# Patient Record
Sex: MALE | Race: WHITE | NOT HISPANIC OR LATINO | Employment: OTHER | URBAN - METROPOLITAN AREA
[De-identification: names, ages, dates, MRNs, and addresses within clinical notes are randomized per-mention and may not be internally consistent; named-entity substitution may affect disease eponyms.]

---

## 2022-05-26 ENCOUNTER — TRANSCRIBE ORDERS (OUTPATIENT)
Dept: HOME HEALTH SERVICES | Facility: HOME HEALTHCARE | Age: 77
End: 2022-05-26

## 2022-05-26 ENCOUNTER — HOME HEALTH ADMISSION (OUTPATIENT)
Dept: HOME HEALTH SERVICES | Facility: HOME HEALTHCARE | Age: 77
End: 2022-05-26

## 2022-05-26 DIAGNOSIS — L03.115 CELLULITIS OF RIGHT FOOT: Primary | ICD-10-CM

## 2022-05-27 ENCOUNTER — HOME CARE VISIT (OUTPATIENT)
Dept: HOME HEALTH SERVICES | Facility: HOME HEALTHCARE | Age: 77
End: 2022-05-27

## 2022-05-27 PROCEDURE — G0299 HHS/HOSPICE OF RN EA 15 MIN: HCPCS

## 2022-05-30 NOTE — HOME HEALTH
Home Health ordered for: SN  Reason for hosp/primary dx/co-morbidities: cellulitis BLE's, afib, HTN, morbid obesity, Covid-19  Focus of care:  Wound care BLE's  Current functional status/mobility/DME:  Minimal assist w/ walker or wheelchair  HB status/living arrangements:  Patient is homebound and lives with spouse.   Skin integrity/wound status:  Several open wounds to BLE's.   Code status:  DNR  Fall risk:  Patient is a risk for falls  POC confirmed with Dr. Perez's office on 5.27.22.

## 2022-05-31 ENCOUNTER — HOME CARE VISIT (OUTPATIENT)
Dept: HOME HEALTH SERVICES | Facility: HOME HEALTHCARE | Age: 77
End: 2022-05-31

## 2022-05-31 VITALS
TEMPERATURE: 98.9 F | SYSTOLIC BLOOD PRESSURE: 134 MMHG | DIASTOLIC BLOOD PRESSURE: 78 MMHG | RESPIRATION RATE: 14 BRPM | OXYGEN SATURATION: 93 % | HEART RATE: 67 BPM

## 2022-05-31 PROCEDURE — G0300 HHS/HOSPICE OF LPN EA 15 MIN: HCPCS

## 2022-05-31 NOTE — HOME HEALTH
Routine Visit Note: LPN    Skill/education provided: *wound care/unna boots    Patient/caregiver response: pt will call [facility] if s/s of infection noted such increased pain , drainage, swelling odor.    Plan for next visit: Wound care/ unna boots    Other pertinent info: non measurable wounds

## 2022-06-03 ENCOUNTER — HOME CARE VISIT (OUTPATIENT)
Dept: HOME HEALTH SERVICES | Facility: HOME HEALTHCARE | Age: 77
End: 2022-06-03

## 2022-06-03 VITALS
RESPIRATION RATE: 18 BRPM | DIASTOLIC BLOOD PRESSURE: 72 MMHG | TEMPERATURE: 97.9 F | OXYGEN SATURATION: 95 % | HEART RATE: 76 BPM | SYSTOLIC BLOOD PRESSURE: 118 MMHG

## 2022-06-03 PROCEDURE — G0299 HHS/HOSPICE OF RN EA 15 MIN: HCPCS

## 2022-06-03 NOTE — HOME HEALTH
SNV for wound care to BLE's. Dressings removed and unaboots applied to BLE's. Wounds are improving and scabbing over in some areas. Patient states he may go see ID on 6/7. Will continue SN visits next week for wound care.

## 2022-06-06 ENCOUNTER — HOME CARE VISIT (OUTPATIENT)
Dept: HOME HEALTH SERVICES | Facility: HOME HEALTHCARE | Age: 77
End: 2022-06-06

## 2022-06-06 PROCEDURE — G0300 HHS/HOSPICE OF LPN EA 15 MIN: HCPCS

## 2022-06-07 VITALS
DIASTOLIC BLOOD PRESSURE: 74 MMHG | TEMPERATURE: 97.2 F | RESPIRATION RATE: 15 BRPM | HEART RATE: 76 BPM | OXYGEN SATURATION: 97 % | SYSTOLIC BLOOD PRESSURE: 120 MMHG

## 2022-06-10 ENCOUNTER — HOME CARE VISIT (OUTPATIENT)
Dept: HOME HEALTH SERVICES | Facility: HOME HEALTHCARE | Age: 77
End: 2022-06-10

## 2022-06-10 PROCEDURE — G0299 HHS/HOSPICE OF RN EA 15 MIN: HCPCS

## 2022-06-12 VITALS
HEART RATE: 73 BPM | OXYGEN SATURATION: 96 % | TEMPERATURE: 97.6 F | DIASTOLIC BLOOD PRESSURE: 72 MMHG | RESPIRATION RATE: 24 BRPM | SYSTOLIC BLOOD PRESSURE: 124 MMHG

## 2022-06-12 NOTE — HOME HEALTH
SNV for unna boots BLE's.  Legs are healing well.  Continued care next week for unna boot change.  Patient to see Chris on Tuesday.

## 2022-06-15 ENCOUNTER — HOME CARE VISIT (OUTPATIENT)
Dept: HOME HEALTH SERVICES | Facility: HOME HEALTHCARE | Age: 77
End: 2022-06-15

## 2022-06-15 VITALS
OXYGEN SATURATION: 99 % | SYSTOLIC BLOOD PRESSURE: 116 MMHG | TEMPERATURE: 98.6 F | HEART RATE: 66 BPM | DIASTOLIC BLOOD PRESSURE: 68 MMHG | RESPIRATION RATE: 12 BRPM

## 2022-06-15 PROCEDURE — G0300 HHS/HOSPICE OF LPN EA 15 MIN: HCPCS

## 2022-06-15 NOTE — HOME HEALTH
Routine Visit Note:  LPN    Skill/education provided: unna boot change to BLE, mulitiple small venous skin lesions not measurable. wounds are healing. Edema to BLE non pitting 3+.    Patient/caregiver response: Pt will call [facility] if s/s of infection are noted.    Plan for next visit: unna boot change to BLE wound care.    Other pertinent info: supplies ordered today

## 2022-06-18 ENCOUNTER — HOME CARE VISIT (OUTPATIENT)
Dept: HOME HEALTH SERVICES | Facility: HOME HEALTHCARE | Age: 77
End: 2022-06-18

## 2022-06-18 VITALS
HEART RATE: 64 BPM | OXYGEN SATURATION: 95 % | DIASTOLIC BLOOD PRESSURE: 61 MMHG | RESPIRATION RATE: 16 BRPM | SYSTOLIC BLOOD PRESSURE: 129 MMHG | TEMPERATURE: 98 F

## 2022-06-18 PROCEDURE — G0299 HHS/HOSPICE OF RN EA 15 MIN: HCPCS

## 2022-06-21 ENCOUNTER — HOME CARE VISIT (OUTPATIENT)
Dept: HOME HEALTH SERVICES | Facility: HOME HEALTHCARE | Age: 77
End: 2022-06-21

## 2022-06-21 NOTE — CASE COMMUNICATION
Patient missed a visit from [facility] on 6/21/2022    Reason:Vascular MD appt      For your records only.   As per home health protocol, MD must be notified of missed/cancelled visits; therefore the prescribed frequency was not met.

## 2022-06-28 ENCOUNTER — HOME CARE VISIT (OUTPATIENT)
Dept: HOME HEALTH SERVICES | Facility: HOME HEALTHCARE | Age: 77
End: 2022-06-28

## 2022-06-28 VITALS
SYSTOLIC BLOOD PRESSURE: 132 MMHG | TEMPERATURE: 97.7 F | RESPIRATION RATE: 16 BRPM | OXYGEN SATURATION: 96 % | HEART RATE: 67 BPM | DIASTOLIC BLOOD PRESSURE: 85 MMHG

## 2022-06-28 PROCEDURE — G0299 HHS/HOSPICE OF RN EA 15 MIN: HCPCS

## 2022-11-20 ENCOUNTER — LAB (OUTPATIENT)
Dept: LAB | Facility: HOSPITAL | Age: 77
End: 2022-11-20

## 2022-11-20 ENCOUNTER — TRANSCRIBE ORDERS (OUTPATIENT)
Dept: LAB | Facility: HOSPITAL | Age: 77
End: 2022-11-20

## 2022-11-20 DIAGNOSIS — L03.115 CELLULITIS OF RIGHT FOOT: ICD-10-CM

## 2022-11-20 DIAGNOSIS — S80.812A INFECTED ABRASION OF LEFT LOWER EXTREMITY, INITIAL ENCOUNTER: Primary | ICD-10-CM

## 2022-11-20 DIAGNOSIS — L08.9 INFECTED ABRASION OF LEFT LOWER EXTREMITY, INITIAL ENCOUNTER: Primary | ICD-10-CM

## 2023-06-09 ENCOUNTER — PRE-ADMISSION TESTING (OUTPATIENT)
Dept: PREADMISSION TESTING | Facility: HOSPITAL | Age: 78
End: 2023-06-09
Payer: MEDICARE

## 2023-06-09 VITALS — BODY MASS INDEX: 41.75 KG/M2 | HEIGHT: 73 IN | WEIGHT: 315 LBS

## 2023-06-09 LAB
25(OH)D3 SERPL-MCNC: 34.7 NG/ML (ref 30–100)
ALBUMIN SERPL-MCNC: 4.2 G/DL (ref 3.5–5.2)
ALBUMIN/GLOB SERPL: 1.9 G/DL
ALP SERPL-CCNC: 50 U/L (ref 39–117)
ALT SERPL W P-5'-P-CCNC: 26 U/L (ref 1–41)
ANION GAP SERPL CALCULATED.3IONS-SCNC: 10 MMOL/L (ref 5–15)
APTT PPP: 36.3 SECONDS (ref 22–39)
AST SERPL-CCNC: 21 U/L (ref 1–40)
BASOPHILS # BLD AUTO: 0.04 10*3/MM3 (ref 0–0.2)
BASOPHILS NFR BLD AUTO: 0.5 % (ref 0–1.5)
BILIRUB SERPL-MCNC: 0.5 MG/DL (ref 0–1.2)
BUN SERPL-MCNC: 14 MG/DL (ref 8–23)
BUN/CREAT SERPL: 18.7 (ref 7–25)
CALCIUM SPEC-SCNC: 9.6 MG/DL (ref 8.6–10.5)
CHLORIDE SERPL-SCNC: 102 MMOL/L (ref 98–107)
CO2 SERPL-SCNC: 28 MMOL/L (ref 22–29)
CREAT SERPL-MCNC: 0.75 MG/DL (ref 0.76–1.27)
CRP SERPL-MCNC: 1.1 MG/DL (ref 0–0.5)
DEPRECATED RDW RBC AUTO: 47.6 FL (ref 37–54)
EGFRCR SERPLBLD CKD-EPI 2021: 93.5 ML/MIN/1.73
EOSINOPHIL # BLD AUTO: 0.21 10*3/MM3 (ref 0–0.4)
EOSINOPHIL NFR BLD AUTO: 2.4 % (ref 0.3–6.2)
ERYTHROCYTE [DISTWIDTH] IN BLOOD BY AUTOMATED COUNT: 13.4 % (ref 12.3–15.4)
ERYTHROCYTE [SEDIMENTATION RATE] IN BLOOD: 26 MM/HR (ref 0–20)
GLOBULIN UR ELPH-MCNC: 2.2 GM/DL
GLUCOSE SERPL-MCNC: 100 MG/DL (ref 65–99)
HBA1C MFR BLD: 5 % (ref 4.8–5.6)
HCT VFR BLD AUTO: 39.5 % (ref 37.5–51)
HGB BLD-MCNC: 13.4 G/DL (ref 13–17.7)
IMM GRANULOCYTES # BLD AUTO: 0.02 10*3/MM3 (ref 0–0.05)
IMM GRANULOCYTES NFR BLD AUTO: 0.2 % (ref 0–0.5)
INR PPP: 1.17 (ref 0.89–1.12)
LYMPHOCYTES # BLD AUTO: 1.71 10*3/MM3 (ref 0.7–3.1)
LYMPHOCYTES NFR BLD AUTO: 19.3 % (ref 19.6–45.3)
MCH RBC QN AUTO: 32.8 PG (ref 26.6–33)
MCHC RBC AUTO-ENTMCNC: 33.9 G/DL (ref 31.5–35.7)
MCV RBC AUTO: 96.6 FL (ref 79–97)
MONOCYTES # BLD AUTO: 0.56 10*3/MM3 (ref 0.1–0.9)
MONOCYTES NFR BLD AUTO: 6.3 % (ref 5–12)
NEUTROPHILS NFR BLD AUTO: 6.32 10*3/MM3 (ref 1.7–7)
NEUTROPHILS NFR BLD AUTO: 71.3 % (ref 42.7–76)
NRBC BLD AUTO-RTO: 0 /100 WBC (ref 0–0.2)
PLATELET # BLD AUTO: 149 10*3/MM3 (ref 140–450)
PMV BLD AUTO: 9.7 FL (ref 6–12)
POTASSIUM SERPL-SCNC: 4.5 MMOL/L (ref 3.5–5.2)
PROT SERPL-MCNC: 6.4 G/DL (ref 6–8.5)
PROTHROMBIN TIME: 15 SECONDS (ref 12.2–14.5)
QT INTERVAL: 396 MS
QTC INTERVAL: 462 MS
RBC # BLD AUTO: 4.09 10*6/MM3 (ref 4.14–5.8)
SODIUM SERPL-SCNC: 140 MMOL/L (ref 136–145)
WBC NRBC COR # BLD: 8.86 10*3/MM3 (ref 3.4–10.8)

## 2023-06-09 PROCEDURE — 80053 COMPREHEN METABOLIC PANEL: CPT

## 2023-06-09 PROCEDURE — 85730 THROMBOPLASTIN TIME PARTIAL: CPT

## 2023-06-09 PROCEDURE — 87081 CULTURE SCREEN ONLY: CPT

## 2023-06-09 PROCEDURE — G0480 DRUG TEST DEF 1-7 CLASSES: HCPCS

## 2023-06-09 PROCEDURE — 82985 ASSAY OF GLYCATED PROTEIN: CPT

## 2023-06-09 PROCEDURE — 93010 ELECTROCARDIOGRAM REPORT: CPT | Performed by: INTERNAL MEDICINE

## 2023-06-09 PROCEDURE — 36415 COLL VENOUS BLD VENIPUNCTURE: CPT

## 2023-06-09 PROCEDURE — 85610 PROTHROMBIN TIME: CPT

## 2023-06-09 PROCEDURE — 82306 VITAMIN D 25 HYDROXY: CPT

## 2023-06-09 PROCEDURE — 93005 ELECTROCARDIOGRAM TRACING: CPT

## 2023-06-09 PROCEDURE — 83036 HEMOGLOBIN GLYCOSYLATED A1C: CPT

## 2023-06-09 PROCEDURE — 85652 RBC SED RATE AUTOMATED: CPT

## 2023-06-09 PROCEDURE — 85025 COMPLETE CBC W/AUTO DIFF WBC: CPT

## 2023-06-09 PROCEDURE — 86140 C-REACTIVE PROTEIN: CPT

## 2023-06-09 RX ORDER — KETOCONAZOLE 20 MG/ML
SHAMPOO TOPICAL WEEKLY
COMMUNITY

## 2023-06-09 RX ORDER — SENNOSIDES 8.6 MG
650 CAPSULE ORAL EVERY 8 HOURS PRN
COMMUNITY

## 2023-06-09 RX ORDER — DOXYCYCLINE HYCLATE 100 MG
1 TABLET ORAL EVERY 12 HOURS SCHEDULED
COMMUNITY
Start: 2023-05-31

## 2023-06-09 RX ORDER — APIXABAN 5 MG/1
1 TABLET, FILM COATED ORAL EVERY 12 HOURS SCHEDULED
COMMUNITY
Start: 2023-04-29

## 2023-06-09 RX ORDER — FEXOFENADINE HCL 180 MG/1
180 TABLET ORAL 2 TIMES DAILY
COMMUNITY

## 2023-06-09 RX ORDER — MUPIROCIN CALCIUM 20 MG/G
CREAM TOPICAL
COMMUNITY

## 2023-06-09 RX ORDER — AMOXICILLIN 250 MG
1 CAPSULE ORAL DAILY
COMMUNITY

## 2023-06-09 NOTE — PAT
An arrival time for procedure was not provided during PAT visit. If patient had any questions or concerns about their arrival time, they were instructed to contact their surgeon/physician.  Additionally, if the patient referred to an arrival time that was acquired from their my chart account, patient was encouraged to verify that time with their surgeon/physician. Arrival times are NOT provided in Pre Admission Testing Department.    Patient viewed general PAT education video as instructed in their preoperative information received from their surgeon.  Patient stated the general PAT education video was viewed in its entirety and survey completed.  Copies of PAT general education handouts (Incentive Spirometry, Meds to Beds Program, Patient Belongings, Pre-op skin preparation instructions, Blood Glucose testing, Visitor policy, Surgery FAQ, Code H) distributed to patient if not printed. Education related to the PAT pass and skin preparation for surgery (if applicable) completed in PAT as a reinforcement to PAT education video. Patient instructed to return PAT pass provided today as well as completed skin preparation sheet (if applicable) on the day of procedure.     Additionally if patient had not viewed video yet but intended to view it at home or in our waiting area, then referred them to the handout with QR code/link provided during PAT visit.  Instructed patient to complete survey after viewing the video in its entirety.  Encouraged patient/family to read PAT general education handouts thoroughly and notify PAT staff with any questions or concerns. Patient verbalized understanding of all information and priority content.    Patient denies any current skin issues.     Patient to apply Chlorhexadine wipes  to surgical area (as instructed) the night before procedure and the AM of procedure. Wipes provided.    Patient instructed to drink 20 ounces of Gatorade and it needs to be completed 1 hour (for Main OR patients)  or 2 hours ([facility]) before given arrival time for procedure (NO RED Gatorade)    Patient verbalized understanding.    Prescription for Chlorhexidine shower called into patient's pharmacy or [facility] pharmacy by patient's surgeon.  Reinforced with patient to  the prescription from applicable pharmacy if they haven't already.  Verbal and written instructions given regarding proper use of Chlorhexidine body wash to patient and/or famlily during PAT visit. Patient/family also instructed to complete checklist and return it to Pre-op on the day of surgery.  Patient and/or family verbalized understanding.    [employer] (by [employer]) pain pump patient informational handout given to patient.  Instructed patient to watch [employer] Patient Education Video regarding Peripheral Nerve Catheter that will be in place for upcoming surgery unless contraindicated. The video can be accessed using QR code noted on handout.  Patient agreed to watch video.  Stressed to patient to call [employer] Nursing Hotline  if patient has any questions or concerns after discharge.     Post-Surgery Information Instruction Sheet given to patient during Pre-Admission Testing Visit with verbal instructions to patient to return with PAT PASS on the day of surgery. Additionally, encouraged patient to review the information provided.    Discussed with patient options for receiving total joint replacement education and assessed patient's ability and preference. Joint Replacement Guide given to patient during PAT visit since not received a copy within the last year. Encouraged patient/family to read guide thoroughly and notify PAT staff with any questions or concerns. Handout provided directing patient to links to watch online videos related to joint replacement surgery on the [facility] website. The handout gives detailed instructions for joining an online joint replacement class through Zoom or phone  "conference offered on Thursdays. Patient agreed to participate by watching videos online. Patient verbalized understanding of instructions and to complete the online learning tool survey. Encouraged to share information with family and/or . An overview of the joint replacement education was provided during the visit including general perioperative instructions that are routine for all surgical patients (PAT PASS, wipes, directions to pre-op, etc.).    Verified patient previously completed cardiology visit for cardiac risk assessment in preparation for upcoming procedure, completion of 12-lead ECG within six months, and risk assessment letter reviewed. No further interventions required.   CARDIAC RISK ASSESSMENT FROM DR. RICKS ON 5/18/23 IN EPIC UNDER \"MEDIA\" TAB. PT TO HOLD ELIQUIS STARTING (3) DAYS PRIOR TO PROCEDURE. PT VERBALIZED UNDERSTANDING.      "

## 2023-06-10 LAB — MRSA SPEC QL CULT: NORMAL

## 2023-06-11 LAB — FRUCTOSAMINE SERPL-SCNC: 229 UMOL/L (ref 0–285)

## 2023-06-14 LAB
COTININE SERPL-MCNC: <1 NG/ML
NICOTINE SERPL-MCNC: <1 NG/ML

## 2023-06-22 PROBLEM — Z79.01 CHRONIC ANTICOAGULATION: Status: ACTIVE | Noted: 2023-06-22

## 2023-06-22 PROBLEM — Z96.651 S/P TKR (TOTAL KNEE REPLACEMENT), RIGHT: Status: ACTIVE | Noted: 2023-06-22

## 2023-06-22 PROBLEM — G47.30 SLEEP APNEA: Status: ACTIVE | Noted: 2023-06-22

## 2023-06-22 PROBLEM — I10 HTN (HYPERTENSION): Status: ACTIVE | Noted: 2023-06-22

## 2023-06-22 PROBLEM — M25.561 RIGHT KNEE PAIN: Status: ACTIVE | Noted: 2023-06-22

## 2023-06-22 PROBLEM — E66.01 MORBID OBESITY: Status: ACTIVE | Noted: 2023-06-22

## 2023-06-25 PROBLEM — G89.18 ACUTE POSTOPERATIVE PAIN: Status: ACTIVE | Noted: 2023-06-25

## 2024-08-30 ENCOUNTER — HOSPITAL ENCOUNTER (INPATIENT)
Facility: HOSPITAL | Age: 79
LOS: 11 days | Discharge: NURSING, REHAB OR LONG-TERM CARE | DRG: 871 | End: 2024-09-10
Attending: EMERGENCY MEDICINE | Admitting: INTERNAL MEDICINE
Payer: MEDICARE

## 2024-08-30 ENCOUNTER — APPOINTMENT (OUTPATIENT)
Dept: GENERAL RADIOLOGY | Facility: HOSPITAL | Age: 79
DRG: 871 | End: 2024-08-30
Payer: MEDICARE

## 2024-08-30 DIAGNOSIS — J96.01 ACUTE RESPIRATORY FAILURE WITH HYPOXEMIA: Primary | ICD-10-CM

## 2024-08-30 DIAGNOSIS — A41.9 ACUTE SEPSIS: ICD-10-CM

## 2024-08-30 DIAGNOSIS — J81.0 FLASH PULMONARY EDEMA: ICD-10-CM

## 2024-08-30 DIAGNOSIS — I50.20 HFREF (HEART FAILURE WITH REDUCED EJECTION FRACTION): ICD-10-CM

## 2024-08-30 DIAGNOSIS — G89.21 CHRONIC PAIN DUE TO TRAUMA: ICD-10-CM

## 2024-08-30 DIAGNOSIS — I49.3 FREQUENT PVCS: ICD-10-CM

## 2024-08-30 DIAGNOSIS — I16.1 HYPERTENSIVE EMERGENCY: ICD-10-CM

## 2024-08-30 DIAGNOSIS — I48.19 PERSISTENT ATRIAL FIBRILLATION: ICD-10-CM

## 2024-08-30 DIAGNOSIS — J18.9 PNEUMONIA OF BOTH LOWER LOBES DUE TO INFECTIOUS ORGANISM: ICD-10-CM

## 2024-08-30 DIAGNOSIS — J96.02 ACUTE RESPIRATORY FAILURE WITH HYPOXIA AND HYPERCAPNIA: ICD-10-CM

## 2024-08-30 DIAGNOSIS — J96.01 ACUTE RESPIRATORY FAILURE WITH HYPOXIA AND HYPERCAPNIA: ICD-10-CM

## 2024-08-30 PROBLEM — G47.30 SLEEP APNEA: Status: RESOLVED | Noted: 2023-06-22 | Resolved: 2024-08-30

## 2024-08-30 PROBLEM — Z72.0 CONTINUOUS TOBACCO ABUSE: Status: ACTIVE | Noted: 2024-08-30

## 2024-08-30 LAB
ABO GROUP BLD: NORMAL
ABO GROUP BLD: NORMAL
ALBUMIN SERPL-MCNC: 3 G/DL (ref 3.5–5.2)
ALBUMIN SERPL-MCNC: 3.6 G/DL (ref 3.5–5.2)
ALBUMIN/GLOB SERPL: 1.3 G/DL
ALBUMIN/GLOB SERPL: 1.3 G/DL
ALP SERPL-CCNC: 42 U/L (ref 39–117)
ALP SERPL-CCNC: 54 U/L (ref 39–117)
ALT SERPL W P-5'-P-CCNC: 14 U/L (ref 1–41)
ALT SERPL W P-5'-P-CCNC: 14 U/L (ref 1–41)
ANION GAP SERPL CALCULATED.3IONS-SCNC: 11 MMOL/L (ref 5–15)
ANION GAP SERPL CALCULATED.3IONS-SCNC: 12 MMOL/L (ref 5–15)
APTT PPP: 25 SECONDS (ref 60–90)
ARTERIAL PATENCY WRIST A: ABNORMAL
AST SERPL-CCNC: 22 U/L (ref 1–40)
AST SERPL-CCNC: 27 U/L (ref 1–40)
ATMOSPHERIC PRESS: ABNORMAL MM[HG]
B PARAPERT DNA SPEC QL NAA+PROBE: NOT DETECTED
B PERT DNA SPEC QL NAA+PROBE: NOT DETECTED
BACTERIA UR QL AUTO: ABNORMAL /HPF
BASE EXCESS BLDA CALC-SCNC: -1.2 MMOL/L (ref 0–2)
BASE EXCESS BLDA CALC-SCNC: -1.5 MMOL/L (ref 0–2)
BASE EXCESS BLDA CALC-SCNC: -7.8 MMOL/L (ref 0–2)
BASOPHILS # BLD AUTO: 0.08 10*3/MM3 (ref 0–0.2)
BASOPHILS # BLD MANUAL: 0 10*3/MM3 (ref 0–0.2)
BASOPHILS NFR BLD AUTO: 0.3 % (ref 0–1.5)
BASOPHILS NFR BLD MANUAL: 0 % (ref 0–1.5)
BDY SITE: ABNORMAL
BILIRUB SERPL-MCNC: 0.5 MG/DL (ref 0–1.2)
BILIRUB SERPL-MCNC: 0.8 MG/DL (ref 0–1.2)
BILIRUB UR QL STRIP: NEGATIVE
BLD GP AB SCN SERPL QL: NEGATIVE
BODY TEMPERATURE: 37
BUN SERPL-MCNC: 19 MG/DL (ref 8–23)
BUN SERPL-MCNC: 21 MG/DL (ref 8–23)
BUN/CREAT SERPL: 17.2 (ref 7–25)
BUN/CREAT SERPL: 19 (ref 7–25)
C PNEUM DNA NPH QL NAA+NON-PROBE: NOT DETECTED
CALCIUM SPEC-SCNC: 7.4 MG/DL (ref 8.6–10.5)
CALCIUM SPEC-SCNC: 8.3 MG/DL (ref 8.6–10.5)
CHLORIDE SERPL-SCNC: 105 MMOL/L (ref 98–107)
CHLORIDE SERPL-SCNC: 110 MMOL/L (ref 98–107)
CK SERPL-CCNC: 71 U/L (ref 20–200)
CLARITY UR: CLEAR
CO2 BLDA-SCNC: 24.3 MMOL/L (ref 22–33)
CO2 BLDA-SCNC: 26.1 MMOL/L (ref 22–33)
CO2 BLDA-SCNC: 28.7 MMOL/L (ref 22–33)
CO2 SERPL-SCNC: 21 MMOL/L (ref 22–29)
CO2 SERPL-SCNC: 24 MMOL/L (ref 22–29)
COARSE GRAN CASTS URNS QL MICRO: ABNORMAL /LPF
COHGB MFR BLD: 1.3 % (ref 0–2)
COHGB MFR BLD: 1.4 % (ref 0–2)
COHGB MFR BLD: 1.5 % (ref 0–2)
COLOR UR: YELLOW
CREAT SERPL-MCNC: 1 MG/DL (ref 0.76–1.27)
CREAT SERPL-MCNC: 1.22 MG/DL (ref 0.76–1.27)
CRP SERPL-MCNC: 0.81 MG/DL (ref 0–0.5)
D-LACTATE SERPL-SCNC: 7.3 MMOL/L (ref 0.5–2)
DEPRECATED RDW RBC AUTO: 57.8 FL (ref 37–54)
DEPRECATED RDW RBC AUTO: 59.7 FL (ref 37–54)
EGFRCR SERPLBLD CKD-EPI 2021: 60.7 ML/MIN/1.73
EGFRCR SERPLBLD CKD-EPI 2021: 77 ML/MIN/1.73
EOSINOPHIL # BLD AUTO: 0.04 10*3/MM3 (ref 0–0.4)
EOSINOPHIL # BLD MANUAL: 0.8 10*3/MM3 (ref 0–0.4)
EOSINOPHIL NFR BLD AUTO: 0.2 % (ref 0.3–6.2)
EOSINOPHIL NFR BLD MANUAL: 3 % (ref 0.3–6.2)
EPAP: 0
ERYTHROCYTE [DISTWIDTH] IN BLOOD BY AUTOMATED COUNT: 15.5 % (ref 12.3–15.4)
ERYTHROCYTE [DISTWIDTH] IN BLOOD BY AUTOMATED COUNT: 15.7 % (ref 12.3–15.4)
ERYTHROCYTE [SEDIMENTATION RATE] IN BLOOD: 18 MM/HR (ref 0–20)
FLUAV SUBTYP SPEC NAA+PROBE: NOT DETECTED
FLUBV RNA ISLT QL NAA+PROBE: NOT DETECTED
GEN 5 2HR TROPONIN T REFLEX: 519 NG/L
GLOBULIN UR ELPH-MCNC: 2.4 GM/DL
GLOBULIN UR ELPH-MCNC: 2.8 GM/DL
GLUCOSE BLDC GLUCOMTR-MCNC: 130 MG/DL (ref 70–130)
GLUCOSE BLDC GLUCOMTR-MCNC: 137 MG/DL (ref 70–130)
GLUCOSE BLDC GLUCOMTR-MCNC: 190 MG/DL (ref 70–130)
GLUCOSE SERPL-MCNC: 130 MG/DL (ref 65–99)
GLUCOSE SERPL-MCNC: 224 MG/DL (ref 65–99)
GLUCOSE UR STRIP-MCNC: NEGATIVE MG/DL
HADV DNA SPEC NAA+PROBE: NOT DETECTED
HBA1C MFR BLD: 5 % (ref 4.8–5.6)
HCO3 BLDA-SCNC: 23.2 MMOL/L (ref 20–26)
HCO3 BLDA-SCNC: 23.7 MMOL/L (ref 20–26)
HCO3 BLDA-SCNC: 26.9 MMOL/L (ref 20–26)
HCOV 229E RNA SPEC QL NAA+PROBE: NOT DETECTED
HCOV HKU1 RNA SPEC QL NAA+PROBE: NOT DETECTED
HCOV NL63 RNA SPEC QL NAA+PROBE: NOT DETECTED
HCOV OC43 RNA SPEC QL NAA+PROBE: NOT DETECTED
HCT VFR BLD AUTO: 39.1 % (ref 37.5–51)
HCT VFR BLD AUTO: 43.9 % (ref 37.5–51)
HCT VFR BLD CALC: 37.4 % (ref 38–51)
HCT VFR BLD CALC: 38.9 % (ref 38–51)
HCT VFR BLD CALC: 42.1 % (ref 38–51)
HGB BLD-MCNC: 12.6 G/DL (ref 13–17.7)
HGB BLD-MCNC: 13.6 G/DL (ref 13–17.7)
HGB BLDA-MCNC: 12.2 G/DL (ref 13.5–17.5)
HGB BLDA-MCNC: 12.7 G/DL (ref 13.5–17.5)
HGB BLDA-MCNC: 13.7 G/DL (ref 13.5–17.5)
HGB UR QL STRIP.AUTO: NEGATIVE
HMPV RNA NPH QL NAA+NON-PROBE: NOT DETECTED
HPIV1 RNA ISLT QL NAA+PROBE: NOT DETECTED
HPIV2 RNA SPEC QL NAA+PROBE: NOT DETECTED
HPIV3 RNA NPH QL NAA+PROBE: NOT DETECTED
HPIV4 P GENE NPH QL NAA+PROBE: NOT DETECTED
HYALINE CASTS UR QL AUTO: ABNORMAL /LPF
IMM GRANULOCYTES # BLD AUTO: 0.16 10*3/MM3 (ref 0–0.05)
IMM GRANULOCYTES NFR BLD AUTO: 0.6 % (ref 0–0.5)
INHALED O2 CONCENTRATION: 100 %
INHALED O2 CONCENTRATION: 100 %
INHALED O2 CONCENTRATION: 60 %
INR PPP: 1.07 (ref 0.89–1.12)
IPAP: 0
KETONES UR QL STRIP: NEGATIVE
L PNEUMO1 AG UR QL IA: NEGATIVE
LEUKOCYTE ESTERASE UR QL STRIP.AUTO: NEGATIVE
LIPASE SERPL-CCNC: 16 U/L (ref 13–60)
LYMPHOCYTES # BLD AUTO: 1.57 10*3/MM3 (ref 0.7–3.1)
LYMPHOCYTES # BLD MANUAL: 7.2 10*3/MM3 (ref 0.7–3.1)
LYMPHOCYTES NFR BLD AUTO: 6.4 % (ref 19.6–45.3)
LYMPHOCYTES NFR BLD MANUAL: 3 % (ref 5–12)
Lab: ABNORMAL
M PNEUMO IGG SER IA-ACNC: NOT DETECTED
MAGNESIUM SERPL-MCNC: 2.2 MG/DL (ref 1.6–2.4)
MCH RBC QN AUTO: 32.1 PG (ref 26.6–33)
MCH RBC QN AUTO: 32.5 PG (ref 26.6–33)
MCHC RBC AUTO-ENTMCNC: 31 G/DL (ref 31.5–35.7)
MCHC RBC AUTO-ENTMCNC: 32.2 G/DL (ref 31.5–35.7)
MCV RBC AUTO: 104.8 FL (ref 79–97)
MCV RBC AUTO: 99.5 FL (ref 79–97)
METAMYELOCYTES NFR BLD MANUAL: 1 % (ref 0–0)
METHGB BLD QL: 0 % (ref 0–1.5)
METHGB BLD QL: 0.3 % (ref 0–1.5)
METHGB BLD QL: 0.3 % (ref 0–1.5)
MODALITY: ABNORMAL
MONOCYTES # BLD AUTO: 0.94 10*3/MM3 (ref 0.1–0.9)
MONOCYTES # BLD: 0.8 10*3/MM3 (ref 0.1–0.9)
MONOCYTES NFR BLD AUTO: 3.8 % (ref 5–12)
MRSA DNA SPEC QL NAA+PROBE: NEGATIVE
NEUTROPHILS # BLD AUTO: 17.59 10*3/MM3 (ref 1.7–7)
NEUTROPHILS NFR BLD AUTO: 21.91 10*3/MM3 (ref 1.7–7)
NEUTROPHILS NFR BLD AUTO: 88.7 % (ref 42.7–76)
NEUTROPHILS NFR BLD MANUAL: 64 % (ref 42.7–76)
NEUTS BAND NFR BLD MANUAL: 2 % (ref 0–5)
NITRITE UR QL STRIP: NEGATIVE
NOTIFIED BY: ABNORMAL
NOTIFIED WHO: ABNORMAL
NRBC BLD AUTO-RTO: 0 /100 WBC (ref 0–0.2)
NT-PROBNP SERPL-MCNC: 1061 PG/ML (ref 0–1800)
OXYHGB MFR BLDV: 88.8 % (ref 94–99)
OXYHGB MFR BLDV: 94.6 % (ref 94–99)
OXYHGB MFR BLDV: 97.8 % (ref 94–99)
PAW @ PEAK INSP FLOW SETTING VENT: 0 CMH2O
PCO2 BLDA: 38 MM HG (ref 35–45)
PCO2 BLDA: 59.8 MM HG (ref 35–45)
PCO2 BLDA: 78.8 MM HG (ref 35–45)
PCO2 TEMP ADJ BLD: 38 MM HG (ref 35–48)
PCO2 TEMP ADJ BLD: 59.8 MM HG (ref 35–48)
PCO2 TEMP ADJ BLD: 78.8 MM HG (ref 35–48)
PEEP RESPIRATORY: 10 CM[H2O]
PH BLDA: 7.09 PH UNITS (ref 7.35–7.45)
PH BLDA: 7.26 PH UNITS (ref 7.35–7.45)
PH BLDA: 7.39 PH UNITS (ref 7.35–7.45)
PH UR STRIP.AUTO: 5.5 [PH] (ref 5–8)
PH, TEMP CORRECTED: 7.09 PH UNITS
PH, TEMP CORRECTED: 7.26 PH UNITS
PH, TEMP CORRECTED: 7.39 PH UNITS
PHOSPHATE SERPL-MCNC: 5.6 MG/DL (ref 2.5–4.5)
PLAT MORPH BLD: NORMAL
PLATELET # BLD AUTO: 178 10*3/MM3 (ref 140–450)
PLATELET # BLD AUTO: 242 10*3/MM3 (ref 140–450)
PMV BLD AUTO: 10 FL (ref 6–12)
PMV BLD AUTO: 9.5 FL (ref 6–12)
PO2 BLDA: 119 MM HG (ref 83–108)
PO2 BLDA: 80.1 MM HG (ref 83–108)
PO2 BLDA: 87.9 MM HG (ref 83–108)
PO2 TEMP ADJ BLD: 119 MM HG (ref 83–108)
PO2 TEMP ADJ BLD: 80.1 MM HG (ref 83–108)
PO2 TEMP ADJ BLD: 87.9 MM HG (ref 83–108)
POTASSIUM SERPL-SCNC: 3.7 MMOL/L (ref 3.5–5.2)
POTASSIUM SERPL-SCNC: 4.1 MMOL/L (ref 3.5–5.2)
PROCALCITONIN SERPL-MCNC: 0.13 NG/ML (ref 0–0.25)
PROT SERPL-MCNC: 5.4 G/DL (ref 6–8.5)
PROT SERPL-MCNC: 6.4 G/DL (ref 6–8.5)
PROT UR QL STRIP: ABNORMAL
PROTHROMBIN TIME: 14 SECONDS (ref 12.2–14.5)
QT INTERVAL: 420 MS
QTC INTERVAL: 475 MS
RBC # BLD AUTO: 3.93 10*6/MM3 (ref 4.14–5.8)
RBC # BLD AUTO: 4.19 10*6/MM3 (ref 4.14–5.8)
RBC # UR STRIP: ABNORMAL /HPF
RBC MORPH BLD: NORMAL
REF LAB TEST METHOD: ABNORMAL
RH BLD: NEGATIVE
RH BLD: NEGATIVE
RHINOVIRUS RNA SPEC NAA+PROBE: NOT DETECTED
RSV RNA NPH QL NAA+NON-PROBE: NOT DETECTED
S PNEUM AG SPEC QL LA: NEGATIVE
SARS-COV-2 RNA NPH QL NAA+NON-PROBE: NOT DETECTED
SODIUM SERPL-SCNC: 141 MMOL/L (ref 136–145)
SODIUM SERPL-SCNC: 142 MMOL/L (ref 136–145)
SP GR UR STRIP: 1.02 (ref 1–1.03)
SQUAMOUS #/AREA URNS HPF: ABNORMAL /HPF
T&S EXPIRATION DATE: NORMAL
TOTAL RATE: 0 BREATHS/MINUTE
TOTAL RATE: 16 BREATHS/MINUTE
TOTAL RATE: 16 BREATHS/MINUTE
TROPONIN T DELTA: 381 NG/L
TROPONIN T SERPL HS-MCNC: 138 NG/L
TROPONIN T SERPL HS-MCNC: 88 NG/L
TSH SERPL DL<=0.05 MIU/L-ACNC: 2.9 UIU/ML (ref 0.27–4.2)
TSH SERPL DL<=0.05 MIU/L-ACNC: 2.9 UIU/ML (ref 0.27–4.2)
UROBILINOGEN UR QL STRIP: ABNORMAL
VARIANT LYMPHS NFR BLD MANUAL: 27 % (ref 19.6–45.3)
VENTILATOR MODE: ABNORMAL
VT ON VENT VENT: 0.5 ML
WBC # UR STRIP: ABNORMAL /HPF
WBC MORPH BLD: NORMAL
WBC NRBC COR # BLD AUTO: 24.7 10*3/MM3 (ref 3.4–10.8)
WBC NRBC COR # BLD AUTO: 26.65 10*3/MM3 (ref 3.4–10.8)

## 2024-08-30 PROCEDURE — 25810000003 SODIUM CHLORIDE 0.9 % SOLUTION: Performed by: EMERGENCY MEDICINE

## 2024-08-30 PROCEDURE — 84484 ASSAY OF TROPONIN QUANT: CPT | Performed by: INTERNAL MEDICINE

## 2024-08-30 PROCEDURE — 94799 UNLISTED PULMONARY SVC/PX: CPT

## 2024-08-30 PROCEDURE — C1751 CATH, INF, PER/CENT/MIDLINE: HCPCS

## 2024-08-30 PROCEDURE — 86900 BLOOD TYPING SEROLOGIC ABO: CPT | Performed by: EMERGENCY MEDICINE

## 2024-08-30 PROCEDURE — 83050 HGB METHEMOGLOBIN QUAN: CPT

## 2024-08-30 PROCEDURE — 93010 ELECTROCARDIOGRAM REPORT: CPT | Performed by: INTERNAL MEDICINE

## 2024-08-30 PROCEDURE — 03HY32Z INSERTION OF MONITORING DEVICE INTO UPPER ARTERY, PERCUTANEOUS APPROACH: ICD-10-PCS | Performed by: INTERNAL MEDICINE

## 2024-08-30 PROCEDURE — 84484 ASSAY OF TROPONIN QUANT: CPT | Performed by: EMERGENCY MEDICINE

## 2024-08-30 PROCEDURE — 83880 ASSAY OF NATRIURETIC PEPTIDE: CPT | Performed by: EMERGENCY MEDICINE

## 2024-08-30 PROCEDURE — 99292 CRITICAL CARE ADDL 30 MIN: CPT | Performed by: INTERNAL MEDICINE

## 2024-08-30 PROCEDURE — 36620 INSERTION CATHETER ARTERY: CPT | Performed by: NURSE PRACTITIONER

## 2024-08-30 PROCEDURE — 36600 WITHDRAWAL OF ARTERIAL BLOOD: CPT

## 2024-08-30 PROCEDURE — 94002 VENT MGMT INPAT INIT DAY: CPT

## 2024-08-30 PROCEDURE — 82375 ASSAY CARBOXYHB QUANT: CPT

## 2024-08-30 PROCEDURE — 86901 BLOOD TYPING SEROLOGIC RH(D): CPT | Performed by: EMERGENCY MEDICINE

## 2024-08-30 PROCEDURE — 87205 SMEAR GRAM STAIN: CPT | Performed by: INTERNAL MEDICINE

## 2024-08-30 PROCEDURE — P9612 CATHETERIZE FOR URINE SPEC: HCPCS

## 2024-08-30 PROCEDURE — 82805 BLOOD GASES W/O2 SATURATION: CPT

## 2024-08-30 PROCEDURE — 82948 REAGENT STRIP/BLOOD GLUCOSE: CPT

## 2024-08-30 PROCEDURE — 93005 ELECTROCARDIOGRAM TRACING: CPT | Performed by: EMERGENCY MEDICINE

## 2024-08-30 PROCEDURE — 63710000001 INSULIN REGULAR HUMAN PER 5 UNITS: Performed by: INTERNAL MEDICINE

## 2024-08-30 PROCEDURE — 25810000003 SODIUM CHLORIDE 0.9 % SOLUTION 250 ML FLEX CONT: Performed by: INTERNAL MEDICINE

## 2024-08-30 PROCEDURE — 84100 ASSAY OF PHOSPHORUS: CPT | Performed by: EMERGENCY MEDICINE

## 2024-08-30 PROCEDURE — 85025 COMPLETE CBC W/AUTO DIFF WBC: CPT | Performed by: EMERGENCY MEDICINE

## 2024-08-30 PROCEDURE — 5A1945Z RESPIRATORY VENTILATION, 24-96 CONSECUTIVE HOURS: ICD-10-PCS | Performed by: EMERGENCY MEDICINE

## 2024-08-30 PROCEDURE — 85610 PROTHROMBIN TIME: CPT | Performed by: EMERGENCY MEDICINE

## 2024-08-30 PROCEDURE — 25010000002 PROPOFOL 1000 MG/100ML EMULSION: Performed by: EMERGENCY MEDICINE

## 2024-08-30 PROCEDURE — 74018 RADEX ABDOMEN 1 VIEW: CPT

## 2024-08-30 PROCEDURE — 84145 PROCALCITONIN (PCT): CPT | Performed by: EMERGENCY MEDICINE

## 2024-08-30 PROCEDURE — 83735 ASSAY OF MAGNESIUM: CPT | Performed by: EMERGENCY MEDICINE

## 2024-08-30 PROCEDURE — 84443 ASSAY THYROID STIM HORMONE: CPT | Performed by: EMERGENCY MEDICINE

## 2024-08-30 PROCEDURE — 86901 BLOOD TYPING SEROLOGIC RH(D): CPT

## 2024-08-30 PROCEDURE — C1894 INTRO/SHEATH, NON-LASER: HCPCS

## 2024-08-30 PROCEDURE — 31500 INSERT EMERGENCY AIRWAY: CPT

## 2024-08-30 PROCEDURE — 80053 COMPREHEN METABOLIC PANEL: CPT | Performed by: INTERNAL MEDICINE

## 2024-08-30 PROCEDURE — 86140 C-REACTIVE PROTEIN: CPT | Performed by: EMERGENCY MEDICINE

## 2024-08-30 PROCEDURE — 25010000002 PROPOFOL 10 MG/ML EMULSION: Performed by: EMERGENCY MEDICINE

## 2024-08-30 PROCEDURE — 25010000002 CEFEPIME PER 500 MG: Performed by: INTERNAL MEDICINE

## 2024-08-30 PROCEDURE — 99291 CRITICAL CARE FIRST HOUR: CPT | Performed by: INTERNAL MEDICINE

## 2024-08-30 PROCEDURE — 25010000002 AZITHROMYCIN PER 500 MG: Performed by: INTERNAL MEDICINE

## 2024-08-30 PROCEDURE — 81001 URINALYSIS AUTO W/SCOPE: CPT | Performed by: EMERGENCY MEDICINE

## 2024-08-30 PROCEDURE — 85730 THROMBOPLASTIN TIME PARTIAL: CPT | Performed by: EMERGENCY MEDICINE

## 2024-08-30 PROCEDURE — 25010000002 CEFEPIME PER 500 MG: Performed by: EMERGENCY MEDICINE

## 2024-08-30 PROCEDURE — 99291 CRITICAL CARE FIRST HOUR: CPT

## 2024-08-30 PROCEDURE — 99222 1ST HOSP IP/OBS MODERATE 55: CPT

## 2024-08-30 PROCEDURE — 86850 RBC ANTIBODY SCREEN: CPT | Performed by: EMERGENCY MEDICINE

## 2024-08-30 PROCEDURE — 85652 RBC SED RATE AUTOMATED: CPT | Performed by: EMERGENCY MEDICINE

## 2024-08-30 PROCEDURE — 76937 US GUIDE VASCULAR ACCESS: CPT | Performed by: NURSE PRACTITIONER

## 2024-08-30 PROCEDURE — 87449 NOS EACH ORGANISM AG IA: CPT | Performed by: INTERNAL MEDICINE

## 2024-08-30 PROCEDURE — 85025 COMPLETE CBC W/AUTO DIFF WBC: CPT | Performed by: INTERNAL MEDICINE

## 2024-08-30 PROCEDURE — 87040 BLOOD CULTURE FOR BACTERIA: CPT | Performed by: EMERGENCY MEDICINE

## 2024-08-30 PROCEDURE — 80053 COMPREHEN METABOLIC PANEL: CPT | Performed by: EMERGENCY MEDICINE

## 2024-08-30 PROCEDURE — 85007 BL SMEAR W/DIFF WBC COUNT: CPT | Performed by: EMERGENCY MEDICINE

## 2024-08-30 PROCEDURE — 0202U NFCT DS 22 TRGT SARS-COV-2: CPT | Performed by: EMERGENCY MEDICINE

## 2024-08-30 PROCEDURE — 84443 ASSAY THYROID STIM HORMONE: CPT | Performed by: INTERNAL MEDICINE

## 2024-08-30 PROCEDURE — 02HV33Z INSERTION OF INFUSION DEVICE INTO SUPERIOR VENA CAVA, PERCUTANEOUS APPROACH: ICD-10-PCS | Performed by: INTERNAL MEDICINE

## 2024-08-30 PROCEDURE — 25010000002 FUROSEMIDE PER 20 MG: Performed by: INTERNAL MEDICINE

## 2024-08-30 PROCEDURE — 82550 ASSAY OF CK (CPK): CPT | Performed by: EMERGENCY MEDICINE

## 2024-08-30 PROCEDURE — 83605 ASSAY OF LACTIC ACID: CPT | Performed by: EMERGENCY MEDICINE

## 2024-08-30 PROCEDURE — 86900 BLOOD TYPING SEROLOGIC ABO: CPT

## 2024-08-30 PROCEDURE — 25010000002 FENTANYL CITRATE (PF) 50 MCG/ML SOLUTION: Performed by: INTERNAL MEDICINE

## 2024-08-30 PROCEDURE — 87070 CULTURE OTHR SPECIMN AEROBIC: CPT | Performed by: INTERNAL MEDICINE

## 2024-08-30 PROCEDURE — 83690 ASSAY OF LIPASE: CPT | Performed by: EMERGENCY MEDICINE

## 2024-08-30 PROCEDURE — 36415 COLL VENOUS BLD VENIPUNCTURE: CPT

## 2024-08-30 PROCEDURE — 93005 ELECTROCARDIOGRAM TRACING: CPT | Performed by: INTERNAL MEDICINE

## 2024-08-30 PROCEDURE — 25810000003 LACTATED RINGERS SOLUTION: Performed by: INTERNAL MEDICINE

## 2024-08-30 PROCEDURE — 0BH17EZ INSERTION OF ENDOTRACHEAL AIRWAY INTO TRACHEA, VIA NATURAL OR ARTIFICIAL OPENING: ICD-10-PCS | Performed by: EMERGENCY MEDICINE

## 2024-08-30 PROCEDURE — 83036 HEMOGLOBIN GLYCOSYLATED A1C: CPT | Performed by: INTERNAL MEDICINE

## 2024-08-30 PROCEDURE — 94640 AIRWAY INHALATION TREATMENT: CPT

## 2024-08-30 PROCEDURE — 87641 MR-STAPH DNA AMP PROBE: CPT | Performed by: INTERNAL MEDICINE

## 2024-08-30 PROCEDURE — 71045 X-RAY EXAM CHEST 1 VIEW: CPT

## 2024-08-30 PROCEDURE — 25010000002 VANCOMYCIN 10 G RECONSTITUTED SOLUTION: Performed by: EMERGENCY MEDICINE

## 2024-08-30 RX ORDER — SODIUM CHLORIDE 0.9 % (FLUSH) 0.9 %
10 SYRINGE (ML) INJECTION AS NEEDED
Status: DISCONTINUED | OUTPATIENT
Start: 2024-08-30 | End: 2024-08-30

## 2024-08-30 RX ORDER — SODIUM CHLORIDE 9 MG/ML
40 INJECTION, SOLUTION INTRAVENOUS AS NEEDED
Status: DISCONTINUED | OUTPATIENT
Start: 2024-08-30 | End: 2024-09-10 | Stop reason: HOSPADM

## 2024-08-30 RX ORDER — ACETAMINOPHEN 160 MG/5ML
650 SOLUTION ORAL EVERY 4 HOURS PRN
Status: DISCONTINUED | OUTPATIENT
Start: 2024-08-30 | End: 2024-09-03

## 2024-08-30 RX ORDER — SODIUM CHLORIDE 0.9 % (FLUSH) 0.9 %
10 SYRINGE (ML) INJECTION EVERY 12 HOURS SCHEDULED
Status: DISCONTINUED | OUTPATIENT
Start: 2024-08-30 | End: 2024-08-30

## 2024-08-30 RX ORDER — ACETAMINOPHEN 650 MG/1
650 SUPPOSITORY RECTAL EVERY 4 HOURS PRN
Status: DISCONTINUED | OUTPATIENT
Start: 2024-08-30 | End: 2024-09-03

## 2024-08-30 RX ORDER — CHLORHEXIDINE GLUCONATE ORAL RINSE 1.2 MG/ML
15 SOLUTION DENTAL EVERY 12 HOURS SCHEDULED
Status: DISCONTINUED | OUTPATIENT
Start: 2024-08-30 | End: 2024-09-03

## 2024-08-30 RX ORDER — SODIUM CHLORIDE 0.9 % (FLUSH) 0.9 %
10 SYRINGE (ML) INJECTION AS NEEDED
Status: DISCONTINUED | OUTPATIENT
Start: 2024-08-30 | End: 2024-09-10 | Stop reason: HOSPADM

## 2024-08-30 RX ORDER — FENTANYL CITRATE 50 UG/ML
50 INJECTION, SOLUTION INTRAMUSCULAR; INTRAVENOUS EVERY 4 HOURS PRN
Status: DISCONTINUED | OUTPATIENT
Start: 2024-08-30 | End: 2024-08-31

## 2024-08-30 RX ORDER — SODIUM CHLORIDE 0.9 % (FLUSH) 0.9 %
10 SYRINGE (ML) INJECTION EVERY 12 HOURS SCHEDULED
Status: DISCONTINUED | OUTPATIENT
Start: 2024-08-30 | End: 2024-09-10 | Stop reason: HOSPADM

## 2024-08-30 RX ORDER — NOREPINEPHRINE BITARTRATE 0.03 MG/ML
INJECTION, SOLUTION INTRAVENOUS
Status: COMPLETED
Start: 2024-08-30 | End: 2024-08-30

## 2024-08-30 RX ORDER — IPRATROPIUM BROMIDE AND ALBUTEROL SULFATE 2.5; .5 MG/3ML; MG/3ML
3 SOLUTION RESPIRATORY (INHALATION)
Status: DISCONTINUED | OUTPATIENT
Start: 2024-08-30 | End: 2024-09-08

## 2024-08-30 RX ORDER — KETOROLAC TROMETHAMINE 5 MG/ML
1 SOLUTION OPHTHALMIC 4 TIMES DAILY
Status: DISCONTINUED | OUTPATIENT
Start: 2024-08-30 | End: 2024-09-04

## 2024-08-30 RX ORDER — PANTOPRAZOLE SODIUM 40 MG/10ML
40 INJECTION, POWDER, LYOPHILIZED, FOR SOLUTION INTRAVENOUS
Status: DISCONTINUED | OUTPATIENT
Start: 2024-08-30 | End: 2024-09-03

## 2024-08-30 RX ORDER — IPRATROPIUM BROMIDE AND ALBUTEROL SULFATE 2.5; .5 MG/3ML; MG/3ML
3 SOLUTION RESPIRATORY (INHALATION) EVERY 6 HOURS PRN
Status: DISCONTINUED | OUTPATIENT
Start: 2024-08-30 | End: 2024-09-10 | Stop reason: HOSPADM

## 2024-08-30 RX ORDER — NITROGLYCERIN 0.4 MG/1
0.4 TABLET SUBLINGUAL
Status: DISCONTINUED | OUTPATIENT
Start: 2024-08-30 | End: 2024-09-10 | Stop reason: HOSPADM

## 2024-08-30 RX ORDER — FUROSEMIDE 10 MG/ML
40 INJECTION INTRAMUSCULAR; INTRAVENOUS EVERY 12 HOURS
Status: DISCONTINUED | OUTPATIENT
Start: 2024-08-30 | End: 2024-08-30

## 2024-08-30 RX ORDER — MOXIFLOXACIN 5 MG/ML
1 SOLUTION/ DROPS OPHTHALMIC 4 TIMES DAILY
Status: DISCONTINUED | OUTPATIENT
Start: 2024-08-30 | End: 2024-09-04

## 2024-08-30 RX ORDER — MOXIFLOXACIN 5 MG/ML
1 SOLUTION/ DROPS OPHTHALMIC 4 TIMES DAILY
Status: DISCONTINUED | OUTPATIENT
Start: 2024-08-30 | End: 2024-08-30

## 2024-08-30 RX ORDER — KETOROLAC TROMETHAMINE 5 MG/ML
1 SOLUTION OPHTHALMIC 4 TIMES DAILY
Status: DISCONTINUED | OUTPATIENT
Start: 2024-08-30 | End: 2024-08-30

## 2024-08-30 RX ORDER — ACETAMINOPHEN 325 MG/1
650 TABLET ORAL EVERY 4 HOURS PRN
Status: DISCONTINUED | OUTPATIENT
Start: 2024-08-30 | End: 2024-08-30

## 2024-08-30 RX ORDER — DEXTROSE MONOHYDRATE 25 G/50ML
25 INJECTION, SOLUTION INTRAVENOUS
Status: DISCONTINUED | OUTPATIENT
Start: 2024-08-30 | End: 2024-09-10 | Stop reason: HOSPADM

## 2024-08-30 RX ORDER — ACETAMINOPHEN 650 MG/1
650 SUPPOSITORY RECTAL EVERY 4 HOURS PRN
Status: DISCONTINUED | OUTPATIENT
Start: 2024-08-30 | End: 2024-08-30

## 2024-08-30 RX ORDER — NICOTINE POLACRILEX 4 MG
15 LOZENGE BUCCAL
Status: DISCONTINUED | OUTPATIENT
Start: 2024-08-30 | End: 2024-08-31

## 2024-08-30 RX ORDER — PREDNISOLONE ACETATE 10 MG/ML
1 SUSPENSION/ DROPS OPHTHALMIC 4 TIMES DAILY
Status: DISCONTINUED | OUTPATIENT
Start: 2024-08-30 | End: 2024-08-30

## 2024-08-30 RX ORDER — DOFETILIDE 0.25 MG/1
250 CAPSULE ORAL EVERY 12 HOURS SCHEDULED
Status: DISCONTINUED | OUTPATIENT
Start: 2024-08-30 | End: 2024-08-31

## 2024-08-30 RX ORDER — NOREPINEPHRINE BITARTRATE 0.03 MG/ML
.02-.3 INJECTION, SOLUTION INTRAVENOUS
Status: DISCONTINUED | OUTPATIENT
Start: 2024-08-30 | End: 2024-09-05

## 2024-08-30 RX ORDER — PREDNISOLONE ACETATE 10 MG/ML
1 SUSPENSION/ DROPS OPHTHALMIC 4 TIMES DAILY
Status: DISCONTINUED | OUTPATIENT
Start: 2024-08-30 | End: 2024-09-04

## 2024-08-30 RX ORDER — IBUPROFEN 600 MG/1
1 TABLET ORAL
Status: DISCONTINUED | OUTPATIENT
Start: 2024-08-30 | End: 2024-09-10 | Stop reason: HOSPADM

## 2024-08-30 RX ORDER — ONDANSETRON 2 MG/ML
4 INJECTION INTRAMUSCULAR; INTRAVENOUS EVERY 6 HOURS PRN
Status: DISCONTINUED | OUTPATIENT
Start: 2024-08-30 | End: 2024-09-10 | Stop reason: HOSPADM

## 2024-08-30 RX ADMIN — Medication 10 ML: at 10:43

## 2024-08-30 RX ADMIN — MOXIFLOXACIN OPHTHALMIC 0.05 ML: 5 SOLUTION/ DROPS OPHTHALMIC at 20:40

## 2024-08-30 RX ADMIN — DOXYCYCLINE 100 MG: 100 INJECTION, POWDER, LYOPHILIZED, FOR SOLUTION INTRAVENOUS at 06:24

## 2024-08-30 RX ADMIN — PROPOFOL 30 MCG/KG/MIN: 10 INJECTION, EMULSION INTRAVENOUS at 18:22

## 2024-08-30 RX ADMIN — PROPOFOL 40 MCG/KG/MIN: 10 INJECTION, EMULSION INTRAVENOUS at 20:39

## 2024-08-30 RX ADMIN — NOREPINEPHRINE BITARTRATE 0.02 MCG/KG/MIN: 0.03 INJECTION, SOLUTION INTRAVENOUS at 23:00

## 2024-08-30 RX ADMIN — MOXIFLOXACIN OPHTHALMIC 0.05 ML: 5 SOLUTION/ DROPS OPHTHALMIC at 12:28

## 2024-08-30 RX ADMIN — KETOROLAC TROMETHAMINE 1 DROP: 5 SOLUTION/ DROPS OPHTHALMIC at 12:27

## 2024-08-30 RX ADMIN — KETOROLAC TROMETHAMINE 1 DROP: 5 SOLUTION/ DROPS OPHTHALMIC at 20:41

## 2024-08-30 RX ADMIN — CHLORHEXIDINE GLUCONATE ORAL RINSE 15 ML: 1.2 SOLUTION DENTAL at 20:39

## 2024-08-30 RX ADMIN — FUROSEMIDE 40 MG: 10 INJECTION, SOLUTION INTRAMUSCULAR; INTRAVENOUS at 06:40

## 2024-08-30 RX ADMIN — IPRATROPIUM BROMIDE AND ALBUTEROL SULFATE 3 ML: 2.5; .5 SOLUTION RESPIRATORY (INHALATION) at 13:04

## 2024-08-30 RX ADMIN — Medication 10 ML: at 20:42

## 2024-08-30 RX ADMIN — NOREPINEPHRINE BITARTRATE 0.06 MCG/KG/MIN: 0.03 INJECTION, SOLUTION INTRAVENOUS at 06:11

## 2024-08-30 RX ADMIN — PANTOPRAZOLE SODIUM 40 MG: 40 INJECTION, POWDER, FOR SOLUTION INTRAVENOUS at 10:43

## 2024-08-30 RX ADMIN — DOFETILIDE 250 MCG: 0.25 CAPSULE ORAL at 10:43

## 2024-08-30 RX ADMIN — VANCOMYCIN HYDROCHLORIDE 2750 MG: 10 INJECTION, POWDER, LYOPHILIZED, FOR SOLUTION INTRAVENOUS at 04:53

## 2024-08-30 RX ADMIN — PREDNISOLONE ACETATE 1 DROP: 10 SUSPENSION/ DROPS OPHTHALMIC at 20:41

## 2024-08-30 RX ADMIN — NOREPINEPHRINE BITARTRATE 0.06 MCG/KG/MIN: 0.03 INJECTION, SOLUTION INTRAVENOUS at 14:30

## 2024-08-30 RX ADMIN — IPRATROPIUM BROMIDE AND ALBUTEROL SULFATE 3 ML: 2.5; .5 SOLUTION RESPIRATORY (INHALATION) at 07:28

## 2024-08-30 RX ADMIN — INSULIN HUMAN 2 UNITS: 100 INJECTION, SOLUTION PARENTERAL at 13:01

## 2024-08-30 RX ADMIN — KETOROLAC TROMETHAMINE 1 DROP: 5 SOLUTION/ DROPS OPHTHALMIC at 18:23

## 2024-08-30 RX ADMIN — SODIUM CHLORIDE, POTASSIUM CHLORIDE, SODIUM LACTATE AND CALCIUM CHLORIDE 500 ML: 600; 310; 30; 20 INJECTION, SOLUTION INTRAVENOUS at 13:14

## 2024-08-30 RX ADMIN — PROPOFOL 30 MCG/KG/MIN: 10 INJECTION, EMULSION INTRAVENOUS at 23:42

## 2024-08-30 RX ADMIN — CEFEPIME 2000 MG: 2 INJECTION, POWDER, FOR SOLUTION INTRAVENOUS at 14:41

## 2024-08-30 RX ADMIN — AZITHROMYCIN DIHYDRATE 500 MG: 500 INJECTION, POWDER, LYOPHILIZED, FOR SOLUTION INTRAVENOUS at 12:25

## 2024-08-30 RX ADMIN — PROPOFOL 10 MCG/KG/MIN: 10 INJECTION, EMULSION INTRAVENOUS at 03:45

## 2024-08-30 RX ADMIN — FENTANYL CITRATE 50 MCG: 50 INJECTION, SOLUTION INTRAMUSCULAR; INTRAVENOUS at 17:13

## 2024-08-30 RX ADMIN — SODIUM CHLORIDE, POTASSIUM CHLORIDE, SODIUM LACTATE AND CALCIUM CHLORIDE 1000 ML: 600; 310; 30; 20 INJECTION, SOLUTION INTRAVENOUS at 11:42

## 2024-08-30 RX ADMIN — PROPOFOL 25 MCG/KG/MIN: 10 INJECTION, EMULSION INTRAVENOUS at 14:30

## 2024-08-30 RX ADMIN — PROPOFOL 20 MCG/KG/MIN: 10 INJECTION, EMULSION INTRAVENOUS at 06:38

## 2024-08-30 RX ADMIN — PREDNISOLONE ACETATE 1 DROP: 10 SUSPENSION/ DROPS OPHTHALMIC at 18:23

## 2024-08-30 RX ADMIN — PROPOFOL 25 MCG/KG/MIN: 10 INJECTION, EMULSION INTRAVENOUS at 11:47

## 2024-08-30 RX ADMIN — CEFEPIME 2000 MG: 2 INJECTION, POWDER, FOR SOLUTION INTRAVENOUS at 22:14

## 2024-08-30 RX ADMIN — CEFEPIME HYDROCHLORIDE 2000 MG: 2 INJECTION, POWDER, FOR SOLUTION INTRAMUSCULAR; INTRAVENOUS at 04:28

## 2024-08-30 RX ADMIN — CHLORHEXIDINE GLUCONATE ORAL RINSE 15 ML: 1.2 SOLUTION DENTAL at 10:43

## 2024-08-30 RX ADMIN — DOFETILIDE 250 MCG: 0.25 CAPSULE ORAL at 23:00

## 2024-08-30 RX ADMIN — IPRATROPIUM BROMIDE AND ALBUTEROL SULFATE 3 ML: 2.5; .5 SOLUTION RESPIRATORY (INHALATION) at 19:15

## 2024-08-30 RX ADMIN — SODIUM CHLORIDE 500 ML: 9 INJECTION, SOLUTION INTRAVENOUS at 04:15

## 2024-08-30 RX ADMIN — APIXABAN 5 MG: 5 TABLET, FILM COATED ORAL at 20:40

## 2024-08-30 RX ADMIN — SODIUM CHLORIDE, POTASSIUM CHLORIDE, SODIUM LACTATE AND CALCIUM CHLORIDE 500 ML: 600; 310; 30; 20 INJECTION, SOLUTION INTRAVENOUS at 12:42

## 2024-08-30 RX ADMIN — PREDNISOLONE ACETATE 1 DROP: 10 SUSPENSION/ DROPS OPHTHALMIC at 12:28

## 2024-08-30 RX ADMIN — MOXIFLOXACIN OPHTHALMIC 0.05 ML: 5 SOLUTION/ DROPS OPHTHALMIC at 18:23

## 2024-08-30 RX ADMIN — APIXABAN 5 MG: 5 TABLET, FILM COATED ORAL at 10:43

## 2024-08-30 NOTE — NURSING NOTE
Patient arrived to floor from ED.  Admitted with respiratory failure.  Patient on vent and sedated.  Patient's BP dropped while getting settled in.  New order for Levo.  Titrated to 0.1 to achieve MAP >65. New IV placed due to multiple infusiates.  Wife at bedside.  Educated on unit routines and layout.  All questions answered.  Wife in agreement with POC.

## 2024-08-30 NOTE — ED PROVIDER NOTES
"Subjective   History of Present Illness  This is a 79-year-old male with past medical history of atrial fibrillation and CHF presented to the emergency department with some acute respiratory distress.  The wife called EMS prior to arrival as the patient was found in severe respiratory distress.  She states that he was having blood coming out of his airway.  He was not responding to her.  When EMS arrived, the patient's oxygen saturation was in the 40s.  He had thick frothy sputum coming out of his mouth.  He was not able to provide any other history given his acute respiratory failure.  The wife states that he had been seen by multiple physicians this week.  He had a recent echo done which was normal.  Patient was positive for COVID about a month ago    History provided by:  EMS personnel and spouse  History limited by:  Severe respiratory distress   used: No        Review of Systems   Unable to perform ROS: Severe respiratory distress       Past Medical History:   Diagnosis Date    A-fib     Arthritis     Hard to intubate     Hypertension     Sleep apnea     NOT USING CPAP       Allergies   Allergen Reactions    Amoxicillin Other (See Comments)     Pt states \"Made me very sick\"       Past Surgical History:   Procedure Laterality Date    BLADDER SURGERY  04/14/2016    STIMULATOR IMPLANTED, NEW ELECTRODE 08/2022    CARDIAC ABLATION  2013    AFIB    CARPAL TUNNEL RELEASE Right 2002    CHOLECYSTECTOMY  2011    COLONOSCOPY      DENTAL PROCEDURE  1962    FINGER SURGERY Left 2000    FOREFINGER BONE TRIMMED    HEMORRHOIDECTOMY  1973    KNEE ARTHROSCOPY Left 2008    MOUTH SURGERY  2008    FOR SLEEP APNEA    SPINAL FUSION  2007    L3 LUMBAR SPINAL STENOSIS    TONSILLECTOMY  1954    TOTAL KNEE ARTHROPLASTY Left 2009    TOTAL KNEE ARTHROPLASTY Right 6/22/2023    Procedure: TOTAL KNEE ARTHROPLASTY - RIGHT;  Surgeon: Leland Elizalde MD;  Location: [facility];  Service: Orthopedics;  Laterality: Right; "       No family history on file.    Social History     Socioeconomic History    Marital status:    Tobacco Use    Smoking status: Former     Current packs/day: 3.00     Types: Cigarettes    Smokeless tobacco: Never    Tobacco comments:     QUIT 40 YRS AGO PER PT   Vaping Use    Vaping status: Never Used   Substance and Sexual Activity    Alcohol use: Not Currently    Drug use: Never    Sexual activity: Defer           Objective   Physical Exam  Vitals and nursing note reviewed.   Constitutional:       General: He is in acute distress.      Appearance: He is ill-appearing and toxic-appearing.   HENT:      Mouth/Throat:      Comments: Patient has pink frothy sputum coming from the mouth and airway  Eyes:      Pupils: Pupils are equal, round, and reactive to light.   Cardiovascular:      Rate and Rhythm: Tachycardia present. Rhythm irregular.   Pulmonary:      Effort: Respiratory distress present.      Breath sounds: Rhonchi and rales present.   Abdominal:      General: Abdomen is flat. There is no distension.      Palpations: There is no mass.      Tenderness: There is no abdominal tenderness.   Musculoskeletal:         General: No deformity.   Neurological:      Mental Status: He is disoriented.         ECG 12 Lead      Date/Time: 8/30/2024 4:11 AM    Performed by: Daryn Hernandez MD  Authorized by: Daryn Hernandez MD  Interpreted by ED physician  Comparison: compared with previous ECG   Similar to previous ECG  Rhythm: atrial fibrillation  Rate: tachycardic  BPM: 131  QRS axis: left  Conduction: non-specific intraventricular conduction delay  Other findings comments: Nonspecific ST changes  Clinical impression: non-specific ECG and dysrhythmia - atrial  Comments: Interpretation:  EKG was directly visualized by myself, interpretations as documented in hospital course.    Intubation    Date/Time: 8/30/2024 4:13 AM    Performed by: Daryn Hernandez MD  Authorized by: Daryn Hernandez MD     Consent:     Consent obtained:  Emergent situation    Consent given by:  Spouse    Risks, benefits, and alternatives were discussed: yes      Risks discussed:  Aspiration and brain injury    Alternatives discussed:  Delayed treatment and alternative treatment  Universal protocol:     Procedure explained and questions answered to patient or proxy's satisfaction: yes      Required blood products, implants, devices, and special equipment available: yes      Immediately prior to procedure, a time out was called: yes      Patient identity confirmed:  Arm band and provided demographic data  Pre-procedure details:     Indications: respiratory failure      Patient status:  Altered mental status    Look externally: no concerns      Mouth opening - incisor distance:  2 finger widths    Hyoid-mental distance: 2 finger widths      Hyoid-thyroid distance: 2 or more finger widths      Mallampati score:  III    Obstruction: none      Neck mobility: reduced      Pharmacologic strategy: RSI      Induction agents:  Etomidate    Paralytics:  Rocuronium  Procedure details:     Preoxygenation:  Bag valve mask    CPR in progress: no      Number of attempts:  1  Successful intubation attempt details:     Intubation method:  Oral    Intubation technique: video assisted      Laryngoscope blade:  Mac 3    Bougie used: no      Grade view: II      Tube size (mm):  7.5    Tube type:  Cuffed    Tube visualized through cords: yes    Placement assessment:     ETT at teeth/gumline (cm):  24    Tube secured with:  ETT bautista    Breath sounds:  Equal    Placement verification: chest rise, colorimetric ETCO2, CXR verification, direct visualization, equal breath sounds, esophageal detector, numeric ETCO2 and tube exhalation      CXR findings:  High    Tube repositioned: yes    Post-procedure details:     Procedure completion:  Tolerated well, no immediate complications             ED Course  ED Course as of 08/30/24 0425   Fri Aug 30, 2024   9838  BP(!): 164/109 [JK]   0413 Temp: 100.5 °F (38.1 °C) [JK]   0413 Temp src: Axillary [JK]   0413 Heart Rate(!): 130 [JK]   0413 SpO2(!): 89 % [JK]   0413 Interpretation:  Patient's repeat vitals, telemetry tracing, and pulse oximetry tracing were directly viewed and interpreted by myself.   O2 sat 89% % on CPAP, interpreted as acute hypoxia  Telemetry rhythm strip revealed a rate of 130 bpm, interpreted as atrial fibrillation with rapid ventricular rate [JK]   0413 Blood Gas, Arterial With Co-Ox(!!)  ABG was reviewed and directly interpreted by myself.  Acute respiratory acidosis [JK]   0413 Patient continued to have worsening respiratory failure.  Decision made to intubate the patient given the emergent and impending respiratory failure [JK]   0415 XR Chest 1 View  Interpretation:  Imaging was directly visualized by myself, per my interpretations, chest x-ray showed pulmonary edema with bilateral infiltrates. [JK]   0415 Patient initial findings consistent for acute respiratory failure pneumonia.  Given his presentation, the patient's findings are concerning for acute sepsis.  We did start the patient on broad-spectrum antibiotics.  However he does have findings consistent with flash pulmonary edema and fluid overload.  The risk of 30 cc/kg bolus outweigh the benefits at this time.  We will proceed with judicious fluid resuscitation [JK]   0422 CBC & Differential(!) [JK]   0423 Lactic Acid, Plasma(!!) [JK]   0423 Type & Screen [JK]   0423 aPTT(!) [JK]   0423 Sedimentation Rate  Interpretation:  Laboratory studies were reviewed and interpreted directly by myself.  CBC showed some significant leukocytosis with a white blood cell count of 26.65, lactic was elevated 7.3, coagulation studies normal, type and screen was negative [JK]   0423 Patient has been requiring increased sedation.  We did have to maximize the patient's PEEP for appropriate oxygenation. [JK]   0423 Findings consistent with acute respiratory failure  and sepsis secondary to pneumonia and flash pulmonary edema.  Patient be maintained on ventilator as well as broad-spectrum antibiotics.  Patient be admitted to the ICU in critical condition. [JK]   0423 Based on the patient's presentation, history and diffuse work-up in the emergency department, the patient is deemed appropriate for admission to the hospital for further evaluation and treatment.  This was discussed with the patient's family at bedside.  They are in agreement with the current medical management.    Admitting physician: Dr. Sanchez    Discussion was had with admitting physician regarding the laboratory and imaging findings.  We did discuss current therapeutics in the emergency department and progression of the patient.  Working diagnosis was conveyed to the admitting physician, as well as current status and prognosis for the patient.  They are in agreement with these findings and have accepted admission.    Shared decision making:   After full review of the patient's clinical presentation, review of any work-up including but not limited to laboratory studies and radiology obtained, I had a discussion with the patient's family.  Treatment options were discussed as well as the risks, benefits and consequences.  I discussed all findings with the patient's family members.  During the discussion, treatment goals were understood by all as well as any misconceptions which were addressed with the patient's family.  Ample time was given for any questions they may have had.  They are in agreement with the treatment plan as well as final disposition. [JK]      ED Course User Index  [JK] Daryn Hernandez MD                                             Medical Decision Making  This is a 79-year-old male with history of CHF and hypertension presented to the emergency department in severe respiratory distress.  On arrival, the patient appears acutely ill.  He has bloody sputum coming from the mouth.  His  initial oxygen saturation is 40% on EMSs CPAP machine.  Patient was immediately transferred resuscitation bay.  Bilateral IVs were established.  Patient placed on supplemental oxygen as well as continuous telemetry monitoring.  Given the patient's presentation and history, his overall presentation is deemed critical.  High percentage of morbility and mortality.  Diffuse workup initiated.      Differential diagnosis: Acute respiratory failure, flash pulmonary edema, hypertensive urgency, CHF, pneumonia, sepsis, viral respiratory illness, acute renal failure    Amount and/or Complexity of Data Reviewed  Independent Historian: EMS  External Data Reviewed: labs, radiology, ECG and notes.     Details: External laboratories, imaging as well as notes were reviewed personally by myself.  All relevant studies were used to guide decision making.     Date of previous record: 8/26/2024    Source of note: Cardiology    Summary:  Patient was seen and evaluated for routine visit.  I did review basic laboratory studies on file as well as a previous chest x-ray and EKG.  Records reviewed    Labs: ordered. Decision-making details documented in ED Course.  Radiology: ordered and independent interpretation performed. Decision-making details documented in ED Course.  ECG/medicine tests: ordered and independent interpretation performed. Decision-making details documented in ED Course.    Risk  Prescription drug management.    Critical Care  Total time providing critical care: 73 minutes (Authorized and performed by: Daryn Hernandez MD  I personally spent a total of 73 minutes of critical care time with the patient.  Due to the high probability of clinically significant, life-threatening deterioration, the patient required my highest level of care to intervene emergently.  These interventions, including, but not limited to, establishing IV access, continuous pulse oximeter and telemetry monitoring, frequent monitoring and reevaluations,  management the patient's airway and cardiovascular system, discussion with other consultants as needed, which bear directly on the management the patient.  This also includes obtaining history, examining the patient, frequent reevaluations and coordinating high level of care.  Failure to emergently initiate these interventions would carry a high probability of resulting in sudden, clinically significant or life threatening deterioration in the patient's condition.  This does not include time spent on separately reported billable procedures.)        Final diagnoses:   Acute respiratory failure with hypoxemia   Flash pulmonary edema   Acute sepsis   Pneumonia of both lower lobes due to infectious organism   Hypertensive emergency       ED Disposition  ED Disposition       ED Disposition   Decision to Admit    Condition   --    Comment   Level of Care: Critical Care [6]   Admitting Physician: NELSON FOSTER [[id number]]   Attending Physician: NELSON FOSTER [[id number]]                 No follow-up provider specified.       Medication List      No changes were made to your prescriptions during this visit.            Daryn Hernandez MD  08/30/24 0425

## 2024-08-30 NOTE — PLAN OF CARE
Goal Outcome Evaluation:         Unable to decrease vent support at this time.

## 2024-08-30 NOTE — PLAN OF CARE
Goal Outcome Evaluation:            Neuro: sedated but following commands, nodding yes to questions.   Respiratory:intubated, acvc, peep 10, Fio2 45%, sputum off and on, lung sounds coarse and diminished.   Heart: Sinus rhythm to sinus tach. EKG before Tikyson  Gi/: walker, 750 out. Bowel sounds active. No BM  Wounds: see LDA  Plan: Keep pt comfortable while on the vent. Fentanyl pushes as needed.   Family at bedside.

## 2024-08-30 NOTE — NURSING NOTE
WOC consult for compression wraps.    Spoke with NURSING who states that compression wraps from admission are still in place.  Encouraged removal as soon as possible -close assessment for pressure injuries/open wounds.    Would recommend holding off on compression wraps while patient is sedated as patient cannot tell us if they hurt/if new pressure injuries are forming in the periphery.  Patient also is on vasopressors which clamps down the periphery putting the patient more at risk for pressure injuries in the lower extremities.    WOC will sign off but please let us know if there are any wounds that need treated and if compression wraps are wanted in the future just consult PT wound care but once again WOC recommends waiting till after patient is not sedated and not on pressors.

## 2024-08-30 NOTE — Clinical Note
Level of Care: Critical Care [6]   Admitting Physician: NELSON FOSTER [[id number]]   Attending Physician: NELSON FOSTER [[id number]]

## 2024-08-30 NOTE — H&P
"Intensive Care Admission Note     Acute respiratory failure with hypoxia and hypercapnia    History of Present Illness     Limited Hx available. Patient intubated and sedated. Hx from ED and patient's wife at bedside.   79 yr old male Hx atrial fibrillation, HTN, MARK. Had cataract surgery on Right eye 1 day PTA.   Hx Covid approx 1 month ago.  Presents with acute shortness of breath that started in the evening. Found in severe hypoxemia by EMS with frothy Sputum. Intubated emergently in ED with Acute Hypoxemic, Hypercapneic Respiratory Failure.    Wife denies fever, chills, chest pain. + cough since having Covid. Former Smoker.   Viral/Respiratory panel NEGATIVE. WBC Elevated.  EKG Atrial Fibrillation. Non spec ST-T changes.       Problem List, Surgical History, Family, Social History, and ROS     Past Medical History:   Diagnosis Date    A-fib     Arthritis     Hard to intubate     Hypertension     Sleep apnea     NOT USING CPAP      Past Surgical History:   Procedure Laterality Date    BLADDER SURGERY  04/14/2016    STIMULATOR IMPLANTED, NEW ELECTRODE 08/2022    CARDIAC ABLATION  2013    AFIB    CARPAL TUNNEL RELEASE Right 2002    CHOLECYSTECTOMY  2011    COLONOSCOPY      DENTAL PROCEDURE  1962    FINGER SURGERY Left 2000    FOREFINGER BONE TRIMMED    HEMORRHOIDECTOMY  1973    KNEE ARTHROSCOPY Left 2008    MOUTH SURGERY  2008    FOR SLEEP APNEA    SPINAL FUSION  2007    L3 LUMBAR SPINAL STENOSIS    TONSILLECTOMY  1954    TOTAL KNEE ARTHROPLASTY Left 2009    TOTAL KNEE ARTHROPLASTY Right 6/22/2023    Procedure: TOTAL KNEE ARTHROPLASTY - RIGHT;  Surgeon: Leland Elizalde MD;  [facility]: [facility];  Service: Orthopedics;  Laterality: Right;       Allergies   Allergen Reactions    Amoxicillin Other (See Comments)     Pt states \"Made me very sick\"     No current facility-administered medications on file prior to encounter.     Current Outpatient Medications on File Prior to Encounter   Medication Sig    acetaminophen " (TYLENOL) 650 MG 8 hr tablet Take 1 tablet by mouth Every 8 (Eight) Hours As Needed for Mild Pain.    ARIPiprazole (ABILIFY) 5 MG tablet Take 1 tablet by mouth Daily.    atenolol (TENORMIN) 25 MG tablet Take 1 tablet by mouth Daily.    clobetasol (TEMOVATE) 0.05 % cream Apply 1 application topically to the appropriate area as directed As Needed (itching).    docusate sodium (Colace) 100 MG capsule Take 1 capsule by mouth 2 (Two) Times a Day.    dofetilide (TIKOSYN) 250 MCG capsule Take 1 capsule by mouth 2 (Two) Times a Day.    doxycycline (VIBRAMYICN) 100 MG tablet Take 1 tablet by mouth Every 12 (Twelve) Hours.    Eliquis 5 MG tablet tablet Take 1 tablet by mouth Every 12 (Twelve) Hours. Take 2.5 mg twice daily through 6/29/23 evening dose then 5 mg twice daily.    escitalopram (LEXAPRO) 10 MG tablet Take 1 tablet by mouth Every Morning.    fexofenadine (ALLEGRA) 180 MG tablet Take 1 tablet by mouth Daily.    furosemide (LASIX) 40 MG tablet Take 1 tablet by mouth Daily. Patient to take 1 tablet on Sunday, Tuesday, Thursday, and Saturday.  2 tablets on Monday, Wednesday, and Friday.    gabapentin (NEURONTIN) 800 MG tablet Take 1 tablet by mouth 3 (Three) Times a Day.    ketoconazole (NIZORAL) 2 % shampoo 1 (One) Time Per Week.    Misc Natural Products (OSTEO BI-FLEX TRIPLE STRENGTH PO) Take 1 tablet by mouth 2 (Two) Times a Day.    Multiple Vitamins-Minerals (PRESERVISION AREDS 2 PO) Take 1 tablet by mouth 2 (Two) Times a Day.    multivitamin with minerals tablet tablet Take 1 tablet by mouth Daily.    ondansetron (Zofran) 4 MG tablet Take 1 tablet by mouth Every 8 (Eight) Hours As Needed for Nausea or Vomiting.    oxyCODONE-acetaminophen (PERCOCET)  MG per tablet Take 1 tablet by mouth 4 (Four) Times a Day.    potassium chloride (K-DUR,KLOR-CON) 10 MEQ CR tablet Take 1 tablet by mouth Daily.    sennosides-docusate (senna-docusate sodium) 8.6-50 MG per tablet Take 1 tablet by mouth Daily.    TiZANidine  "(ZANAFLEX) 4 MG capsule Take 1 capsule by mouth Daily As Needed for Muscle Spasms.    traMADol (ULTRAM) 50 MG tablet Take 1 tablet by mouth Every 6 (Six) Hours As Needed for Moderate Pain.    trihexyphenidyl (ARTANE) 2 MG tablet Take 1 tablet by mouth 2 (Two) Times a Day With Meals.    trospium 60 MG capsule sustained-release 24 hr capsule Take 1 capsule by mouth Daily.     MEDICATION LIST AND ALLERGIES REVIEWED.    No family history on file.  Social History     Tobacco Use    Smoking status: Former     Current packs/day: 3.00     Types: Cigarettes    Smokeless tobacco: Never    Tobacco comments:     QUIT 40 YRS AGO PER PT   Vaping Use    Vaping status: Never Used   Substance Use Topics    Alcohol use: Not Currently    Drug use: Never     Social History     Social History Narrative    Not on file     FAMILY AND SOCIAL HISTORY REVIEWED.    Review of Systems: limited. Pertinent per HPI. Hx Lymphedema LE's.  ALL OTHER SYSTEMS REVIEWED AND ARE NEGATIVE.    Physical Exam and Clinical Information   BP 92/44   Pulse 104   Temp 100.5 °F (38.1 °C) (Axillary)   Resp 16   Ht 185 cm (72.84\")   Wt (!) 174 kg (384 lb 7.7 oz)   SpO2 94%   BMI 50.96 kg/m²   Physical Exam  Gen: Intubated, sedated       HEENT: Atraumatic, RICHARD, non icteric. Mouth no lesions/thrush.Upper missing teeth.  Neck: No JVD no adenopathy. ETT  CV S1S2 IRRR. No M/R  Lungs:Bilateral Rhonchi. No wheeze.   Abd: Distended, Obese, Soft, + BS. Non tender Rectal: deferred  : No lesions  Ext: Bilateral Lymphedema with chronic changes . Wrapped.   Neuro: Sedated, on ventilator support.   Psych: Unable to assess  Results from last 7 days   Lab Units 08/30/24  0325   WBC 10*3/mm3 26.65*   HEMOGLOBIN g/dL 13.6   PLATELETS 10*3/mm3 242           Invalid input(s): \"CHLORIDE\"  CrCl cannot be calculated (Patient's most recent lab result is older than the maximum 30 days allowed.).      Results from last 7 days   Lab Units 08/30/24  0337   PH, ARTERIAL pH units 7.086* "   PCO2, ARTERIAL mm Hg 78.8*   PO2 ART mm Hg 80.1*     Lab Results   Component Value Date    LACTATE 7.3 (C) 08/30/2024        Images: CXR: ETT, cardiomegaly. Pulmonary Edema with Bilateral patchy infiltrates R> L.     I reviewed the patient's results and images.     Impression     Acute respiratory failure with hypoxia and hypercapnia  Acute Pulmonary Edema  Atrial Fibrillation  Possible Pneumonia  Hx Covid  Hx MARK  H HTN  Cataract Surgery R Eye 8/29/24  Plan/Recommendations     ICU admission  Vent Support: PRVC 16/500/100% and PEEP 5 cm  ABG  Duonebs  Sputum Culture, Blood Culture  Sedation: Propofol, Fentanyl PRN  Lasix IV now and BID  Galindo Catheter: Strict I/O's.   Cont Eliquis  Cont Tikosyn( Dofetilide)  Serial Troponin  Echocardiogram  Repeat Lactate  Cont Cefepime . Received 1x vanco . Cont Doxycycline.   NPO  OGT  Protonix  Continue Lymphedema Leg Wraps  Full Code          Time spent Critical care 50 min (exclusive of procedure time)  including high complexity decision making to assess, manipulate, and support vital organ system failure in this individual who has impairment of one or more vital organ systems such that there is a high probability of imminent or life threatening deterioration in the patient’s condition.      Bo Thomas MD  Pulmonary and Critical Care Medicine  08/30/24 04:58 EDT     CC: Octavio Haines MD

## 2024-08-30 NOTE — PROCEDURES
Insert Arterial Line    Date/Time: 8/30/2024 3:15 PM    Performed by: Lurdes Justin APRN  Authorized by: Lurdes Justin APRN    Universal Protocol:     Verbal consent obtained?: Yes      Written consent obtained?: Yes      Risks and benefits: Risks, benefits and alternatives were discussed      Consent given by:  Spouse    Required items: Required blood products, implants, devices and special equipment available      Patient identity confirmed:  Arm band and hospital-assigned identification number    Time out: Immediately prior to the procedure a time out was called    A time out verifies correct patient, procedure, equipment, support staff and site/side marked as required:   Preparation:     Preparation: Patient was prepped and draped in usual sterile fashion    Indications:     Indications: hemodynamic monitoring    Location:     Location:  Left radial  Anesthesia:     Local anesthetic:  Lidocaine 1% without epinephrine    Patient sedated: Yes      Sedation:  See MAR for details    Analgesia:  See MAR for details  Procedure Details:     Ultrasound Guidance: yes  The ultrasound was used for evaluation of possible access sites.  Vessel patency was confirmed with the ultrasound.  Needle entry into vessel was visualized in realtime with the ultrasound.       Needle gauge:  20    Seldinger technique: Seldinger technique used      Number of attempts:  2  Post-procedure:     Post-procedure:  Line sutured and dressing applied    Post-procedure CMS: unable to assess due to being intubated/sedated.     patient tolerated the procedure well with no immediate complications     Procedure performed under the direct supervision of RAKEL Woods.

## 2024-08-30 NOTE — CASE MANAGEMENT/SOCIAL WORK
Discharge Planning Assessment  [facility]     Patient Name: Bo Perez  MRN: [id number]  Today's Date: 8/30/2024    Admit Date: 8/30/2024    Plan: IDP   Discharge Needs Assessment       Row Name 08/30/24 1227       Living Environment    People in Home spouse    Name(s) of People in Home Kristen, spouse    Current Living Arrangements home    Potentially Unsafe Housing Conditions unable to assess    In the past 12 months has the electric, gas, oil, or water company threatened to shut off services in your home? No    Primary Care Provided by self    Provides Primary Care For no one    Family Caregiver if Needed spouse    Family Caregiver Names Kristen, spouse    Quality of Family Relationships helpful;involved;supportive       Resource/Environmental Concerns    Resource/Environmental Concerns none    Transportation Concerns none       Transportation Needs    In the past 12 months, has lack of transportation kept you from medical appointments or from getting medications? no    In the past 12 months, has lack of transportation kept you from meetings, work, or from getting things needed for daily living? No       Food Insecurity    Within the past 12 months, you worried that your food would run out before you got the money to buy more. Never true    Within the past 12 months, the food you bought just didn't last and you didn't have money to get more. Never true       Transition Planning    Patient/Family Anticipated Services at Transition        Discharge Needs Assessment    Equipment Currently Used at Home walker, rolling    Concerns to be Addressed discharge planning                   Discharge Plan       Row Name 08/30/24 1233       Plan    Plan IDP    Plan Comments Spoke with patient's spouse at bedside to initiate discharge plan; patient on mechanical ventilation.  Confirmed their residence in [facility]; PCP is Srinivasan Cary; insurance is [employer].  Patient is reported to be independent with  ADLs prior to this admission; patient has RW for assistance with ambulation.  Patient is not current with home health.  CM will continue to follow.                  Continued Care and Services - Admitted Since 8/30/2024    No active coordination exists for this encounter.       Expected Discharge Date and Time       Expected Discharge Date Expected Discharge Time    Sep 4, 2024            Demographic Summary       Row Name 08/30/24 1226       General Information    Referral Source admission list    Reason for Consult discharge planning    Preferred Language English       Contact Information    Permission Granted to Share Info With                    Functional Status       Row Name 08/30/24 1227       Functional Status    Usual Activity Tolerance good       Functional Status, IADL    Medications independent    Meal Preparation assistive equipment    Housekeeping assistive equipment    Laundry assistive equipment    Shopping assistive equipment                   Psychosocial    No documentation.                  Abuse/Neglect    No documentation.                  Legal    No documentation.                  Substance Abuse    No documentation.                  Patient Forms    No documentation.                     Marizol Dowling RN

## 2024-08-30 NOTE — CONSULTS
"[place] [place] [facility] at [facility]  CARDIAC ELECTROPHYSIOLOGY CONSULTATION NOTE    Bo Perez  : 1945  MRN:[id number]  Home Phone:[phone]    Date of Admission:2024  Date of Consultation: 24  Primary Provider:  Srinivasan Cary MD    Referring Provider: Octavio Haines MD  Reason for Consultation: Wide Complex Tachycardia    IDENTIFICATION: A 79 y.o. male [place] resident    CC:   Chief Complaint   Patient presents with    Respiratory Distress       PROBLEM LIST:   Paroxysmal atrial fibrillation  Followed by Dr. Bear with [facility]  General cardiologist Dr. Galvez  Echocardiogram reportedly done 1 month ago normal per family to reevaluate asc aortic aneurysm which was stable  PVCs, bradycardia  Monitor summer 2024, data deficit: 27% PVC burden, 89 pauses longest 6.5 seconds-all pauses occurred during sleeping hours  Acute Respiratory Failure  [facility] admission 2024 pH 7.09 on alex  Normal coronaries reportedly on left heart catheterization , incomplete database  BMI >45  Hypertension  Dyslipidemia  MARK  History of secondary parkinsonism, drug-induced          ALLERGIES:   Allergies   Allergen Reactions    Amoxicillin Other (See Comments)     Pt states \"Made me very sick\"       HPI:   Mr. Perez is a 79-year-old male with the above medical history we are asked to evaluate regarding ectopic atrial rhythm and wide-complex tachycardia.  The patient initially presented to [facility] ER for acute respiratory distress.  Family says that he went unresponsive with having blood coming out of his airway.  Oxygen saturation was in the 40s upon EMS arrival.  He was positive for COVID 1 month ago.  He was intubated emergently in the ER on arrival.  His initial blood gas showed pH of 7.09 with pCO2 78.8.  This improved to 7.261 and 59.8 after several hours of mechanical ventilation.  Lactate 7.3.  WBC 25.  proBNP 1000 (negative).  Troponin understandably mildly elevated " with increase from 88-1 38 likely type II NSTEMI.  Since arrival to the ICU, the patient has had no further tachycardia and has been in either normal sinus rhythm or an ectopic atrial rhythm with normal rates. Either way stable.       ROS: All systems have been reviewed and are negative with the exception of those mentioned in the HPI and problem list above.    HOME MEDICINES:   Current Outpatient Medications   Medication Instructions    acetaminophen (TYLENOL) 650 mg, Oral, Every 8 Hours PRN    ARIPiprazole (ABILIFY) 5 mg, Oral, Daily    atenolol (TENORMIN) 25 mg, Oral, Daily    clobetasol (TEMOVATE) 0.05 % cream 1 application , Topical, As Needed    docusate sodium (COLACE) 100 mg, Oral, 2 Times Daily    dofetilide (TIKOSYN) 250 mcg, Oral, 2 Times Daily    doxycycline (VIBRAMYICN) 100 MG tablet 1 tablet, Oral, Every 12 Hours Scheduled    Eliquis 5 mg, Oral, Every 12 Hours Scheduled, Take 2.5 mg twice daily through 6/29/23 evening dose then 5 mg twice daily.    escitalopram (LEXAPRO) 10 mg, Oral, Every Morning    fexofenadine (ALLEGRA) 180 mg, Oral, Daily    furosemide (LASIX) 40 mg, Oral, Daily, Patient to take 1 tablet on Sunday, Tuesday, Thursday, and Saturday.  2 tablets on Monday, Wednesday, and Friday.     gabapentin (NEURONTIN) 800 mg, Oral, 3 Times Daily    ketoconazole (NIZORAL) 2 % shampoo Weekly    Misc Natural Products (OSTEO BI-FLEX TRIPLE STRENGTH PO) 1 tablet, Oral, 2 Times Daily    Multiple Vitamins-Minerals (PRESERVISION AREDS 2 PO) 1 tablet, Oral, 2 Times Daily    multivitamin with minerals tablet tablet 1 tablet, Oral, Daily    ondansetron (ZOFRAN) 4 mg, Oral, Every 8 Hours PRN    oxyCODONE-acetaminophen (PERCOCET)  MG per tablet 1 tablet, Oral, 4 Times Daily    potassium chloride (K-DUR,KLOR-CON) 10 MEQ CR tablet 10 mEq, Oral, Daily    sennosides-docusate (senna-docusate sodium) 8.6-50 MG per tablet 1 tablet, Oral, Daily    TiZANidine (ZANAFLEX) 4 mg, Oral, Daily PRN    traMADol (ULTRAM)  "50 mg, Oral, Every 6 Hours PRN    trihexyphenidyl (ARTANE) 2 mg, Oral, 2 Times Daily With Meals    trospium 60 mg, Oral, Daily       Surgical History:   Past Surgical History:   Procedure Laterality Date    BLADDER SURGERY  04/14/2016    STIMULATOR IMPLANTED, NEW ELECTRODE 08/2022    CARDIAC ABLATION  2013    AFIB    CARPAL TUNNEL RELEASE Right 2002    CHOLECYSTECTOMY  2011    COLONOSCOPY      DENTAL PROCEDURE  1962    FINGER SURGERY Left 2000    FOREFINGER BONE TRIMMED    HEMORRHOIDECTOMY  1973    KNEE ARTHROSCOPY Left 2008    MOUTH SURGERY  2008    FOR SLEEP APNEA    SPINAL FUSION  2007    L3 LUMBAR SPINAL STENOSIS    TONSILLECTOMY  1954    TOTAL KNEE ARTHROPLASTY Left 2009    TOTAL KNEE ARTHROPLASTY Right 6/22/2023    Procedure: TOTAL KNEE ARTHROPLASTY - RIGHT;  Surgeon: Leland Elizalde MD;  Location: [facility];  Service: Orthopedics;  Laterality: Right;       Social History:   Social History     Socioeconomic History    Marital status:    Tobacco Use    Smoking status: Former     Current packs/day: 3.00     Types: Cigarettes    Smokeless tobacco: Never    Tobacco comments:     QUIT 40 YRS AGO PER PT   Vaping Use    Vaping status: Never Used   Substance and Sexual Activity    Alcohol use: Not Currently    Drug use: Never    Sexual activity: Defer       Family History: History reviewed. No pertinent family history.    Objective     /65   Pulse 75   Temp 99.5 °F (37.5 °C) (Axillary)   Resp 18   Ht 185 cm (72.84\")   Wt (!) 160 kg (352 lb 4.7 oz)   SpO2 100%   BMI 46.69 kg/m²     Intake/Output Summary (Last 24 hours) at 8/30/2024 1239  Last data filed at 8/30/2024 0900  Gross per 24 hour   Intake 960 ml   Output 150 ml   Net 810 ml       PHYSICAL EXAM:   CONSTITUTIONAL: acutely ill-appearing, sedated on [facility] ventilation  CARDIOVASCULAR:  Regular rhythm and normal rate, no murmur, gallop, rub.   RESPIRATORY: coarse breath sounds diffusely, on mechanical ventilation  EXTREMITIES: dermalthickening " from chronic venous stasis to shala legs    Labs/Diagnostic Data  Results from last 7 days   Lab Units 08/30/24  0401   SODIUM mmol/L 141   POTASSIUM mmol/L 3.7   CHLORIDE mmol/L 105   CO2 mmol/L 24.0   BUN mg/dL 19   CREATININE mg/dL 1.00   GLUCOSE mg/dL 224*   CALCIUM mg/dL 8.3*     Results from last 7 days   Lab Units 08/30/24  0634 08/30/24  0401   CK TOTAL U/L  --  71   HSTROP T ng/L 138* 88*     Results from last 7 days   Lab Units 08/30/24  1211 08/30/24  0325   WBC 10*3/mm3 24.70* 26.65*   HEMOGLOBIN g/dL 12.6* 13.6   HEMATOCRIT % 39.1 43.9   PLATELETS 10*3/mm3 178 242     Results from last 7 days   Lab Units 08/30/24  0401   MAGNESIUM mg/dL 2.2         Results from last 7 days   Lab Units 08/30/24  0401   TSH uIU/mL 2.900  2.900     Results from last 7 days   Lab Units 08/30/24  0325   HEMOGLOBIN A1C % 5.00     Results from last 7 days   Lab Units 08/30/24  0401   PROBNP pg/mL 1,061.0     Results from last 7 days   Lab Units 08/30/24  0325   PROTIME Seconds 14.0   INR  1.07   APTT seconds 25.0*       I personally reviewed the patient's EKG/Telemetry data    Radiology Data:   XR Abdomen KUB    Result Date: 8/30/2024  Impression: NG tube curves oriented retrograde terminating over the gastric fundus. No dilated loops of bowel in the upper abdomen. Electronically Signed: Daryn Toledo MD  8/30/2024 6:35 AM EDT  Workstation ID: [id number]    XR Chest 1 View    Result Date: 8/30/2024  Impression: Mild pulmonary edema pattern with patchy airspace disease within the lung bases bilaterally, right greater than left, likely related to pneumonia. Probable small right-sided pleural effusion present. Electronically Signed: Gloria Pritchett MD  8/30/2024 3:38 AM EDT  Workstation ID: [id number]       Current Medications:    apixaban, 5 mg, Nasogastric, Q12H  azithromycin, 500 mg, Intravenous, Once  cefepime, 2,000 mg, Intravenous, Q8H  chlorhexidine, 15 mL, Mouth/Throat, Q12H  dofetilide, 250 mcg, Nasogastric, Q12H  furosemide,  40 mg, Intravenous, Q12H  insulin regular, 2-7 Units, Subcutaneous, Q6H  ipratropium-albuterol, 3 mL, Nebulization, Q6H - RT  ketorolac, 1 drop, Right Eye, 4x Daily  lactated ringers, 500 mL, Intravenous, Once  lactated ringers, 500 mL, Intravenous, Once  moxifloxacin, 1 drop, Right Eye, 4x Daily  pantoprazole, 40 mg, Intravenous, Q24H  prednisoLONE acetate, 1 drop, Right Eye, 4x Daily  sodium chloride, 10 mL, Intravenous, Q12H  sodium chloride, 10 mL, Intravenous, Q12H      norepinephrine, 0.02-0.3 mcg/kg/min, Last Rate: 0.1 mcg/kg/min (08/30/24 0645)  propofol, 5-50 mcg/kg/min, Last Rate: 25 mcg/kg/min (08/30/24 1147)        Assessment  History of paroxysmal atrial fibrillation  Established with Dr. Bear with [facility] in clinic  On Tikosyn  Wide-complex tachycardia  In ER, resolved with no recurrence since being in ER  NSTEMI, likely type ii demand ischemia s/t (4)  Normal coronaries in 2020 reportedly, idb  Troponin 88 -> 138 -> 519  No ischemic changes on EKG  Acute hypoxic, hypercapnic respiratory failure  Initial pH 7.09, [facility], per ICU        Plan  QTc acceptable on EKGs. Continue Tikosyn and Eliquis.  Troponin increasing likely due to type ii demand ischemia from respiratory failure in the absence of ischemic changes on EKG. Would not pursue aggressive ischemic evaluation at this time. Will get AM troponin and put on cardiac interventionalist's list for the weekend. Patient will likely need noninvasive ischemic testing outpatient at later date. EKG in AM or with any significant rhythm changes  Wide complex tachycardia in the ER is concerning for real VT rather than aberrancy. I believe this was secondary to acute hypoxic, hypercapnic respiratory failure rather than a primary VT. No further recurrences since arriving to ICU. Continue to monitor  Current rhythm is either NSR or an ectopic atrial rhythm originating very near the sinus node. Either way, it is stable and no intervention  necessary  Echo pending with recent transthoracic echo reportedly normal per wife  Would reintroduce home diuretic regimen when hemodynamics will allow. Reassuring proBNP      Electronically signed by Sage Arora PA-C, 08/30/24, 1:47 PM EDT.

## 2024-08-30 NOTE — PROGRESS NOTES
Critical Care Note     LOS: 0 days   Patient Care Team:  Octavio Haines MD as PCP - General (Infectious Diseases)  Srinivasan Cary MD as Consulting Physician (Family Medicine)  Shekhar Galvez MD as Consulting Physician (Cardiology)    Chief Complaint/Reason for visit:    Chief Complaint   Patient presents with    Sepsis  Bilateral pneumonia  Acute respiratory failure  Atrial fibrillation/wide-complex tachycardia  Tobacco abuse       Subjective     Interval History:     Patient admitted early this morning with respiratory distress, fever, pneumonia requiring intubation for hypoxia.  He is hypotensive requiring vasopressors.  Initially there was concern that this might represent heart failure so he did not receive a full fluid bolus.  He has a wide QRS with intermittent P waves and a controlled rate of 75.  Records indicate a normal heart catheterization in 2020.  Currently he is on a rate of 16 tidal volume 500 PEEP of 10 and 70% FiO2.  He is oliguric with only 150 mL of urine output.    Patient was in his recliner called out to his wife around 2:00 in the morning saying he could not breathe and was having chills.  Brought to our emergency room where his saturations were extremely low and he was intubated.    Review of Systems:    All systems were reviewed and negative except as noted in subjective.    Medical history, surgical history, social history, family history reviewed    Objective     Intake/Output:    Intake/Output Summary (Last 24 hours) at 8/30/2024 1126  Last data filed at 8/30/2024 0900  Gross per 24 hour   Intake 960 ml   Output 150 ml   Net 810 ml       Nutrition:  NPO Diet NPO Type: Strict NPO    Infusions:  norepinephrine, 0.02-0.3 mcg/kg/min, Last Rate: 0.1 mcg/kg/min (08/30/24 0645)  propofol, 5-50 mcg/kg/min, Last Rate: 20 mcg/kg/min (08/30/24 0638)        Mechanical Ventilator Settings:            Resp Rate (Set): 16     FiO2 (%): 70 %  PEEP/CPAP (cm H2O): 10 cm H20    Minute  "Ventilation (L/min) (Obs): 11.3 L/min  Resp Rate (Observed) Vent: 22  I:E Ratio (Set): 1:2.75  I:E Ratio (Obs): 1:1.8    PIP Observed (cm H2O): 27 cm H2O  Plateau Pressure (cm H2O): (S) 24 cm H2O    Telemetry: Sinus rhythm with a wide QRS complex             Vital Signs  Blood pressure 128/65, pulse 75, temperature 99.5 °F (37.5 °C), temperature source Axillary, resp. rate 18, height 185 cm (72.84\"), weight (!) 160 kg (352 lb 4.7 oz), SpO2 100%.    Physical Exam:  General Appearance:  Morbidly obese older gentleman   Head:  Atraumatic   Eyes:          No jaundice   Ears:     Throat: Orally intubated   Neck: Trachea midline   Back:      Lungs:   Coarse rhonchi and wheezing bilaterally    Heart:  S1, S2 auscultated, regular   Abdomen:   Hypoactive bowel sounds, large panniculus, soft   Rectal:   Deferred   Extremities: Lymphedema with compression wraps in place   Pulses: Palpable radial pulse   Skin: Warm and dry   Lymph nodes:    Neurologic: Sedated on propofol      Results Review:     I reviewed the patient's new clinical results.   Results from last 7 days   Lab Units 08/30/24  0401   SODIUM mmol/L 141   POTASSIUM mmol/L 3.7   CHLORIDE mmol/L 105   CO2 mmol/L 24.0   BUN mg/dL 19   CREATININE mg/dL 1.00   CALCIUM mg/dL 8.3*   BILIRUBIN mg/dL 0.5   ALK PHOS U/L 54   ALT (SGPT) U/L 14   AST (SGOT) U/L 22   GLUCOSE mg/dL 224*     Results from last 7 days   Lab Units 08/30/24  0325   WBC 10*3/mm3 26.65*   HEMOGLOBIN g/dL 13.6   HEMATOCRIT % 43.9   PLATELETS 10*3/mm3 242   MONOCYTES % % 3.0*   EOSINOPHIL % % 3.0     Results from last 7 days   Lab Units 08/30/24  0604   PH, ARTERIAL pH units 7.261*   PO2 ART mm Hg 87.9   PCO2, ARTERIAL mm Hg 59.8*   HCO3 ART mmol/L 26.9*     No results found for: \"BLOODCX\"  No results found for: \"URINECX\"    I reviewed the patient's new imaging including images and reports.    XR CHEST 1 VW    Date of Exam: 8/30/2024 3:35 AM EDT    Indication: Cough    Comparison: None " available.    Findings:  Endotracheal tube tip in the proximal to mid trachea. The heart appears enlarged. There is indistinctness of the pulmonary vasculature. Patchy airspace disease is seen within the lung bases bilaterally, right greater than left. Probable small right-sided   pleural effusion present. No pneumothorax.   Impression:     Impression:  Mild pulmonary edema pattern with patchy airspace disease within the lung bases bilaterally, right greater than left, likely related to pneumonia. Probable small right-sided pleural effusion present.        Electronically Signed: Gloria Pritchett MD   8/30/2024 3:38 AM EDT     All medications reviewed.   apixaban, 5 mg, Nasogastric, Q12H  azithromycin, 500 mg, Intravenous, Once  cefepime, 2,000 mg, Intravenous, Q8H  chlorhexidine, 15 mL, Mouth/Throat, Q12H  dofetilide, 250 mcg, Nasogastric, Q12H  furosemide, 40 mg, Intravenous, Q12H  ipratropium-albuterol, 3 mL, Nebulization, Q6H - RT  ketorolac, 1 drop, Right Eye, 4x Daily  lactated ringers, 1,000 mL, Intravenous, Once  lactated ringers, 500 mL, Intravenous, Once  lactated ringers, 500 mL, Intravenous, Once  moxifloxacin, 1 drop, Right Eye, 4x Daily  pantoprazole, 40 mg, Intravenous, Q24H  prednisoLONE acetate, 1 drop, Right Eye, 4x Daily  sodium chloride, 10 mL, Intravenous, Q12H          Assessment & Plan       Acute respiratory failure with hypoxia and hypercapnia    Pneumonia of both lungs due to infectious organism    Continuous tobacco abuse      79-year-old gentleman with recent cataract surgery presenting with respiratory distress, hypoxia, lung infiltrates and hypotension.  He is a heavy smoker, 3 packs/day.  He was intubated in the emergency room after he was intubated.  pH was 7.08, CO2 80, O2 80.  He is currently on a rate of 16 tidal volume 500 PEEP of 10 and 70% FiO2 with a saturation of 100%.  Driving pressure is 14.3.  Repeat ABG pH 7.26, pCO2 60, pO2 88 on 100%.  Respiratory panel PCR is negative.   Respiratory Gram stain reveals no organisms.  Procalcitonin is 0.13.  White count is elevated at 26.  proBNP was 1061(normal).  He apparently takes Tikosyn for a history of atrial fibrillation.  He had a heart catheterization in 2020 that had normal coronary arteries.  These records are from the [facility].  I cannot find an old echocardiogram report.  However, in 2023 he had an interventricular conduction delay similar to his current EKG.  Troponin is mildly elevated at 138 most likely from stress.  He had a home sleep study August 20, 2024.  His apnea hypopnea index was only 1.2.    PLAN:    Recheck ABG, to evaluate hypercapnea  10 PEEP at 10  Wean FiO2 as tolerated  Give fluid bolus  Echocardiogram  [facility]  Cardiology to address Tikosyn, wide QRS complex, possible ectopic atrial focus  Will hold diuretics because of hypotension    Nebulized bronchodilators  Follow-up respiratory cultures  Continue cefepime  Change doxycycline to azithromycin  Nasal swab for MRSA  Legionella and pneumococcal urinary antigens    Continue eyedrops post cataract surgery      VTE Prophylaxis: anticoagulated    Stress Ulcer Prophylaxis: Protonix    Patient is ill with hypotension requiring vasopressors, hypoxia requiring mechanical ventilatory support and in need of ongoing ICU management and care    Gloria Noland MD  08/30/24  11:26 EDT      Time: Critical care 40 min  I personally provided care to this critically ill patient as documented above.  Critical care time does not include time spent on separately billed procedures.  None of my critical care time was concurrent with other critical care providers.

## 2024-08-31 ENCOUNTER — APPOINTMENT (OUTPATIENT)
Dept: GENERAL RADIOLOGY | Facility: HOSPITAL | Age: 79
DRG: 871 | End: 2024-08-31
Payer: MEDICARE

## 2024-08-31 ENCOUNTER — APPOINTMENT (OUTPATIENT)
Dept: CARDIOLOGY | Facility: HOSPITAL | Age: 79
DRG: 871 | End: 2024-08-31
Payer: MEDICARE

## 2024-08-31 PROBLEM — E78.2 MIXED HYPERLIPIDEMIA: Status: ACTIVE | Noted: 2024-08-31

## 2024-08-31 PROBLEM — Z96.651 S/P TKR (TOTAL KNEE REPLACEMENT), RIGHT: Status: RESOLVED | Noted: 2023-06-22 | Resolved: 2024-08-31

## 2024-08-31 PROBLEM — I49.3 PVC'S (PREMATURE VENTRICULAR CONTRACTIONS): Status: ACTIVE | Noted: 2024-08-31

## 2024-08-31 PROBLEM — I42.9 CARDIOMYOPATHY: Status: ACTIVE | Noted: 2024-08-31

## 2024-08-31 PROBLEM — I21.4 NSTEMI, INITIAL EPISODE OF CARE: Status: ACTIVE | Noted: 2024-08-31

## 2024-08-31 PROBLEM — R00.0 WIDE-COMPLEX TACHYCARDIA: Status: ACTIVE | Noted: 2024-08-31

## 2024-08-31 PROBLEM — G89.18 ACUTE POSTOPERATIVE PAIN: Status: RESOLVED | Noted: 2023-06-25 | Resolved: 2024-08-31

## 2024-08-31 PROBLEM — M25.561 RIGHT KNEE PAIN: Status: RESOLVED | Noted: 2023-06-22 | Resolved: 2024-08-31

## 2024-08-31 PROBLEM — R94.31 PROLONGED Q-T INTERVAL ON ECG: Status: ACTIVE | Noted: 2024-08-31

## 2024-08-31 PROBLEM — I50.21 ACUTE SYSTOLIC CHF (CONGESTIVE HEART FAILURE): Status: ACTIVE | Noted: 2024-08-31

## 2024-08-31 PROBLEM — I48.0 PAROXYSMAL ATRIAL FIBRILLATION: Status: ACTIVE | Noted: 2024-08-31

## 2024-08-31 LAB
ANION GAP SERPL CALCULATED.3IONS-SCNC: 9 MMOL/L (ref 5–15)
ARTERIAL PATENCY WRIST A: ABNORMAL
ASCENDING AORTA: 4.2 CM
ATMOSPHERIC PRESS: ABNORMAL MM[HG]
BASE EXCESS BLDA CALC-SCNC: 0.5 MMOL/L (ref 0–2)
BDY SITE: ABNORMAL
BH CV ECHO MEAS - AO MAX PG: 10.8 MMHG
BH CV ECHO MEAS - AO MEAN PG: 6 MMHG
BH CV ECHO MEAS - AO ROOT DIAM: 3.3 CM
BH CV ECHO MEAS - AO V2 MAX: 164.5 CM/SEC
BH CV ECHO MEAS - AO V2 VTI: 27.6 CM
BH CV ECHO MEAS - AVA(I,D): 2.23 CM2
BH CV ECHO MEAS - EDV(CUBED): 175.6 ML
BH CV ECHO MEAS - EDV(MOD-SP2): 217 ML
BH CV ECHO MEAS - EDV(MOD-SP4): 313 ML
BH CV ECHO MEAS - EF(MOD-BP): 30 %
BH CV ECHO MEAS - EF(MOD-SP2): 32.3 %
BH CV ECHO MEAS - EF(MOD-SP4): 25.2 %
BH CV ECHO MEAS - ESV(CUBED): 71.5 ML
BH CV ECHO MEAS - ESV(MOD-SP2): 147 ML
BH CV ECHO MEAS - ESV(MOD-SP4): 234 ML
BH CV ECHO MEAS - FS: 25.9 %
BH CV ECHO MEAS - IVS/LVPW: 1 CM
BH CV ECHO MEAS - IVSD: 1.2 CM
BH CV ECHO MEAS - LA DIMENSION: 4.7 CM
BH CV ECHO MEAS - LAT PEAK E' VEL: 14 CM/SEC
BH CV ECHO MEAS - LV DIASTOLIC VOL/BSA (35-75): 114.4 CM2
BH CV ECHO MEAS - LV MASS(C)D: 280.5 GRAMS
BH CV ECHO MEAS - LV MAX PG: 5 MMHG
BH CV ECHO MEAS - LV MEAN PG: 2.5 MMHG
BH CV ECHO MEAS - LV SYSTOLIC VOL/BSA (12-30): 85.5 CM2
BH CV ECHO MEAS - LV V1 MAX: 111.5 CM/SEC
BH CV ECHO MEAS - LV V1 VTI: 17.8 CM
BH CV ECHO MEAS - LVIDD: 5.6 CM
BH CV ECHO MEAS - LVIDS: 4.2 CM
BH CV ECHO MEAS - LVOT AREA: 3.5 CM2
BH CV ECHO MEAS - LVOT DIAM: 2.1 CM
BH CV ECHO MEAS - LVPWD: 1.2 CM
BH CV ECHO MEAS - MED PEAK E' VEL: 10.3 CM/SEC
BH CV ECHO MEAS - MR MAX PG: 82.4 MMHG
BH CV ECHO MEAS - MR MAX VEL: 454 CM/SEC
BH CV ECHO MEAS - MR MEAN PG: 62 MMHG
BH CV ECHO MEAS - MR MEAN VEL: 378 CM/SEC
BH CV ECHO MEAS - MR VTI: 146 CM
BH CV ECHO MEAS - MV DEC SLOPE: 730.5 CM/SEC2
BH CV ECHO MEAS - MV DEC TIME: 0.15 SEC
BH CV ECHO MEAS - MV E MAX VEL: 112 CM/SEC
BH CV ECHO MEAS - MV MAX PG: 8.5 MMHG
BH CV ECHO MEAS - MV MEAN PG: 3 MMHG
BH CV ECHO MEAS - MV P1/2T: 55.3 MSEC
BH CV ECHO MEAS - MV V2 VTI: 25.4 CM
BH CV ECHO MEAS - MVA(P1/2T): 4 CM2
BH CV ECHO MEAS - MVA(VTI): 2.43 CM2
BH CV ECHO MEAS - PA ACC TIME: 0.11 SEC
BH CV ECHO MEAS - PA V2 MAX: 107 CM/SEC
BH CV ECHO MEAS - RAP SYSTOLE: 15 MMHG
BH CV ECHO MEAS - RVSP: 32 MMHG
BH CV ECHO MEAS - SV(LVOT): 61.7 ML
BH CV ECHO MEAS - SV(MOD-SP2): 70 ML
BH CV ECHO MEAS - SV(MOD-SP4): 79 ML
BH CV ECHO MEAS - SVI(LVOT): 22.5 ML/M2
BH CV ECHO MEAS - SVI(MOD-SP2): 25.6 ML/M2
BH CV ECHO MEAS - SVI(MOD-SP4): 28.9 ML/M2
BH CV ECHO MEAS - TAPSE (>1.6): 1.76 CM
BH CV ECHO MEAS - TR MAX PG: 16.6 MMHG
BH CV ECHO MEAS - TR MAX VEL: 204 CM/SEC
BH CV ECHO MEASUREMENTS AVERAGE E/E' RATIO: 9.22
BH CV XLRA - RV BASE: 3.8 CM
BH CV XLRA - RV LENGTH: 6.2 CM
BH CV XLRA - RV MID: 3 CM
BH CV XLRA - TDI S': 14.6 CM/SEC
BODY TEMPERATURE: 37
BUN SERPL-MCNC: 23 MG/DL (ref 8–23)
BUN/CREAT SERPL: 25 (ref 7–25)
CALCIUM SPEC-SCNC: 8 MG/DL (ref 8.6–10.5)
CHLORIDE SERPL-SCNC: 105 MMOL/L (ref 98–107)
CO2 BLDA-SCNC: 26.5 MMOL/L (ref 22–33)
CO2 SERPL-SCNC: 24 MMOL/L (ref 22–29)
COHGB MFR BLD: 1.7 % (ref 0–2)
CREAT SERPL-MCNC: 0.92 MG/DL (ref 0.76–1.27)
EGFRCR SERPLBLD CKD-EPI 2021: 85.1 ML/MIN/1.73
EPAP: 0
GLUCOSE BLDC GLUCOMTR-MCNC: 118 MG/DL (ref 70–130)
GLUCOSE BLDC GLUCOMTR-MCNC: 123 MG/DL (ref 70–130)
GLUCOSE SERPL-MCNC: 133 MG/DL (ref 65–99)
HCO3 BLDA-SCNC: 25.3 MMOL/L (ref 20–26)
HCT VFR BLD CALC: 36.5 % (ref 38–51)
HGB BLDA-MCNC: 11.9 G/DL (ref 13.5–17.5)
INHALED O2 CONCENTRATION: 35 %
IPAP: 0
LEFT ATRIUM VOLUME INDEX: 35.5 ML/M2
MAGNESIUM SERPL-MCNC: 2.1 MG/DL (ref 1.6–2.4)
METHGB BLD QL: 0.2 % (ref 0–1.5)
MODALITY: ABNORMAL
OXYHGB MFR BLDV: 97.1 % (ref 94–99)
PAW @ PEAK INSP FLOW SETTING VENT: 0 CMH2O
PCO2 BLDA: 40.4 MM HG (ref 35–45)
PCO2 TEMP ADJ BLD: 40.4 MM HG (ref 35–48)
PEEP RESPIRATORY: 10 CM[H2O]
PH BLDA: 7.41 PH UNITS (ref 7.35–7.45)
PH, TEMP CORRECTED: 7.41 PH UNITS
PO2 BLDA: 103 MM HG (ref 83–108)
PO2 TEMP ADJ BLD: 103 MM HG (ref 83–108)
POTASSIUM SERPL-SCNC: 3.9 MMOL/L (ref 3.5–5.2)
PROCALCITONIN SERPL-MCNC: 2.31 NG/ML (ref 0–0.25)
SODIUM SERPL-SCNC: 138 MMOL/L (ref 136–145)
TOTAL RATE: 16 BREATHS/MINUTE
TROPONIN T SERPL HS-MCNC: 278 NG/L
VENTILATOR MODE: ABNORMAL
VT ON VENT VENT: 0.5 ML

## 2024-08-31 PROCEDURE — 93005 ELECTROCARDIOGRAM TRACING: CPT | Performed by: INTERNAL MEDICINE

## 2024-08-31 PROCEDURE — 94799 UNLISTED PULMONARY SVC/PX: CPT

## 2024-08-31 PROCEDURE — 25010000002 FENTANYL CITRATE (PF) 50 MCG/ML SOLUTION: Performed by: INTERNAL MEDICINE

## 2024-08-31 PROCEDURE — 80048 BASIC METABOLIC PNL TOTAL CA: CPT | Performed by: INTERNAL MEDICINE

## 2024-08-31 PROCEDURE — 93306 TTE W/DOPPLER COMPLETE: CPT

## 2024-08-31 PROCEDURE — 82948 REAGENT STRIP/BLOOD GLUCOSE: CPT

## 2024-08-31 PROCEDURE — 84484 ASSAY OF TROPONIN QUANT: CPT

## 2024-08-31 PROCEDURE — 99233 SBSQ HOSP IP/OBS HIGH 50: CPT | Performed by: INTERNAL MEDICINE

## 2024-08-31 PROCEDURE — 94003 VENT MGMT INPAT SUBQ DAY: CPT

## 2024-08-31 PROCEDURE — 25010000002 FENTANYL 10 MCG/1 ML NS: Performed by: INTERNAL MEDICINE

## 2024-08-31 PROCEDURE — 93306 TTE W/DOPPLER COMPLETE: CPT | Performed by: INTERNAL MEDICINE

## 2024-08-31 PROCEDURE — 99232 SBSQ HOSP IP/OBS MODERATE 35: CPT | Performed by: INTERNAL MEDICINE

## 2024-08-31 PROCEDURE — 83050 HGB METHEMOGLOBIN QUAN: CPT

## 2024-08-31 PROCEDURE — 25010000002 PROPOFOL 10 MG/ML EMULSION: Performed by: EMERGENCY MEDICINE

## 2024-08-31 PROCEDURE — 82375 ASSAY CARBOXYHB QUANT: CPT

## 2024-08-31 PROCEDURE — 93010 ELECTROCARDIOGRAM REPORT: CPT | Performed by: INTERNAL MEDICINE

## 2024-08-31 PROCEDURE — 84145 PROCALCITONIN (PCT): CPT

## 2024-08-31 PROCEDURE — 83735 ASSAY OF MAGNESIUM: CPT | Performed by: INTERNAL MEDICINE

## 2024-08-31 PROCEDURE — 82805 BLOOD GASES W/O2 SATURATION: CPT

## 2024-08-31 PROCEDURE — 71045 X-RAY EXAM CHEST 1 VIEW: CPT

## 2024-08-31 PROCEDURE — 94761 N-INVAS EAR/PLS OXIMETRY MLT: CPT

## 2024-08-31 PROCEDURE — 25010000002 CEFEPIME PER 500 MG: Performed by: INTERNAL MEDICINE

## 2024-08-31 PROCEDURE — 25010000002 SULFUR HEXAFLUORIDE MICROSPH 60.7-25 MG RECONSTITUTED SUSPENSION: Performed by: INTERNAL MEDICINE

## 2024-08-31 RX ORDER — GABAPENTIN 400 MG/1
800 CAPSULE ORAL EVERY 8 HOURS SCHEDULED
Status: DISCONTINUED | OUTPATIENT
Start: 2024-08-31 | End: 2024-09-03

## 2024-08-31 RX ORDER — OXYCODONE AND ACETAMINOPHEN 10; 325 MG/1; MG/1
1 TABLET ORAL EVERY 6 HOURS SCHEDULED
Status: DISCONTINUED | OUTPATIENT
Start: 2024-08-31 | End: 2024-09-03

## 2024-08-31 RX ORDER — GABAPENTIN 400 MG/1
800 CAPSULE ORAL EVERY 8 HOURS SCHEDULED
Status: DISCONTINUED | OUTPATIENT
Start: 2024-08-31 | End: 2024-08-31

## 2024-08-31 RX ORDER — OXYCODONE AND ACETAMINOPHEN 10; 325 MG/1; MG/1
1 TABLET ORAL 4 TIMES DAILY
Status: DISCONTINUED | OUTPATIENT
Start: 2024-08-31 | End: 2024-08-31

## 2024-08-31 RX ORDER — ATENOLOL 25 MG/1
25 TABLET ORAL
Status: DISCONTINUED | OUTPATIENT
Start: 2024-08-31 | End: 2024-09-02

## 2024-08-31 RX ORDER — ARIPIPRAZOLE 10 MG/1
5 TABLET ORAL DAILY
Status: DISCONTINUED | OUTPATIENT
Start: 2024-08-31 | End: 2024-08-31

## 2024-08-31 RX ORDER — ARIPIPRAZOLE 10 MG/1
5 TABLET ORAL DAILY
Status: DISCONTINUED | OUTPATIENT
Start: 2024-09-01 | End: 2024-09-10 | Stop reason: HOSPADM

## 2024-08-31 RX ADMIN — PROPOFOL 35 MCG/KG/MIN: 10 INJECTION, EMULSION INTRAVENOUS at 02:47

## 2024-08-31 RX ADMIN — PROPOFOL 25 MCG/KG/MIN: 10 INJECTION, EMULSION INTRAVENOUS at 16:30

## 2024-08-31 RX ADMIN — PREDNISOLONE ACETATE 1 DROP: 10 SUSPENSION/ DROPS OPHTHALMIC at 08:21

## 2024-08-31 RX ADMIN — PREDNISOLONE ACETATE 1 DROP: 10 SUSPENSION/ DROPS OPHTHALMIC at 18:47

## 2024-08-31 RX ADMIN — PREDNISOLONE ACETATE 1 DROP: 10 SUSPENSION/ DROPS OPHTHALMIC at 20:01

## 2024-08-31 RX ADMIN — PROPOFOL 25 MCG/KG/MIN: 10 INJECTION, EMULSION INTRAVENOUS at 19:47

## 2024-08-31 RX ADMIN — CEFEPIME 2000 MG: 2 INJECTION, POWDER, FOR SOLUTION INTRAVENOUS at 05:57

## 2024-08-31 RX ADMIN — PREDNISOLONE ACETATE 1 DROP: 10 SUSPENSION/ DROPS OPHTHALMIC at 12:36

## 2024-08-31 RX ADMIN — SULFUR HEXAFLUORIDE 5 ML: KIT at 10:17

## 2024-08-31 RX ADMIN — PROPOFOL 40 MCG/KG/MIN: 10 INJECTION, EMULSION INTRAVENOUS at 09:55

## 2024-08-31 RX ADMIN — MOXIFLOXACIN OPHTHALMIC 0.05 ML: 5 SOLUTION/ DROPS OPHTHALMIC at 18:47

## 2024-08-31 RX ADMIN — IPRATROPIUM BROMIDE AND ALBUTEROL SULFATE 3 ML: 2.5; .5 SOLUTION RESPIRATORY (INHALATION) at 00:40

## 2024-08-31 RX ADMIN — PROPOFOL 30 MCG/KG/MIN: 10 INJECTION, EMULSION INTRAVENOUS at 12:36

## 2024-08-31 RX ADMIN — PANTOPRAZOLE SODIUM 40 MG: 40 INJECTION, POWDER, FOR SOLUTION INTRAVENOUS at 09:49

## 2024-08-31 RX ADMIN — KETOROLAC TROMETHAMINE 1 DROP: 5 SOLUTION/ DROPS OPHTHALMIC at 18:47

## 2024-08-31 RX ADMIN — Medication 50 MCG/HR: at 10:58

## 2024-08-31 RX ADMIN — GABAPENTIN 800 MG: 400 CAPSULE ORAL at 13:54

## 2024-08-31 RX ADMIN — OXYCODONE AND ACETAMINOPHEN 1 TABLET: 10; 325 TABLET ORAL at 23:09

## 2024-08-31 RX ADMIN — KETOROLAC TROMETHAMINE 1 DROP: 5 SOLUTION/ DROPS OPHTHALMIC at 12:36

## 2024-08-31 RX ADMIN — KETOROLAC TROMETHAMINE 1 DROP: 5 SOLUTION/ DROPS OPHTHALMIC at 20:07

## 2024-08-31 RX ADMIN — IPRATROPIUM BROMIDE AND ALBUTEROL SULFATE 3 ML: 2.5; .5 SOLUTION RESPIRATORY (INHALATION) at 13:12

## 2024-08-31 RX ADMIN — CEFEPIME 2000 MG: 2 INJECTION, POWDER, FOR SOLUTION INTRAVENOUS at 13:54

## 2024-08-31 RX ADMIN — MOXIFLOXACIN OPHTHALMIC 0.05 ML: 5 SOLUTION/ DROPS OPHTHALMIC at 12:36

## 2024-08-31 RX ADMIN — PROPOFOL 35 MCG/KG/MIN: 10 INJECTION, EMULSION INTRAVENOUS at 05:11

## 2024-08-31 RX ADMIN — IPRATROPIUM BROMIDE AND ALBUTEROL SULFATE 3 ML: 2.5; .5 SOLUTION RESPIRATORY (INHALATION) at 07:11

## 2024-08-31 RX ADMIN — FENTANYL CITRATE 50 MCG: 50 INJECTION, SOLUTION INTRAMUSCULAR; INTRAVENOUS at 08:22

## 2024-08-31 RX ADMIN — KETOROLAC TROMETHAMINE 1 DROP: 5 SOLUTION/ DROPS OPHTHALMIC at 08:21

## 2024-08-31 RX ADMIN — CHLORHEXIDINE GLUCONATE ORAL RINSE 15 ML: 1.2 SOLUTION DENTAL at 20:01

## 2024-08-31 RX ADMIN — IPRATROPIUM BROMIDE AND ALBUTEROL SULFATE 3 ML: 2.5; .5 SOLUTION RESPIRATORY (INHALATION) at 19:08

## 2024-08-31 RX ADMIN — MOXIFLOXACIN OPHTHALMIC 0.05 ML: 5 SOLUTION/ DROPS OPHTHALMIC at 20:29

## 2024-08-31 RX ADMIN — ARIPIPRAZOLE 5 MG: 10 TABLET ORAL at 10:58

## 2024-08-31 RX ADMIN — OXYCODONE AND ACETAMINOPHEN 1 TABLET: 10; 325 TABLET ORAL at 18:47

## 2024-08-31 RX ADMIN — APIXABAN 5 MG: 5 TABLET, FILM COATED ORAL at 09:49

## 2024-08-31 RX ADMIN — FENTANYL CITRATE 50 MCG: 50 INJECTION, SOLUTION INTRAMUSCULAR; INTRAVENOUS at 04:25

## 2024-08-31 RX ADMIN — OXYCODONE HYDROCHLORIDE AND ACETAMINOPHEN 1 TABLET: 10; 325 TABLET ORAL at 12:36

## 2024-08-31 RX ADMIN — ATENOLOL 25 MG: 25 TABLET ORAL at 10:58

## 2024-08-31 RX ADMIN — CEFEPIME 2000 MG: 2 INJECTION, POWDER, FOR SOLUTION INTRAVENOUS at 21:42

## 2024-08-31 RX ADMIN — Medication 10 ML: at 20:01

## 2024-08-31 RX ADMIN — MOXIFLOXACIN OPHTHALMIC 0.05 ML: 5 SOLUTION/ DROPS OPHTHALMIC at 08:21

## 2024-08-31 RX ADMIN — PROPOFOL 35 MCG/KG/MIN: 10 INJECTION, EMULSION INTRAVENOUS at 07:27

## 2024-08-31 RX ADMIN — APIXABAN 5 MG: 5 TABLET, FILM COATED ORAL at 20:01

## 2024-08-31 RX ADMIN — Medication 10 ML: at 09:49

## 2024-08-31 RX ADMIN — GABAPENTIN 800 MG: 400 CAPSULE ORAL at 21:42

## 2024-08-31 RX ADMIN — CHLORHEXIDINE GLUCONATE ORAL RINSE 15 ML: 1.2 SOLUTION DENTAL at 09:49

## 2024-08-31 RX ADMIN — PROPOFOL 25 MCG/KG/MIN: 10 INJECTION, EMULSION INTRAVENOUS at 23:09

## 2024-08-31 NOTE — PROGRESS NOTES
"[facility]  IP Progress Note    PROBLEM LIST:  Paroxysmal atrial fibrillation  Followed by Dr. Bear with [facility]  General cardiologist Dr. Galvez  Echocardiogram reportedly done 1 month ago normal per family to reevaluate asc aortic aneurysm which was stable  PVCs, bradycardia  Monitor summer 2024, data deficit: 27% PVC burden, 89 pauses longest 6.5 seconds-all pauses occurred during sleeping hours  Acute Respiratory Failure  [facility] admission 8/2024 pH 7.09 on alex  Normal coronaries reportedly on left heart catheterization 2020, incomplete database  BMI >45  Hypertension  Dyslipidemia  MARK  History of secondary parkinsonism, drug-induced       HOSPITAL COURSE:  Patient presented with severe respiratory distress.  He does have a history of a COVID a month ago.  He was emergently intubated.  He was found to have prolonged QT and he was on Tikosyn for his paroxysmal atrial fibrillation.      CHIEF COMPLAINTS:  Shortness of breath could not breathe      Subjective   Patient intubated      Objective     Blood pressure 129/71, pulse 101, temperature 98.3 °F (36.8 °C), temperature source Axillary, resp. rate 21, height 185 cm (72.84\"), weight (!) 160 kg (352 lb 11.8 oz), SpO2 97%.     Intake/Output Summary (Last 24 hours) at 8/31/2024 0950  Last data filed at 8/31/2024 0557  Gross per 24 hour   Intake 1342.95 ml   Output 775 ml   Net 567.95 ml       PHYSICAL EXAM:  Constitutional:       General: Not in acute distress.     Appearance: Healthy appearance. Not in distress.     Neck:     JVP:Not elevated     Carotid artery: Normal    Pulmonary:      Effort: Pulmonary effort is normal.      Breath sounds: Normal breath sounds. No wheezing. No rhonchi. No rales.     Cardiovascular:      Normal rate. Regular rhythm. Normal S1. Normal S2.      Murmurs: There is no significant murmur.      No gallop. No click. No rub.     Abdominal:      General: Bowel sounds are normal.      " "Palpations: Abdomen is soft.      Tenderness: There is no abdominal tenderness.    Extremities:     Pulses:Normal radial and pedal pulses     Edema:no edema    MEDICATIONS:    apixaban, 5 mg, Nasogastric, Q12H  cefepime, 2,000 mg, Intravenous, Q8H  chlorhexidine, 15 mL, Mouth/Throat, Q12H  [Held by provider] dofetilide, 250 mcg, Nasogastric, Q12H  insulin regular, 2-7 Units, Subcutaneous, Q6H  ipratropium-albuterol, 3 mL, Nebulization, Q6H - RT  ketorolac, 1 drop, Right Eye, 4x Daily  moxifloxacin, 1 drop, Right Eye, 4x Daily  pantoprazole, 40 mg, Intravenous, Q24H  prednisoLONE acetate, 1 drop, Right Eye, 4x Daily  sodium chloride, 10 mL, Intravenous, Q12H          Results from last 7 days   Lab Units 08/30/24  1211   WBC 10*3/mm3 24.70*   HEMOGLOBIN g/dL 12.6*   HEMATOCRIT % 39.1   PLATELETS 10*3/mm3 178     Results from last 7 days   Lab Units 08/31/24  0430 08/30/24  1211   SODIUM mmol/L 138 142   POTASSIUM mmol/L 3.9 4.1   CHLORIDE mmol/L 105 110*   CO2 mmol/L 24.0 21.0*   BUN mg/dL 23 21   CREATININE mg/dL 0.92 1.22   CALCIUM mg/dL 8.0* 7.4*   BILIRUBIN mg/dL  --  0.8   ALK PHOS U/L  --  42   ALT (SGPT) U/L  --  14   AST (SGOT) U/L  --  27   GLUCOSE mg/dL 133* 130*     Results from last 7 days   Lab Units 08/30/24  0325   INR  1.07     Lab Results   Component Value Date    TROPONINT 278 (C) 08/31/2024     Results from last 7 days   Lab Units 08/30/24  0401   TSH uIU/mL 2.900  2.900             No results found for: \"IRON\", \"FERRITIN\", \"LABIRON\", \"TIBC\"   Hemoglobin A1C   Date Value Ref Range Status   08/30/2024 5.00 4.80 - 5.60 % Final     Magnesium   Date Value Ref Range Status   08/31/2024 2.1 1.6 - 2.4 mg/dL Final        RESULT REVIEW:    I reviewed the patient's new clinical results.    Tele: Sinus Rythym with prolonged QT      ASSESSMENT:     Possible infection COVID-related  Troponin most likely type II  Atrial fibrillation    PLAN:     Patient WBCs around 25,000 with increased Pro-Lacho also.  Patient is " being treated with antibiotics per intensivist.  Patient EF shows about 45% with mild distal anterior hypokinesis.  Patient QT is being prolonged we will keep holding Tikosyn for now.  I believe most of his symptoms are related to lung problems.  He will need ischemic eval though his heart cath about 3 years ago was normal.

## 2024-08-31 NOTE — PROGRESS NOTES
INTENSIVIST   PROGRESS NOTE     Hospital:  LOS: 1 day     Mr. Bo Perez, 79 y.o. male is followed for a Chief Complaint of: Respiratory Failure      Subjective   S     Interval History:  Remains on mechanical ventilation. FiO2 at 35% and PEEP of 10.        The patient's relevant past medical, surgical and social history were reviewed and updated in Epic as appropriate.      ROS: Unable to obtain secondary to sedation and mechanical ventilation.     Objective   O     Vitals:  Temp  Min: 98.3 °F (36.8 °C)  Max: 100.2 °F (37.9 °C)  BP  Min: 93/59  Max: 144/76  Pulse  Min: 73  Max: 108  Resp  Min: 20  Max: 24  SpO2  Min: 96 %  Max: 100 % Flow (L/min)  Min: 70  Max: 70    Intake/Ouptut 24 hrs (7:00AM - 6:59 AM)  Intake & Output (last 3 days)         08/28 0701  08/29 0700 08/29 0701  08/30 0700 08/30 0701  08/31 0700 08/31 0701 09/01 0700    P.O.   360     I.V. (mL/kg)   1143 (7.1)     IV Piggyback  600 200     Total Intake(mL/kg)  600 (3.8) 1703 (10.6)     Urine (mL/kg/hr)  150 775 (0.2)     Total Output  150 775     Net  +450 +928                     Medications (drips):  fentanyl 10 mcg/mL  norepinephrine, Last Rate: Stopped (08/31/24 0345)  propofol, Last Rate: 40 mcg/kg/min (08/31/24 0955)        Mechanical Ventilator Settings:          Resp Rate (Set): 16     FiO2 (%): 35 %  PEEP/CPAP (cm H2O): 10 cm H20    Minute Ventilation (L/min) (Obs): 9.86 L/min  Resp Rate (Observed) Vent: 18  I:E Ratio (Set): 1:2.75  I:E Ratio (Obs): 1:2    PIP Observed (cm H2O): 25 cm H2O  Plateau Pressure (cm H2O): (S) 23 cm H2O    Physical Examination  Telemetry:  Normal sinus rhythm.    Constitutional:  No acute distress.  ET tube in place on mechanical ventilation.    Eyes: No scleral icterus.   PERRL, EOM intact.    Neck:  Supple, FROM   Cardiovascular: Normal rate, regular and rhythm. Normal heart sounds.  No murmurs, gallop or rub.   Respiratory: No respiratory distress. Normal respiratory effort.  Diminished. No wheezing.     Abdominal:  Soft. No masses. Nontender. No distension. No HSM.   Extremities: No digital cyanosis. No clubbing.  1+ peripheral edema.   Skin: No rashes, lesions or ulcers   Neurological:   Arouses to stimulation.              Results from last 7 days   Lab Units 08/30/24  1211 08/30/24  0325   WBC 10*3/mm3 24.70* 26.65*   HEMOGLOBIN g/dL 12.6* 13.6   MCV fL 99.5* 104.8*   PLATELETS 10*3/mm3 178 242     Results from last 7 days   Lab Units 08/31/24  0430 08/30/24  1211 08/30/24  0401   SODIUM mmol/L 138 142 141   POTASSIUM mmol/L 3.9 4.1 3.7   CO2 mmol/L 24.0 21.0* 24.0   CREATININE mg/dL 0.92 1.22 1.00   GLUCOSE mg/dL 133* 130* 224*   MAGNESIUM mg/dL 2.1  --  2.2   PHOSPHORUS mg/dL  --   --  5.6*     Estimated Creatinine Clearance: 104.8 mL/min (by C-G formula based on SCr of 0.92 mg/dL).  Results from last 7 days   Lab Units 08/30/24  1211 08/30/24  0401   ALK PHOS U/L 42 54   BILIRUBIN mg/dL 0.8 0.5   ALT (SGPT) U/L 14 14   AST (SGOT) U/L 27 22       Results from last 7 days   Lab Units 08/31/24  0317 08/30/24  1358 08/30/24  0604 08/30/24  0337   PH, ARTERIAL pH units 7.405 7.393 7.261* 7.086*   PCO2, ARTERIAL mm Hg 40.4 38.0 59.8* 78.8*   PO2 ART mm Hg 103.0 119.0* 87.9 80.1*   FIO2 % 35 60 100 100       Images:  Imaging Results (Last 24 Hours)       Procedure Component Value Units Date/Time    XR Chest 1 View [[id number]] Collected: 08/31/24 0808     Updated: 08/31/24 0813    Narrative:      XR CHEST 1 VW    Date of Exam: 8/31/2024 2:07 AM EDT    Indication: Intubated Patient    Comparison: 1 day prior.    Findings:  Nasogastric tube terminates in the stomach, and a right-sided PICC line terminates in the SVC. Some chronic interstitial changes appear similar and there is likely decreased component of edema/congestion with trace suspected left pleural effusion. There   is no distinct pneumothorax.      Impression:      Impression:  Nasogastric tube terminates in the stomach, and a right-sided PICC line  terminates in the SVC. Some chronic interstitial changes appear similar and there is likely decreased component of edema/congestion with trace suspected left pleural effusion. There   is no distinct pneumothorax.        Electronically Signed: Timo Flores MD    8/31/2024 8:09 AM EDT    Workstation ID: [id number]               Results: Reviewed.  I reviewed the patient's new laboratory and imaging results.  I independently reviewed the patient's new images.    Medications: Reviewed.    Assessment & Plan   A / P     Mr. Perez is a 80yo M with a history of Afib on Tikosyn and 3pk/day smoker with a recent cataract surgery who presented to the [facility] ED on 8/30/24 with respiratory distress with hypoxia and required intubation. He was hypotensive and was started on Levophed. Cefepime was started for possible pneumonia.     This AM, he is off vasopressors. He remains on mechanical ventilation. FiO2 at 35% and PEEP of 10.     Nutrition:   NPO Diet NPO Type: Strict NPO  Advance Directives:   Code Status and Medical Interventions: CPR (Attempt to Resuscitate); Full Support   Ordered at: 08/30/24 0533     Code Status (Patient has no pulse and is not breathing):    CPR (Attempt to Resuscitate)     Medical Interventions (Patient has pulse or is breathing):    Full Support       Active Hospital Problems    Diagnosis     **Acute respiratory failure with hypoxia and hypercapnia     Pneumonia of both lungs due to infectious organism     Continuous tobacco abuse        Assessment / Plan:    Continue mechanical ventilation. Titrate PEEP.   Sedation with Propofol. Add low-dose Fentanyl infusion as he is on chronic narcotics at home.   Cardiology following. Echocardiogram today.   Tikosyn on hold secondary to prolonged Qt.   Continue Cefepime. F/u cultures. NGTD.   SSI as needed for hyperglycemia.   Eliquis for Afib.   Duonebs.   Eye drops for recent cataract surgery.   Start tube feeds.   Home medications reviewed and restarted as  appropriate.   AM labs and CXR    High risk secondary to management of mechanical ventilation.     High level of risk due to:  severe exacerbation of chronic illness and illness with threat to life or bodily function.    Plan of care and goals reviewed during interdisciplinary rounds.  I discussed the patient's findings and my recommendations with nursing staff      Emeli Nair DO    Intensive Care Medicine and Pulmonary Medicine

## 2024-08-31 NOTE — PLAN OF CARE
Goal Outcome Evaluation:               Neuro: follows commands to squeeze my hands. Wakes up off of sedation easily. Scheduled po pain meds started. Restraints bilateral wrists.   Lungs: mechanical ventilation. Weaning peep. Lungs diminished.   Cardiac: heart rate 90's to low 100's. Pt has dipped into the 40's a couple of times but did recover well. EF 30% per echo today. Levo has remained off. Blood pressure did go a little soft after taking Atenolol.  per EKG.   GI/: walker catheter. Urine with sediment. NPO  Skin: no new breakdown noted. Turn Q 2.   Bedside report given to ZENIA Velázquez

## 2024-08-31 NOTE — PROGRESS NOTES
"Pharmacy Consult - Tikosyn Initiation    Bo Perez is a 79 y.o. male on prior to admission.     Height: 185 cm (72.84\")  Weight: (!) 160 kg (352 lb 4.7 oz)    Evaluation of Drug-Drug Interactions     One dose of Azithromycin IV given 8/30; Legionella antigen negative    Laboratory    Results from last 7 days   Lab Units 08/30/24  0401   SODIUM mmol/L 141   POTASSIUM mmol/L 3.7   CHLORIDE mmol/L 105   CO2 mmol/L 24.0   BUN mg/dL 19   CREATININE mg/dL 1.00   GLUCOSE mg/dL 224*   CALCIUM mg/dL 8.3*     Results from last 7 days   Lab Units 08/30/24  0401   MAGNESIUM mg/dL 2.2     Pharmacy will order electrolyte replacement per protocols if indicated (Mag < 2.0, K < 4.0) based on laboratory values on admission      - Magnesium replacement protocol ordered: Yes     - Potassium replacement protocol ordered: Yes    Estimated CrCl =  96.4 mL/min  (Calculated with Cockroft-Gault equation using actual body weight and serum creatinine for calculation--can use laboratory values from previous 24 hours)    Initial QTc and EKG Monitoring    QTc = 649     Initial Tikosyn dose based on Creatinine Clearance:    CrCl > 60 mL/min - Dofetilide 500 mcg PO Q12H  CrCl 40-60 mL/min - Dofetilide 250 mcg PO Q12H  CrCl 20-39 mL/min - Dofetilide 125 mcg PO Q12H  CrCl < 20 mL/min - Do not start medication, contact MD      Assessment/Plan:  Holding Tikosyn for now due to elevated QTc as above  Checking EKG BID to monitor QTc elevation; most likely due to one time dose of Azithromycin.  K and Mg acceptable.  Pharmacy will continue to follow and adjust dose based on renal function, drug levels, and clinical status.    Thanks,  Remigio Cabrera, PharmD, BCPS, BCCCP  08/31/24  15:01 EDT  "

## 2024-09-01 ENCOUNTER — APPOINTMENT (OUTPATIENT)
Dept: GENERAL RADIOLOGY | Facility: HOSPITAL | Age: 79
DRG: 871 | End: 2024-09-01
Payer: MEDICARE

## 2024-09-01 PROBLEM — A41.9 SEPSIS DUE TO PNEUMONIA: Status: ACTIVE | Noted: 2024-08-30

## 2024-09-01 LAB
ANION GAP SERPL CALCULATED.3IONS-SCNC: 7 MMOL/L (ref 5–15)
ARTERIAL PATENCY WRIST A: ABNORMAL
ATMOSPHERIC PRESS: ABNORMAL MM[HG]
BACTERIA SPEC RESP CULT: NORMAL
BASE EXCESS BLDA CALC-SCNC: 1.2 MMOL/L (ref 0–2)
BDY SITE: ABNORMAL
BODY TEMPERATURE: 37
BUN SERPL-MCNC: 21 MG/DL (ref 8–23)
BUN/CREAT SERPL: 29.6 (ref 7–25)
CALCIUM SPEC-SCNC: 8 MG/DL (ref 8.6–10.5)
CHLORIDE SERPL-SCNC: 104 MMOL/L (ref 98–107)
CO2 BLDA-SCNC: 27.8 MMOL/L (ref 22–33)
CO2 SERPL-SCNC: 25 MMOL/L (ref 22–29)
COHGB MFR BLD: 2 % (ref 0–2)
CREAT SERPL-MCNC: 0.71 MG/DL (ref 0.76–1.27)
DEPRECATED RDW RBC AUTO: 55.3 FL (ref 37–54)
EGFRCR SERPLBLD CKD-EPI 2021: 93.9 ML/MIN/1.73
EPAP: 0
ERYTHROCYTE [DISTWIDTH] IN BLOOD BY AUTOMATED COUNT: 15.6 % (ref 12.3–15.4)
GLUCOSE BLDC GLUCOMTR-MCNC: 108 MG/DL (ref 70–130)
GLUCOSE BLDC GLUCOMTR-MCNC: 112 MG/DL (ref 70–130)
GLUCOSE BLDC GLUCOMTR-MCNC: 113 MG/DL (ref 70–130)
GLUCOSE BLDC GLUCOMTR-MCNC: 113 MG/DL (ref 70–130)
GLUCOSE BLDC GLUCOMTR-MCNC: 117 MG/DL (ref 70–130)
GLUCOSE SERPL-MCNC: 117 MG/DL (ref 65–99)
GRAM STN SPEC: NORMAL
HCO3 BLDA-SCNC: 26.4 MMOL/L (ref 20–26)
HCT VFR BLD AUTO: 33.9 % (ref 37.5–51)
HCT VFR BLD CALC: 34.8 % (ref 38–51)
HGB BLD-MCNC: 11 G/DL (ref 13–17.7)
HGB BLDA-MCNC: 11.3 G/DL (ref 13.5–17.5)
INHALED O2 CONCENTRATION: 35 %
IPAP: 0
MAGNESIUM SERPL-MCNC: 2.3 MG/DL (ref 1.6–2.4)
MCH RBC QN AUTO: 31.4 PG (ref 26.6–33)
MCHC RBC AUTO-ENTMCNC: 32.4 G/DL (ref 31.5–35.7)
MCV RBC AUTO: 96.9 FL (ref 79–97)
METHGB BLD QL: 0.3 % (ref 0–1.5)
MODALITY: ABNORMAL
OXYHGB MFR BLDV: 94.9 % (ref 94–99)
PAW @ PEAK INSP FLOW SETTING VENT: 0 CMH2O
PCO2 BLDA: 43.5 MM HG (ref 35–45)
PCO2 TEMP ADJ BLD: 43.5 MM HG (ref 35–48)
PEEP RESPIRATORY: 8 CM[H2O]
PH BLDA: 7.39 PH UNITS (ref 7.35–7.45)
PH, TEMP CORRECTED: 7.39 PH UNITS
PHOSPHATE SERPL-MCNC: 2.1 MG/DL (ref 2.5–4.5)
PLATELET # BLD AUTO: 111 10*3/MM3 (ref 140–450)
PMV BLD AUTO: 9.7 FL (ref 6–12)
PO2 BLDA: 81.6 MM HG (ref 83–108)
PO2 TEMP ADJ BLD: 81.6 MM HG (ref 83–108)
POTASSIUM SERPL-SCNC: 4 MMOL/L (ref 3.5–5.2)
QT INTERVAL: 356 MS
QTC INTERVAL: 525 MS
RBC # BLD AUTO: 3.5 10*6/MM3 (ref 4.14–5.8)
SODIUM SERPL-SCNC: 136 MMOL/L (ref 136–145)
TOTAL RATE: 16 BREATHS/MINUTE
VENTILATOR MODE: ABNORMAL
VT ON VENT VENT: 0.5 ML
WBC NRBC COR # BLD AUTO: 10.07 10*3/MM3 (ref 3.4–10.8)

## 2024-09-01 PROCEDURE — 85027 COMPLETE CBC AUTOMATED: CPT | Performed by: INTERNAL MEDICINE

## 2024-09-01 PROCEDURE — 99233 SBSQ HOSP IP/OBS HIGH 50: CPT | Performed by: INTERNAL MEDICINE

## 2024-09-01 PROCEDURE — 94799 UNLISTED PULMONARY SVC/PX: CPT

## 2024-09-01 PROCEDURE — 80048 BASIC METABOLIC PNL TOTAL CA: CPT | Performed by: INTERNAL MEDICINE

## 2024-09-01 PROCEDURE — 94003 VENT MGMT INPAT SUBQ DAY: CPT

## 2024-09-01 PROCEDURE — 99232 SBSQ HOSP IP/OBS MODERATE 35: CPT | Performed by: INTERNAL MEDICINE

## 2024-09-01 PROCEDURE — 82805 BLOOD GASES W/O2 SATURATION: CPT

## 2024-09-01 PROCEDURE — 82948 REAGENT STRIP/BLOOD GLUCOSE: CPT

## 2024-09-01 PROCEDURE — 83050 HGB METHEMOGLOBIN QUAN: CPT

## 2024-09-01 PROCEDURE — 71045 X-RAY EXAM CHEST 1 VIEW: CPT

## 2024-09-01 PROCEDURE — 83735 ASSAY OF MAGNESIUM: CPT | Performed by: INTERNAL MEDICINE

## 2024-09-01 PROCEDURE — 93005 ELECTROCARDIOGRAM TRACING: CPT | Performed by: INTERNAL MEDICINE

## 2024-09-01 PROCEDURE — 93010 ELECTROCARDIOGRAM REPORT: CPT | Performed by: INTERNAL MEDICINE

## 2024-09-01 PROCEDURE — 84100 ASSAY OF PHOSPHORUS: CPT | Performed by: INTERNAL MEDICINE

## 2024-09-01 PROCEDURE — 25010000002 PROPOFOL 10 MG/ML EMULSION: Performed by: EMERGENCY MEDICINE

## 2024-09-01 PROCEDURE — 25010000002 CEFEPIME PER 500 MG: Performed by: INTERNAL MEDICINE

## 2024-09-01 PROCEDURE — 82375 ASSAY CARBOXYHB QUANT: CPT

## 2024-09-01 RX ADMIN — KETOROLAC TROMETHAMINE 1 DROP: 5 SOLUTION/ DROPS OPHTHALMIC at 18:29

## 2024-09-01 RX ADMIN — IPRATROPIUM BROMIDE AND ALBUTEROL SULFATE 3 ML: 2.5; .5 SOLUTION RESPIRATORY (INHALATION) at 18:48

## 2024-09-01 RX ADMIN — IPRATROPIUM BROMIDE AND ALBUTEROL SULFATE 3 ML: 2.5; .5 SOLUTION RESPIRATORY (INHALATION) at 07:00

## 2024-09-01 RX ADMIN — CHLORHEXIDINE GLUCONATE ORAL RINSE 15 ML: 1.2 SOLUTION DENTAL at 20:26

## 2024-09-01 RX ADMIN — OXYCODONE AND ACETAMINOPHEN 1 TABLET: 10; 325 TABLET ORAL at 05:16

## 2024-09-01 RX ADMIN — CEFEPIME 2000 MG: 2 INJECTION, POWDER, FOR SOLUTION INTRAVENOUS at 21:53

## 2024-09-01 RX ADMIN — GABAPENTIN 800 MG: 400 CAPSULE ORAL at 05:16

## 2024-09-01 RX ADMIN — MOXIFLOXACIN OPHTHALMIC 0.05 ML: 5 SOLUTION/ DROPS OPHTHALMIC at 18:29

## 2024-09-01 RX ADMIN — APIXABAN 5 MG: 5 TABLET, FILM COATED ORAL at 20:26

## 2024-09-01 RX ADMIN — PREDNISOLONE ACETATE 1 DROP: 10 SUSPENSION/ DROPS OPHTHALMIC at 12:18

## 2024-09-01 RX ADMIN — PROPOFOL 20 MCG/KG/MIN: 10 INJECTION, EMULSION INTRAVENOUS at 16:27

## 2024-09-01 RX ADMIN — OXYCODONE AND ACETAMINOPHEN 1 TABLET: 10; 325 TABLET ORAL at 12:18

## 2024-09-01 RX ADMIN — Medication 10 ML: at 09:51

## 2024-09-01 RX ADMIN — CEFEPIME 2000 MG: 2 INJECTION, POWDER, FOR SOLUTION INTRAVENOUS at 14:20

## 2024-09-01 RX ADMIN — GABAPENTIN 800 MG: 400 CAPSULE ORAL at 21:53

## 2024-09-01 RX ADMIN — KETOROLAC TROMETHAMINE 1 DROP: 5 SOLUTION/ DROPS OPHTHALMIC at 20:31

## 2024-09-01 RX ADMIN — CEFEPIME 2000 MG: 2 INJECTION, POWDER, FOR SOLUTION INTRAVENOUS at 05:14

## 2024-09-01 RX ADMIN — PREDNISOLONE ACETATE 1 DROP: 10 SUSPENSION/ DROPS OPHTHALMIC at 07:53

## 2024-09-01 RX ADMIN — OXYCODONE AND ACETAMINOPHEN 1 TABLET: 10; 325 TABLET ORAL at 18:27

## 2024-09-01 RX ADMIN — APIXABAN 5 MG: 5 TABLET, FILM COATED ORAL at 09:50

## 2024-09-01 RX ADMIN — KETOROLAC TROMETHAMINE 1 DROP: 5 SOLUTION/ DROPS OPHTHALMIC at 07:53

## 2024-09-01 RX ADMIN — MOXIFLOXACIN OPHTHALMIC 0.05 ML: 5 SOLUTION/ DROPS OPHTHALMIC at 07:53

## 2024-09-01 RX ADMIN — CHLORHEXIDINE GLUCONATE ORAL RINSE 15 ML: 1.2 SOLUTION DENTAL at 09:50

## 2024-09-01 RX ADMIN — MOXIFLOXACIN OPHTHALMIC 0.05 ML: 5 SOLUTION/ DROPS OPHTHALMIC at 21:53

## 2024-09-01 RX ADMIN — Medication 10 ML: at 20:26

## 2024-09-01 RX ADMIN — GABAPENTIN 800 MG: 400 CAPSULE ORAL at 14:20

## 2024-09-01 RX ADMIN — PROPOFOL 20 MCG/KG/MIN: 10 INJECTION, EMULSION INTRAVENOUS at 11:24

## 2024-09-01 RX ADMIN — IPRATROPIUM BROMIDE AND ALBUTEROL SULFATE 3 ML: 2.5; .5 SOLUTION RESPIRATORY (INHALATION) at 01:46

## 2024-09-01 RX ADMIN — PREDNISOLONE ACETATE 1 DROP: 10 SUSPENSION/ DROPS OPHTHALMIC at 18:27

## 2024-09-01 RX ADMIN — KETOROLAC TROMETHAMINE 1 DROP: 5 SOLUTION/ DROPS OPHTHALMIC at 12:18

## 2024-09-01 RX ADMIN — PROPOFOL 20 MCG/KG/MIN: 10 INJECTION, EMULSION INTRAVENOUS at 05:13

## 2024-09-01 RX ADMIN — PANTOPRAZOLE SODIUM 40 MG: 40 INJECTION, POWDER, FOR SOLUTION INTRAVENOUS at 09:50

## 2024-09-01 RX ADMIN — PROPOFOL 20 MCG/KG/MIN: 10 INJECTION, EMULSION INTRAVENOUS at 20:25

## 2024-09-01 RX ADMIN — IPRATROPIUM BROMIDE AND ALBUTEROL SULFATE 3 ML: 2.5; .5 SOLUTION RESPIRATORY (INHALATION) at 12:03

## 2024-09-01 RX ADMIN — MOXIFLOXACIN OPHTHALMIC 0.05 ML: 5 SOLUTION/ DROPS OPHTHALMIC at 12:18

## 2024-09-01 RX ADMIN — PREDNISOLONE ACETATE 1 DROP: 10 SUSPENSION/ DROPS OPHTHALMIC at 20:26

## 2024-09-01 RX ADMIN — PROPOFOL 20 MCG/KG/MIN: 10 INJECTION, EMULSION INTRAVENOUS at 02:04

## 2024-09-01 NOTE — PROGRESS NOTES
Clinical Nutrition     Patient Name: Bo Perez  YOB: 1945  MRN: [id number]  Date of Encounter: 09/01/24 09:09 EDT  Admission date: 8/30/2024  Reason for Visit: EN    Assessment   Nutrition Assessment   Admission Diagnosis:  Acute respiratory failure with hypoxia and hypercapnia [J96.01, J96.02]    Problem List:    Acute respiratory failure with hypoxia and hypercapnia    HTN (hypertension)    Pneumonia of both lungs due to infectious organism    Continuous tobacco abuse    Paroxysmal atrial fibrillation    PVC's (premature ventricular contractions)    Mixed hyperlipidemia    Wide-complex tachycardia    NSTEMI, initial episode of care    Acute systolic CHF (congestive heart failure)    Cardiomyopathy    Prolonged Q-T interval on ECG      PMH:   He  has a past medical history of A-fib, Arthritis, Hard to intubate, Hypertension, and Sleep apnea.    PSH:  He  has a past surgical history that includes Tonsillectomy (1954); Dental surgery (1962); Hemorrhoid surgery (1973); Finger surgery (Left, 2000); Carpal tunnel release (Right, 2002); Spinal fusion (2007); Mouth surgery (2008); Knee arthroscopy (Left, 2008); Total knee arthroplasty (Left, 2009); Cholecystectomy (2011); Cardiac Ablation (2013); Bladder surgery (04/14/2016); Colonoscopy; and Total knee arthroplasty (Right, 6/22/2023).    Applicable Nutrition History:       Labs    Labs Reviewed: Yes    Results from last 7 days   Lab Units 09/01/24  0519 08/31/24  0430 08/30/24  1211 08/30/24  0401 08/30/24  0401 08/30/24  0325   GLUCOSE mg/dL 117* 133* 130*   < > 224*  --    BUN mg/dL 21 23 21   < > 19  --    CREATININE mg/dL 0.71* 0.92 1.22   < > 1.00  --    SODIUM mmol/L 136 138 142   < > 141  --    CHLORIDE mmol/L 104 105 110*   < > 105  --    POTASSIUM mmol/L 4.0 3.9 4.1   < > 3.7  --    PHOSPHORUS mg/dL 2.1*  --   --   --  5.6*  --    MAGNESIUM mg/dL 2.3 2.1  --   --  2.2  --    ALT (SGPT) U/L  --   --  14  --  14  --    LACTATE  mmol/L  --   --   --   --   --  7.3*    < > = values in this interval not displayed.       Results from last 7 days   Lab Units 08/30/24  1211 08/30/24  0401   ALBUMIN g/dL 3.0* 3.6   CRP mg/dL  --  0.81*       Results from last 7 days   Lab Units 09/01/24  0607 09/01/24  0027 08/31/24  1811 08/31/24  1156 08/30/24  2354 08/30/24  1748   GLUCOSE mg/dL 113 117 118 123 130 137*     Lab Results   Lab Value Date/Time    HGBA1C 5.00 08/30/2024 0325    HGBA1C 5.00 06/09/2023 1343         Results from last 7 days   Lab Units 08/30/24  0401   PROBNP pg/mL 1,061.0       Medications    Medications Reviewed: yes    Scheduled Meds:apixaban, 5 mg, Nasogastric, Q12H  ARIPiprazole, 5 mg, Nasogastric, Daily  atenolol, 25 mg, Nasogastric, Q24H  cefepime, 2,000 mg, Intravenous, Q8H  chlorhexidine, 15 mL, Mouth/Throat, Q12H  gabapentin, 800 mg, Nasogastric, Q8H  insulin regular, 2-7 Units, Subcutaneous, Q6H  ipratropium-albuterol, 3 mL, Nebulization, Q6H - RT  ketorolac, 1 drop, Right Eye, 4x Daily  moxifloxacin, 1 drop, Right Eye, 4x Daily  oxyCODONE-acetaminophen, 1 tablet, Nasogastric, Q6H  pantoprazole, 40 mg, Intravenous, Q24H  prednisoLONE acetate, 1 drop, Right Eye, 4x Daily  sodium chloride, 10 mL, Intravenous, Q12H      Continuous Infusions:fentanyl 10 mcg/mL,  mcg/hr, Last Rate: 50 mcg/hr (08/31/24 1112)  norepinephrine, 0.02-0.3 mcg/kg/min, Last Rate: 0.02 mcg/kg/min (09/01/24 0733)  propofol, 5-50 mcg/kg/min, Last Rate: 20 mcg/kg/min (09/01/24 0513)      PRN Meds:.  acetaminophen **OR** acetaminophen    Calcium Replacement - Follow Nurse / BPA Driven Protocol    dextrose    fentaNYL    glucagon (human recombinant)    ipratropium-albuterol    Magnesium Cardiology Dose Replacement - Follow Nurse / BPA Driven Protocol    nitroglycerin    ondansetron    Phosphorus Replacement - Follow Nurse / BPA Driven Protocol    Potassium Replacement - Follow Nurse / BPA Driven Protocol    sodium chloride    sodium chloride     "tiZANidine    Intake/Ouptut 24 hrs (0701 - 0700)   I&O's Reviewed: yes  No documented BM     Anthropometrics     Height: Height: 185 cm (72.84\")  Last Filed Weight: Weight:  (unable to weight d/t bed scale not being zeroed) (09/01/24 0530)  Method: Weight Method: Bed scale  BMI: BMI (Calculated): 46.7    UBW: UTD   Weight change:  ? Weight gain per documented weight     Unable to obtain bed weight at time of visit; patient on speciality bed   Nutrition Focused Physical Exam     Date: 9/1    Unable to perform due to Defer pending indication     Subjective   Reported/Observed/Food/Nutrition Related History:   9/1  NPO x 2 days, remains intubated. Start tube feeding per intensivisit note yesterday.  Discussed with RN, reports no plan for extubation today.  Has NGT.  RN reports wife indicated patient with history of esophageal dilation, food gets stuck sometimes.  Patient will more likely need SLP eval post extubation.    No family at bedside at time of visit.     Needs Assessment   Date:     Height used:Height: 185 cm (72.84\")  Weights used: 352lb/160kg; IBW 184lb/84kg     Estimated Calorie needs: ~  2250Kcal/day (goal)   Method:  Kcals/KG 14= 2240kcal; 25kcals IBW = 2100kcals   Method:  MSJ = 2371kcal  Method:  PSU (ve 8.01, tmax 37) = 2256    Estimated Protein needs: ~  201g PRO/day  Method: 2.0-2.5g/Kg IBW = 168- 210g; 1.0-1.2 g/kg CBW = 160-192g    Estimated Fluid needs: ~ ml/day   Per clinical status    Current Nutrition Prescription     PO: NPO Diet NPO Type: Strict NPO  Oral Nutrition Supplement:  Intake: Insufficient data    Assessment & Plan   Nutrition Diagnosis   Date: 9/2 Updated:   Problem Inadequate energy intake    Etiology Intubated    Signs/Symptoms NPO    Status: New    Goal:   Nutrition to support treatment and Initiate EN      Nutrition Intervention      Follow treatment progress, Care plan reviewed, Nutrition support order placed    For tube feeding start:   Peptamen VHP @ 25ml/hr; advance by 10ml " Q8hrs to goal rate 110ml/hr.  Water flushes @ 10ml/hr.  This will provide:    2200ml (GV x 20hrs infusion)  2200kcals (98% est needs)  205g PRO (102% est needs)  9.7g fiber   1848ml water from EN     IC study today (goal to complete prior to starting EN)    Monitor clinical course for further recs/ recommendations.       Monitoring/Evaluation:   Per protocol, I&O, Pertinent labs, EN delivery/tolerance, Symptoms, POC/GOC, Swallow function    Nikole Cruz RD  Time Spent: 30min

## 2024-09-01 NOTE — PLAN OF CARE
Goal Outcome Evaluation:                                    Accidentally marked ongoing not progressing. See previous care plan note. Pt is progressing well.

## 2024-09-01 NOTE — PROGRESS NOTES
INTENSIVIST   PROGRESS NOTE     Hospital:  LOS: 2 days     Mr. Bo Perez, 79 y.o. male is followed for a Chief Complaint of: Respiratory Failure      Subjective   S     Interval History:  Remains on mechanical ventilation. FiO2 at 35% and PEEP of 8.        The patient's relevant past medical, surgical and social history were reviewed and updated in Epic as appropriate.      ROS: Unable to obtain secondary to sedation and mechanical ventilation.     Objective   O     Vitals:  Temp  Min: 98.1 °F (36.7 °C)  Max: 98.8 °F (37.1 °C)  BP  Min: 112/77  Max: 139/75  Pulse  Min: 79  Max: 112  Resp  Min: 16  Max: 19  SpO2  Min: 95 %  Max: 100 % No data recorded    Intake/Ouptut 24 hrs (7:00AM - 6:59 AM)  Intake & Output (last 3 days)         08/28 0701 08/29 0700 08/29 0701 08/30 0700 08/30 0701  08/31 0700 08/31 0701 09/01 0700    P.O.   360     I.V. (mL/kg)   1143 (7.1)     IV Piggyback  600 200     Total Intake(mL/kg)  600 (3.8) 1703 (10.6)     Urine (mL/kg/hr)  150 775 (0.2)     Total Output  150 775     Net  +450 +928                     Medications (drips):  fentanyl 10 mcg/mL, Last Rate: 50 mcg/hr (08/31/24 1112)  norepinephrine, Last Rate: 0.02 mcg/kg/min (09/01/24 0733)  propofol, Last Rate: 20 mcg/kg/min (09/01/24 0513)        Mechanical Ventilator Settings:          Resp Rate (Set): 16     FiO2 (%): 35 %  PEEP/CPAP (cm H2O): (S) 5 cm H20    Minute Ventilation (L/min) (Obs): 8.08 L/min  Resp Rate (Observed) Vent: 16  I:E Ratio (Set): 1:2.75  I:E Ratio (Obs): 1:2.8    PIP Observed (cm H2O): 23 cm H2O  Plateau Pressure (cm H2O): 20 cm H2O    Physical Examination  Telemetry:  Normal sinus rhythm.    Constitutional:  No acute distress.  ET tube in place on mechanical ventilation.    Eyes: No scleral icterus.   PERRL, EOM intact.    Neck:  Supple, FROM   Cardiovascular: Normal rate, regular and rhythm. Normal heart sounds.  No murmurs, gallop or rub.   Respiratory: No respiratory distress. Normal respiratory  effort.  Diminished. No wheezing.    Abdominal:  Soft. No masses. Nontender. No distension. No HSM.   Extremities: No digital cyanosis. No clubbing.  1+ peripheral edema.   Skin: No rashes, lesions or ulcers   Neurological:   Arouses to stimulation. Does not follow commands.             Results from last 7 days   Lab Units 09/01/24  0519 08/30/24  1211 08/30/24  0325   WBC 10*3/mm3 10.07 24.70* 26.65*   HEMOGLOBIN g/dL 11.0* 12.6* 13.6   MCV fL 96.9 99.5* 104.8*   PLATELETS 10*3/mm3 111* 178 242     Results from last 7 days   Lab Units 09/01/24  0519 08/31/24  0430 08/30/24  1211 08/30/24  0401   SODIUM mmol/L 136 138 142 141   POTASSIUM mmol/L 4.0 3.9 4.1 3.7   CO2 mmol/L 25.0 24.0 21.0* 24.0   CREATININE mg/dL 0.71* 0.92 1.22 1.00   GLUCOSE mg/dL 117* 133* 130* 224*   MAGNESIUM mg/dL 2.3 2.1  --  2.2   PHOSPHORUS mg/dL 2.1*  --   --  5.6*     Estimated Creatinine Clearance: 135.8 mL/min (A) (by C-G formula based on SCr of 0.71 mg/dL (L)).  Results from last 7 days   Lab Units 08/30/24  1211 08/30/24  0401   ALK PHOS U/L 42 54   BILIRUBIN mg/dL 0.8 0.5   ALT (SGPT) U/L 14 14   AST (SGOT) U/L 27 22       Results from last 7 days   Lab Units 09/01/24  0409 08/31/24  0317 08/30/24  1358 08/30/24  0604 08/30/24  0337   PH, ARTERIAL pH units 7.392 7.405 7.393 7.261* 7.086*   PCO2, ARTERIAL mm Hg 43.5 40.4 38.0 59.8* 78.8*   PO2 ART mm Hg 81.6* 103.0 119.0* 87.9 80.1*   FIO2 % 35 35 60 100 100       Images:  Imaging Results (Last 24 Hours)       Procedure Component Value Units Date/Time    XR Chest 1 View [[id number]] Collected: 09/01/24 0825     Updated: 09/01/24 0830    Narrative:      XR CHEST 1 VW    Date of Exam: 9/1/2024 6:14 AM EDT    Indication: Intubated Patient    Comparison: Chest radiograph 8/31/2024.    Findings:  Unchanged position of endotracheal tube, right PICC, and visualized portions of the nasogastric tube. Cardiomediastinal silhouette is unchanged. Unchanged trace bilateral pleural effusions with  streaky bibasilar opacities, left greater than right. No   pneumothorax. Osseous structures are unchanged.      Impression:      Impression:  No significant interval change, with similar trace bilateral pleural effusions and bibasilar airspace disease, left greater than right.      Electronically Signed: Boaz Schreiber MD    9/1/2024 8:27 AM EDT    Workstation ID: [id number]               Results: Reviewed.  I reviewed the patient's new laboratory and imaging results.  I independently reviewed the patient's new images.    Medications: Reviewed.    Assessment & Plan   A / P     Mr. Perez is a 80yo M with a history of Afib on Tikosyn and 3pk/day smoker with a recent cataract surgery who presented to the [facility] ED on 8/30/24 with respiratory distress with hypoxia and required intubation. He was hypotensive and was started on Levophed. Cefepime was started for possible pneumonia.     Vasopressors have been weaned off. He remains on mechanical ventilation. FiO2 at 35% and PEEP of 8.     Nutrition:   NPO Diet NPO Type: Tube Feeding  Advance Directives:   Code Status and Medical Interventions: CPR (Attempt to Resuscitate); Full Support   Ordered at: 08/30/24 0533     Code Status (Patient has no pulse and is not breathing):    CPR (Attempt to Resuscitate)     Medical Interventions (Patient has pulse or is breathing):    Full Support       Active Hospital Problems    Diagnosis     **Acute respiratory failure with hypoxia and hypercapnia     Paroxysmal atrial fibrillation     PVC's (premature ventricular contractions)     Mixed hyperlipidemia     Wide-complex tachycardia     NSTEMI, initial episode of care     Acute systolic CHF (congestive heart failure)     Cardiomyopathy     Prolonged Q-T interval on ECG     Pneumonia of both lungs due to infectious organism     Continuous tobacco abuse     HTN (hypertension)        Assessment / Plan:    Continue mechanical ventilation. Titrate PEEP down to 5. PST when more awake. (Apneic this  AM on PST).   Sedation with Propofol. Hold Fentanyl infusion. Continue PO pain medications.    Cardiology following. Echocardiogram with an EF of 30%.   Tikosyn on hold secondary to prolonged Qt.   Continue Cefepime. F/u cultures. NGTD.   SSI as needed for hyperglycemia.   Eliquis for Afib.   Duonebs.   Eye drops for recent cataract surgery.   Tube feeds   AM labs and CXR    High risk secondary to management of mechanical ventilation.     High level of risk due to:  severe exacerbation of chronic illness and illness with threat to life or bodily function.    Plan of care and goals reviewed during interdisciplinary rounds.  I discussed the patient's findings and my recommendations with nursing staff      Emeli Nair, DO    Intensive Care Medicine and Pulmonary Medicine

## 2024-09-01 NOTE — PROGRESS NOTES
"Pharmacy Consult - Tikosyn Initiation    Bo Perez is a 79 y.o. male on prior to admission.     Height: 185 cm (72.84\")  Weight: (!) 160 kg (352 lb 11.8 oz)    Evaluation of Drug-Drug Interactions     One dose of Azithromycin IV given 8/30; Legionella antigen negative, so azithromycin stopped    Laboratory    Results from last 7 days   Lab Units 09/01/24  0519 08/31/24  0430 08/30/24  1211   SODIUM mmol/L 136 138 142   POTASSIUM mmol/L 4.0 3.9 4.1   CHLORIDE mmol/L 104 105 110*   CO2 mmol/L 25.0 24.0 21.0*   BUN mg/dL 21 23 21   CREATININE mg/dL 0.71* 0.92 1.22   GLUCOSE mg/dL 117* 133* 130*   CALCIUM mg/dL 8.0* 8.0* 7.4*     Results from last 7 days   Lab Units 09/01/24  0519   MAGNESIUM mg/dL 2.3     Pharmacy will order electrolyte replacement per protocols if indicated (Mag < 2.0, K < 4.0) based on laboratory values on admission      - Magnesium replacement protocol ordered: Yes     - Potassium replacement protocol ordered: Yes    Estimated Creatinine Clearance: 135.8 mL/min (A) (by C-G formula based on SCr of 0.71 mg/dL (L)).    Initial QTc and EKG Monitoring    QTc = 560     Initial Tikosyn dose based on Creatinine Clearance:    CrCl > 60 mL/min - Dofetilide 500 mcg PO Q12H  CrCl 40-60 mL/min - Dofetilide 250 mcg PO Q12H  CrCl 20-39 mL/min - Dofetilide 125 mcg PO Q12H  CrCl < 20 mL/min - Do not start medication, contact MD      Assessment/Plan:  Continue holding Tikosyn for now; QTc is slowly shortening, however last EKG showed conversion back into wide QRS rhythm similar to admission.  Will follow cardiology recs.  I formally discontinued Tikosyn from the MAR to prevent inadvertent administration while on hold.  K and Mg appropriate.  Pharmacy will continue to follow and attempt to resume Tikosyn as appropriate.    Thanks,  Remigio Cabrera, PharmD, BCPS, BCCCP  09/01/24  07:35 EDT  "

## 2024-09-01 NOTE — PLAN OF CARE
Goal Outcome Evaluation:            Neuro: pt following commands. Off of Fentanyl, on Propofol. Will move arms, will try and wiggle toes, will open eyes.   Lungs: Thick tanish, reddish secretions. Large amounts at times. Pt remains on the Vent, peep 5, fio2 35%. Pt was on spontaneous from 12 to 6. Coughing at times but tolerating vent. Lungs coarse to diminished.   Heart: Sinus with frequent PVC's. Sinus Tach at times. Pt has dropped heart rate to the 50's and 40's at times but rebounds quickly. Held tikosyn as well as Atenolol.   Gi/: Tube feed started. Adequate urine output.   Skin: turn q 2 with no new skin issues noted. Low grade fever noted at 1830. 38.2.    Bedside report given

## 2024-09-01 NOTE — PROGRESS NOTES
Cardiology Progress Note      Primary cardiologist: Dr. Bear    Reason for visit:    NSTEMI  Paroxysmal atrial fibrillation  Frequent PVCs    IDENTIFICATION: 79-year-old gentleman who resides in [place], [place]    Active Hospital Problems    Diagnosis  POA    **Acute respiratory failure with hypoxia and hypercapnia [J96.01, J96.02]  Yes     Priority: High    Paroxysmal atrial fibrillation [I48.0]  Yes     Priority: High     Followed [facility] Dr. Bear with [facility]  General cardiologist Dr. Galvez  Echocardiogram reportedly done 1 month ago normal per family to reevaluate ascending aortic aneurysm which was stable  Chads Vasc= 3      Wide-complex tachycardia [R00.0]  Yes     Priority: High    NSTEMI, initial episode of care [I21.4]  Yes     Priority: High     Reportedly normal coronaries on cath 2020.  Data deficit.  Elevated troponins in setting of respiratory distress      Cardiomyopathy [I42.9]  Yes     Priority: High     Echo (8/31/2024):LVEF=30%.  Stress induced versus CAD      Prolonged Q-T interval on ECG [R94.31]  Yes     Priority: High    Pneumonia of both lungs due to infectious organism [J18.9]  Yes     Priority: High    HTN (hypertension) [I10]  Yes     Priority: Medium    Mixed hyperlipidemia [E78.2]  Yes     Priority: Low    Continuous tobacco abuse [Z72.0]  Yes     Priority: Low    PVC's (premature ventricular contractions) [I49.3]  Yes     Reportedly normal coronaries on cath 2020  Monitor summer 2024, data deficit: 27% PVC burden, 89 pauses longest 6.5 seconds-all pauses occurred during sleeping hours       Acute systolic CHF (congestive heart failure) [I50.21]  Yes     Echo (8/31/2024):LVEF=30%              Patient is sedated and intubated.  No family is at bedside.  IV Levophed drip is off.  He is currently in sinus rhythm with occasional PVCs.  Earlier this morning he had a wide QRS rhythm with frequent PVCs in bigeminal pattern.           Vital Sign Min/Max for last 24 hours  Temp  Min:  98.1 °F (36.7 °C)  Max: 98.8 °F (37.1 °C)   BP  Min: 112/77  Max: 129/71   Pulse  Min: 79  Max: 112   Resp  Min: 16  Max: 21   SpO2  Min: 95 %  Max: 100 %   No data recorded      Intake/Output Summary (Last 24 hours) at 2024 0749  Last data filed at 2024 0514  Gross per 24 hour   Intake 1158.88 ml   Output 1150 ml   Net 8.88 ml           Physical Exam  Constitutional:       Interventions: He is sedated and intubated.   Cardiovascular:      Rate and Rhythm: Normal rate and regular rhythm. Frequent Extrasystoles are present.     Heart sounds: No murmur heard.     Comments: PVCs  Pulmonary:      Effort: Pulmonary effort is normal. He is intubated.      Breath sounds: Decreased breath sounds present.   Musculoskeletal:      Right lower le+ Pitting Edema present.      Left lower le+ Pitting Edema present.         Tele: Sinus rhythm with PVCs    Results Review (reviewed the patient's recent labs in the electronic medical record):     EKG (2024): Wide QRS rhythm with PVCs in bigeminal pattern    CXR (2024): Chronic interstitial changes likely decrease component edema/congestion and trace suspected left pleural effusion no pneumothorax    ECHO (2024): LVEF 30%.  RVSP 32 mmHg    Results from last 7 days   Lab Units 24  0430 24  1211 24  0401   SODIUM mmol/L 136 138 142 141   POTASSIUM mmol/L 4.0 3.9 4.1 3.7   CHLORIDE mmol/L 104 105 110* 105   BUN mg/dL 21 23 21 19   CREATININE mg/dL 0.71* 0.92 1.22 1.00   MAGNESIUM mg/dL 2.3 2.1  --  2.2     Results from last 7 days   Lab Units 24  0430 24  1211 24  0634   HSTROP T ng/L 278* 519* 138*     Results from last 7 days   Lab Units 24  0524  1211 24  0325   WBC 10*3/mm3 10.07 24.70* 26.65*   HEMOGLOBIN g/dL 11.0* 12.6* 13.6   HEMATOCRIT % 33.9* 39.1 43.9   PLATELETS 10*3/mm3 111* 178 242       Lab Results   Component Value Date    HGBA1C 5.00 2024       No results found for:  "\"CHOL\", \"CHLPL\", \"TRIG\", \"HDL\", \"LDL\", \"LDLDIRECT\"             NSTEMI type I versus type II  Reportedly normal coronaries on cath 2020 but data deficit  Troponins elevated 88, 138 and 519  Echo this admission shows reduced LVEF 30%    History of paroxysmal atrial fibrillation  Established with Dr. Bear with [facility] in clinic  Eliquis 5 mg twice daily  On Tikosyn at home but currently on hold     Frequent PVCs/bigeminy/wide-complex tachycardia  Monitor in summer 2024 reportedly 27% PVC burden and 89 pauses with the longest of 6.5 seconds all during sleeping hours  Reportedly wide-complex tachycardia while in ED that was concerning for real VT rather than aberrancy that was felt to be due secondary to have acute hypoxia.  No further reoccurrences  Frequent PVCs and wide-complex QRS    Cardiomyopathy/systolic heart failure  Echo this admission LVEF 30%  Will eventually need guideline directed medical therapy  proBNP was normal on admission 8/30    Hypertension  Current /77  Atenolol 25 mg daily      Acute hypoxic hypercapnic respiratory failure  Initial pH 7.09 improving now on mechanical ventilation  Did have history of COVID 1 month ago                 Will need eventual ischemic evaluation  given reduced LVEF 30%.  Unclear if type I or type II but reportedly patient did have normal coronaries on cath in 2020  Continue to hold Tikosyn  Continue Eliquis for stroke prophylaxis  Will need guideline directed medical therapy started when BP more stable    Electronically signed by RAKEL Alcantar, 09/01/24, 7:49 AM EDT.  "

## 2024-09-02 ENCOUNTER — APPOINTMENT (OUTPATIENT)
Dept: GENERAL RADIOLOGY | Facility: HOSPITAL | Age: 79
DRG: 871 | End: 2024-09-02
Payer: MEDICARE

## 2024-09-02 PROBLEM — I48.19 PERSISTENT ATRIAL FIBRILLATION: Status: ACTIVE | Noted: 2024-08-31

## 2024-09-02 PROBLEM — I42.9 CARDIOMYOPATHY: Status: RESOLVED | Noted: 2024-08-31 | Resolved: 2024-09-02

## 2024-09-02 PROBLEM — I50.20 HFREF (HEART FAILURE WITH REDUCED EJECTION FRACTION): Status: ACTIVE | Noted: 2024-08-31

## 2024-09-02 LAB
ALBUMIN SERPL-MCNC: 2.9 G/DL (ref 3.5–5.2)
ALBUMIN/GLOB SERPL: 1.1 G/DL
ALP SERPL-CCNC: 48 U/L (ref 39–117)
ALT SERPL W P-5'-P-CCNC: 15 U/L (ref 1–41)
ANION GAP SERPL CALCULATED.3IONS-SCNC: 10 MMOL/L (ref 5–15)
ANION GAP SERPL CALCULATED.3IONS-SCNC: 7 MMOL/L (ref 5–15)
ARTERIAL PATENCY WRIST A: ABNORMAL
AST SERPL-CCNC: 39 U/L (ref 1–40)
ATMOSPHERIC PRESS: ABNORMAL MM[HG]
BASE EXCESS BLDA CALC-SCNC: 2.1 MMOL/L (ref 0–2)
BASE EXCESS BLDA CALC-SCNC: 3.5 MMOL/L (ref 0–2)
BASE EXCESS BLDA CALC-SCNC: 3.9 MMOL/L (ref 0–2)
BDY SITE: ABNORMAL
BILIRUB SERPL-MCNC: 0.7 MG/DL (ref 0–1.2)
BODY TEMPERATURE: 37
BUN SERPL-MCNC: 19 MG/DL (ref 8–23)
BUN SERPL-MCNC: 22 MG/DL (ref 8–23)
BUN/CREAT SERPL: 29.7 (ref 7–25)
BUN/CREAT SERPL: 33.3 (ref 7–25)
CALCIUM SPEC-SCNC: 8.2 MG/DL (ref 8.6–10.5)
CALCIUM SPEC-SCNC: 8.4 MG/DL (ref 8.6–10.5)
CHLORIDE SERPL-SCNC: 105 MMOL/L (ref 98–107)
CHLORIDE SERPL-SCNC: 99 MMOL/L (ref 98–107)
CHOLEST SERPL-MCNC: 141 MG/DL (ref 0–200)
CO2 BLDA-SCNC: 27.1 MMOL/L (ref 22–33)
CO2 BLDA-SCNC: 27.9 MMOL/L (ref 22–33)
CO2 BLDA-SCNC: 29.4 MMOL/L (ref 22–33)
CO2 SERPL-SCNC: 23 MMOL/L (ref 22–29)
CO2 SERPL-SCNC: 27 MMOL/L (ref 22–29)
COHGB MFR BLD: 1.7 % (ref 0–2)
COHGB MFR BLD: 2 % (ref 0–2)
COHGB MFR BLD: 2.1 % (ref 0–2)
CREAT SERPL-MCNC: 0.64 MG/DL (ref 0.76–1.27)
CREAT SERPL-MCNC: 0.66 MG/DL (ref 0.76–1.27)
CRP SERPL-MCNC: 8.26 MG/DL (ref 0–0.5)
DEPRECATED RDW RBC AUTO: 54.7 FL (ref 37–54)
EGFRCR SERPLBLD CKD-EPI 2021: 96 ML/MIN/1.73
EGFRCR SERPLBLD CKD-EPI 2021: 96.9 ML/MIN/1.73
EPAP: 0
EPAP: 0
ERYTHROCYTE [DISTWIDTH] IN BLOOD BY AUTOMATED COUNT: 15.3 % (ref 12.3–15.4)
GLOBULIN UR ELPH-MCNC: 2.7 GM/DL
GLUCOSE BLDC GLUCOMTR-MCNC: 115 MG/DL (ref 70–130)
GLUCOSE BLDC GLUCOMTR-MCNC: 119 MG/DL (ref 70–130)
GLUCOSE BLDC GLUCOMTR-MCNC: 133 MG/DL (ref 70–130)
GLUCOSE BLDC GLUCOMTR-MCNC: 155 MG/DL (ref 70–130)
GLUCOSE SERPL-MCNC: 129 MG/DL (ref 65–99)
GLUCOSE SERPL-MCNC: 144 MG/DL (ref 65–99)
HCO3 BLDA-SCNC: 26 MMOL/L (ref 20–26)
HCO3 BLDA-SCNC: 26.8 MMOL/L (ref 20–26)
HCO3 BLDA-SCNC: 28.1 MMOL/L (ref 20–26)
HCT VFR BLD AUTO: 35.2 % (ref 37.5–51)
HCT VFR BLD CALC: 35.9 % (ref 38–51)
HCT VFR BLD CALC: 37.3 % (ref 38–51)
HCT VFR BLD CALC: 39.6 % (ref 38–51)
HGB BLD-MCNC: 11.9 G/DL (ref 13–17.7)
HGB BLDA-MCNC: 11.7 G/DL (ref 13.5–17.5)
HGB BLDA-MCNC: 12.2 G/DL (ref 13.5–17.5)
HGB BLDA-MCNC: 12.9 G/DL (ref 13.5–17.5)
INHALED O2 CONCENTRATION: 28 %
INHALED O2 CONCENTRATION: 35 %
INHALED O2 CONCENTRATION: 35 %
IPAP: 0
IPAP: 0
MAGNESIUM SERPL-MCNC: 2.1 MG/DL (ref 1.6–2.4)
MCH RBC QN AUTO: 32.7 PG (ref 26.6–33)
MCHC RBC AUTO-ENTMCNC: 33.8 G/DL (ref 31.5–35.7)
MCV RBC AUTO: 96.7 FL (ref 79–97)
METHGB BLD QL: 0.1 % (ref 0–1.5)
METHGB BLD QL: 0.1 % (ref 0–1.5)
METHGB BLD QL: 0.4 % (ref 0–1.5)
MODALITY: ABNORMAL
OXYHGB MFR BLDV: 92.9 % (ref 94–99)
OXYHGB MFR BLDV: 95.1 % (ref 94–99)
OXYHGB MFR BLDV: 96.1 % (ref 94–99)
PAW @ PEAK INSP FLOW SETTING VENT: 0 CMH2O
PAW @ PEAK INSP FLOW SETTING VENT: 0 CMH2O
PCO2 BLDA: 35.5 MM HG (ref 35–45)
PCO2 BLDA: 37.2 MM HG (ref 35–45)
PCO2 BLDA: 40.3 MM HG (ref 35–45)
PCO2 TEMP ADJ BLD: 35.5 MM HG (ref 35–48)
PCO2 TEMP ADJ BLD: 37.2 MM HG (ref 35–48)
PCO2 TEMP ADJ BLD: 40.3 MM HG (ref 35–48)
PEEP RESPIRATORY: 5 CM[H2O]
PEEP RESPIRATORY: 5 CM[H2O]
PH BLDA: 7.45 PH UNITS (ref 7.35–7.45)
PH BLDA: 7.45 PH UNITS (ref 7.35–7.45)
PH BLDA: 7.49 PH UNITS (ref 7.35–7.45)
PH, TEMP CORRECTED: 7.45 PH UNITS
PH, TEMP CORRECTED: 7.45 PH UNITS
PH, TEMP CORRECTED: 7.49 PH UNITS
PHOSPHATE SERPL-MCNC: 1.4 MG/DL (ref 2.5–4.5)
PHOSPHATE SERPL-MCNC: 1.5 MG/DL (ref 2.5–4.5)
PHOSPHATE SERPL-MCNC: 1.8 MG/DL (ref 2.5–4.5)
PLATELET # BLD AUTO: 130 10*3/MM3 (ref 140–450)
PMV BLD AUTO: 9.5 FL (ref 6–12)
PO2 BLDA: 69.8 MM HG (ref 83–108)
PO2 BLDA: 79.8 MM HG (ref 83–108)
PO2 BLDA: 82.8 MM HG (ref 83–108)
PO2 TEMP ADJ BLD: 69.8 MM HG (ref 83–108)
PO2 TEMP ADJ BLD: 79.8 MM HG (ref 83–108)
PO2 TEMP ADJ BLD: 82.8 MM HG (ref 83–108)
POTASSIUM SERPL-SCNC: 3.2 MMOL/L (ref 3.5–5.2)
POTASSIUM SERPL-SCNC: 3.9 MMOL/L (ref 3.5–5.2)
PREALB SERPL-MCNC: 10.9 MG/DL (ref 20–40)
PROCALCITONIN SERPL-MCNC: 0.89 NG/ML (ref 0–0.25)
PROT SERPL-MCNC: 5.6 G/DL (ref 6–8.5)
PSV: 10 CMH2O
QT INTERVAL: 384 MS
QT INTERVAL: 384 MS
QT INTERVAL: 428 MS
QT INTERVAL: 466 MS
QT INTERVAL: 506 MS
QTC INTERVAL: 517 MS
QTC INTERVAL: 531 MS
QTC INTERVAL: 560 MS
QTC INTERVAL: 570 MS
QTC INTERVAL: 649 MS
RBC # BLD AUTO: 3.64 10*6/MM3 (ref 4.14–5.8)
SODIUM SERPL-SCNC: 133 MMOL/L (ref 136–145)
SODIUM SERPL-SCNC: 138 MMOL/L (ref 136–145)
TOTAL RATE: 0 BREATHS/MINUTE
TOTAL RATE: 16 BREATHS/MINUTE
TRIGL SERPL-MCNC: 130 MG/DL (ref 0–150)
TROPONIN T SERPL HS-MCNC: 227 NG/L
VENTILATOR MODE: ABNORMAL
VENTILATOR MODE: ABNORMAL
VT ON VENT VENT: 0.5 ML
WBC NRBC COR # BLD AUTO: 10.89 10*3/MM3 (ref 3.4–10.8)

## 2024-09-02 PROCEDURE — 25010000002 FUROSEMIDE PER 20 MG: Performed by: INTERNAL MEDICINE

## 2024-09-02 PROCEDURE — 25010000002 PROPOFOL 10 MG/ML EMULSION: Performed by: EMERGENCY MEDICINE

## 2024-09-02 PROCEDURE — 25010000002 CEFEPIME PER 500 MG: Performed by: INTERNAL MEDICINE

## 2024-09-02 PROCEDURE — 94799 UNLISTED PULMONARY SVC/PX: CPT

## 2024-09-02 PROCEDURE — 71045 X-RAY EXAM CHEST 1 VIEW: CPT

## 2024-09-02 PROCEDURE — 93010 ELECTROCARDIOGRAM REPORT: CPT | Performed by: INTERNAL MEDICINE

## 2024-09-02 PROCEDURE — 84484 ASSAY OF TROPONIN QUANT: CPT | Performed by: NURSE PRACTITIONER

## 2024-09-02 PROCEDURE — 92610 EVALUATE SWALLOWING FUNCTION: CPT | Performed by: SPEECH-LANGUAGE PATHOLOGIST

## 2024-09-02 PROCEDURE — 84134 ASSAY OF PREALBUMIN: CPT

## 2024-09-02 PROCEDURE — 84145 PROCALCITONIN (PCT): CPT

## 2024-09-02 PROCEDURE — 82465 ASSAY BLD/SERUM CHOLESTEROL: CPT

## 2024-09-02 PROCEDURE — 84100 ASSAY OF PHOSPHORUS: CPT

## 2024-09-02 PROCEDURE — 84478 ASSAY OF TRIGLYCERIDES: CPT

## 2024-09-02 PROCEDURE — 86140 C-REACTIVE PROTEIN: CPT

## 2024-09-02 PROCEDURE — 85027 COMPLETE CBC AUTOMATED: CPT | Performed by: INTERNAL MEDICINE

## 2024-09-02 PROCEDURE — 80053 COMPREHEN METABOLIC PANEL: CPT

## 2024-09-02 PROCEDURE — 93005 ELECTROCARDIOGRAM TRACING: CPT | Performed by: INTERNAL MEDICINE

## 2024-09-02 PROCEDURE — 94003 VENT MGMT INPAT SUBQ DAY: CPT

## 2024-09-02 PROCEDURE — 82375 ASSAY CARBOXYHB QUANT: CPT

## 2024-09-02 PROCEDURE — 82948 REAGENT STRIP/BLOOD GLUCOSE: CPT

## 2024-09-02 PROCEDURE — 83735 ASSAY OF MAGNESIUM: CPT

## 2024-09-02 PROCEDURE — 83050 HGB METHEMOGLOBIN QUAN: CPT

## 2024-09-02 PROCEDURE — 84100 ASSAY OF PHOSPHORUS: CPT | Performed by: INTERNAL MEDICINE

## 2024-09-02 PROCEDURE — 99233 SBSQ HOSP IP/OBS HIGH 50: CPT | Performed by: INTERNAL MEDICINE

## 2024-09-02 PROCEDURE — 93005 ELECTROCARDIOGRAM TRACING: CPT

## 2024-09-02 PROCEDURE — 82805 BLOOD GASES W/O2 SATURATION: CPT

## 2024-09-02 PROCEDURE — 84100 ASSAY OF PHOSPHORUS: CPT | Performed by: NURSE PRACTITIONER

## 2024-09-02 PROCEDURE — 99232 SBSQ HOSP IP/OBS MODERATE 35: CPT | Performed by: INTERNAL MEDICINE

## 2024-09-02 RX ORDER — POTASSIUM CHLORIDE 1.5 G/1.58G
40 POWDER, FOR SOLUTION ORAL EVERY 4 HOURS
Status: COMPLETED | OUTPATIENT
Start: 2024-09-02 | End: 2024-09-03

## 2024-09-02 RX ORDER — CARVEDILOL 12.5 MG/1
12.5 TABLET ORAL 2 TIMES DAILY WITH MEALS
Status: DISCONTINUED | OUTPATIENT
Start: 2024-09-02 | End: 2024-09-02

## 2024-09-02 RX ORDER — ATENOLOL 50 MG/1
50 TABLET ORAL
Status: DISCONTINUED | OUTPATIENT
Start: 2024-09-02 | End: 2024-09-02

## 2024-09-02 RX ORDER — FUROSEMIDE 10 MG/ML
40 INJECTION INTRAMUSCULAR; INTRAVENOUS ONCE
Status: COMPLETED | OUTPATIENT
Start: 2024-09-02 | End: 2024-09-02

## 2024-09-02 RX ADMIN — CARVEDILOL 12.5 MG: 12.5 TABLET, FILM COATED ORAL at 18:48

## 2024-09-02 RX ADMIN — MOXIFLOXACIN OPHTHALMIC 0.05 ML: 5 SOLUTION/ DROPS OPHTHALMIC at 08:39

## 2024-09-02 RX ADMIN — SACUBITRIL AND VALSARTAN 1 TABLET: 24; 26 TABLET, FILM COATED ORAL at 21:40

## 2024-09-02 RX ADMIN — GABAPENTIN 800 MG: 400 CAPSULE ORAL at 14:36

## 2024-09-02 RX ADMIN — PREDNISOLONE ACETATE 1 DROP: 10 SUSPENSION/ DROPS OPHTHALMIC at 18:48

## 2024-09-02 RX ADMIN — IPRATROPIUM BROMIDE AND ALBUTEROL SULFATE 3 ML: 2.5; .5 SOLUTION RESPIRATORY (INHALATION) at 06:11

## 2024-09-02 RX ADMIN — IPRATROPIUM BROMIDE AND ALBUTEROL SULFATE 3 ML: 2.5; .5 SOLUTION RESPIRATORY (INHALATION) at 01:04

## 2024-09-02 RX ADMIN — FUROSEMIDE 40 MG: 10 INJECTION, SOLUTION INTRAMUSCULAR; INTRAVENOUS at 08:51

## 2024-09-02 RX ADMIN — POTASSIUM CHLORIDE 40 MEQ: 1.5 POWDER, FOR SOLUTION ORAL at 21:54

## 2024-09-02 RX ADMIN — PREDNISOLONE ACETATE 1 DROP: 10 SUSPENSION/ DROPS OPHTHALMIC at 11:57

## 2024-09-02 RX ADMIN — ACETAMINOPHEN 650 MG: 650 SOLUTION ORAL at 00:37

## 2024-09-02 RX ADMIN — PREDNISOLONE ACETATE 1 DROP: 10 SUSPENSION/ DROPS OPHTHALMIC at 21:55

## 2024-09-02 RX ADMIN — CEFEPIME 2000 MG: 2 INJECTION, POWDER, FOR SOLUTION INTRAVENOUS at 21:40

## 2024-09-02 RX ADMIN — SODIUM PHOSPHATE, MONOBASIC, MONOHYDRATE AND SODIUM PHOSPHATE, DIBASIC, ANHYDROUS 15 MMOL: 276; 142 INJECTION, SOLUTION INTRAVENOUS at 22:55

## 2024-09-02 RX ADMIN — KETOROLAC TROMETHAMINE 1 DROP: 5 SOLUTION/ DROPS OPHTHALMIC at 11:57

## 2024-09-02 RX ADMIN — CHLORHEXIDINE GLUCONATE ORAL RINSE 15 ML: 1.2 SOLUTION DENTAL at 08:38

## 2024-09-02 RX ADMIN — MOXIFLOXACIN OPHTHALMIC 0.05 ML: 5 SOLUTION/ DROPS OPHTHALMIC at 18:48

## 2024-09-02 RX ADMIN — APIXABAN 5 MG: 5 TABLET, FILM COATED ORAL at 08:39

## 2024-09-02 RX ADMIN — GABAPENTIN 800 MG: 400 CAPSULE ORAL at 21:40

## 2024-09-02 RX ADMIN — Medication 10 ML: at 08:39

## 2024-09-02 RX ADMIN — PROPOFOL 20 MCG/KG/MIN: 10 INJECTION, EMULSION INTRAVENOUS at 00:22

## 2024-09-02 RX ADMIN — MOXIFLOXACIN OPHTHALMIC 0.05 ML: 5 SOLUTION/ DROPS OPHTHALMIC at 21:39

## 2024-09-02 RX ADMIN — APIXABAN 5 MG: 5 TABLET, FILM COATED ORAL at 21:40

## 2024-09-02 RX ADMIN — IPRATROPIUM BROMIDE AND ALBUTEROL SULFATE 3 ML: 2.5; .5 SOLUTION RESPIRATORY (INHALATION) at 18:35

## 2024-09-02 RX ADMIN — OXYCODONE AND ACETAMINOPHEN 1 TABLET: 10; 325 TABLET ORAL at 18:48

## 2024-09-02 RX ADMIN — CHLORHEXIDINE GLUCONATE ORAL RINSE 15 ML: 1.2 SOLUTION DENTAL at 21:40

## 2024-09-02 RX ADMIN — PROPOFOL 25 MCG/KG/MIN: 10 INJECTION, EMULSION INTRAVENOUS at 04:34

## 2024-09-02 RX ADMIN — IPRATROPIUM BROMIDE AND ALBUTEROL SULFATE 3 ML: 2.5; .5 SOLUTION RESPIRATORY (INHALATION) at 12:00

## 2024-09-02 RX ADMIN — CARVEDILOL 12.5 MG: 12.5 TABLET, FILM COATED ORAL at 10:50

## 2024-09-02 RX ADMIN — MOXIFLOXACIN OPHTHALMIC 0.05 ML: 5 SOLUTION/ DROPS OPHTHALMIC at 11:57

## 2024-09-02 RX ADMIN — SACUBITRIL AND VALSARTAN 1 TABLET: 24; 26 TABLET, FILM COATED ORAL at 10:50

## 2024-09-02 RX ADMIN — Medication 10 ML: at 21:41

## 2024-09-02 RX ADMIN — CEFEPIME 2000 MG: 2 INJECTION, POWDER, FOR SOLUTION INTRAVENOUS at 05:27

## 2024-09-02 RX ADMIN — OXYCODONE AND ACETAMINOPHEN 1 TABLET: 10; 325 TABLET ORAL at 00:21

## 2024-09-02 RX ADMIN — POTASSIUM & SODIUM PHOSPHATES POWDER PACK 280-160-250 MG 2 PACKET: 280-160-250 PACK at 08:38

## 2024-09-02 RX ADMIN — METOPROLOL TARTRATE 5 MG: 5 INJECTION INTRAVENOUS at 05:12

## 2024-09-02 RX ADMIN — PANTOPRAZOLE SODIUM 40 MG: 40 INJECTION, POWDER, FOR SOLUTION INTRAVENOUS at 08:38

## 2024-09-02 RX ADMIN — POTASSIUM & SODIUM PHOSPHATES POWDER PACK 280-160-250 MG 2 PACKET: 280-160-250 PACK at 18:48

## 2024-09-02 RX ADMIN — GABAPENTIN 800 MG: 400 CAPSULE ORAL at 05:28

## 2024-09-02 RX ADMIN — KETOROLAC TROMETHAMINE 1 DROP: 5 SOLUTION/ DROPS OPHTHALMIC at 18:48

## 2024-09-02 RX ADMIN — KETOROLAC TROMETHAMINE 1 DROP: 5 SOLUTION/ DROPS OPHTHALMIC at 08:39

## 2024-09-02 RX ADMIN — KETOROLAC TROMETHAMINE 1 DROP: 5 SOLUTION/ DROPS OPHTHALMIC at 21:33

## 2024-09-02 RX ADMIN — CEFEPIME 2000 MG: 2 INJECTION, POWDER, FOR SOLUTION INTRAVENOUS at 14:36

## 2024-09-02 RX ADMIN — PREDNISOLONE ACETATE 1 DROP: 10 SUSPENSION/ DROPS OPHTHALMIC at 08:39

## 2024-09-02 RX ADMIN — PROPOFOL 20 MCG/KG/MIN: 10 INJECTION, EMULSION INTRAVENOUS at 05:26

## 2024-09-02 RX ADMIN — OXYCODONE AND ACETAMINOPHEN 1 TABLET: 10; 325 TABLET ORAL at 05:28

## 2024-09-02 RX ADMIN — OXYCODONE AND ACETAMINOPHEN 1 TABLET: 10; 325 TABLET ORAL at 11:56

## 2024-09-02 NOTE — PROGRESS NOTES
Clinical Nutrition     Patient Name: Bo Perez  YOB: 1945  MRN: [id number]  Date of Encounter: 09/02/24 10:53 EDT  Admission date: 8/30/2024  Reason for Visit: Follow-up protocol, EN    Assessment   Nutrition Assessment   Admission Diagnosis:  Acute respiratory failure with hypoxia and hypercapnia [J96.01, J96.02]    Problem List:    Acute respiratory failure with hypoxia and hypercapnia    HTN (hypertension)    Sepsis due to pneumonia    Continuous tobacco abuse    Persistent atrial fibrillation    PVC's (premature ventricular contractions)    Wide-complex tachycardia    NSTEMI (non-ST elevated myocardial infarction)    HFrEF (heart failure with reduced ejection fraction)    Prolonged Q-T interval on ECG      PMH:   He  has a past medical history of A-fib, Arthritis, Hard to intubate, Hypertension, and Sleep apnea.    PSH:  He  has a past surgical history that includes Tonsillectomy (1954); Dental surgery (1962); Hemorrhoid surgery (1973); Finger surgery (Left, 2000); Carpal tunnel release (Right, 2002); Spinal fusion (2007); Mouth surgery (2008); Knee arthroscopy (Left, 2008); Total knee arthroplasty (Left, 2009); Cholecystectomy (2011); Cardiac Ablation (2013); Bladder surgery (04/14/2016); Colonoscopy; and Total knee arthroplasty (Right, 6/22/2023).    Applicable Nutrition History:       Labs    Labs Reviewed: Yes    Results from last 7 days   Lab Units 09/02/24  0519 09/01/24  0519 08/31/24  0430 08/30/24  1211 08/30/24  0401 08/30/24  0401 08/30/24  0325   GLUCOSE mg/dL 144* 117* 133* 130*   < > 224*  --    BUN mg/dL 19 21 23 21   < > 19  --    CREATININE mg/dL 0.64* 0.71* 0.92 1.22   < > 1.00  --    SODIUM mmol/L 138 136 138 142   < > 141  --    CHLORIDE mmol/L 105 104 105 110*   < > 105  --    POTASSIUM mmol/L 3.9 4.0 3.9 4.1   < > 3.7  --    PHOSPHORUS mg/dL 1.5* 2.1*  --   --   --  5.6*  --    MAGNESIUM mg/dL 2.1 2.3 2.1  --    < > 2.2  --    ALT (SGPT) U/L 15  --   --   14  --  14  --    LACTATE mmol/L  --   --   --   --   --   --  7.3*    < > = values in this interval not displayed.       Results from last 7 days   Lab Units 09/02/24  0519 08/30/24  1211 08/30/24  0401   ALBUMIN g/dL 2.9* 3.0* 3.6   CRP mg/dL 8.26*  --  0.81*   CHOLESTEROL mg/dL 141  --   --    TRIGLYCERIDES mg/dL 130  --   --        Results from last 7 days   Lab Units 09/02/24  0519 09/01/24  2327 09/01/24  1800 09/01/24  1134 09/01/24  0607 09/01/24  0027   GLUCOSE mg/dL 133* 112 113 108 113 117     Lab Results   Lab Value Date/Time    HGBA1C 5.00 08/30/2024 0325    HGBA1C 5.00 06/09/2023 1343         Results from last 7 days   Lab Units 08/30/24  0401   PROBNP pg/mL 1,061.0       Medications    Medications Reviewed: yes    Scheduled Meds:apixaban, 5 mg, Nasogastric, Q12H  [Held by provider] ARIPiprazole, 5 mg, Nasogastric, Daily  carvedilol, 12.5 mg, Oral, BID With Meals  cefepime, 2,000 mg, Intravenous, Q8H  chlorhexidine, 15 mL, Mouth/Throat, Q12H  gabapentin, 800 mg, Nasogastric, Q8H  insulin regular, 2-7 Units, Subcutaneous, Q6H  ipratropium-albuterol, 3 mL, Nebulization, Q6H - RT  ketorolac, 1 drop, Right Eye, 4x Daily  moxifloxacin, 1 drop, Right Eye, 4x Daily  oxyCODONE-acetaminophen, 1 tablet, Nasogastric, Q6H  pantoprazole, 40 mg, Intravenous, Q24H  prednisoLONE acetate, 1 drop, Right Eye, 4x Daily  sacubitril-valsartan, 1 tablet, Oral, Q12H  sodium chloride, 10 mL, Intravenous, Q12H      Continuous Infusions:norepinephrine, 0.02-0.3 mcg/kg/min, Last Rate: 0.02 mcg/kg/min (09/01/24 0733)  propofol, 5-50 mcg/kg/min, Last Rate: 20 mcg/kg/min (09/02/24 0526)      PRN Meds:.  acetaminophen **OR** acetaminophen    Calcium Replacement - Follow Nurse / BPA Driven Protocol    dextrose    fentaNYL    glucagon (human recombinant)    ipratropium-albuterol    Magnesium Cardiology Dose Replacement - Follow Nurse / BPA Driven Protocol    nitroglycerin    ondansetron    Phosphorus Replacement - Follow Nurse / BPA  "Driven Protocol    Potassium Replacement - Follow Nurse / BPA Driven Protocol    sodium chloride    sodium chloride    tiZANidine    Intake/Ouptut 24 hrs (0701 - 0700)   I&O's Reviewed: yes  No documented BM     Anthropometrics     Height: Height: 185 cm (72.84\")  Last Filed Weight: Weight:  (unable to weight d/t bed scale not being zeroed) (09/01/24 0530)  Method: Weight Method: Bed scale  BMI: BMI (Calculated): 46.7    UBW: UTD   Weight change:  ? Weight gain per documented weight     Unable to obtain bed weight at time of visit; patient on speciality bed   Nutrition Focused Physical Exam     Date: 9/2    Pt does not meet criteria for malnutrition diagnosis, at this time.      Subjective   Reported/Observed/Food/Nutrition Related History:     9/2  Pt remains intubated/sedated, tolerating EN advancing to goal. Phos critically low. No BM yet.     9/1  NPO x 2 days, remains intubated. Start tube feeding per intensivisit note yesterday.  Discussed with RN, reports no plan for extubation today.  Has NGT.  RN reports wife indicated patient with history of esophageal dilation, food gets stuck sometimes.  Patient will more likely need SLP eval post extubation.    No family at bedside at time of visit.     Needs Assessment   Date: 9/1    Height used:Height: 185 cm (72.84\")  Weights used: 352lb/160kg; IBW 184lb/84kg     Estimated Calorie needs: ~  2250Kcal/day (goal)   Method:  Kcals/KG 14= 2240kcal; 25kcals IBW = 2100kcals   Method:  MSJ = 2371kcal  Method:  PSU (ve 8.01, tmax 37) = 2256    Estimated Protein needs: ~  201g PRO/day  Method: 2.0-2.5g/Kg IBW = 168- 210g; 1.0-1.2 g/kg CBW = 160-192g    Estimated Fluid needs: ~ ml/day   Per clinical status    Current Nutrition Prescription     PO: NPO Diet NPO Type: Tube Feeding  Oral Nutrition Supplement:  Intake: Insufficient data    EN: Peptamen Intense VHP  Goal Rate: 110 ml/hr  Water Flushes: 10 ml/hr  Modular: None  Route: NG  Tube: Large bore    At goal over: 20Hrs/day "     Rx will supply:   Goal Volume 2200 mL/day     Flush Volume 200 mL/day     Energy 2200 Kcal/day 98 % Est Need   Protein 205 g/day 102 % Est Need   Fiber 9 g/day     Water in  EN 1848 mL     Total Water 2048 mL     Meet DRI Yes        --------------------------------------------------------------------------  Product/Rate verified at bedside: Yes  Infusing Rate at time of visit: 35 ml/hr    Average Delivery from Chartin Day: (while advancing)  Volume 323 mL/day   % Goal Vol.   Flush Volume 130 mL/day     Energy  Kcal/day  % Est Need   Protein  g/day  % Est Need   Fiber  g/day     Water in  EN  mL     Total Water  mL     Meet DRI Yes        Assessment & Plan   Nutrition Diagnosis   Date:  Updated:   Problem Inadequate energy intake    Etiology Intubated    Signs/Symptoms NPO    Status: Active Receiving EN    Goal:   Nutrition to support treatment and Adjust EN    Nutrition Intervention      Follow treatment progress, Care plan reviewed, Nutrition support order placed    Continue current EN regimen per order / as tolerated:  Peptamen VHP- hold at current rate 35 ml/hr until phos replaced to WNL- then continue to advance by 10 ml/hr Q 8 hr as tolerated to goal 85 ml/hr.     Phos critically low, hold til replaced/slow advancement to avoid refeeding (precautionary)  Goal rate adjusted for additional kcal from current propofol drip.     Rx will supply:   Goal Volume 1700 mL/day     Flush Volume 200 mL/day     Energy 1700 Kcal/day *75 % Est Need   Protein 158 g/day *79 % Est Need   Fiber 7 g/day     Water in  EN 1428 mL     Total Water 1628 mL     Meet DRI Yes      *+552 kcal from propofol = 2252 total kcal (100% est. Needs)  *if propofol not weaned in next 24 hr, will add prosource to meet pro goals within 48 hr of EN initiation.     IC study ordered.    Monitor clinical course for further recs/ recommendations.     Monitoring/Evaluation:   Per protocol, I&O, Pertinent labs, EN delivery/tolerance, Symptoms,  POC/GOC, Swallow function    Toma Carcamo RD, CNSC  Time Spent: 30min

## 2024-09-02 NOTE — PLAN OF CARE
Goal Outcome Evaluation:  Plan of Care Reviewed With: patient        Progress:  (initial eval)     Plan for FEES tomorrow. Continue NPO w/ NG    Anticipated Discharge Disposition (SLP): inpatient rehabilitation facility          SLP Swallowing Diagnosis: oral dysphagia, suspected pharyngeal dysphagia (09/02/24 1300)

## 2024-09-02 NOTE — PROGRESS NOTES
INTENSIVIST   PROGRESS NOTE     Hospital:  LOS: 3 days     Mr. Bo Perez, 79 y.o. male is followed for a Chief Complaint of: Respiratory Failure      Subjective   S     Interval History:  Remains on mechanical ventilation. Tolerated PST yesterday.        The patient's relevant past medical, surgical and social history were reviewed and updated in Epic as appropriate.      ROS: Unable to obtain secondary to sedation and mechanical ventilation.     Objective   O     Vitals:  Temp  Min: 99 °F (37.2 °C)  Max: 100.4 °F (38 °C)  BP  Min: 118/94  Max: 152/70  Pulse  Min: 56  Max: 117  Resp  Min: 18  Max: 24  SpO2  Min: 87 %  Max: 98 % No data recorded    Intake/Ouptut 24 hrs (7:00AM - 6:59 AM)  Intake & Output (last 3 days)         08/28 0701 08/29 0700 08/29 0701 08/30 0700 08/30 0701 08/31 0700 08/31 0701 09/01 0700    P.O.   360     I.V. (mL/kg)   1143 (7.1)     IV Piggyback  600 200     Total Intake(mL/kg)  600 (3.8) 1703 (10.6)     Urine (mL/kg/hr)  150 775 (0.2)     Total Output  150 775     Net  +450 +928                     Medications (drips):  fentanyl 10 mcg/mL, Last Rate: Stopped (09/01/24 0930)  norepinephrine, Last Rate: 0.02 mcg/kg/min (09/01/24 0733)  propofol, Last Rate: 20 mcg/kg/min (09/02/24 0526)        Mechanical Ventilator Settings:          Resp Rate (Set): 16  Pressure Support (cm H2O): 10 cm H20  FiO2 (%): 35 %  PEEP/CPAP (cm H2O): 5 cm H20    Minute Ventilation (L/min) (Obs): 10.6 L/min  Resp Rate (Observed) Vent: 23  I:E Ratio (Set): 1:2.75  I:E Ratio (Obs): 1:2    PIP Observed (cm H2O): 16 cm H2O  Plateau Pressure (cm H2O): (S) 18 cm H2O    Physical Examination  Telemetry:  Normal sinus rhythm.    Constitutional:  No acute distress.  ET tube in place on mechanical ventilation.    Eyes: No scleral icterus.   PERRL, EOM intact.    Neck:  Supple, FROM   Cardiovascular: Normal rate, regular and rhythm. Normal heart sounds.  No murmurs, gallop or rub.   Respiratory: No respiratory distress.  Normal respiratory effort.  Diminished. No wheezing.    Abdominal:  Soft. No masses. Nontender. No distension. No HSM.   Extremities: No digital cyanosis. No clubbing.  1+ peripheral edema.   Skin: No rashes, lesions or ulcers   Neurological:   Arouses to stimulation.              Results from last 7 days   Lab Units 09/02/24  0519 09/01/24  0519 08/30/24  1211   WBC 10*3/mm3 10.89* 10.07 24.70*   HEMOGLOBIN g/dL 11.9* 11.0* 12.6*   MCV fL 96.7 96.9 99.5*   PLATELETS 10*3/mm3 130* 111* 178     Results from last 7 days   Lab Units 09/02/24  0519 09/01/24  0519 08/31/24  0430 08/30/24  1211 08/30/24  0401   SODIUM mmol/L 138 136 138   < > 141   POTASSIUM mmol/L 3.9 4.0 3.9   < > 3.7   CO2 mmol/L 23.0 25.0 24.0   < > 24.0   CREATININE mg/dL 0.64* 0.71* 0.92   < > 1.00   GLUCOSE mg/dL 144* 117* 133*   < > 224*   MAGNESIUM mg/dL 2.1 2.3 2.1  --  2.2   PHOSPHORUS mg/dL 1.5* 2.1*  --   --  5.6*    < > = values in this interval not displayed.     Estimated Creatinine Clearance: 150.7 mL/min (A) (by C-G formula based on SCr of 0.64 mg/dL (L)).  Results from last 7 days   Lab Units 09/02/24  0519 08/30/24  1211 08/30/24  0401   ALK PHOS U/L 48 42 54   BILIRUBIN mg/dL 0.7 0.8 0.5   ALT (SGPT) U/L 15 14 14   AST (SGOT) U/L 39 27 22       Results from last 7 days   Lab Units 09/02/24  0326 09/01/24  0409 08/31/24  0317 08/30/24  1358 08/30/24  0604   PH, ARTERIAL pH units 7.452* 7.392 7.405 7.393 7.261*   PCO2, ARTERIAL mm Hg 37.2 43.5 40.4 38.0 59.8*   PO2 ART mm Hg 79.8* 81.6* 103.0 119.0* 87.9   FIO2 % 35 35 35 60 100       Images:  Imaging Results (Last 24 Hours)       Procedure Component Value Units Date/Time    XR Chest 1 View [[id number]] Collected: 09/02/24 0805     Updated: 09/02/24 0811    Narrative:      XR CHEST 1 VW    Date of Exam: 9/2/2024 1:58 AM EDT    Indication: Intubated Patient    Comparison: AP chest x-ray 9/1/2024, 8/31/2024, 8/30/2024    Findings:  Tip of the endotracheal tube terminates in the midthoracic  trachea. Tip of the enteric tube is not included, but courses below the level of the diaphragm. Tip of the right upper extremity PICC terminates near the cavoatrial junction.    Cardiac silhouette remains enlarged. Bilateral perihilar airspace opacities appear stable. There is blunting of each lateral costophrenic angle. No pneumothorax is seen.      Impression:      Impression:  1.Stable appearance of bilateral perihilar airspace opacities, suggestive of pulmonary edema with small pleural effusions.  2.Positioning of support tubes and lines, as above.      Electronically Signed: Radha Lawrence    9/2/2024 8:08 AM EDT    Workstation ID: [id number]               Results: Reviewed.  I reviewed the patient's new laboratory and imaging results.  I independently reviewed the patient's new images.    Medications: Reviewed.    Assessment & Plan   A / P     Mr. Perez is a 78yo M with a history of Afib on Tikosyn and 3pk/day smoker with a recent cataract surgery who presented to the [facility] ED on 8/30/24 with respiratory distress with hypoxia and required intubation. He was hypotensive and was started on Levophed. Cefepime was started for possible pneumonia.     Vasopressors have been weaned off. He remains on mechanical ventilation. FiO2 at 35% and PEEP of 5.     Echocardiogram with an EF of 30%.     Nutrition:   NPO Diet NPO Type: Tube Feeding  Advance Directives:   Code Status and Medical Interventions: CPR (Attempt to Resuscitate); Full Support   Ordered at: 08/30/24 0533     Code Status (Patient has no pulse and is not breathing):    CPR (Attempt to Resuscitate)     Medical Interventions (Patient has pulse or is breathing):    Full Support       Active Hospital Problems    Diagnosis     **Acute respiratory failure with hypoxia and hypercapnia     Persistent atrial fibrillation     PVC's (premature ventricular contractions)     Wide-complex tachycardia     NSTEMI (non-ST elevated myocardial infarction)     HFrEF (heart failure  with reduced ejection fraction)     Prolonged Q-T interval on ECG     Sepsis due to pneumonia     Continuous tobacco abuse     HTN (hypertension)        Assessment / Plan:    Continue mechanical ventilation. Placed on pressure support this AM. Plan to get an ABG and mechanics when awake.    Hold sedation.   Cardiology following. Echocardiogram with an EF of 30%. Entresto to start today.   Tikosyn on hold secondary to prolonged Qt.   Complete a course of empiric Cefepime. F/u cultures. NGTD.   SSI as needed for hyperglycemia.   Eliquis for Afib.   Duonebs.   Eye drops for recent cataract surgery.   Tube feeds   AM labs and CXR    High risk secondary to management of mechanical ventilation.     High level of risk due to:  severe exacerbation of chronic illness and illness with threat to life or bodily function.    Plan of care and goals reviewed during interdisciplinary rounds.  I discussed the patient's findings and my recommendations with nursing staff      Emeli Nair,     Intensive Care Medicine and Pulmonary Medicine

## 2024-09-02 NOTE — THERAPY EVALUATION
Acute Care - Speech Language Pathology   Swallow Initial Evaluation [facility]  Clinical Swallow Evaluation       Patient Name: Bo Perez  : 1945  MRN: [id number]  Today's Date: 2024               Admit Date: 2024    Visit Dx:     ICD-10-CM ICD-9-CM   1. Acute respiratory failure with hypoxemia  J96.01 518.81   2. Flash pulmonary edema  J81.0 518.4   3. Acute sepsis  A41.9 038.9     995.91   4. Pneumonia of both lower lobes due to infectious organism  J18.9 486   5. Hypertensive emergency  I16.1 401.9   6. Dysphagia, unspecified type  R13.10 787.20     Patient Active Problem List   Diagnosis    Chronic anticoagulation    HTN (hypertension)    Morbid obesity    Acute respiratory failure with hypoxia and hypercapnia    Sepsis due to pneumonia    Continuous tobacco abuse    Persistent atrial fibrillation    PVC's (premature ventricular contractions)    Mixed hyperlipidemia    Wide-complex tachycardia    NSTEMI (non-ST elevated myocardial infarction)    HFrEF (heart failure with reduced ejection fraction)    Prolonged Q-T interval on ECG     Past Medical History:   Diagnosis Date    A-fib     Arthritis     Hard to intubate     Hypertension     Sleep apnea     NOT USING CPAP     Past Surgical History:   Procedure Laterality Date    BLADDER SURGERY  2016    STIMULATOR IMPLANTED, NEW ELECTRODE 2022    CARDIAC ABLATION      AFIB    CARPAL TUNNEL RELEASE Right     CHOLECYSTECTOMY      COLONOSCOPY      DENTAL PROCEDURE      FINGER SURGERY Left     FOREFINGER BONE TRIMMED    HEMORRHOIDECTOMY  1973    KNEE ARTHROSCOPY Left 2008    MOUTH SURGERY      FOR SLEEP APNEA    SPINAL FUSION      L3 LUMBAR SPINAL STENOSIS    TONSILLECTOMY      TOTAL KNEE ARTHROPLASTY Left     TOTAL KNEE ARTHROPLASTY Right 2023    Procedure: TOTAL KNEE ARTHROPLASTY - RIGHT;  Surgeon: Leland Elizalde MD;  Location: [facility];  Service: Orthopedics;  Laterality: Right;       SLP  Recommendation and Plan  SLP Swallowing Diagnosis: oral dysphagia, suspected pharyngeal dysphagia (09/02/24 1300)  SLP Diet Recommendation: NPO, temporary alternate methods of nutrition/hydration (until FEES tomorrow) (09/02/24 1300)  Recommended Precautions and Strategies: general aspiration precautions (09/02/24 1300)  SLP Rec. for Method of Medication Administration: meds via alternate route (09/02/24 1300)     Monitor for Signs of Aspiration: yes, notify SLP if any concerns (09/02/24 1300)  Recommended Diagnostics: reassess via FEES (9/3) (09/02/24 1300)  Swallow Criteria for Skilled Therapeutic Interventions Met: demonstrates skilled criteria (09/02/24 1300)  Anticipated Discharge Disposition (SLP): inpatient rehabilitation facility (09/02/24 1300)  Rehab Potential/Prognosis, Swallowing: adequate, monitor progress closely (09/02/24 1300)        Oral Care Recommendations: Oral Care BID/PRN, Suction toothbrush (09/02/24 1300)              Plan of Care Reviewed With: patient  Progress:  (initial eval)      SWALLOW EVALUATION (Last 72 Hours)       SLP Adult Swallow Evaluation       Row Name 09/02/24 1300       Rehab Evaluation    Document Type evaluation  -CJ    Subjective Information no complaints  -CJ    Patient Observations alert;cooperative  -CJ    Patient/Family/Caregiver Comments/Observations no family present  -CJ    Patient Effort adequate  -CJ    Symptoms Noted During/After Treatment none  -CJ    Oral Care suction provided  -CJ       General Information    Patient Profile Reviewed yes  -CJ    Pertinent History Of Current Problem Pt adm w/ resp failure w/ hypoxia/hypercapnia; sig h/o HTN, sepsis, afib, PVC's NSTEMI, HLD. Per chart has h/o esophageal dilation. CXR: bilateral perihilar airspace opacities. Intubated 8/30-9/2  -CJ    Current Method of Nutrition NPO;nasogastric feedings;large-bore  -CJ    Precautions/Limitations, Vision WFL;for purposes of eval  -CJ    Precautions/Limitations, Hearing WFL;for  purposes of eval  -    Prior Level of Function-Communication unknown  -    Prior Level of Function-Swallowing unknown;esophageal concerns  -    Plans/Goals Discussed with patient;agreed upon  -    Barriers to Rehab medically complex  -    Patient's Goals for Discharge return to PO diet  -       Pain    Additional Documentation Pain Scale: FACES Pre/Post-Treatment (Group)  -       Pain Scale: FACES Pre/Post-Treatment    Pain: FACES Scale, Pretreatment 0-->no hurt  -CJ    Posttreatment Pain Rating 0-->no hurt  -       Oral Motor Structure and Function    Dentition Assessment natural, present and adequate  -    Secretion Management problems swallowing secretions  -    Mucosal Quality sticky  -       Oral Musculature and Cranial Nerve Assessment    Oral Motor General Assessment vocal impairment  -    Vocal Impairment, Detail. Cranial Nerve X (Vagus) vocal quality abnormality (see comments)  strained  -       General Eating/Swallowing Observations    Respiratory Support Currently in Use nasal cannula  -    Eating/Swallowing Skills fed by SLP  in  wrist restraints  -    Positioning During Eating upright in bed  -    Utensils Used spoon;cup;straw  -    Consistencies Trialed pureed;ice chips;thin liquids  -       Respiratory    Date of Intubation 8/30-9/2  -       Clinical Swallow Eval    Pharyngeal Phase suspected pharyngeal impairment  -    Clinical Swallow Evaluation Summary Pt w/ decreased vocal intensity, strained in quality. Overt s/s of aspiration w/ thins w/ cough response, multiple swallows w/ puree. Will plan for FEES tomorrow to further assess  -       Pharyngeal Phase Concerns    Pharyngeal Phase Concerns cough;multiple swallows  -       SLP Evaluation Clinical Impression    SLP Swallowing Diagnosis oral dysphagia;suspected pharyngeal dysphagia  -    Functional Impact risk of aspiration/pneumonia  -    Rehab Potential/Prognosis, Swallowing adequate, monitor  progress closely  -    Swallow Criteria for Skilled Therapeutic Interventions Met demonstrates skilled criteria  -       Recommendations    SLP Diet Recommendation NPO;temporary alternate methods of nutrition/hydration  until FEES tomorrow  -    Recommended Diagnostics reassess via FEES  9/3  -    Recommended Precautions and Strategies general aspiration precautions  -    Oral Care Recommendations Oral Care BID/PRN;Suction toothbrush  -    SLP Rec. for Method of Medication Administration meds via alternate route  -    Monitor for Signs of Aspiration yes;notify SLP if any concerns  -    Anticipated Discharge Disposition (SLP) inpatient rehabilitation facility  -              User Key  (r) = Recorded By, (t) = Taken By, (c) = Cosigned By      Initials Name Effective Dates    Diana Champagne MS CCC-SLP 06/03/24 -                     EDUCATION  The patient has been educated in the following areas:   Dysphagia (Swallowing Impairment) Oral Care/Hydration.                Time Calculation:    Time Calculation- SLP       Row Name 09/02/24 1333             Time Calculation- SLP    SLP Start Time 1300  -      SLP Received On 09/02/24  -         Untimed Charges    [id number] Eval Oral Pharyng Swallow Minutes 40  -         Total Minutes    Untimed Charges Total Minutes 40  -CJ       Total Minutes 40  -                User Key  (r) = Recorded By, (t) = Taken By, (c) = Cosigned By      Initials Name Provider Type    Diana Champagne MS CCC-SLP Speech and Language Pathologist                    Therapy Charges for Today       Code Description Service Date Service Provider Modifiers Qty    [id number]  ST EVAL ORAL PHARYNG SWALLOW 3 9/2/2024 Diana Salazar MS CCC-SLP GN 1                 Diana Salazar MS CCC-SLP  9/2/2024

## 2024-09-02 NOTE — PROGRESS NOTES
Cardiology Progress Note      Reason for visit:    NSTEMI type I versus type II  Paroxysmal atrial fibrillaton  Frequent PVCs    IDENTIFICATION: 79-year-old gentleman who resides in     Active Hospital Problems    Diagnosis  POA    **Acute respiratory failure with hypoxia and hypercapnia [J96.01, J96.02]  Yes     Priority: High    Sepsis due to pneumonia [J18.9, A41.9]  Yes     Priority: High    NSTEMI (non-ST elevated myocardial infarction) [I21.4]  Yes     Priority: Medium     Reportedly normal coronaries on cath 2020.  Data deficit.  Elevated troponins in setting of respiratory distress      HFrEF (heart failure with reduced ejection fraction) [I50.20]  Yes     Priority: Medium     Echo (8/31/2024): LVEF 30%      Persistent atrial fibrillation [I48.19]  Yes     Followed by Dr. Bear with [facility]  General cardiologist Dr. Galvez  Echocardiogram reportedly done 1 month ago normal per family to reevaluate ascending aortic aneurysm which was stable  Chads Vasc= 3 (age >75, CHF)      PVC's (premature ventricular contractions) [I49.3]  Yes     Reportedly normal coronaries on cath 2020  Monitor summer 2024, data deficit: 27% PVC burden, 89 pauses longest 6.5 seconds-all pauses occurred during sleeping hours       Wide-complex tachycardia [R00.0]  Yes    Prolonged Q-T interval on ECG [R94.31]  Yes     Prolonged QTc on dofetilide, POA      Continuous tobacco abuse [Z72.0]  Yes    HTN (hypertension) [I10]  Yes            Intubated and sedated  No vasopressors  Frequent PVCs on telemetry           Vital Sign Min/Max for last 24 hours  Temp  Min: 99 °F (37.2 °C)  Max: 100.4 °F (38 °C)   BP  Min: 118/94  Max: 152/70   Pulse  Min: 56  Max: 117   Resp  Min: 18  Max: 24   SpO2  Min: 87 %  Max: 98 %   No data recorded      Intake/Output Summary (Last 24 hours) at 9/2/2024 1032  Last data filed at 9/2/2024 0840  Gross per 24 hour   Intake 1617.57 ml   Output 1475 ml   Net 142.57 ml           Physical  Exam  Constitutional:       Appearance: Normal appearance.      Interventions: He is sedated and intubated.   HENT:      Head: Normocephalic.   Cardiovascular:      Rate and Rhythm: Normal rate. Extrasystoles are present.     Heart sounds: No murmur heard.  Pulmonary:      Effort: He is intubated.   Neurological:      Mental Status: He is alert.         Tele: Sinus rhythm with PVCs     Results Review (reviewed the patient's recent labs in the electronic medical record):      EKG (9/2/2024): Wide QRS rhythm with PVCs in bigeminal pattern     CXR (9/1/2024): No significant change.  Similar trace bilateral pleural effusions with bibasilar airspace disease left greater than right     ECHO (8/31/2024): LVEF 30%.  RVSP 32 mmHg    Results from last 7 days   Lab Units 09/02/24  0519 09/01/24  0519 08/31/24  0430 08/30/24  1211 08/30/24  0401   SODIUM mmol/L 138 136 138 142 141   POTASSIUM mmol/L 3.9 4.0 3.9 4.1 3.7   CHLORIDE mmol/L 105 104 105 110* 105   BUN mg/dL 19 21 23 21 19   CREATININE mg/dL 0.64* 0.71* 0.92 1.22 1.00   MAGNESIUM mg/dL 2.1 2.3 2.1  --  2.2     Results from last 7 days   Lab Units 08/31/24  0430 08/30/24  1211 08/30/24  0634   HSTROP T ng/L 278* 519* 138*     Results from last 7 days   Lab Units 09/02/24  0519 09/01/24  0519 08/30/24  1211   WBC 10*3/mm3 10.89* 10.07 24.70*   HEMOGLOBIN g/dL 11.9* 11.0* 12.6*   HEMATOCRIT % 35.2* 33.9* 39.1   PLATELETS 10*3/mm3 130* 111* 178       Lab Results   Component Value Date    HGBA1C 5.00 08/30/2024       Lab Results   Component Value Date    CHOL 141 09/02/2024    TRIG 130 09/02/2024              NSTEMI, probable type II  Reportedly normal coronaries on cath 2020 but data deficit  Troponins elevated 88, 138 and 519  Echo this admission shows reduced LVEF 30%  Eventual ischemic evaluation     Persistent atrial fibrillation  Established with Dr. Bear with [facility] in clinic  Eliquis 5 mg twice daily  Dofetilide discontinued due to prolonged QTc  On telemetry appears  to be atrial fibrillation with frequent PVCs     Frequent PVCs  Monitor in summer 2024 reportedly 27% PVC burden and 89 pauses with the longest of 6.5 seconds all during sleeping hours  Reportedly wide-complex tachycardia while in ED that was concerning for real VT rather than aberrancy that was felt to be due secondary to have acute hypoxia.  No further reoccurrences  Frequent PVCs and wide-complex QRS     HFrEF  Echo this admission LVEF 30%  Change atenolol to carvedilol  Start Entresto 24-26 mg twice daily            Change atenolol to carvedilol 12.5 mg twice daily  Start Entresto 24-26 mg twice daily    Electronically signed by Yasir Canales IV, MD, 09/02/24, 10:28 AM EDT.

## 2024-09-03 ENCOUNTER — ANCILLARY PROCEDURE (OUTPATIENT)
Dept: SPEECH THERAPY | Facility: HOSPITAL | Age: 79
DRG: 871 | End: 2024-09-03
Payer: MEDICARE

## 2024-09-03 LAB
ANION GAP SERPL CALCULATED.3IONS-SCNC: 6 MMOL/L (ref 5–15)
ARTERIAL PATENCY WRIST A: ABNORMAL
ATMOSPHERIC PRESS: ABNORMAL MM[HG]
BASE EXCESS BLDA CALC-SCNC: 3.6 MMOL/L (ref 0–2)
BDY SITE: ABNORMAL
BODY TEMPERATURE: 37
BUN SERPL-MCNC: 28 MG/DL (ref 8–23)
BUN/CREAT SERPL: 34.6 (ref 7–25)
CALCIUM SPEC-SCNC: 8 MG/DL (ref 8.6–10.5)
CHLORIDE SERPL-SCNC: 108 MMOL/L (ref 98–107)
CO2 BLDA-SCNC: 30.3 MMOL/L (ref 22–33)
CO2 SERPL-SCNC: 27 MMOL/L (ref 22–29)
COHGB MFR BLD: 2.2 % (ref 0–2)
CREAT SERPL-MCNC: 0.81 MG/DL (ref 0.76–1.27)
DEPRECATED RDW RBC AUTO: 57.7 FL (ref 37–54)
EGFRCR SERPLBLD CKD-EPI 2021: 90.2 ML/MIN/1.73
EPAP: 0
ERYTHROCYTE [DISTWIDTH] IN BLOOD BY AUTOMATED COUNT: 15.7 % (ref 12.3–15.4)
GEN 5 2HR TROPONIN T REFLEX: 229 NG/L
GLUCOSE BLDC GLUCOMTR-MCNC: 112 MG/DL (ref 70–130)
GLUCOSE BLDC GLUCOMTR-MCNC: 116 MG/DL (ref 70–130)
GLUCOSE BLDC GLUCOMTR-MCNC: 138 MG/DL (ref 70–130)
GLUCOSE BLDC GLUCOMTR-MCNC: 95 MG/DL (ref 70–130)
GLUCOSE SERPL-MCNC: 120 MG/DL (ref 65–99)
HCO3 BLDA-SCNC: 28.9 MMOL/L (ref 20–26)
HCT VFR BLD AUTO: 32.7 % (ref 37.5–51)
HCT VFR BLD CALC: 34.4 % (ref 38–51)
HGB BLD-MCNC: 10.4 G/DL (ref 13–17.7)
HGB BLDA-MCNC: 11.2 G/DL (ref 13.5–17.5)
INHALED O2 CONCENTRATION: 28 %
IPAP: 0
MAGNESIUM SERPL-MCNC: 2.4 MG/DL (ref 1.6–2.4)
MCH RBC QN AUTO: 31.8 PG (ref 26.6–33)
MCHC RBC AUTO-ENTMCNC: 31.8 G/DL (ref 31.5–35.7)
MCV RBC AUTO: 100 FL (ref 79–97)
METHGB BLD QL: 0.2 % (ref 0–1.5)
MODALITY: ABNORMAL
OXYHGB MFR BLDV: 95.1 % (ref 94–99)
PAW @ PEAK INSP FLOW SETTING VENT: 0 CMH2O
PCO2 BLDA: 46 MM HG (ref 35–45)
PCO2 TEMP ADJ BLD: 46 MM HG (ref 35–48)
PH BLDA: 7.41 PH UNITS (ref 7.35–7.45)
PH, TEMP CORRECTED: 7.41 PH UNITS
PHOSPHATE SERPL-MCNC: 2.4 MG/DL (ref 2.5–4.5)
PLATELET # BLD AUTO: 135 10*3/MM3 (ref 140–450)
PMV BLD AUTO: 9.6 FL (ref 6–12)
PO2 BLDA: 86 MM HG (ref 83–108)
PO2 TEMP ADJ BLD: 86 MM HG (ref 83–108)
POTASSIUM SERPL-SCNC: 4.1 MMOL/L (ref 3.5–5.2)
POTASSIUM SERPL-SCNC: 4.1 MMOL/L (ref 3.5–5.2)
RBC # BLD AUTO: 3.27 10*6/MM3 (ref 4.14–5.8)
SODIUM SERPL-SCNC: 141 MMOL/L (ref 136–145)
TOTAL RATE: 0 BREATHS/MINUTE
TROPONIN T DELTA: 2 NG/L
WBC NRBC COR # BLD AUTO: 8.22 10*3/MM3 (ref 3.4–10.8)

## 2024-09-03 PROCEDURE — 99232 SBSQ HOSP IP/OBS MODERATE 35: CPT | Performed by: NURSE PRACTITIONER

## 2024-09-03 PROCEDURE — 99233 SBSQ HOSP IP/OBS HIGH 50: CPT | Performed by: INTERNAL MEDICINE

## 2024-09-03 PROCEDURE — 25010000002 CEFEPIME PER 500 MG: Performed by: INTERNAL MEDICINE

## 2024-09-03 PROCEDURE — 93005 ELECTROCARDIOGRAM TRACING: CPT | Performed by: NURSE PRACTITIONER

## 2024-09-03 PROCEDURE — 92610 EVALUATE SWALLOWING FUNCTION: CPT

## 2024-09-03 PROCEDURE — 82948 REAGENT STRIP/BLOOD GLUCOSE: CPT

## 2024-09-03 PROCEDURE — 83050 HGB METHEMOGLOBIN QUAN: CPT

## 2024-09-03 PROCEDURE — 82375 ASSAY CARBOXYHB QUANT: CPT

## 2024-09-03 PROCEDURE — 82805 BLOOD GASES W/O2 SATURATION: CPT

## 2024-09-03 PROCEDURE — 85027 COMPLETE CBC AUTOMATED: CPT | Performed by: INTERNAL MEDICINE

## 2024-09-03 PROCEDURE — 84132 ASSAY OF SERUM POTASSIUM: CPT | Performed by: NURSE PRACTITIONER

## 2024-09-03 PROCEDURE — 94799 UNLISTED PULMONARY SVC/PX: CPT

## 2024-09-03 PROCEDURE — 36600 WITHDRAWAL OF ARTERIAL BLOOD: CPT

## 2024-09-03 PROCEDURE — 25010000002 FUROSEMIDE PER 20 MG: Performed by: NURSE PRACTITIONER

## 2024-09-03 PROCEDURE — 80048 BASIC METABOLIC PNL TOTAL CA: CPT | Performed by: INTERNAL MEDICINE

## 2024-09-03 PROCEDURE — 84100 ASSAY OF PHOSPHORUS: CPT | Performed by: INTERNAL MEDICINE

## 2024-09-03 PROCEDURE — 93010 ELECTROCARDIOGRAM REPORT: CPT | Performed by: INTERNAL MEDICINE

## 2024-09-03 PROCEDURE — 92612 ENDOSCOPY SWALLOW (FEES) VID: CPT

## 2024-09-03 PROCEDURE — 83735 ASSAY OF MAGNESIUM: CPT | Performed by: INTERNAL MEDICINE

## 2024-09-03 RX ORDER — PANTOPRAZOLE SODIUM 40 MG/1
40 TABLET, DELAYED RELEASE ORAL
Status: DISCONTINUED | OUTPATIENT
Start: 2024-09-04 | End: 2024-09-10 | Stop reason: HOSPADM

## 2024-09-03 RX ORDER — ACETAMINOPHEN 160 MG/5ML
650 SOLUTION ORAL EVERY 4 HOURS PRN
Status: DISCONTINUED | OUTPATIENT
Start: 2024-09-03 | End: 2024-09-10 | Stop reason: HOSPADM

## 2024-09-03 RX ORDER — ACETAMINOPHEN 650 MG/1
650 SUPPOSITORY RECTAL EVERY 4 HOURS PRN
Status: DISCONTINUED | OUTPATIENT
Start: 2024-09-03 | End: 2024-09-10 | Stop reason: HOSPADM

## 2024-09-03 RX ORDER — OXYCODONE AND ACETAMINOPHEN 10; 325 MG/1; MG/1
1 TABLET ORAL EVERY 6 HOURS PRN
Status: DISCONTINUED | OUTPATIENT
Start: 2024-09-03 | End: 2024-09-07

## 2024-09-03 RX ORDER — GABAPENTIN 400 MG/1
800 CAPSULE ORAL EVERY 8 HOURS SCHEDULED
Status: DISCONTINUED | OUTPATIENT
Start: 2024-09-03 | End: 2024-09-10 | Stop reason: HOSPADM

## 2024-09-03 RX ORDER — FUROSEMIDE 10 MG/ML
60 INJECTION INTRAMUSCULAR; INTRAVENOUS ONCE
Status: COMPLETED | OUTPATIENT
Start: 2024-09-03 | End: 2024-09-03

## 2024-09-03 RX ORDER — OXYCODONE AND ACETAMINOPHEN 10; 325 MG/1; MG/1
1 TABLET ORAL EVERY 6 HOURS PRN
Status: DISCONTINUED | OUTPATIENT
Start: 2024-09-03 | End: 2024-09-03

## 2024-09-03 RX ADMIN — Medication 10 ML: at 20:36

## 2024-09-03 RX ADMIN — OXYCODONE AND ACETAMINOPHEN 1 TABLET: 10; 325 TABLET ORAL at 00:44

## 2024-09-03 RX ADMIN — GABAPENTIN 800 MG: 400 CAPSULE ORAL at 14:26

## 2024-09-03 RX ADMIN — MOXIFLOXACIN OPHTHALMIC 0.05 ML: 5 SOLUTION/ DROPS OPHTHALMIC at 18:30

## 2024-09-03 RX ADMIN — SACUBITRIL AND VALSARTAN 1 TABLET: 24; 26 TABLET, FILM COATED ORAL at 20:48

## 2024-09-03 RX ADMIN — TIZANIDINE 4 MG: 4 TABLET ORAL at 00:44

## 2024-09-03 RX ADMIN — GABAPENTIN 800 MG: 400 CAPSULE ORAL at 06:36

## 2024-09-03 RX ADMIN — PREDNISOLONE ACETATE 1 DROP: 10 SUSPENSION/ DROPS OPHTHALMIC at 07:57

## 2024-09-03 RX ADMIN — PREDNISOLONE ACETATE 1 DROP: 10 SUSPENSION/ DROPS OPHTHALMIC at 20:47

## 2024-09-03 RX ADMIN — KETOROLAC TROMETHAMINE 1 DROP: 5 SOLUTION/ DROPS OPHTHALMIC at 18:30

## 2024-09-03 RX ADMIN — APIXABAN 5 MG: 5 TABLET, FILM COATED ORAL at 20:48

## 2024-09-03 RX ADMIN — MOXIFLOXACIN OPHTHALMIC 0.05 ML: 5 SOLUTION/ DROPS OPHTHALMIC at 12:28

## 2024-09-03 RX ADMIN — OXYCODONE AND ACETAMINOPHEN 1 TABLET: 10; 325 TABLET ORAL at 20:49

## 2024-09-03 RX ADMIN — IPRATROPIUM BROMIDE AND ALBUTEROL SULFATE 3 ML: 2.5; .5 SOLUTION RESPIRATORY (INHALATION) at 15:17

## 2024-09-03 RX ADMIN — CEFEPIME 2000 MG: 2 INJECTION, POWDER, FOR SOLUTION INTRAVENOUS at 06:36

## 2024-09-03 RX ADMIN — KETOROLAC TROMETHAMINE 1 DROP: 5 SOLUTION/ DROPS OPHTHALMIC at 20:41

## 2024-09-03 RX ADMIN — IPRATROPIUM BROMIDE AND ALBUTEROL SULFATE 3 ML: 2.5; .5 SOLUTION RESPIRATORY (INHALATION) at 07:40

## 2024-09-03 RX ADMIN — PREDNISOLONE ACETATE 1 DROP: 10 SUSPENSION/ DROPS OPHTHALMIC at 12:28

## 2024-09-03 RX ADMIN — GABAPENTIN 800 MG: 400 CAPSULE ORAL at 21:00

## 2024-09-03 RX ADMIN — IPRATROPIUM BROMIDE AND ALBUTEROL SULFATE 3 ML: 2.5; .5 SOLUTION RESPIRATORY (INHALATION) at 00:40

## 2024-09-03 RX ADMIN — APIXABAN 5 MG: 5 TABLET, FILM COATED ORAL at 09:37

## 2024-09-03 RX ADMIN — Medication 10 ML: at 09:38

## 2024-09-03 RX ADMIN — PREDNISOLONE ACETATE 1 DROP: 10 SUSPENSION/ DROPS OPHTHALMIC at 18:30

## 2024-09-03 RX ADMIN — ACETAMINOPHEN 650 MG: 650 SOLUTION ORAL at 09:37

## 2024-09-03 RX ADMIN — NOREPINEPHRINE BITARTRATE 0.02 MCG/KG/MIN: 0.03 INJECTION, SOLUTION INTRAVENOUS at 04:31

## 2024-09-03 RX ADMIN — KETOROLAC TROMETHAMINE 1 DROP: 5 SOLUTION/ DROPS OPHTHALMIC at 07:57

## 2024-09-03 RX ADMIN — PANTOPRAZOLE SODIUM 40 MG: 40 INJECTION, POWDER, FOR SOLUTION INTRAVENOUS at 09:37

## 2024-09-03 RX ADMIN — FUROSEMIDE 60 MG: 10 INJECTION, SOLUTION INTRAMUSCULAR; INTRAVENOUS at 09:37

## 2024-09-03 RX ADMIN — IPRATROPIUM BROMIDE AND ALBUTEROL SULFATE 3 ML: 2.5; .5 SOLUTION RESPIRATORY (INHALATION) at 19:06

## 2024-09-03 RX ADMIN — KETOROLAC TROMETHAMINE 1 DROP: 5 SOLUTION/ DROPS OPHTHALMIC at 12:28

## 2024-09-03 RX ADMIN — OXYCODONE AND ACETAMINOPHEN 1 TABLET: 10; 325 TABLET ORAL at 14:36

## 2024-09-03 RX ADMIN — MOXIFLOXACIN OPHTHALMIC 0.05 ML: 5 SOLUTION/ DROPS OPHTHALMIC at 20:35

## 2024-09-03 RX ADMIN — SACUBITRIL AND VALSARTAN 1 TABLET: 24; 26 TABLET, FILM COATED ORAL at 09:37

## 2024-09-03 RX ADMIN — POTASSIUM CHLORIDE 40 MEQ: 1.5 POWDER, FOR SOLUTION ORAL at 02:25

## 2024-09-03 RX ADMIN — MOXIFLOXACIN OPHTHALMIC 0.05 ML: 5 SOLUTION/ DROPS OPHTHALMIC at 07:57

## 2024-09-03 NOTE — PROGRESS NOTES
[facility] Electrophysiologty  In Patient progress note   LOS: 4 days   Patient Care Team:  Srinivasan Cary MD as PCP - General (Family Medicine)  Shekhar Galvez MD as Consulting Physician (Cardiology)  Mahendra Bear MD as Consulting Physician (Cardiac Electrophysiology)    Follow up for Cheif Complaint EP: Atrial Fibrillation    Subjective  awake and talking, NPO. Remains unaware of rate and rhythm           Active Hospital Problems    Diagnosis  POA    **Acute respiratory failure with hypoxia and hypercapnia [J96.01, J96.02]  Yes    Persistent atrial fibrillation [I48.19]  Yes     Followed by Dr. Bear with [facility]  General cardiologist Dr. Galvez  Echocardiogram reportedly done 1 month ago normal per family to reevaluate ascending aortic aneurysm which was stable  Chads Vasc= 3 (age >75, CHF)      PVC's (premature ventricular contractions) [I49.3]  Yes     Reportedly normal coronaries on cath 2020  Monitor summer 2024, data deficit: 27% PVC burden, 89 pauses longest 6.5 seconds-all pauses occurred during sleeping hours       Wide-complex tachycardia [R00.0]  Yes    NSTEMI (non-ST elevated myocardial infarction) [I21.4]  Yes     Reportedly normal coronaries on cath 2020.  Data deficit.  Elevated troponins in setting of respiratory distress      HFrEF (heart failure with reduced ejection fraction) [I50.20]  Yes     Echo (8/31/2024): LVEF 30%      Prolonged Q-T interval on ECG [R94.31]  Yes     Prolonged QTc on dofetilide, POA  Tikosyn discontinued      Sepsis due to pneumonia [J18.9, A41.9]  Yes    Continuous tobacco abuse [Z72.0]  Yes    HTN (hypertension) [I10]  Yes               Tele: Atrial Fib with Controlled Rate    Vitals:  Temp:  [98.2 °F (36.8 °C)-99.5 °F (37.5 °C)] 98.8 °F (37.1 °C)  Heart Rate:  [] 86  Resp:  [20-27] 26  BP: ()/() 113/62  Arterial Line BP: (135-173)/(61-80) 142/69  Flow (L/min):  [2-4] 2  FiO2 (%):  [35 %] 35 %    Body mass index  is 46.75 kg/m².    Intake/Output Summary (Last 24 hours) at 9/3/2024 1025  Last data filed at 9/3/2024 1000  Gross per 24 hour   Intake 1966.74 ml   Output 2650 ml   Net -683.26 ml       Physical Exam:      General: alert, no acute distress, acyanotic,   Chest: Clear Auscultation   CV: Heart regular rate and rhythm   Extremities: chronic venous insufficiency     Results Review:         Labs:  Results from last 7 days   Lab Units 09/03/24 0342 08/30/24 1211 08/30/24  0325   WBC 10*3/mm3 8.22   < > 26.65*   HEMOGLOBIN g/dL 10.4*   < > 13.6   HEMATOCRIT % 32.7*   < > 43.9   PLATELETS 10*3/mm3 135*   < > 242   INR   --   --  1.07    < > = values in this interval not displayed.     Results from last 7 days   Lab Units 09/03/24 0342 09/02/24 2111 09/02/24  0519 08/31/24  0430 08/30/24  1211 08/30/24  0401   SODIUM mmol/L 141   < > 138   < > 142 141   POTASSIUM mmol/L 4.1  4.1   < > 3.9   < > 4.1 3.7   CHLORIDE mmol/L 108*   < > 105   < > 110* 105   CO2 mmol/L 27.0   < > 23.0   < > 21.0* 24.0   BUN mg/dL 28*   < > 19   < > 21 19   CREATININE mg/dL 0.81   < > 0.64*   < > 1.22 1.00   GLUCOSE mg/dL 120*   < > 144*   < > 130* 224*   CALCIUM mg/dL 8.0*   < > 8.2*   < > 7.4* 8.3*   ALK PHOS U/L  --   --  48  --  42 54   ALT (SGPT) U/L  --   --  15  --  14 14   AST (SGOT) U/L  --   --  39  --  27 22    < > = values in this interval not displayed.     Estimated Creatinine Clearance: 119.1 mL/min (by C-G formula based on SCr of 0.81 mg/dL).  Results from last 7 days   Lab Units 09/02/24 2327 09/02/24 2111 08/31/24  0430   HSTROP T ng/L 229* 227* 278*     Results from last 7 days   Lab Units 08/30/24  0401   PROBNP pg/mL 1,061.0       Scheduled Meds:  apixaban, 5 mg, Nasogastric, Q12H  [Held by provider] ARIPiprazole, 5 mg, Nasogastric, Daily  cefepime, 2,000 mg, Intravenous, Q8H  gabapentin, 800 mg, Nasogastric, Q8H  insulin regular, 2-7 Units, Subcutaneous, Q6H  ipratropium-albuterol, 3 mL, Nebulization, Q6H -  RT  ketorolac, 1 drop, Right Eye, 4x Daily  moxifloxacin, 1 drop, Right Eye, 4x Daily  pantoprazole, 40 mg, Intravenous, Q24H  prednisoLONE acetate, 1 drop, Right Eye, 4x Daily  sacubitril-valsartan, 1 tablet, Nasogastric, Q12H  sodium chloride, 10 mL, Intravenous, Q12H        Continuous Infusions:norepinephrine, 0.02-0.3 mcg/kg/min, Last Rate: 0.04 mcg/kg/min (09/03/24 0539)          Assessment: Plan:    PAF  Anticoagulated   Mostly well rate controlled with a few asymptomatic breakthrough RVRs   Coreg held overnight due to asymptomatic pauses  SBP is marginal, restart beta blocker as tolerated  WCT  No reoccurrence  Tikosyn discontinued  Frequent PVC with compensatory pauses  Asymptomatic and mostly during sleep  Continue to monitor          Electronically signed by JOSH Shearer, 09/03/24, 10:37 AM EDT.

## 2024-09-03 NOTE — MBS/VFSS/FEES
Acute Care - Speech Language Pathology   Swallow Re-Evaluation [facility]  Fiberoptic Endoscopic Evaluation of Swallowing (FEES)       Patient Name: Bo Perez  : 1945  MRN: [id number]  Today's Date: 9/3/2024               Admit Date: 2024    Visit Dx:     ICD-10-CM ICD-9-CM   1. Acute respiratory failure with hypoxemia  J96.01 518.81   2. Flash pulmonary edema  J81.0 518.4   3. Acute sepsis  A41.9 038.9     995.91   4. Pneumonia of both lower lobes due to infectious organism  J18.9 486   5. Hypertensive emergency  I16.1 401.9   6. Oropharyngeal dysphagia  R13.12 787.22     Patient Active Problem List   Diagnosis    Chronic anticoagulation    HTN (hypertension)    Morbid obesity    Acute respiratory failure with hypoxia and hypercapnia    Sepsis due to pneumonia    Continuous tobacco abuse    Persistent atrial fibrillation    PVC's (premature ventricular contractions)    Mixed hyperlipidemia    Wide-complex tachycardia    NSTEMI (non-ST elevated myocardial infarction)    HFrEF (heart failure with reduced ejection fraction)    Prolonged Q-T interval on ECG     Past Medical History:   Diagnosis Date    A-fib     Arthritis     Hard to intubate     Hypertension     Sleep apnea     NOT USING CPAP     Past Surgical History:   Procedure Laterality Date    BLADDER SURGERY  2016    STIMULATOR IMPLANTED, NEW ELECTRODE 2022    CARDIAC ABLATION      AFIB    CARPAL TUNNEL RELEASE Right     CHOLECYSTECTOMY      COLONOSCOPY      DENTAL PROCEDURE      FINGER SURGERY Left     FOREFINGER BONE TRIMMED    HEMORRHOIDECTOMY  1973    KNEE ARTHROSCOPY Left 2008    MOUTH SURGERY      FOR SLEEP APNEA    SPINAL FUSION  2007    L3 LUMBAR SPINAL STENOSIS    TONSILLECTOMY      TOTAL KNEE ARTHROPLASTY Left 2009    TOTAL KNEE ARTHROPLASTY Right 2023    Procedure: TOTAL KNEE ARTHROPLASTY - RIGHT;  Surgeon: Leland Elizalde MD;  Location: [facility];  Service: Orthopedics;  Laterality: Right;        SLP Recommendation and Plan  SLP Swallowing Diagnosis: mild, oral dysphagia, mild-moderate, pharyngeal dysphagia (09/03/24 1020)  SLP Diet Recommendation: puree (09/03/24 1020)  Recommended Precautions and Strategies: upright posture during/after eating, small bites of food and sips of liquid, multiple swallows per bite of food, multiple swallows per sip of liquid, volitional throat clear, general aspiration precautions (09/03/24 1020)  SLP Rec. for Method of Medication Administration: meds whole, with puree (09/03/24 1020)     Monitor for Signs of Aspiration: yes, notify SLP if any concerns (09/03/24 1020)  Recommended Diagnostics: FEES (09/03/24 0950)  Swallow Criteria for Skilled Therapeutic Interventions Met: demonstrates skilled criteria (09/03/24 1020)  Anticipated Discharge Disposition (SLP): inpatient rehabilitation facility (09/03/24 1020)  Rehab Potential/Prognosis, Swallowing: adequate, monitor progress closely (09/03/24 1020)  Therapy Frequency (Swallow): 5 days per week (09/03/24 1020)  Predicted Duration Therapy Intervention (Days): 1 week (09/03/24 1020)  Oral Care Recommendations: Oral Care BID/PRN, Suction toothbrush (09/03/24 1020)                                        Plan of Care Reviewed With: patient  Progress: improving      SWALLOW EVALUATION (Last 72 Hours)       SLP Adult Swallow Evaluation       Row Name 09/03/24 1020 09/03/24 0950 09/02/24 1300             Rehab Evaluation    Document Type evaluation  -GA re-evaluation  -GA evaluation  -      Subjective Information no complaints  -GA no complaints  -GA no complaints  -      Patient Observations alert;cooperative;agree to therapy  -GA alert;cooperative;agree to therapy  able to rouse following 5 minutes of conversation and stimulation  -GA alert;cooperative  -      Patient/Family/Caregiver Comments/Observations no family present  -GA no family present  -GA no family present  -      Patient Effort adequate  -GA good  -GA  adequate  -CJ      Symptoms Noted During/After Treatment none  -GA none  -GA none  -CJ      Oral Care -- teeth brushed - suction toothbrush  -GA suction provided  -CJ         General Information    Patient Profile Reviewed yes  -GA yes  -GA yes  -CJ      Pertinent History Of Current Problem see SLP hx  -GA see SLP hx. RN requesting a re-evaluation readiness d/t patient lethargic and sleeping.  -GA Pt adm w/ resp failure w/ hypoxia/hypercapnia; sig h/o HTN, sepsis, afib, PVC's NSTEMI, HLD. Per chart has h/o esophageal dilation. CXR: bilateral perihilar airspace opacities. Intubated 8/30-9/2  -CJ      Current Method of Nutrition NPO;nasogastric feedings;large-bore  -GA NPO;nasogastric feedings;large-bore  -GA NPO;nasogastric feedings;large-bore  -      Precautions/Limitations, Vision WFL;for purposes of eval  -GA WFL;for purposes of eval  -GA WFL;for purposes of eval  -CJ      Precautions/Limitations, Hearing WFL;for purposes of eval  -GA WFL;for purposes of eval  -GA WFL;for purposes of eval  -CJ      Prior Level of Function-Communication unknown  -GA unknown  -GA unknown  -CJ      Prior Level of Function-Swallowing unknown;esophageal concerns  -GA unknown;esophageal concerns  dilation procedure in hx  -GA unknown;esophageal concerns  -      Plans/Goals Discussed with patient;agreed upon  -GA patient;agreed upon  -GA patient;agreed upon  -      Barriers to Rehab medically complex  -GA medically complex  -GA medically complex  -CJ      Patient's Goals for Discharge return to PO diet  -GA return to PO diet  -GA return to PO diet  -CJ         Pain    Additional Documentation Pain Scale: FACES Pre/Post-Treatment (Group)  -GA Pain Scale: FACES Pre/Post-Treatment (Group)  -GA Pain Scale: FACES Pre/Post-Treatment (Group)  -CJ         Pain Scale: FACES Pre/Post-Treatment    Pain: FACES Scale, Pretreatment 0-->no hurt  -GA 0-->no hurt  -GA 0-->no hurt  -CJ      Posttreatment Pain Rating 0-->no hurt  -GA 0-->no hurt  -GA  0-->no hurt  -         Oral Motor Structure and Function    Dentition Assessment -- natural, present and adequate  -GA natural, present and adequate  -      Secretion Management -- WNL/WFL  -GA problems swallowing secretions  -      Mucosal Quality -- sticky  -GA sticky  -         Oral Musculature and Cranial Nerve Assessment    Oral Motor General Assessment -- vocal impairment  -GA vocal impairment  -      Vocal Impairment, Detail. Cranial Nerve X (Vagus) -- vocal quality abnormality (see comments)  -GA vocal quality abnormality (see comments)  strained  -         General Eating/Swallowing Observations    Respiratory Support Currently in Use nasal cannula  -GA nasal cannula  -GA nasal cannula  -      O2 Liters 2L  -GA 2L  -GA --      Eating/Swallowing Skills -- fed by SLP  -GA fed by SLP  in  wrist restraints  -      Positioning During Eating -- upright in bed  -GA upright in bed  -      Utensils Used -- spoon;cup  -GA spoon;cup;straw  -      Consistencies Trialed -- pureed;ice chips;thin liquids  -GA pureed;ice chips;thin liquids  -         Respiratory    Date of Intubation -- -- 8/30-9/2  -         Clinical Swallow Eval    Oral Prep Phase -- WFL  -GA --      Oral Transit -- WFL  -GA --      Pharyngeal Phase -- suspected pharyngeal impairment  -GA suspected pharyngeal impairment  -      Esophageal Phase -- unremarkable  -GA --      Clinical Swallow Evaluation Summary -- Patient without overt s/sx with ice chips, teaspoon sips, and puree solids. Patient with multiple swallows noted across liquid consistencies. Voice quality clear following PO. Voice intensity strong but continues to be mildly strained. FEES recommended.  -GA Pt w/ decreased vocal intensity, strained in quality. Overt s/s of aspiration w/ thins w/ cough response, multiple swallows w/ puree. Will plan for FEES tomorrow to further assess  -         Pharyngeal Phase Concerns    Pharyngeal Phase Concerns -- multiple  swallows  -GA cough;multiple swallows  -CJ      Multiple Swallows -- thin;other (see comments)  ice chips  -GA --         Fiberoptic Endoscopic Evaluation of Swallowing (FEES)    Risks/Benefits Reviewed risks/benefits explained;patient;agreed to eval  -GA -- --      Nasal Entry right:  -GA -- --      Scope serial number/identification [id number]  -GA -- --         Anatomy and Physiology    Anatomic Considerations erythema;edema;vocal folds;false vocal folds  -GA -- --      Comment NG causing excess secretions between BOT and posterior pharyngeal wall. However laryngea vestibule and pyiform sinus was clear of excess secretions.  -GA -- --      Velopharyngeal WFL  -GA -- --      Base of Tongue symmetrical  -GA -- --      Epiglottis WFL;omega shape  -GA -- --      Laryngeal Function Breathing symmetrical  -GA -- --      Laryngeal Function Phonation symmetrical  -GA -- --      Laryngeal Function to Breath Hold TVF contact;sustained closure  -GA -- --      Secretion Rating Scale (Declan et alTeddy 1996) 1- secretions present around the laryngeal vestibule  -GA -- --      Secretion Description thick;white  -GA -- --      Ice Chips partially cleared secretions  -GA -- --      Spontaneous Swallow frequency adequate  -GA -- --      Sensory sensed scope  -GA -- --      Utensils Used Spoon;Cup  -GA -- --      Consistencies Trialed thin liquids;nectar-thick liquids;pudding/puree  no further trials were administered d/t scope becoming difficult to clear with excess secretions  -GA -- --         FEES Interpretation    Oral Phase prespill of liquids into pharynx  -GA -- --         Initiation of Pharyngeal Swallow    Initiation of Pharyngeal Swallow bolus in pyriform sinuses  -GA -- --      Pharyngeal Phase impaired pharyngeal phase of swallowing  -GA -- --      Penetration Before the Swallow thin liquids;secondary to delayed swallow initiation or mistiming  -GA -- --      Penetration After the Swallow thin liquids;secondary to residue;other  (see comments);in pyriform sinuses  secretions pooling and mixing with secretions from oral cavity and pharyng  -GA -- --      Depth of Penetration deep;shallow  -GA -- --      Response to Penetration Yes  -GA -- --      Responsed to penetration with spontaneous swallow;effective  -GA -- --      Rosenbek's Scale thin:;4-->Level 4  -GA -- --      Residue thin liquids  -GA -- --      Response to Residue cleared residue;with spontaneous subsequent swallow  -GA -- --      Attempted Compensatory Maneuvers bolus size;bolus presentation style;additional subsequent swallow;throat clear after swallow  -GA -- --      Response to Attempted Compensatory Maneuvers reduced residue;prevented aspiration  -GA -- --      Successful Compensatory Maneuver Competency patient able to;demonstrate compensations;with cues  -GA -- --      Pharyngeal Phase, Comment Had difficulty observing puree trials immediately after initial swallow d/t camera quality. Following 2 swallow patient with limtied residue, nothing below VF, and no throat clear present.  -GA -- --      FEES Summary Patient demonstrated shallow penetration before and deep penetration of thin liquids. Suspect swallow function impacted by secretions and large bore NG. Patient demonstrating ability to clear residue and pooling secretions with spontaneous sequential swallows. Also able to produce throat clears when cued to help clear residue and re-swallow. Patient does well following directions and taking single small sips. Recommend patient repeat FEES soon/once large bore NG is removed. Swallow function and visualization will improve. Difficulty seeing puree trails d/t camera quality. However following 2 swallows no evidence of aspiration following the swallow and residue elimiated. Recommend diet of nectar via small cup sips and puree solids. strategies of 2 swallow per bolus and throat clear/re-swallow intermittently. Medication whole or crushed in puree. Puree recommended d/t  scope being unable to clear and not able to visualize solids. Recommend trialing solids at bedside to determine readiness for advancement or following repeat instrumental.  -GA -- --         SLP Evaluation Clinical Impression    SLP Swallowing Diagnosis mild;oral dysphagia;mild-moderate;pharyngeal dysphagia  -GA R/O pharyngeal dysphagia  -GA oral dysphagia;suspected pharyngeal dysphagia  -      Functional Impact risk of aspiration/pneumonia  -GA risk of aspiration/pneumonia  -GA risk of aspiration/pneumonia  -      Rehab Potential/Prognosis, Swallowing adequate, monitor progress closely  -GA -- adequate, monitor progress closely  -      Swallow Criteria for Skilled Therapeutic Interventions Met demonstrates skilled criteria  -GA -- demonstrates skilled criteria  -         Recommendations    Therapy Frequency (Swallow) 5 days per week  -GA -- --      Predicted Duration Therapy Intervention (Days) 1 week  -GA -- --      SLP Diet Recommendation puree  -GA NPO;temporary alternate methods of nutrition/hydration  -GA NPO;temporary alternate methods of nutrition/hydration  until FEES tomorrow  -      Recommended Diagnostics -- FEES  -GA reassess via FEES  9/3  -      Recommended Precautions and Strategies upright posture during/after eating;small bites of food and sips of liquid;multiple swallows per bite of food;multiple swallows per sip of liquid;volitional throat clear;general aspiration precautions  -GA general aspiration precautions  -GA general aspiration precautions  -      Oral Care Recommendations Oral Care BID/PRN;Suction toothbrush  -GA Oral Care BID/PRN;Suction toothbrush  -GA Oral Care BID/PRN;Suction toothbrush  -      SLP Rec. for Method of Medication Administration meds whole;with puree  -GA meds via alternate route  -GA meds via alternate route  -      Monitor for Signs of Aspiration yes;notify SLP if any concerns  -GA yes;notify SLP if any concerns  -GA yes;notify SLP if any concerns   -      Anticipated Discharge Disposition (SLP) inpatient rehabilitation facility  -GA inpatient rehabilitation facility  -GA inpatient rehabilitation facility  -                User Key  (r) = Recorded By, (t) = Taken By, (c) = Cosigned By      Initials Name Effective Dates    Diana Champagne, MS CCC-SLP 06/03/24 -     GA Michael Jennifer, MS CCC-SLP 09/14/23 -                     EDUCATION  The patient has been educated in the following areas:   Dysphagia (Swallowing Impairment) Modified Diet Instruction.        SLP GOALS       Row Name 09/03/24 1020             (LTG) Patient will demonstrate functional swallow for    Diet Texture (Demonstrate functional swallow) regular textures  -GA      Liquid viscosity (Demonstrate functional swallow) thin liquids  -GA      [place] (Demonstrate functional swallow) independently (over 90% accuracy)  -GA      Time Frame (Demonstrate functional swallow) by discharge  -GA      Progress/Outcomes (Demonstrate functional swallow) new goal  -GA         (STG) Patient will tolerate trials of    Consistencies Trialed (Tolerate trials) pureed textures;nectar/ mildly thick liquids  -GA      Desired Outcome (Tolerate trials) without signs/symptoms of aspiration  -GA      [place] (Tolerate trials) with minimal cues (75-90% accuracy)  -GA      Progress/Outcomes (Tolerate trials) new goal  -GA         (STG) Patient will tolerate therapeutic trials of    Consistencies Trialed (Tolerate therapeutic trials) soft to chew (chopped) textures;thin liquids  -GA      Desired Outcome (Tolerate therapeutic trials) without signs/symptoms of aspiration;with use of compensatory strategies (see comments)  throat clear and 2x swallow  -GA      [place] (Tolerate therapeutic trials) with minimal cues (75-90% accuracy)  -GA      Progress/Outcomes (Tolerate therapeutic trials) new goal  -GA         ([facility]) Pharyngeal Strengthening Exercise Goal 1 (SLP)    Activity (Pharyngeal Strengthening  Goal 1, SLP) increase timing;increase superior movement of the hyolaryngeal complex;increase anterior movement of the hyolaryngeal complex;increase closure at entrance to airway/closure of airway at glottis  -GA      Increase Timing prepping - 3 second prep or suck swallow or 3-step swallow  -GA      Increase Superior Movement of the Hyolaryngeal Complex Mendelsohn;falsetto  -GA      Increase Anterior Movement of the Hyolaryngeal Complex hard effortful swallow  -GA      Increase Closure at Entrance to Airway/Closure of Airway at Glottis chin tuck against resistance (CTAR)  -GA      [place]/Accuracy (Pharyngeal Strengthening Goal 1, SLP) with minimal cues (75-90% accuracy)  -GA      Time Frame (Pharyngeal Strengthening Goal 1, SLP) 1 week  -GA      Progress/Outcomes (Pharyngeal Strengthening Goal 1, SLP) new goal  -GA                User Key  (r) = Recorded By, (t) = Taken By, (c) = Cosigned By      Initials Name Provider Type    Jennifer Hidalgo MS CCC-SLP Speech and Language Pathologist                         Time Calculation:    Time Calculation- SLP       Row Name 09/03/24 1315             Time Calculation- SLP    SLP Start Time 0950  -GA      SLP Received On 09/03/24  -GA         Untimed Charges    16019-YB Eval Oral Pharyng Swallow Minutes 3  -GA      59125-NX Fiberoptic Endo Eval Swallow Minutes 6  -GA         Total Minutes    Untimed Charges Total Minutes 9  -GA       Total Minutes 9  -GA                User Key  (r) = Recorded By, (t) = Taken By, (c) = Cosigned By      Initials Name Provider Type    Jennifer Hidalgo MS CCC-SLP Speech and Language Pathologist                    Therapy Charges for Today       Code Description Service Date Service Provider Modifiers Qty    12439395186 HC ST EVAL ORAL PHARYNG SWALLOW 3 9/3/2024 Jennifer Rodriguez MS CCC-SLP GN 1    [id number] HC ST FIBEROPTIC ENDO EVAL SWALL 6 9/3/2024 Jennifer Rodriguez MS CCC-SLP GN 1                 Jennifer Rodriguez MS  CCC-SLP  9/3/2024

## 2024-09-03 NOTE — SIGNIFICANT NOTE
Notified by primary RN that patient had a pause on telemetry that lasted about 2 seconds. Per the RN, the patient has multiple pauses on day shift today. He had been switched from Atenolol to Coreg per Cardiology. Stat EKG obtained and STAT labs ordered.   Suggested that RN notify Cardiology of the patient's pause. Will DC Coreg for now.        Vitals:    09/02/24 1835 09/02/24 1848 09/02/24 1900 09/02/24 2000   BP:  (!) 125/101 123/77 113/67   Pulse: 106 108 111 102   Resp: 20      Temp:       TempSrc:       SpO2: 95%  94% 93%   Weight:       Height:

## 2024-09-03 NOTE — PLAN OF CARE
Goal Outcome Evaluation:  SLP evaluation completed. Will continue to address swallow function, monitor diet tolerance, and re-assess via instrumentation once large bore NG is removed. Please see note for further details and recommendations.    Plan of Care Reviewed With: patient        Progress: improving         Anticipated Discharge Disposition (SLP): inpatient rehabilitation facility          SLP Swallowing Diagnosis: mild, oral dysphagia, mild-moderate, pharyngeal dysphagia (09/03/24 1020)

## 2024-09-03 NOTE — PLAN OF CARE
Goal Outcome Evaluation:  Plan of Care Reviewed With: patient, caregiver        Progress: improving                  Neuro: alert and oriented. Following commands, carrying on understandable conversation and making sense.   Heart: irregular in rate. Heart rate from 0 to low 100's. Pt will be carrying on conversation when this happens. Cardiology aware. Pt remains asymptomatic at this time. Pt rebounds quickly. Pt possibly going for heart cath on Thursday.   Lungs: diminished, nasal cannula 2 Liters. Cough but not productive.   Gi/gu: pureed diet, nectar liquids. Bowel sounds active.   Bedside report given.

## 2024-09-03 NOTE — PROGRESS NOTES
Clinical Nutrition     Patient Name: Bo Perez  YOB: 1945  MRN: [id number]  Date of Encounter: 09/03/24 13:40 EDT  Admission date: 8/30/2024  Reason for Visit: Follow-up protocol, EN    Assessment   Nutrition Assessment   Admission Diagnosis:  Acute respiratory failure with hypoxia and hypercapnia [J96.01, J96.02]    Problem List:    Acute respiratory failure with hypoxia and hypercapnia    HTN (hypertension)    Sepsis due to pneumonia    Continuous tobacco abuse    Persistent atrial fibrillation    Wide-complex tachycardia    NSTEMI (non-ST elevated myocardial infarction)    HFrEF (heart failure with reduced ejection fraction)    Prolonged Q-T interval on ECG      PMH:   He  has a past medical history of A-fib, Arthritis, Hard to intubate, Hypertension, and Sleep apnea.    PSH:  He  has a past surgical history that includes Tonsillectomy (1954); Dental surgery (1962); Hemorrhoid surgery (1973); Finger surgery (Left, 2000); Carpal tunnel release (Right, 2002); Spinal fusion (2007); Mouth surgery (2008); Knee arthroscopy (Left, 2008); Total knee arthroplasty (Left, 2009); Cholecystectomy (2011); Cardiac Ablation (2013); Bladder surgery (04/14/2016); Colonoscopy; and Total knee arthroplasty (Right, 6/22/2023).    Applicable Nutrition History:   (9/3) FEES - SLP Swallowing Diagnosis: mild, oral dysphagia, mild-moderate, pharyngeal dysphagia; SLP Diet Recommendation: JOSÉ MIGUEL jones    Labs    Labs Reviewed: Yes    Results from last 7 days   Lab Units 09/03/24  0342 09/02/24  2111 09/02/24  1439 09/02/24  0519 09/01/24  0519 08/31/24  0430 08/30/24  1211 08/30/24  0401 08/30/24  0401 08/30/24  0325   GLUCOSE mg/dL 120* 129*  --  144* 117*   < > 130*   < > 224*  --    BUN mg/dL 28* 22  --  19 21   < > 21   < > 19  --    CREATININE mg/dL 0.81 0.66*  --  0.64* 0.71*   < > 1.22   < > 1.00  --    SODIUM mmol/L 141 133*  --  138 136   < > 142   < > 141  --    CHLORIDE mmol/L 108* 99  --  105 104    < > 110*   < > 105  --    POTASSIUM mmol/L 4.1  4.1 3.2*  --  3.9 4.0   < > 4.1   < > 3.7  --    PHOSPHORUS mg/dL 2.4* 1.8* 1.4* 1.5* 2.1*  --   --    < > 5.6*  --    MAGNESIUM mg/dL 2.4  --   --  2.1 2.3   < >  --   --  2.2  --    ALT (SGPT) U/L  --   --   --  15  --   --  14  --  14  --    LACTATE mmol/L  --   --   --   --   --   --   --   --   --  7.3*    < > = values in this interval not displayed.       Results from last 7 days   Lab Units 09/02/24  0519 08/30/24  1211 08/30/24  0401   ALBUMIN g/dL 2.9* 3.0* 3.6   PREALBUMIN mg/dL 10.9*  --   --    CRP mg/dL 8.26*  --  0.81*   CHOLESTEROL mg/dL 141  --   --    TRIGLYCERIDES mg/dL 130  --   --        Results from last 7 days   Lab Units 09/03/24  1135 09/03/24  0548 09/02/24  2338 09/02/24  1839 09/02/24  1140 09/02/24  0519   GLUCOSE mg/dL 112 138* 115 119 155* 133*     Lab Results   Lab Value Date/Time    HGBA1C 5.00 08/30/2024 0325    HGBA1C 5.00 06/09/2023 1343         Results from last 7 days   Lab Units 08/30/24  0401   PROBNP pg/mL 1,061.0       Medications    Medications Reviewed: yes    Scheduled Meds:apixaban, 5 mg, Nasogastric, Q12H  [Held by provider] ARIPiprazole, 5 mg, Nasogastric, Daily  gabapentin, 800 mg, Nasogastric, Q8H  insulin regular, 2-7 Units, Subcutaneous, Q6H  ipratropium-albuterol, 3 mL, Nebulization, Q6H - RT  ketorolac, 1 drop, Right Eye, 4x Daily  moxifloxacin, 1 drop, Right Eye, 4x Daily  pantoprazole, 40 mg, Intravenous, Q24H  prednisoLONE acetate, 1 drop, Right Eye, 4x Daily  sacubitril-valsartan, 1 tablet, Nasogastric, Q12H  sodium chloride, 10 mL, Intravenous, Q12H      Continuous Infusions:norepinephrine, 0.02-0.3 mcg/kg/min, Last Rate: 0.04 mcg/kg/min (09/03/24 0539)      PRN Meds:.  acetaminophen **OR** acetaminophen    Calcium Replacement - Follow Nurse / BPA Driven Protocol    dextrose    fentaNYL    glucagon (human recombinant)    ipratropium-albuterol    Magnesium Cardiology Dose Replacement - Follow Nurse / BPA  "Driven Protocol    nitroglycerin    ondansetron    oxyCODONE-acetaminophen    Phosphorus Replacement - Follow Nurse / BPA Driven Protocol    Potassium Replacement - Follow Nurse / BPA Driven Protocol    sodium chloride    sodium chloride    tiZANidine    Intake/Ouptut 24 hrs (0701 - 0700)   I&O's Reviewed: yes  No documented BM     Anthropometrics     Height: Height: 185 cm (72.84\")  Last Filed Weight: Weight:  (unable to weight d/t bed scale not being zeroed) (09/01/24 0530)  Method: Weight Method: Bed scale  BMI: BMI (Calculated): 46.7    UBW: UTD   Weight change:  ? Weight gain per documented weight     Unable to obtain bed weight at time of visit; patient on speciality bed   Nutrition Focused Physical Exam     Date: 9/2    Pt does not meet criteria for malnutrition diagnosis, at this time.      Subjective   Reported/Observed/Food/Nutrition Related History:     9/3  Pt was extubated yesterday and remains so, passed FEES. Discussed with RN, pt ate 75% first meal and demonstrated ability to eat without difficulty, NG pulled.     9/2  Pt remains intubated/sedated, tolerating EN advancing to goal. Phos critically low. No BM yet.     9/1  NPO x 2 days, remains intubated. Start tube feeding per intensivisit note yesterday.  Discussed with RN, reports no plan for extubation today.  Has NGT.  RN reports wife indicated patient with history of esophageal dilation, food gets stuck sometimes.  Patient will more likely need SLP eval post extubation.    No family at bedside at time of visit.     Needs Assessment   Date: 9/1    Height used:Height: 185 cm (72.84\")  Weights used: 352lb/160kg; IBW 184lb/84kg     Estimated Calorie needs: ~  2250Kcal/day (goal)   Method:  Kcals/KG 14= 2240kcal; 25kcals IBW = 2100kcals   Method:  MSJ = 2371kcal  Method:  PSU (ve 8.01, tmax 37) = 2256    Estimated Protein needs: ~  201g PRO/day  Method: 2.0-2.5g/Kg IBW = 168- 210g; 1.0-1.2 g/kg CBW = 160-192g    Estimated Fluid needs: ~ ml/day   Per " clinical status    Current Nutrition Prescription     PO: Diet: Cardiac; Healthy Heart (2-3 Na+); Texture: Pureed (NDD 1); Fluid Consistency: [place] Thick  Oral Nutrition Supplement:  Intake: Insufficient data 75% X 1 meal per RN    EN: Peptamen Intense VHP - previous order to d/c today 9/3 as NG removed  Goal Rate: 110 ml/hr  Water Flushes: 10 ml/hr  Modular: None  Route: NG  Tube: Large bore    At goal over: 20Hrs/day     Rx will supply:   Goal Volume 2200 mL/day     Flush Volume 200 mL/day     Energy 2200 Kcal/day 98 % Est Need   Protein 205 g/day 102 % Est Need   Fiber 9 g/day     Water in  EN 1848 mL     Total Water 2048 mL     Meet DRI Yes        --------------------------------------------------------------------------  Product/Rate verified at bedside: Yes  Infusing Rate at time of visit: 45 ml/hr    Average Delivery from [place] Day: (while advancing)  Volume 919 mL/day   % Goal Vol.   Flush Volume 237 mL/day     Energy  Kcal/day  % Est Need   Protein  g/day  % Est Need   Fiber  g/day     Water in  EN  mL     Total Water  mL     Meet DRI Yes        Assessment & Plan   Nutrition Diagnosis   Date:  Updated: , 9/3  Problem Inadequate energy intake    Etiology Intubated    Signs/Symptoms NPO    Status: Discontinued Extubated and now with dysphagia diet, 75% X first meal    Date:  9/3 Updated:  Problem Swallowing difficulty   Etiology mild, oral dysphagia, mild-moderate, pharyngeal dysphagia    Signs/Symptoms Pureed, NTL diet per SLP/FEES 9/3   Status:    Goal:   Nutrition to support treatment, Maintain intake, Advance diet as medically feasible/appropriate, and EN to PO    Nutrition Intervention      Follow treatment progress, Care plan reviewed    Pt passed FEES and given Pureed/NTL diet.   Ate well at first meal and NG was removed.  Encourage PO, assist as needed.   Will follow, please consult if needed.     Monitoring/Evaluation:   Per protocol, I&O, Pertinent labs, EN delivery/tolerance, Symptoms,  POC/GOC, Swallow function    Toma Carcamo RD, CNSC  Time Spent: 20m

## 2024-09-03 NOTE — PROGRESS NOTES
Cardiology Progress Note  Primary cardiologist: Dr. Galvez    Reason for visit:    NSTEMI type I versus type II  Paroxysmal atrial fibrillation  Frequent PVCs    IDENTIFICATION: 81-year-old gentleman who resides in [place], [place]    Active Hospital Problems    Diagnosis  POA    **Acute respiratory failure with hypoxia and hypercapnia [J96.01, J96.02]  Yes     Priority: High    Persistent atrial fibrillation [I48.19]  Yes     Priority: High     Followed by Dr. Bear with [facility]  General cardiologist Dr. Galvez  Echocardiogram reportedly done 1 month ago normal per family to reevaluate ascending aortic aneurysm which was stable  Chads Vasc= 3 (age >75, CHF)      Wide-complex tachycardia [R00.0]  Yes     Priority: High    NSTEMI (non-ST elevated myocardial infarction) [I21.4]  Yes     Priority: High     Reportedly normal coronaries on cath 2020.  Data deficit.  Elevated troponins in setting of respiratory distress      Prolonged Q-T interval on ECG [R94.31]  Yes     Priority: High     Prolonged QTc on dofetilide, POA      Sepsis due to pneumonia [J18.9, A41.9]  Yes     Priority: High    HTN (hypertension) [I10]  Yes     Priority: Medium    Continuous tobacco abuse [Z72.0]  Yes     Priority: Low    PVC's (premature ventricular contractions) [I49.3]  Yes     Reportedly normal coronaries on cath 2020  Monitor summer 2024, data deficit: 27% PVC burden, 89 pauses longest 6.5 seconds-all pauses occurred during sleeping hours       HFrEF (heart failure with reduced ejection fraction) [I50.20]  Yes     Echo (8/31/2024): LVEF 30%              Patient was extubated yesterday.  He is lying in bed on O2 at 2 L by nasal cannula.  He is alert and oriented.  He denies any chest pain.  He had multiple brief pulses on telemetry during the night and on-call cardiology was contacted and his carvedilol was discontinued.  Tikosyn remains on hold.  He looks to be in A-fib with frequent PVCs on telemetry           Vital  Sign Min/Max for last 24 hours  Temp  Min: 98.2 °F (36.8 °C)  Max: 99.5 °F (37.5 °C)   BP  Min: 81/49  Max: 145/86   Pulse  Min: 64  Max: 111   Resp  Min: 20  Max: 27   SpO2  Min: 92 %  Max: 99 %   Flow (L/min)  Min: 2  Max: 4      Intake/Output Summary (Last 24 hours) at 9/3/2024 0752  Last data filed at 9/3/2024 0419  Gross per 24 hour   Intake 2020.9 ml   Output 2375 ml   Net -354.1 ml           Physical Exam  Constitutional:       General: He is awake.   Pulmonary:      Effort: Pulmonary effort is normal.      Breath sounds: Normal breath sounds.   Musculoskeletal:      Right lower le+ Pitting Edema present.      Left lower le+ Pitting Edema present.   Skin:     General: Skin is warm and dry.   Neurological:      Mental Status: He is alert.   Psychiatric:         Behavior: Behavior is cooperative.         Tele: Sinus rhythm with PVCs     Results Review (reviewed the patient's recent labs in the electronic medical record):      EKG (2024): Wide QRS rhythm with PVCs in bigeminal pattern     CXR (2024): No significant change.  Similar trace bilateral pleural effusions with bibasilar airspace disease left greater than right     ECHO (2024): LVEF 30%.  RVSP 32 mmHg    Results from last 7 days   Lab Units 24  0342 24  0519 24  0519 24  0430 24  1211 24  0401   SODIUM mmol/L 141 133* 138 136 138 142 141   POTASSIUM mmol/L 4.1  4.1 3.2* 3.9 4.0 3.9 4.1 3.7   CHLORIDE mmol/L 108* 99 105 104 105 110* 105   BUN mg/dL 28* 22 19 21 23 21 19   CREATININE mg/dL 0.81 0.66* 0.64* 0.71* 0.92 1.22 1.00   MAGNESIUM mg/dL 2.4  --  2.1 2.3 2.1  --  2.2     Results from last 7 days   Lab Units 247 24  0430   HSTROP T ng/L 229* 227* 278*     Results from last 7 days   Lab Units 24  0342 24  0519 24  0519   WBC 10*3/mm3 8.22 10.89* 10.07   HEMOGLOBIN g/dL 10.4* 11.9* 11.0*   HEMATOCRIT % 32.7* 35.2* 33.9*    PLATELETS 10*3/mm3 135* 130* 111*       Lab Results   Component Value Date    HGBA1C 5.00 08/30/2024       Lab Results   Component Value Date    CHOL 141 09/02/2024    TRIG 130 09/02/2024              NSTEMI, probable type II  Reportedly normal coronaries on cath 2020 but data deficit  Troponins elevated 88, 138 and 519  Echo this admission shows reduced LVEF 30%  Eventual ischemic evaluation     Persistent atrial fibrillation  Established with Dr. Bear with [facility] in clinic  Eliquis 5 mg twice daily  Dofetilide discontinued due to prolonged QTc  On telemetry appears to be atrial fibrillation with frequent PVCs     Frequent PVCs  Monitor in summer 2024 reportedly 27% PVC burden and 89 pauses with the longest of 6.5 seconds all during sleeping hours  Reportedly wide-complex tachycardia while in ED that was concerning for real VT rather than aberrancy that was felt to be due secondary to have acute hypoxia.  No further reoccurrences  Frequent PVCs and wide-complex QRS     HFrEF  Echo this admission LVEF 30%  Carvedilol was discontinued due to pauses on 9/2  Entresto 24-26 mg twice daily  Treated with IV Lasix on 9/2     Hypertension  Current /86  Entresto 24/26 mg 1 tablet twice daily      Pauses on telemetry  Reportedly multiple pauses noted on telemetry and carvedilol discontinued 9/2       Give 60 mg IV Lasix x 1 for lower extremity edema  Continue Eliquis for stroke prophylaxis  Continue Entresto but will hold beta-blocker for now due to pauses noted on telemetry  Will have EP to evaluate today  Will plan for eventual ischemic evaluation    Electronically signed by RAKEL Alcantar, 09/03/24, 7:52 AM EDT.

## 2024-09-03 NOTE — CASE MANAGEMENT/SOCIAL WORK
Continued Stay Note  [facility]     Patient Name: Bo Perez  MRN: [id number]  Today's Date: 9/3/2024    Admit Date: 8/30/2024    Plan: TBD   Discharge Plan       Row Name 09/03/24 1054       Plan    Plan TBD    Plan Comments Discussed patient in MDR.  Patient to have FEES today.  Cardiology following; EP to evaluate today.  Discharge plan is to be determined.   will continue to follow.                   Discharge Codes    No documentation.                       Marizol Dowling RN

## 2024-09-03 NOTE — PROGRESS NOTES
Intensive Care Follow-up     Hospital:  LOS: 4 days   Mr. Bo Perez, 79 y.o. male is followed for:   Acute respiratory failure with hypoxia and hypercapnia        Subjective     Mr. Perez is a 78yo M with a history of Afib on Tikosyn and 3pk/day smoker with a recent cataract surgery who presented to the [facility] ED on 8/30/24 with respiratory distress with hypoxia and required intubation. He was hypotensive and was started on Levophed.     Interval History:  The chart has been reviewed.  The patient was extubated yesterday.  He does have some garbled speech today but is easily understood.  It has been reported that he is having a lot of ectopy as well as nonsustained ventricular tachycardia.  Norepinephrine was turned off this morning.  He also is having some sinus pauses occasionally.  He is in the process of being diuresed.    The patient's past medical, surgical and social history were reviewed and updated in Epic as appropriate.        Objective     Infusions:  norepinephrine, 0.02-0.3 mcg/kg/min, Last Rate: 0.04 mcg/kg/min (09/03/24 0539)      Medications:  apixaban, 5 mg, Nasogastric, Q12H  [Held by provider] ARIPiprazole, 5 mg, Nasogastric, Daily  gabapentin, 800 mg, Nasogastric, Q8H  insulin regular, 2-7 Units, Subcutaneous, Q6H  ipratropium-albuterol, 3 mL, Nebulization, Q6H - RT  ketorolac, 1 drop, Right Eye, 4x Daily  moxifloxacin, 1 drop, Right Eye, 4x Daily  pantoprazole, 40 mg, Intravenous, Q24H  prednisoLONE acetate, 1 drop, Right Eye, 4x Daily  sacubitril-valsartan, 1 tablet, Nasogastric, Q12H  sodium chloride, 10 mL, Intravenous, Q12H        Vital Sign Min/Max for last 24 hours  Temp  Min: 98.2 °F (36.8 °C)  Max: 99.5 °F (37.5 °C)   BP  Min: 81/49  Max: 145/86   Pulse  Min: 47  Max: 111   Resp  Min: 20  Max: 27   SpO2  Min: 92 %  Max: 100 %   Flow (L/min)  Min: 2  Max: 4       Input/Output for last 24 hour shift  09/02 0701 - 09/03 0700  In: 2020.9 [I.V.:429.6]  Out: 2375 [Urine:2375]   FiO2 (%):   "[35 %] 35 %  PEEP/CPAP (cm H2O):  [5 cm H20] 5 cm H20  ME SUP:  [0 cm H20] 0 cm H20  MAP (cm H2O):  [6.6] 6.6  Objective:  General Appearance:  Uncomfortable, ill-appearing and in no acute distress.    Vital signs: (most recent): Blood pressure 117/65, pulse (!) 47, temperature 98.8 °F (37.1 °C), temperature source Bladder, resp. rate 26, height 185 cm (72.84\"), weight (!) 160 kg (352 lb 11.8 oz), SpO2 94%.    HEENT: (NG tube in place)    Lungs:  Normal effort.  There are decreased breath sounds.  No rales, wheezes or rhonchi.    Heart: Bradycardia.  Regular rhythm.  S1 normal and S2 normal.  No murmur.   Chest: Symmetric chest wall expansion.   Abdomen: Bowel sounds are normal.   There is no abdominal tenderness.     Extremities: Normal range of motion.    Neurological: (Lethargic but easily arousable.  Answers most questions appropriately.  Speech is garbled.).    Pupils:  Pupils are equal, round, and reactive to light.  Pupils are equal.   Skin:  Warm.                Results from last 7 days   Lab Units 09/03/24  0342 09/02/24  0519 09/01/24  0519   WBC 10*3/mm3 8.22 10.89* 10.07   HEMOGLOBIN g/dL 10.4* 11.9* 11.0*   PLATELETS 10*3/mm3 135* 130* 111*     Results from last 7 days   Lab Units 09/03/24  0342 09/02/24  2111 09/02/24  1439 09/02/24  0519 09/01/24  0519   SODIUM mmol/L 141 133*  --  138 136   POTASSIUM mmol/L 4.1  4.1 3.2*  --  3.9 4.0   CO2 mmol/L 27.0 27.0  --  23.0 25.0   BUN mg/dL 28* 22  --  19 21   CREATININE mg/dL 0.81 0.66*  --  0.64* 0.71*   MAGNESIUM mg/dL 2.4  --   --  2.1 2.3   PHOSPHORUS mg/dL 2.4* 1.8* 1.4* 1.5* 2.1*   GLUCOSE mg/dL 120* 129*  --  144* 117*     Estimated Creatinine Clearance: 119.1 mL/min (by C-G formula based on SCr of 0.81 mg/dL).    Results from last 7 days   Lab Units 09/03/24  0557   PH, ARTERIAL pH units 7.407   PCO2, ARTERIAL mm Hg 46.0*   PO2 ART mm Hg 86.0         I reviewed the patient's results and images.     Assessment & Plan   Impression        Acute " respiratory failure with hypoxia and hypercapnia    HTN (hypertension)    Sepsis due to pneumonia    Continuous tobacco abuse    Persistent atrial fibrillation    Wide-complex tachycardia    NSTEMI (non-ST elevated myocardial infarction)    HFrEF (heart failure with reduced ejection fraction)    Prolonged Q-T interval on ECG       Plan        Electrophysiology to see for arrhythmias.  Continue with support for respiratory failure.  Tube feedings for now.  Speech therapy to see for possible dysphagia.  He will need smoking cessation education.  Hold further microbials for now as there does not appear to be evidence of pneumonia at this time.  Blood pressure control as before.  He will remain in the intensive care unit secondary to his very high risk of worsening due to cardiac instability.    Plan of care and goals reviewed with mulitdisciplinary/antibiotic stewardship team during rounds.   I discussed the patient's findings and my recommendations with patient and nursing staff     High level of risk due to:  decision to de-escalate care.        Herrera Gardner MD, [facility]  Pulmonology and Critical Care Medicine

## 2024-09-04 LAB
ANION GAP SERPL CALCULATED.3IONS-SCNC: 10 MMOL/L (ref 5–15)
BACTERIA SPEC AEROBE CULT: NORMAL
BACTERIA SPEC AEROBE CULT: NORMAL
BASOPHILS # BLD AUTO: 0.05 10*3/MM3 (ref 0–0.2)
BASOPHILS NFR BLD AUTO: 0.7 % (ref 0–1.5)
BUN SERPL-MCNC: 32 MG/DL (ref 8–23)
BUN/CREAT SERPL: 50 (ref 7–25)
CALCIUM SPEC-SCNC: 8.1 MG/DL (ref 8.6–10.5)
CHLORIDE SERPL-SCNC: 102 MMOL/L (ref 98–107)
CO2 SERPL-SCNC: 25 MMOL/L (ref 22–29)
CREAT SERPL-MCNC: 0.64 MG/DL (ref 0.76–1.27)
DEPRECATED RDW RBC AUTO: 56.1 FL (ref 37–54)
EGFRCR SERPLBLD CKD-EPI 2021: 96.9 ML/MIN/1.73
EOSINOPHIL # BLD AUTO: 0.33 10*3/MM3 (ref 0–0.4)
EOSINOPHIL NFR BLD AUTO: 4.3 % (ref 0.3–6.2)
ERYTHROCYTE [DISTWIDTH] IN BLOOD BY AUTOMATED COUNT: 15.7 % (ref 12.3–15.4)
GLUCOSE BLDC GLUCOMTR-MCNC: 101 MG/DL (ref 70–130)
GLUCOSE BLDC GLUCOMTR-MCNC: 103 MG/DL (ref 70–130)
GLUCOSE BLDC GLUCOMTR-MCNC: 106 MG/DL (ref 70–130)
GLUCOSE BLDC GLUCOMTR-MCNC: 115 MG/DL (ref 70–130)
GLUCOSE SERPL-MCNC: 100 MG/DL (ref 65–99)
HCT VFR BLD AUTO: 31.9 % (ref 37.5–51)
HGB BLD-MCNC: 10.3 G/DL (ref 13–17.7)
IMM GRANULOCYTES # BLD AUTO: 0.07 10*3/MM3 (ref 0–0.05)
IMM GRANULOCYTES NFR BLD AUTO: 0.9 % (ref 0–0.5)
LYMPHOCYTES # BLD AUTO: 1.48 10*3/MM3 (ref 0.7–3.1)
LYMPHOCYTES NFR BLD AUTO: 19.3 % (ref 19.6–45.3)
MAGNESIUM SERPL-MCNC: 2.1 MG/DL (ref 1.6–2.4)
MCH RBC QN AUTO: 31.8 PG (ref 26.6–33)
MCHC RBC AUTO-ENTMCNC: 32.3 G/DL (ref 31.5–35.7)
MCV RBC AUTO: 98.5 FL (ref 79–97)
MONOCYTES # BLD AUTO: 0.48 10*3/MM3 (ref 0.1–0.9)
MONOCYTES NFR BLD AUTO: 6.3 % (ref 5–12)
NEUTROPHILS NFR BLD AUTO: 5.24 10*3/MM3 (ref 1.7–7)
NEUTROPHILS NFR BLD AUTO: 68.5 % (ref 42.7–76)
NRBC BLD AUTO-RTO: 0 /100 WBC (ref 0–0.2)
PLATELET # BLD AUTO: 123 10*3/MM3 (ref 140–450)
PMV BLD AUTO: 9.3 FL (ref 6–12)
POTASSIUM SERPL-SCNC: 3.9 MMOL/L (ref 3.5–5.2)
RBC # BLD AUTO: 3.24 10*6/MM3 (ref 4.14–5.8)
SODIUM SERPL-SCNC: 137 MMOL/L (ref 136–145)
WBC NRBC COR # BLD AUTO: 7.65 10*3/MM3 (ref 3.4–10.8)

## 2024-09-04 PROCEDURE — 85025 COMPLETE CBC W/AUTO DIFF WBC: CPT | Performed by: INTERNAL MEDICINE

## 2024-09-04 PROCEDURE — 97530 THERAPEUTIC ACTIVITIES: CPT

## 2024-09-04 PROCEDURE — 25010000002 FUROSEMIDE PER 20 MG: Performed by: NURSE PRACTITIONER

## 2024-09-04 PROCEDURE — 97166 OT EVAL MOD COMPLEX 45 MIN: CPT

## 2024-09-04 PROCEDURE — 97162 PT EVAL MOD COMPLEX 30 MIN: CPT

## 2024-09-04 PROCEDURE — 82948 REAGENT STRIP/BLOOD GLUCOSE: CPT

## 2024-09-04 PROCEDURE — 94799 UNLISTED PULMONARY SVC/PX: CPT

## 2024-09-04 PROCEDURE — 94664 DEMO&/EVAL PT USE INHALER: CPT

## 2024-09-04 PROCEDURE — 93005 ELECTROCARDIOGRAM TRACING: CPT | Performed by: NURSE PRACTITIONER

## 2024-09-04 PROCEDURE — 94761 N-INVAS EAR/PLS OXIMETRY MLT: CPT

## 2024-09-04 PROCEDURE — 83735 ASSAY OF MAGNESIUM: CPT | Performed by: INTERNAL MEDICINE

## 2024-09-04 PROCEDURE — 93010 ELECTROCARDIOGRAM REPORT: CPT | Performed by: INTERNAL MEDICINE

## 2024-09-04 PROCEDURE — 80048 BASIC METABOLIC PNL TOTAL CA: CPT | Performed by: INTERNAL MEDICINE

## 2024-09-04 PROCEDURE — 99232 SBSQ HOSP IP/OBS MODERATE 35: CPT | Performed by: STUDENT IN AN ORGANIZED HEALTH CARE EDUCATION/TRAINING PROGRAM

## 2024-09-04 PROCEDURE — 99232 SBSQ HOSP IP/OBS MODERATE 35: CPT | Performed by: INTERNAL MEDICINE

## 2024-09-04 RX ORDER — KETOROLAC TROMETHAMINE 5 MG/ML
1 SOLUTION OPHTHALMIC 2 TIMES DAILY
Status: DISCONTINUED | OUTPATIENT
Start: 2024-09-11 | End: 2024-09-10 | Stop reason: HOSPADM

## 2024-09-04 RX ORDER — PREDNISOLONE ACETATE 10 MG/ML
1 SUSPENSION/ DROPS OPHTHALMIC 3 TIMES DAILY
Status: DISCONTINUED | OUTPATIENT
Start: 2024-09-04 | End: 2024-09-10 | Stop reason: HOSPADM

## 2024-09-04 RX ORDER — KETOROLAC TROMETHAMINE 5 MG/ML
1 SOLUTION OPHTHALMIC DAILY
Status: DISCONTINUED | OUTPATIENT
Start: 2024-09-19 | End: 2024-09-10 | Stop reason: HOSPADM

## 2024-09-04 RX ORDER — PREDNISOLONE ACETATE 10 MG/ML
1 SUSPENSION/ DROPS OPHTHALMIC 2 TIMES DAILY
Status: DISCONTINUED | OUTPATIENT
Start: 2024-09-11 | End: 2024-09-10 | Stop reason: HOSPADM

## 2024-09-04 RX ORDER — KETOROLAC TROMETHAMINE 5 MG/ML
1 SOLUTION OPHTHALMIC 3 TIMES DAILY
Status: DISCONTINUED | OUTPATIENT
Start: 2024-09-04 | End: 2024-09-10 | Stop reason: HOSPADM

## 2024-09-04 RX ORDER — FUROSEMIDE 10 MG/ML
80 INJECTION INTRAMUSCULAR; INTRAVENOUS ONCE
Status: DISCONTINUED | OUTPATIENT
Start: 2024-09-04 | End: 2024-09-04

## 2024-09-04 RX ORDER — CARVEDILOL 3.12 MG/1
3.12 TABLET ORAL 2 TIMES DAILY WITH MEALS
Status: DISCONTINUED | OUTPATIENT
Start: 2024-09-04 | End: 2024-09-04

## 2024-09-04 RX ORDER — FUROSEMIDE 10 MG/ML
80 INJECTION INTRAMUSCULAR; INTRAVENOUS
Status: DISCONTINUED | OUTPATIENT
Start: 2024-09-04 | End: 2024-09-05

## 2024-09-04 RX ORDER — PREDNISOLONE ACETATE 10 MG/ML
1 SUSPENSION/ DROPS OPHTHALMIC DAILY
Status: DISCONTINUED | OUTPATIENT
Start: 2024-09-19 | End: 2024-09-10 | Stop reason: HOSPADM

## 2024-09-04 RX ADMIN — PREDNISOLONE ACETATE 1 DROP: 10 SUSPENSION/ DROPS OPHTHALMIC at 07:46

## 2024-09-04 RX ADMIN — KETOROLAC TROMETHAMINE 1 DROP: 5 SOLUTION/ DROPS OPHTHALMIC at 20:16

## 2024-09-04 RX ADMIN — IPRATROPIUM BROMIDE AND ALBUTEROL SULFATE 3 ML: 2.5; .5 SOLUTION RESPIRATORY (INHALATION) at 00:36

## 2024-09-04 RX ADMIN — GABAPENTIN 800 MG: 400 CAPSULE ORAL at 14:37

## 2024-09-04 RX ADMIN — PREDNISOLONE ACETATE 1 DROP: 10 SUSPENSION/ DROPS OPHTHALMIC at 14:38

## 2024-09-04 RX ADMIN — IPRATROPIUM BROMIDE AND ALBUTEROL SULFATE 3 ML: 2.5; .5 SOLUTION RESPIRATORY (INHALATION) at 07:28

## 2024-09-04 RX ADMIN — KETOROLAC TROMETHAMINE 1 DROP: 5 SOLUTION/ DROPS OPHTHALMIC at 14:37

## 2024-09-04 RX ADMIN — OXYCODONE AND ACETAMINOPHEN 1 TABLET: 10; 325 TABLET ORAL at 03:59

## 2024-09-04 RX ADMIN — OXYCODONE AND ACETAMINOPHEN 1 TABLET: 10; 325 TABLET ORAL at 10:14

## 2024-09-04 RX ADMIN — SACUBITRIL AND VALSARTAN 1 TABLET: 49; 51 TABLET, FILM COATED ORAL at 20:16

## 2024-09-04 RX ADMIN — MOXIFLOXACIN OPHTHALMIC 0.05 ML: 5 SOLUTION/ DROPS OPHTHALMIC at 07:46

## 2024-09-04 RX ADMIN — GABAPENTIN 800 MG: 400 CAPSULE ORAL at 21:55

## 2024-09-04 RX ADMIN — GABAPENTIN 800 MG: 400 CAPSULE ORAL at 05:51

## 2024-09-04 RX ADMIN — IPRATROPIUM BROMIDE AND ALBUTEROL SULFATE 3 ML: 2.5; .5 SOLUTION RESPIRATORY (INHALATION) at 13:08

## 2024-09-04 RX ADMIN — PANTOPRAZOLE SODIUM 40 MG: 40 TABLET, DELAYED RELEASE ORAL at 05:50

## 2024-09-04 RX ADMIN — OXYCODONE AND ACETAMINOPHEN 1 TABLET: 10; 325 TABLET ORAL at 20:21

## 2024-09-04 RX ADMIN — KETOROLAC TROMETHAMINE 1 DROP: 5 SOLUTION/ DROPS OPHTHALMIC at 07:46

## 2024-09-04 RX ADMIN — IPRATROPIUM BROMIDE AND ALBUTEROL SULFATE 3 ML: 2.5; .5 SOLUTION RESPIRATORY (INHALATION) at 19:31

## 2024-09-04 RX ADMIN — PREDNISOLONE ACETATE 1 DROP: 10 SUSPENSION/ DROPS OPHTHALMIC at 20:16

## 2024-09-04 RX ADMIN — FUROSEMIDE 80 MG: 10 INJECTION, SOLUTION INTRAMUSCULAR; INTRAVENOUS at 10:04

## 2024-09-04 RX ADMIN — FUROSEMIDE 80 MG: 10 INJECTION, SOLUTION INTRAMUSCULAR; INTRAVENOUS at 18:32

## 2024-09-04 RX ADMIN — Medication 10 ML: at 20:30

## 2024-09-04 RX ADMIN — APIXABAN 5 MG: 5 TABLET, FILM COATED ORAL at 20:16

## 2024-09-04 RX ADMIN — TIZANIDINE 4 MG: 4 TABLET ORAL at 01:09

## 2024-09-04 RX ADMIN — Medication 10 ML: at 10:15

## 2024-09-04 RX ADMIN — SACUBITRIL AND VALSARTAN 1 TABLET: 24; 26 TABLET, FILM COATED ORAL at 10:04

## 2024-09-04 RX ADMIN — APIXABAN 5 MG: 5 TABLET, FILM COATED ORAL at 10:04

## 2024-09-04 NOTE — PLAN OF CARE
Goal Outcome Evaluation:  Plan of Care Reviewed With: patient        Progress: no change  Outcome Evaluation: Pt presents to OT evaluation w/ deficits in activity tolerance, balance, generalized weakness and self-care. Pt would benefit from IPOT services to address deficits in order to progress towards PLOF. d/c rec is IPR.      Anticipated Discharge Disposition (OT): inpatient rehabilitation facility

## 2024-09-04 NOTE — PROGRESS NOTES
"  [facility]  CARDIAC EP PROGRESS NOTE    Date of Admission: 8/30/2024  Date of Service: 09/04/24    Primary Care Physician: Srinivasan Cary MD    Chief Complaint: Afib, NSVT, bradycardia      Subjective      HPI: Extubated yesterday. Patient A/o x4. He is in sinus rhythm on telemetry. He denies any chest pain, palpitations or shortness of breath on 2 L/min. NO further NSVT since yesterday AM but still with common PVCs      Objective   Vitals: BP (!) 139/105   Pulse 88   Temp 98.4 °F (36.9 °C) (Bladder)   Resp 20   Ht 185 cm (72.84\")   Wt (!) 160 kg (352 lb 11.8 oz)   SpO2 97%   BMI 46.75 kg/m²     Physical Exam:   GENERAL: Alert, cooperative, in no acute distress.   HEART: Regular rate and rhythm; no murmurs, rubs or gallops  LUNGS: Normal breath sounds. Nonlabored breathing    Results:  Results from last 7 days   Lab Units 09/04/24  0320 09/03/24  0342 09/02/24  0519   WBC 10*3/mm3 7.65 8.22 10.89*   HEMOGLOBIN g/dL 10.3* 10.4* 11.9*   HEMATOCRIT % 31.9* 32.7* 35.2*   PLATELETS 10*3/mm3 123* 135* 130*     Results from last 7 days   Lab Units 09/04/24  0320 09/03/24  0342 09/02/24  2111   SODIUM mmol/L 137 141 133*   POTASSIUM mmol/L 3.9 4.1  4.1 3.2*   CHLORIDE mmol/L 102 108* 99   CO2 mmol/L 25.0 27.0 27.0   BUN mg/dL 32* 28* 22   CREATININE mg/dL 0.64* 0.81 0.66*   GLUCOSE mg/dL 100* 120* 129*      Lab Results   Component Value Date    CHOL 141 09/02/2024    TRIG 130 09/02/2024    AST 39 09/02/2024    ALT 15 09/02/2024     Results from last 7 days   Lab Units 08/30/24  0325   HEMOGLOBIN A1C % 5.00     Results from last 7 days   Lab Units 09/02/24  0519   CHOLESTEROL mg/dL 141   TRIGLYCERIDES mg/dL 130     Results from last 7 days   Lab Units 08/30/24  0401   TSH uIU/mL 2.900  2.900         Results from last 7 days   Lab Units 08/30/24  0325   PROTIME Seconds 14.0   INR  1.07   APTT seconds 25.0*     Results from last 7 days   Lab Units 09/02/24  2327 " 09/02/24  2111 08/31/24  0430 08/30/24  0634 08/30/24  0401   CK TOTAL U/L  --   --   --   --  71   HSTROP T ng/L 229* 227* 278*   < > 88*    < > = values in this interval not displayed.     Results from last 7 days   Lab Units 08/30/24  0401   PROBNP pg/mL 1,061.0         Intake/Output Summary (Last 24 hours) at 9/4/2024 0753  Last data filed at 9/4/2024 0750  Gross per 24 hour   Intake 967.94 ml   Output 1900 ml   Net -932.06 ml       I personally reviewed the patient's EKG/Telemetry data    Radiology Data:   XR Chest 1 View    Result Date: 9/2/2024  Impression: 1.Stable appearance of bilateral perihilar airspace opacities, suggestive of pulmonary edema with small pleural effusions. 2.Positioning of support tubes and lines, as above. Electronically Signed: Radha Lawrence  9/2/2024 8:08 AM EDT  Workstation ID: [id number]    XR Chest 1 View    Result Date: 9/1/2024  Impression: No significant interval change, with similar trace bilateral pleural effusions and bibasilar airspace disease, left greater than right. Electronically Signed: Boaz Schreiber MD  9/1/2024 8:27 AM EDT  Workstation ID: [id number]    XR Chest 1 View    Result Date: 8/31/2024  Impression: Nasogastric tube terminates in the stomach, and a right-sided PICC line terminates in the SVC. Some chronic interstitial changes appear similar and there is likely decreased component of edema/congestion with trace suspected left pleural effusion. There is no distinct pneumothorax. Electronically Signed: Timo Flores MD  8/31/2024 8:09 AM EDT  Workstation ID: [id number]    XR Abdomen KUB    Result Date: 8/30/2024  Impression: NG tube curves oriented retrograde terminating over the gastric fundus. No dilated loops of bowel in the upper abdomen. Electronically Signed: Daryn Toledo MD  8/30/2024 6:35 AM EDT  Workstation ID: [id number]    XR Chest 1 View    Result Date: 8/30/2024  Impression: Mild pulmonary edema pattern with patchy airspace disease within the lung bases  bilaterally, right greater than left, likely related to pneumonia. Probable small right-sided pleural effusion present. Electronically Signed: Gloria Pritchett MD  8/30/2024 3:38 AM EDT  Workstation ID: [id number]       Current Medications:  apixaban, 5 mg, Oral, Q12H  [Held by provider] ARIPiprazole, 5 mg, Nasogastric, Daily  gabapentin, 800 mg, Oral, Q8H  insulin regular, 2-7 Units, Subcutaneous, Q6H  ipratropium-albuterol, 3 mL, Nebulization, Q6H - RT  ketorolac, 1 drop, Right Eye, 4x Daily  moxifloxacin, 1 drop, Right Eye, 4x Daily  pantoprazole, 40 mg, Oral, Q AM  prednisoLONE acetate, 1 drop, Right Eye, 4x Daily  sacubitril-valsartan, 1 tablet, Oral, Q12H  sodium chloride, 10 mL, Intravenous, Q12H      norepinephrine, 0.02-0.3 mcg/kg/min, Last Rate: 0.04 mcg/kg/min (09/03/24 0539)        Assessment  History of paroxysmal atrial fibrillation  Established with Dr. Bear with [facility] in clinic  On Tikosyn prior to admission, now on hold due to long QT  Wide-complex tachycardia  In ER, resolved with no recurrence since being in ER  In setting of acute hypoxic, hypercapnic respiratory failure  PVCs           Plan  Wide complex tachycardia in the ER is concerning for real VT rather than aberrancy. I believe this was secondary to acute hypoxic, hypercapnic respiratory failure rather than a primary VT. The patient has had some short runs of NSVT  Tikosyn held earlier this admission d/t prolonged QTc. Beta blockade held due to pauses and intermittent av block. The patient is not a good candidate for antiarrhythmics due to new reduced LVEF and prolonged QTc on Tikosyn. We could use amiodarone if the patient develops an unstable tachyarrhythmia, but would hold off if his rhythm remains stable  Cardiac EP will continue to follow    Electronically signed by Sage Arora PA-C, 09/04/24, 9:03 AM EDT.

## 2024-09-04 NOTE — PLAN OF CARE
Goal Outcome Evaluation:  Plan of Care Reviewed With: patient        Progress: no change  Outcome Evaluation: PT eval complete. Pt presents with generalized weakness, decreased activity tolerance, and balance deficits warranting skilled IPPT services. Pt required maxA x2 for mobility, but SpO2 and HR remained stable throughout session. PT rec IRF at d/c.      Anticipated Discharge Disposition (PT): inpatient rehabilitation facility

## 2024-09-04 NOTE — PROGRESS NOTES
Cardiology Progress Note      Reason for visit:    NSTEMI type I versus type II  Wide-complex tachycardia/persistent atrial fibrillation/frequent PVCs    IDENTIFICATION: 79-year-old gentleman who resides in [place], [place]    Active Hospital Problems    Diagnosis  POA    **Acute respiratory failure with hypoxia and hypercapnia [J96.01, J96.02]  Yes     Priority: High    Persistent atrial fibrillation [I48.19]  Yes     Priority: High     Followed by Dr. Bear with [facility]  General cardiologist Dr. Galvez  Echocardiogram reportedly done 1 month ago normal per family to reevaluate ascending aortic aneurysm which was stable  Chads Vasc= 3 (age >75, CHF)      Wide-complex tachycardia [R00.0]  Yes     Priority: High    NSTEMI (non-ST elevated myocardial infarction) [I21.4]  Yes     Priority: High     Reportedly normal coronaries on cath 2020.  Data deficit.  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  He denies any chest pain.  He is breathing easy on O2 at 2 L by nasal cannula with acceptable O2 saturations.  He has had several episodes of asymptomatic pauses.  He has frequent PVCs on telemetry.  He is relatively asymptomatic with this.            Vital Sign Min/Max for last 24 hours  Temp  Min: 98.2 °F (36.8 °C)  Max: 99.5 °F (37.5 °C)   BP  Min: 82/48  Max: 139/105   Pulse  Min: 47  Max: 104   Resp  Min: 18  Max: 26   SpO2  Min: 92 %  Max: 98 %   Flow (L/min)  Min: 2  Max: 2      Intake/Output Summary (Last 24 hours) at 2024 0756  Last data filed at 2024 0750  Gross per 24 hour   Intake 967.94 ml   Output 1900 ml   Net -932.06 ml           Physical Exam  Constitutional:       General: He is awake.   Cardiovascular:      Rate and Rhythm: Normal rate. Rhythm irregular.      Heart sounds: No murmur heard.  Pulmonary:      Effort: Pulmonary effort is normal.      Breath sounds: Decreased breath sounds present.   Musculoskeletal:      Right lower le+ Pitting Edema present.      Left lower le+ Pitting Edema present.   Neurological:      Mental Status: He is alert and oriented to person, place, and time.   Psychiatric:         Behavior: Behavior is cooperative.         Tele: Sinus rhythm with PVCs     Results Review (reviewed the patient's recent labs in the electronic medical record):      EKG (2024): Wide QRS rhythm with PVCs in bigeminal pattern     CXR (2024): No significant change.  Similar trace bilateral pleural effusions with bibasilar airspace disease left greater than right     ECHO (2024): LVEF 30%.  RVSP 32 mmHg    Results from last 7 days   Lab Units 24  0320 24  0342 24  2111 24  0519 24  0519 24  0430 24  1211   SODIUM mmol/L 137 141 133* 138 136 138 142   POTASSIUM mmol/L 3.9 4.1  4.1 3.2* 3.9 4.0 3.9 4.1   CHLORIDE mmol/L 102 108* 99 105 104 105 110*   BUN mg/dL 32* 28* 22 19 21 23 21   CREATININE mg/dL 0.64* 0.81 0.66* 0.64* 0.71* 0.92 1.22   MAGNESIUM mg/dL 2.1 2.4  --  2.1 2.3 2.1  --      Results from last 7 days   Lab Units 24  2327 24  2111 24  0430   HSTROP T ng/L 229* 227* 278*     Results from last 7 days   Lab Units 24  0320 24  0342  09/02/24  0519   WBC 10*3/mm3 7.65 8.22 10.89*   HEMOGLOBIN g/dL 10.3* 10.4* 11.9*   HEMATOCRIT % 31.9* 32.7* 35.2*   PLATELETS 10*3/mm3 123* 135* 130*       Lab Results   Component Value Date    HGBA1C 5.00 08/30/2024       Lab Results   Component Value Date    CHOL 141 09/02/2024    TRIG 130 09/02/2024              NSTEMI, probable type II  Reportedly normal coronaries on cath 2020 but data deficit  Troponins elevated 88, 138 and 519  Echo this admission shows reduced LVEF 30%  Eventual ischemic evaluation     Persistent atrial fibrillation  Established with Dr. Bear with [facility] in clinic  Eliquis 5 mg twice daily  Dofetilide discontinued due to prolonged QTc  On telemetry appears to be atrial fibrillation with frequent PVCs     Frequent PVCs/wide-complex tachycardia/pauses on telemetry  Monitor in summer 2024 reportedly 27% PVC burden and 89 pauses with the longest of 6.5 seconds all during sleeping hours  Reportedly wide-complex tachycardia while in ED that was concerning for real VT rather than aberrancy that was felt to be due secondary to have acute hypoxia.  No further reoccurrences  Frequent PVCs and wide-complex QRS  Beta-blocker discontinued due to frequent pauses on telemetry     HFrEF  Echo this admission LVEF 30%  Carvedilol was discontinued due to pauses on 9/2  Entresto 24-26 mg twice daily  Treated with IV Lasix on 9/2 and 9 3     Hypertension  Current /105  Entresto 24/26 mg 1 tablet twice daily                      Diurese with 80 mg IV Lasix twice daily  Strict I's and O's  Will continue to avoid AV juliane blocking agents  Likely ischemic eval with cardiac cath once respiratory status is stable and has been diuresed.  Will keep n.p.o. after midnight in case procedure can be performed tomorrow.    Electronically signed by RAKEL Alcantar, 09/04/24, 7:55 AM EDT.

## 2024-09-04 NOTE — CASE MANAGEMENT/SOCIAL WORK
Continued Stay Note  [facility]     Patient Name: Bo Perez  MRN: [id number]  Today's Date: 9/4/2024    Admit Date: 8/30/2024    Plan: IPR   Discharge Plan       Row Name 09/04/24 6243       Plan    Plan Inpatient Rehabilitation Facility    Plan Comments Patient remains in ICU, extubated yesterday.  POT/OT evaluations today, recommending IPR once medically stable.  Case Management will continue to follow closely, facilitate rehab placement once medically stable.  Discussed in unit multidisciplinary rounds this morning.  Hyacinth May, Ext. [phone]                   Discharge Codes    No documentation.                 Expected Discharge Date and Time       Expected Discharge Date Expected Discharge Time    Sep 6, 2024               SUMEET Cotton

## 2024-09-04 NOTE — PROGRESS NOTES
Intensive Care Follow-up     Hospital:  LOS: 5 days   Mr. Bo Perez, 79 y.o. male is followed for:   Acute respiratory failure with hypoxia and hypercapnia        Subjective     Mr. Perez is a 80yo M with a history of Afib on Tikosyn and 3pk/day smoker with a recent cataract surgery who presented to the [facility] ED on 8/30/24 with respiratory distress with hypoxia and required intubation. He was hypotensive and was started on Levophed.   Interval History:  The chart has been reviewed.  The patient was afebrile overnight.  Speech is much clearer today.  He continues to have frequent ectopy.  He denies any chest pain.  He is not particularly short of breath.  Currently on 2 L nasal cannula.  He is having a good diuresis.    The patient's past medical, surgical and social history were reviewed and updated in Epic as appropriate.        Objective     Infusions:  norepinephrine, 0.02-0.3 mcg/kg/min, Last Rate: 0.04 mcg/kg/min (09/03/24 0539)      Medications:  apixaban, 5 mg, Oral, Q12H  [Held by provider] ARIPiprazole, 5 mg, Nasogastric, Daily  furosemide, 80 mg, Intravenous, BID  gabapentin, 800 mg, Oral, Q8H  insulin regular, 2-7 Units, Subcutaneous, Q6H  ipratropium-albuterol, 3 mL, Nebulization, Q6H - RT  ketorolac, 1 drop, Right Eye, TID   Followed by  [START ON 9/11/2024] ketorolac, 1 drop, Right Eye, BID   Followed by  [START ON 9/19/2024] ketorolac, 1 drop, Right Eye, Daily  pantoprazole, 40 mg, Oral, Q AM  prednisoLONE acetate, 1 drop, Right Eye, TID   Followed by  [START ON 9/11/2024] prednisoLONE acetate, 1 drop, Right Eye, BID   Followed by  [START ON 9/19/2024] prednisoLONE acetate, 1 drop, Right Eye, Daily  sacubitril-valsartan, 1 tablet, Oral, Q12H  sodium chloride, 10 mL, Intravenous, Q12H        Vital Sign Min/Max for last 24 hours  Temp  Min: 98.2 °F (36.8 °C)  Max: 99.5 °F (37.5 °C)   BP  Min: 82/48  Max: 139/85   Pulse  Min: 47  Max: 104   Resp  Min: 18  Max: 22   SpO2  Min: 92 %  Max: 98 %   Flow  "(L/min)  Min: 2  Max: 2       Input/Output for last 24 hour shift  09/03 0701 - 09/04 0700  In: 967.9 [P.O.:400; I.V.:183.9]  Out: 1625 [Urine:1625]      Objective:  General Appearance:  Uncomfortable and in no acute distress.    Vital signs: (most recent): Blood pressure 112/79, pulse 92, temperature 98.4 °F (36.9 °C), temperature source Bladder, resp. rate 20, height 185 cm (72.84\"), weight (!) 160 kg (352 lb 11.8 oz), SpO2 96%.    HEENT: (NG tube in place)    Lungs:  Normal effort.  There are decreased breath sounds.  No rales, wheezes or rhonchi.    Heart: Normal rate.  Regular rhythm.  S1 normal and S2 normal.  No murmur.   Chest: Symmetric chest wall expansion.   Abdomen: Bowel sounds are normal.   There is no abdominal tenderness.     Extremities: Normal range of motion.  There is dependent edema.    Neurological: (Awake.  Mildly garbled speech though clearer than yesterday.  Oriented.).    Pupils:  Pupils are equal, round, and reactive to light.  Pupils are equal.   Skin:  Warm.                Results from last 7 days   Lab Units 09/04/24  0320 09/03/24  0342 09/02/24  0519   WBC 10*3/mm3 7.65 8.22 10.89*   HEMOGLOBIN g/dL 10.3* 10.4* 11.9*   PLATELETS 10*3/mm3 123* 135* 130*     Results from last 7 days   Lab Units 09/04/24  0320 09/03/24  0342 09/02/24  2111 09/02/24  1439 09/02/24  0519   SODIUM mmol/L 137 141 133*  --  138   POTASSIUM mmol/L 3.9 4.1  4.1 3.2*  --  3.9   CO2 mmol/L 25.0 27.0 27.0  --  23.0   BUN mg/dL 32* 28* 22  --  19   CREATININE mg/dL 0.64* 0.81 0.66*  --  0.64*   MAGNESIUM mg/dL 2.1 2.4  --   --  2.1   PHOSPHORUS mg/dL  --  2.4* 1.8* 1.4* 1.5*   GLUCOSE mg/dL 100* 120* 129*  --  144*     Estimated Creatinine Clearance: 150.7 mL/min (A) (by C-G formula based on SCr of 0.64 mg/dL (L)).    Results from last 7 days   Lab Units 09/03/24  0557   PH, ARTERIAL pH units 7.407   PCO2, ARTERIAL mm Hg 46.0*   PO2 ART mm Hg 86.0         I reviewed the patient's results and images.     Assessment " & Plan   Impression        Acute respiratory failure with hypoxia and hypercapnia    HTN (hypertension)    Sepsis due to pneumonia    Continuous tobacco abuse    Persistent atrial fibrillation    Frequent PVCs    Wide-complex tachycardia    NSTEMI (non-ST elevated myocardial infarction)    HFrEF (heart failure with reduced ejection fraction)    Prolonged Q-T interval on ECG       Plan        We will continue monitoring in the ICU secondary to intermittent bradycardia and arrhythmias.  Continuing with diuresis as before.  Wean oxygen as tolerated.  Currently requiring only 2 L.  Continue to monitor off of antibiotics as there is no current evidence of pneumonia.  Plan will eventually be for ischemic workup given underlying heart failure.  Blood pressure control as needed.  Follow-up labs and orders are placed for tomorrow.    Plan of care and goals reviewed with mulitdisciplinary/antibiotic stewardship team during rounds.   I discussed the patient's findings and my recommendations with patient and nursing staff         Herrera Gardner MD, [facility]  Pulmonology and Critical Care Medicine

## 2024-09-04 NOTE — THERAPY EVALUATION
Patient Name: Bo Perez  : 1945    MRN: [id number]                              Today's Date: 2024       Admit Date: 2024    Visit Dx:     ICD-10-CM ICD-9-CM   1. Acute respiratory failure with hypoxemia  J96.01 518.81   2. Flash pulmonary edema  J81.0 518.4   3. Acute sepsis  A41.9 038.9     995.91   4. Pneumonia of both lower lobes due to infectious organism  J18.9 486   5. Hypertensive emergency  I16.1 401.9   6. Oropharyngeal dysphagia  R13.12 787.22     Patient Active Problem List   Diagnosis    Chronic anticoagulation    HTN (hypertension)    Morbid obesity    Acute respiratory failure with hypoxia and hypercapnia    Sepsis due to pneumonia    Continuous tobacco abuse    Persistent atrial fibrillation    Frequent PVCs    Mixed hyperlipidemia    Wide-complex tachycardia    NSTEMI (non-ST elevated myocardial infarction)    HFrEF (heart failure with reduced ejection fraction)    Prolonged Q-T interval on ECG     Past Medical History:   Diagnosis Date    A-fib     Arthritis     Hard to intubate     Hypertension     Sleep apnea     NOT USING CPAP     Past Surgical History:   Procedure Laterality Date    BLADDER SURGERY  2016    STIMULATOR IMPLANTED, NEW ELECTRODE 2022    CARDIAC ABLATION      AFIB    CARPAL TUNNEL RELEASE Right     CHOLECYSTECTOMY      COLONOSCOPY      DENTAL PROCEDURE      FINGER SURGERY Left     FOREFINGER BONE TRIMMED    HEMORRHOIDECTOMY  1973    KNEE ARTHROSCOPY Left 2008    MOUTH SURGERY      FOR SLEEP APNEA    SPINAL FUSION  2007    L3 LUMBAR SPINAL STENOSIS    TONSILLECTOMY      TOTAL KNEE ARTHROPLASTY Left 2009    TOTAL KNEE ARTHROPLASTY Right 2023    Procedure: TOTAL KNEE ARTHROPLASTY - RIGHT;  Surgeon: Leland Elizalde MD;  Location: [facility];  Service: Orthopedics;  Laterality: Right;      General Information       Row Name 24 0906          Physical Therapy Time and Intention    Document Type evaluation  -ES     Mode of  Treatment physical therapy  -ES       Row Name 09/04/24 1138 09/04/24 0906       General Information    Patient Profile Reviewed yes  -ES yes  -ES    Prior Level of Function -- independent:;all household mobility;community mobility;transfer;bed mobility;ADL's  Uses rollator at baseline. Denies recent falls. Wife assists as needed  -ES    Existing Precautions/Restrictions -- fall;oxygen therapy device and L/min;other (see comments)  montior HR, BLE edema  -ES    Barriers to Rehab -- medically complex;previous functional deficit  -ES      Row Name 09/04/24 0906          Living Environment    People in Home spouse  -ES       Row Name 09/04/24 0906          Home Main Entrance    Number of Stairs, Main Entrance two  -ES       Row Name 09/04/24 0906          Stairs Within Home, Primary    Number of Stairs, Within Home, Primary other (see comments)  pt has chair lift to reach 2nd floor  -ES       Row Name 09/04/24 0906          Cognition    Orientation Status (Cognition) oriented x 3  -ES       Row Name 09/04/24 0906          Safety Issues, Functional Mobility    Safety Issues Affecting Function (Mobility) awareness of need for assistance;insight into deficits/self-awareness;safety precaution awareness;safety precautions follow-through/compliance  -ES     Impairments Affecting Function (Mobility) balance;coordination;endurance/activity tolerance;shortness of breath;strength  -ES               User Key  (r) = Recorded By, (t) = Taken By, (c) = Cosigned By      Initials Name Provider Type    ES Francesca Valadez PT Physical Therapist                   Mobility       Row Name 09/04/24 0910          Bed Mobility    Bed Mobility supine-sit  -ES     Supine-Sit [place] (Bed Mobility) moderate assist (50% patient effort);2 person assist;verbal cues  -ES     Assistive Device (Bed Mobility) bed rails;head of bed elevated  -ES     Comment, (Bed Mobility) Denied dizziness sitting EOB. Required increased time/effort w/ cueing  -ES        Row Name 09/04/24 0910          Bed-Chair Transfer    Bed-Chair [place] (Transfers) maximum assist (25% patient effort);2 person assist;verbal cues  -ES     Assistive Device (Bed-Chair Transfers) --  BUE support  -ES     Comment, (Bed-Chair Transfer) v/c for sequencing and to promote upright posture. Further mobility limited by weakness and fatigue. HR and SpO2 stable throughout session.  -ES       Row Name 09/04/24 0910          Sit-Stand Transfer    Sit-Stand [place] (Transfers) 2 person assist;maximum assist (25% patient effort);verbal cues  -ES     Assistive Device (Sit-Stand Transfers) other (see comments)  BUE support  -ES     Comment, (Sit-Stand Transfer) Increased time/effort to reach upright posture.  -ES               User Key  (r) = Recorded By, (t) = Taken By, (c) = Cosigned By      Initials Name Provider Type    ES Francesca Valadez PT Physical Therapist                   Obj/Interventions       Row Name 09/04/24 1139          Range of Motion Comprehensive    General Range of Motion bilateral lower extremity ROM WFL  -ES       Row Name 09/04/24 1139          Strength Comprehensive (MMT)    General Manual Muscle Testing (MMT) Assessment lower extremity strength deficits identified  -ES     Comment, General Manual Muscle Testing (MMT) Assessment BLE grossly 4-/5  -ES       Row Name 09/04/24 1139          Balance    Balance Assessment sitting static balance;sitting dynamic balance;sit to stand dynamic balance;standing dynamic balance;standing static balance  -ES     Static Sitting Balance contact guard  -ES     Dynamic Sitting Balance minimal assist;verbal cues  -ES     Position, Sitting Balance supported;sitting in chair;sitting edge of bed  -ES     Sit to Stand Dynamic Balance maximum assist;2-person assist;verbal cues  -ES     Static Standing Balance maximum assist;2-person assist;verbal cues  -ES     Dynamic Standing Balance maximum assist;2-person assist;verbal cues  -ES      Position/Device Used, Standing Balance supported  -ES     Balance Interventions sitting;standing;sit to stand;supported;static;dynamic;occupation based/functional task  -ES       Row Name 09/04/24 1139          Sensory Assessment (Somatosensory)    Sensory Assessment (Somatosensory) LE sensation intact  -ES               User Key  (r) = Recorded By, (t) = Taken By, (c) = Cosigned By      Initials Name Provider Type    ES Francesca Valadez, PT Physical Therapist                   Goals/Plan       Row Name 09/04/24 1141          Bed Mobility Goal 1 (PT)    Activity/Assistive Device (Bed Mobility Goal 1, PT) sit to supine/supine to sit  -ES     [place] Level/Cues Needed (Bed Mobility Goal 1, PT) minimum assist (75% or more patient effort)  -ES     Time Frame (Bed Mobility Goal 1, PT) short term goal (STG);5 days  -ES       Row Name 09/04/24 1141          Transfer Goal 1 (PT)    Activity/Assistive Device (Transfer Goal 1, PT) bed-to-chair/chair-to-bed;sit-to-stand/stand-to-sit;walker, rolling  -ES     [place] Level/Cues Needed (Transfer Goal 1, PT) minimum assist (75% or more patient effort)  -ES     Time Frame (Transfer Goal 1, PT) long term goal (LTG);10 days  -ES       Row Name 09/04/24 1141          Gait Training Goal 1 (PT)    Activity/Assistive Device (Gait Training Goal 1, PT) gait (walking locomotion);assistive device use;decrease fall risk;increase endurance/gait distance;walker, rolling  -ES     [place] Level (Gait Training Goal 1, PT) minimum assist (75% or more patient effort)  -ES     Distance (Gait Training Goal 1, PT) 75  -ES     Time Frame (Gait Training Goal 1, PT) long term goal (LTG);10 days  -ES       Row Name 09/04/24 1141          Therapy Assessment/Plan (PT)    Planned Therapy Interventions (PT) balance training;bed mobility training;gait training;home exercise program;neuromuscular re-education;patient/family education;strengthening;stair training;postural re-education;transfer  training  -ES               User Key  (r) = Recorded By, (t) = Taken By, (c) = Cosigned By      Initials Name Provider Type    ES Francesca Valadez, PT Physical Therapist                   Clinical Impression       Row Name 09/04/24 1140          Pain    Pain Intervention(s) Repositioned;Ambulation/increased activity  -ES     Additional Documentation Pain Scale: FACES Pre/Post-Treatment (Group)  -ES       Row Name 09/04/24 1140          Pain Scale: FACES Pre/Post-Treatment    Pain: FACES Scale, Pretreatment 2-->hurts little bit  -ES     Posttreatment Pain Rating 2-->hurts little bit  -ES     Pain Location generalized  -ES     Pain Location - back  -ES     Pre/Posttreatment Pain Comment tolerated  -ES       Row Name 09/04/24 1140          Plan of Care Review    Plan of Care Reviewed With patient  -ES     Progress no change  -ES     Outcome Evaluation PT eval complete. Pt presents with generalized weakness, decreased activity tolerance, and balance deficits warranting skilled IPPT services. Pt required maxA x2 for mobility, but SpO2 and HR remained stable throughout session. PT rec IRF at d/c.  -ES       Row Name 09/04/24 1140          Therapy Assessment/Plan (PT)    Rehab Potential (PT) good, to achieve stated therapy goals  -ES     Criteria for Skilled Interventions Met (PT) yes;meets criteria;skilled treatment is necessary  -ES     Therapy Frequency (PT) daily  -ES     Predicted Duration of Therapy Intervention (PT) 10 days  -ES       Row Name 09/04/24 1140          Vital Signs    Pre Systolic BP Rehab 139  -ES     Pre Treatment Diastolic BP 85  -ES     Post Systolic BP Rehab 133  -ES     Post Treatment Diastolic BP 68  -ES     Pretreatment Heart Rate (beats/min) 90  -ES     Posttreatment Heart Rate (beats/min) 87  -ES     Pre SpO2 (%) 95  -ES     O2 Delivery Pre Treatment nasal cannula  -ES     O2 Delivery Intra Treatment nasal cannula  -ES     Post SpO2 (%) 96  -ES     O2 Delivery Post Treatment nasal cannula  -ES      Pre Patient Position Supine  -ES     Intra Patient Position Standing  -ES     Post Patient Position Sitting  -ES       Row Name 09/04/24 1140          Positioning and Restraints    Pre-Treatment Position in bed  -ES     Post Treatment Position chair  -ES     In Chair notified nsg;reclined;sitting;call light within reach;encouraged to call for assist;exit alarm on;on mechanical lift sling;waffle cushion;legs elevated;with family/caregiver  -ES               User Key  (r) = Recorded By, (t) = Taken By, (c) = Cosigned By      Initials Name Provider Type    ES Francesca Valadez, PT Physical Therapist                   Outcome Measures       Row Name 09/04/24 1142          How much help from another person do you currently need...    Turning from your back to your side while in flat bed without using bedrails? 2  -ES     Moving from lying on back to sitting on the side of a flat bed without bedrails? 2  -ES     Moving to and from a bed to a chair (including a wheelchair)? 2  -ES     Standing up from a chair using your arms (e.g., wheelchair, bedside chair)? 2  -ES     Climbing 3-5 steps with a railing? 1  -ES     To walk in hospital room? 1  -ES     AM-PAC 6 Clicks Score (PT) 10  -ES     Highest Level of Mobility Goal 4 --> Transfer to chair/commode  -ES       Row Name 09/04/24 1142 09/04/24 1101       Functional Assessment    Outcome Measure Options AM-PAC 6 Clicks Basic Mobility (PT)  -ES AM-PAC 6 Clicks Daily Activity (OT)  -KL              User Key  (r) = Recorded By, (t) = Taken By, (c) = Cosigned By      Initials Name Provider Type    ES Francesca Valadez, PT Physical Therapist    Monisha Lassiter OT Occupational Therapist                                 Physical Therapy Education       Title: PT OT SLP Therapies (In Progress)       Topic: Physical Therapy (In Progress)       Point: Mobility training (In Progress)       Learning Progress Summary             Patient Acceptance, E,TB, NR by BEAU at 9/4/2024 1142                          Point: Home exercise program (Not Started)       Learner Progress:  Not documented in this visit.              Point: Body mechanics (In Progress)       Learning Progress Summary             Patient Acceptance, E,TB, NR by ES at 9/4/2024 1142                         Point: Precautions (In Progress)       Learning Progress Summary             Patient Acceptance, E,TB, NR by ES at 9/4/2024 1142                                         User Key       Initials Effective Dates Name Provider Type Discipline     08/11/22 -  Francesca Valadez, PT Physical Therapist PT                  PT Recommendation and Plan  Planned Therapy Interventions (PT): balance training, bed mobility training, gait training, home exercise program, neuromuscular re-education, patient/family education, strengthening, stair training, postural re-education, transfer training  Plan of Care Reviewed With: patient  Progress: no change  Outcome Evaluation: PT eval complete. Pt presents with generalized weakness, decreased activity tolerance, and balance deficits warranting skilled IPPT services. Pt required maxA x2 for mobility, but SpO2 and HR remained stable throughout session. PT rec IRF at d/c.     Time Calculation:   PT Evaluation Complexity  History, PT Evaluation Complexity: 1-2 personal factors and/or comorbidities  Examination of Body Systems (PT Eval Complexity): total of 3 or more elements  Clinical Presentation (PT Evaluation Complexity): evolving  Clinical Decision Making (PT Evaluation Complexity): moderate complexity  Overall Complexity (PT Evaluation Complexity): moderate complexity     PT Charges       Row Name 09/04/24 1143             Time Calculation    Start Time 0816  -ES      PT Received On 09/04/24  -ES      PT Goal Re-Cert Due Date 09/14/24  -ES         Untimed Charges    PT Eval/Re-eval Minutes 52  -ES         Total Minutes    Untimed Charges Total Minutes 52  -ES       Total Minutes 52  -ES                 User Key  (r) = Recorded By, (t) = Taken By, (c) = Cosigned By      Initials Name Provider Type    ES Francesca Valadez, ABRAHAN Physical Therapist                  Therapy Charges for Today       Code Description Service Date Service Provider Modifiers Qty    [id number] HC PT EVAL MOD COMPLEXITY 4 9/4/2024 Francesca Valadez PT GP 1            PT G-Codes  Outcome Measure Options: AM-PAC 6 Clicks Basic Mobility (PT)  AM-PAC 6 Clicks Score (PT): 10  AM-PAC 6 Clicks Score (OT): 12  PT Discharge Summary  Anticipated Discharge Disposition (PT): inpatient rehabilitation facility    Francesca Valadez PT  9/4/2024

## 2024-09-04 NOTE — PROGRESS NOTES
"                  Clinical Nutrition     Patient Name: Bo Perez  YOB: 1945  MRN: [id number]  Date of Encounter: 09/04/24 14:14 EDT  Admission date: 8/30/2024  Reason for Visit: Follow-up protocol, EN    Assessment   Nutrition Assessment   Admission Diagnosis:  Acute respiratory failure with hypoxia and hypercapnia [J96.01, J96.02]    Problem List:    Acute respiratory failure with hypoxia and hypercapnia    HTN (hypertension)    Sepsis due to pneumonia    Continuous tobacco abuse    Persistent atrial fibrillation    Frequent PVCs    Wide-complex tachycardia    NSTEMI (non-ST elevated myocardial infarction)    HFrEF (heart failure with reduced ejection fraction)    Prolonged Q-T interval on ECG      PMH:   He  has a past medical history of A-fib, Arthritis, Hard to intubate, Hypertension, and Sleep apnea.    PSH:  He  has a past surgical history that includes Tonsillectomy (1954); Dental surgery (1962); Hemorrhoid surgery (1973); Finger surgery (Left, 2000); Carpal tunnel release (Right, 2002); Spinal fusion (2007); Mouth surgery (2008); Knee arthroscopy (Left, 2008); Total knee arthroplasty (Left, 2009); Cholecystectomy (2011); Cardiac Ablation (2013); Bladder surgery (04/14/2016); Colonoscopy; and Total knee arthroplasty (Right, 6/22/2023).    Applicable Nutrition History:   (9/3) FEES - SLP Swallowing Diagnosis: mild, oral dysphagia, mild-moderate, pharyngeal dysphagia; SLP Diet Recommendation: puree, NTL    Anthropometrics     Height: Height: 185 cm (72.84\")  Last Filed Weight: Weight:  (unable to weight d/t bed scale not being zeroed) (09/01/24 0530)  Method: Weight Method: Bed scale  BMI: BMI (Calculated): 46.7    UBW: UTD   Weight change:  ? Weight gain per documented weight     Nutrition Focused Physical Exam     Date: 9/2    Pt does not meet criteria for malnutrition diagnosis, at this time.      Subjective   Reported/Observed/Food/Nutrition Related History:     9/4  Pt remains extubated " "and is tolerating dysphagia diet. RD explained SLP process and pureed/NTL diet, reviewed menu. Pt voiced understanding and stated he thinks he will be able to find enough of the foods he likes to eat on this diet. Pt denied further dietary needs/preferences or nutritional questions/concerns at this time.    9/3  Pt was extubated yesterday and remains so, passed FEES. Discussed with RN, pt ate 75% first meal and demonstrated ability to eat without difficulty, NG pulled.     9/2  Pt remains intubated/sedated, tolerating EN advancing to goal. Phos critically low. No BM yet.     9/1  NPO x 2 days, remains intubated. Start tube feeding per intensivisit note yesterday.  Discussed with RN, reports no plan for extubation today.  Has NGT.  RN reports wife indicated patient with history of esophageal dilation, food gets stuck sometimes.  Patient will more likely need SLP eval post extubation.    No family at bedside at time of visit.     Needs Assessment   Date: 9/1    Height used:Height: 185 cm (72.84\")  Weights used: 352lb/160kg; IBW 184lb/84kg     Estimated Calorie needs: ~  2250Kcal/day (goal)   Method:  Kcals/KG 14= 2240kcal; 25kcals IBW = 2100kcals   Method:  MSJ = 2371kcal  Method:  PSU (ve 8.01, tmax 37) = 2256    Estimated Protein needs: ~  201g PRO/day  Method: 2.0-2.5g/Kg IBW = 168- 210g; 1.0-1.2 g/kg CBW = 160-192g    Estimated Fluid needs: ~ ml/day   Per clinical status    Current Nutrition Prescription     PO: Diet: Cardiac; Healthy Heart (2-3 Na+); Texture: Pureed (NDD 1); Fluid Consistency: Nectar Thick  NPO Diet NPO Type: Strict NPO  Oral Nutrition Supplement:  Intake: Insufficient data 50% X 1 meal per RN    EN: d/c 9/3, previously Peptamen VHP was advanced to 45 ml/hr prior to 9/3 when pt passed FEES/NG removal.     Assessment & Plan   Nutrition Diagnosis   Date: 9/1 Updated: 9/2, 9/3  Problem Inadequate energy intake    Etiology Intubated    Signs/Symptoms NPO    Status: Discontinued Extubated and now with " dysphagia diet, 75% X first meal    Date:  9/3 Updated:  Problem Swallowing difficulty   Etiology mild, oral dysphagia, mild-moderate, pharyngeal dysphagia    Signs/Symptoms Pureed, NTL diet per SLP/FEES 9/3   Status: active    Goal:   Nutrition to support treatment, Maintain intake, Advance diet as medically feasible/appropriate, and EN to PO    Nutrition Intervention      Follow treatment progress, Care plan reviewed    Pureed/NTL diet per SLP.   Tolerating PO without issue, continue to monitor.     Monitoring/Evaluation:   Per protocol, I&O, Pertinent labs, EN delivery/tolerance, Symptoms, POC/GOC, Swallow function    Toma Carcamo RD, CNSC  Time Spent: 20m

## 2024-09-04 NOTE — THERAPY EVALUATION
Patient Name: Bo Perez  : 1945    MRN: [id number]                              Today's Date: 2024       Admit Date: 2024    Visit Dx:     ICD-10-CM ICD-9-CM   1. Acute respiratory failure with hypoxemia  J96.01 518.81   2. Flash pulmonary edema  J81.0 518.4   3. Acute sepsis  A41.9 038.9     995.91   4. Pneumonia of both lower lobes due to infectious organism  J18.9 486   5. Hypertensive emergency  I16.1 401.9   6. Oropharyngeal dysphagia  R13.12 787.22     Patient Active Problem List   Diagnosis    Chronic anticoagulation    HTN (hypertension)    Morbid obesity    Acute respiratory failure with hypoxia and hypercapnia    Sepsis due to pneumonia    Continuous tobacco abuse    Persistent atrial fibrillation    Frequent PVCs    Mixed hyperlipidemia    Wide-complex tachycardia    NSTEMI (non-ST elevated myocardial infarction)    HFrEF (heart failure with reduced ejection fraction)    Prolonged Q-T interval on ECG     Past Medical History:   Diagnosis Date    A-fib     Arthritis     Hard to intubate     Hypertension     Sleep apnea     NOT USING CPAP     Past Surgical History:   Procedure Laterality Date    BLADDER SURGERY  2016    STIMULATOR IMPLANTED, NEW ELECTRODE 2022    CARDIAC ABLATION      AFIB    CARPAL TUNNEL RELEASE Right     CHOLECYSTECTOMY      COLONOSCOPY      DENTAL PROCEDURE      FINGER SURGERY Left     FOREFINGER BONE TRIMMED    HEMORRHOIDECTOMY  1973    KNEE ARTHROSCOPY Left 2008    MOUTH SURGERY      FOR SLEEP APNEA    SPINAL FUSION  2007    L3 LUMBAR SPINAL STENOSIS    TONSILLECTOMY      TOTAL KNEE ARTHROPLASTY Left 2009    TOTAL KNEE ARTHROPLASTY Right 2023    Procedure: TOTAL KNEE ARTHROPLASTY - RIGHT;  Surgeon: Leland Elizalde MD;  Location: [facility];  Service: Orthopedics;  Laterality: Right;      General Information       Row Name 24 1048          OT Time and Intention    Document Type evaluation  -KL     Mode of Treatment  occupational therapy  -       Row Name 09/04/24 1048          General Information    Patient Profile Reviewed yes  -     Prior Level of Function independent:;all household mobility;community mobility;gait;transfer;bed mobility;ADL's  4WW; shower seat; no falls  -     Existing Precautions/Restrictions fall;oxygen therapy device and L/min;other (see comments)  monitor HR  -     Barriers to Rehab medically complex;previous functional deficit  -       Row Name 09/04/24 1048          Living Environment    People in Home spouse  -       Row Name 09/04/24 1048          Home Main Entrance    Number of Stairs, Main Entrance two  -     Stair Railings, Main Entrance none  -       Row Name 09/04/24 1048          Stairs Within Home, Primary    Stairs, Within Home, Primary stairs to basement, pt has electric chair lift  -       Row Name 09/04/24 1048          Cognition    Orientation Status (Cognition) oriented x 3  -       Row Name 09/04/24 1048          Safety Issues, Functional Mobility    Safety Issues Affecting Function (Mobility) awareness of need for assistance;insight into deficits/self-awareness;safety precautions follow-through/compliance;sequencing abilities;safety precaution awareness  -     Impairments Affecting Function (Mobility) balance;coordination;endurance/activity tolerance;shortness of breath;strength;pain;postural/trunk control  -               User Key  (r) = Recorded By, (t) = Taken By, (c) = Cosigned By      Initials Name Provider Type    Monisha Lassiter OT Occupational Therapist                     Mobility/ADL's       Row Name 09/04/24 1051          Bed Mobility    Supine-Sit [place] (Bed Mobility) moderate assist (50% patient effort);2 person assist;verbal cues  -     Bed Mobility, Safety Issues impaired trunk control for bed mobility;decreased use of legs for bridging/pushing  -     Assistive Device (Bed Mobility) bed rails;head of bed elevated  -       Row Name  09/04/24 1051          Bed-Chair Transfer    Bed-Chair [place] (Transfers) maximum assist (25% patient effort);2 person assist;verbal cues  -     Assistive Device (Bed-Chair Transfers) other (see comments)  E support  -[facility] Name 09/04/24 1051          Sit-Stand Transfer    Sit-Stand [place] (Transfers) 2 person assist;maximum assist (25% patient effort);verbal cues  -     Assistive Device (Sit-Stand Transfers) other (see comments)  [facility] support  -KL       Row Name 09/04/24 1051          Activities of Daily Living    BADL Assessment/Intervention lower body dressing;feeding  -KL       Row Name 09/04/24 1051          Lower Body Dressing Assessment/Training    [place] Level (Lower Body Dressing) don;socks;dependent (less than 25% patient effort)  -     Position (Lower Body Dressing) sitting up in bed  -KL       Row Name 09/04/24 1051          Self-Feeding Assessment/Training    [place] Level (Feeding) scoop food and bring to mouth;set up  -     Position (Self-Feeding) sitting up in bed  -KL               User Key  (r) = Recorded By, (t) = Taken By, (c) = Cosigned By      Initials Name Provider Type    Monisha Lassiter OT Occupational Therapist                   Obj/Interventions       Row Name 09/04/24 1054          Sensory Assessment (Somatosensory)    Sensory Assessment (Somatosensory) UE sensation intact  -KL       Row Name 09/04/24 1054          Range of Motion Comprehensive    General Range of Motion bilateral upper extremity ROM WFL  -KL       Row Name 09/04/24 1054          Strength Comprehensive (MMT)    Comment, General Manual Muscle Testing (MMT) Assessment [facility] MMT grossly 4-/5  -KL       Row Name 09/04/24 1054          Balance    Balance Assessment sitting static balance;sitting dynamic balance;standing static balance;standing dynamic balance  -     Static Sitting Balance contact guard  -     Dynamic Sitting Balance minimal assist;verbal cues  -     Position, Sitting  Balance supported;sitting edge of bed  -     Static Standing Balance maximum assist;2-person assist;verbal cues;non-verbal cues (demo/gesture)  -     Dynamic Standing Balance maximum assist;2-person assist;verbal cues;non-verbal cues (demo/gesture)  -     Position/Device Used, Standing Balance supported  -     Balance Interventions sitting;sit to stand;standing;supported;static;dynamic;occupation based/functional task  -               User Key  (r) = Recorded By, (t) = Taken By, (c) = Cosigned By      Initials Name Provider Type    Monisha Lassiter OT Occupational Therapist                   Goals/Plan       Row Name 09/04/24 1059          Transfer Goal 1 (OT)    Activity/Assistive Device (Transfer Goal 1, OT) sit-to-stand/stand-to-sit;toilet;commode, bedside without drop arms  -     [place] Level/Cues Needed (Transfer Goal 1, OT) moderate assist (50-74% patient effort)  -     Time Frame (Transfer Goal 1, OT) short term goal (STG);5 days  -     Progress/Outcome (Transfer Goal 1, OT) new goal  -[facility] Name 09/04/24 1059          Toileting Goal 1 (OT)    Activity/Device (Toileting Goal 1, OT) adjust/manage clothing;perform perineal hygiene  -     [place] Level/Cues Needed (Toileting Goal 1, OT) minimum assist (75% or more patient effort)  -     Time Frame (Toileting Goal 1, OT) long term goal (LTG);10 days  -     Progress/Outcome (Toileting Goal 1, OT) new goal  -       Row Name 09/04/24 1059          Grooming Goal 1 (OT)    Activity/Device (Grooming Goal 1, OT) hair care;oral care;wash face, hands  -     [place] (Grooming Goal 1, OT) contact guard required  -     Time Frame (Grooming Goal 1, OT) long term goal (LTG);10 days  -     Strategies/Barriers (Grooming Goal 1, OT) supported stand  -     Progress/Outcome (Grooming Goal 1, OT) new [facility]  -       Row Name 09/04/24 1059          Therapy Assessment/Plan (OT)    Planned Therapy Interventions (OT) activity  tolerance training;adaptive equipment training;functional balance retraining;occupation/activity based interventions;patient/caregiver education/training;ROM/therapeutic exercise;strengthening exercise;transfer/mobility retraining  -               User Key  (r) = Recorded By, (t) = Taken By, (c) = Cosigned By      Initials Name Provider Type    Monisha Lassiter, ANNA Occupational Therapist                   Clinical Impression       [facility] Name 09/04/24 1057          Pain Assessment    Pain Intervention(s) Repositioned;Ambulation/increased activity  -     Additional Documentation Pain Scale: FACES Pre/Post-Treatment (Group)  -[facility] Name 09/04/24 1057          Pain Scale: FACES Pre/Post-Treatment    Pain: FACES Scale, Pretreatment 2-->hurts little bit  -     Posttreatment Pain Rating 2-->hurts little bit  -     Pain Location generalized  -     Pain Location - back  -[facility] Name 09/04/24 1057          Plan of Care Review    Plan of Care Reviewed With patient  -     Progress no change  -     Outcome Evaluation Pt presents to OT evaluation w/ deficits in activity tolerance, balance, generalized weakness and self-care. Pt would benefit from IPOT services to address deficits in order to progress towards PLOF. d/c rec is IPR.  -[facility] Name 09/04/24 1057          Therapy Assessment/Plan (OT)    Patient/Family Therapy Goal Statement (OT) Return to PLOF  -     Rehab Potential (OT) good, to achieve stated therapy goals  -     Criteria for Skilled Therapeutic Interventions Met (OT) yes  -     Therapy Frequency (OT) daily  -     Predicted Duration of Therapy Intervention (OT) 10 days  -[facility] Name 09/04/24 1057          Therapy Plan Review/Discharge Plan (OT)    Anticipated Discharge Disposition (OT) inpatient rehabilitation facility  -[facility] Name 09/04/24 1057          Vital Signs    Pre Systolic BP Rehab 139  -KL     Pre Treatment Diastolic BP 86  -     Pretreatment Heart Rate  (beats/min) 86  -KL     Posttreatment Heart Rate (beats/min) 90  -KL     Pre SpO2 (%) 96  -KL     O2 Delivery Pre Treatment nasal cannula  -KL     Post SpO2 (%) 94  -KL     O2 Delivery Post Treatment nasal cannula  -KL     Pre Patient Position Supine  -KL     Intra Patient Position Standing  -KL     Post Patient Position Sitting  -       Row Name 09/04/24 1057          Positioning and Restraints    Pre-Treatment Position in bed  -KL     Post Treatment Position chair  -KL     In Chair reclined;sitting;with PT  -               User Key  (r) = Recorded By, (t) = Taken By, (c) = Cosigned By      Initials Name Provider Type    Monisha Lassiter OT Occupational Therapist                   Outcome Measures       Row Name 09/04/24 1101          How much help from another is currently needed...    Putting on and taking off regular lower body clothing? 1  -KL     Bathing (including washing, rinsing, and drying) 2  -KL     Toileting (which includes using toilet bed pan or urinal) 1  -KL     Putting on and taking off regular upper body clothing 2  -KL     Taking care of personal grooming (such as brushing teeth) 3  -KL     Eating meals 3  -KL     AM-PAC 6 Clicks Score (OT) 12  -       Row Name 09/04/24 1101          Functional Assessment    Outcome Measure Options AM-PAC 6 Clicks Daily Activity (OT)  -               User Key  (r) = Recorded By, (t) = Taken By, (c) = Cosigned By      Initials Name Provider Type    Monisha Lassiter OT Occupational Therapist                    Occupational Therapy Education       Title: PT OT SLP Therapies (In Progress)       Topic: Occupational Therapy (In Progress)       Point: ADL training (In Progress)       Description:   Instruct learner(s) on proper safety adaptation and remediation techniques during self care or transfers.   Instruct in proper use of assistive devices.                  Learning Progress Summary             Patient Acceptance, E, NR by MINNIE at 9/4/2024 1101                          Point: Home exercise program (Not Started)       Description:   Instruct learner(s) on appropriate technique for monitoring, assisting and/or progressing therapeutic exercises/activities.                  Learner Progress:  Not documented in this visit.              Point: Precautions (In Progress)       Description:   Instruct learner(s) on prescribed precautions during self-care and functional transfers.                  Learning Progress Summary             Patient Acceptance, E, NR by  at 9/4/2024 1101                         Point: Body mechanics (In Progress)       Description:   Instruct learner(s) on proper positioning and spine alignment during self-care, functional mobility activities and/or exercises.                  Learning Progress Summary             Patient Acceptance, E, NR by  at 9/4/2024 1101                                         User Key       Initials Effective Dates Name Provider Type Discipline     02/05/24 -  Monisha Morgan OT Occupational Therapist OT                  OT Recommendation and Plan  Planned Therapy Interventions (OT): activity tolerance training, adaptive equipment training, functional balance retraining, occupation/activity based interventions, patient/caregiver education/training, ROM/therapeutic exercise, strengthening exercise, transfer/mobility retraining  Therapy Frequency (OT): daily  Plan of Care Review  Plan of Care Reviewed With: patient  Progress: no change  Outcome Evaluation: Pt presents to OT evaluation w/ deficits in activity tolerance, balance, generalized weakness and self-care. Pt would benefit from IPOT services to address deficits in order to progress towards PLOF. d/c rec is IPR.     Time Calculation:   Evaluation Complexity (OT)  Review Occupational Profile/Medical/Therapy History Complexity: expanded/moderate complexity  Assessment, Occupational Performance/Identification of Deficit Complexity: 3-5 performance deficits  Clinical  Decision Making Complexity (OT): detailed assessment/moderate complexity  Overall Complexity of Evaluation (OT): moderate complexity     Time Calculation- OT       Row Name 09/04/24 1102             Time Calculation- OT    OT Start Time 0815  -KL      OT Received On 09/04/24  -      OT Goal Re-Cert Due Date 09/14/24  -KL         Timed Charges    18489 - OT Therapeutic Activity Minutes 10  -KL         Untimed Charges    OT Eval/Re-eval Minutes 35  -KL         Total Minutes    Timed Charges Total Minutes 10  -KL      Untimed Charges Total Minutes 35  -KL       Total Minutes 45  -KL                User Key  (r) = Recorded By, (t) = Taken By, (c) = Cosigned By      Initials Name Provider Type    Monisha Lassiter OT Occupational Therapist                  Therapy Charges for Today       Code Description Service Date Service Provider Modifiers Qty    89621502342 HC OT THERAPEUTIC ACT EA 15 MIN 9/4/2024 Monisha Morgan OT GO 1    19420698588 HC OT EVAL MOD COMPLEXITY 3 9/4/2024 Monisha Morgan OT GO 1                 Monisha Morgan OT  9/4/2024

## 2024-09-05 ENCOUNTER — APPOINTMENT (OUTPATIENT)
Dept: CARDIOLOGY | Facility: HOSPITAL | Age: 79
DRG: 871 | End: 2024-09-05
Payer: MEDICARE

## 2024-09-05 PROBLEM — I50.21 ACUTE HFREF (HEART FAILURE WITH REDUCED EJECTION FRACTION): Status: ACTIVE | Noted: 2024-09-05

## 2024-09-05 LAB
ANION GAP SERPL CALCULATED.3IONS-SCNC: 9 MMOL/L (ref 5–15)
BASOPHILS # BLD AUTO: 0.05 10*3/MM3 (ref 0–0.2)
BASOPHILS NFR BLD AUTO: 0.6 % (ref 0–1.5)
BUN SERPL-MCNC: 21 MG/DL (ref 8–23)
BUN/CREAT SERPL: 30.4 (ref 7–25)
CALCIUM SPEC-SCNC: 8.3 MG/DL (ref 8.6–10.5)
CHLORIDE SERPL-SCNC: 98 MMOL/L (ref 98–107)
CO2 SERPL-SCNC: 31 MMOL/L (ref 22–29)
CREAT SERPL-MCNC: 0.69 MG/DL (ref 0.76–1.27)
DEPRECATED RDW RBC AUTO: 55.7 FL (ref 37–54)
EGFRCR SERPLBLD CKD-EPI 2021: 94.7 ML/MIN/1.73
EOSINOPHIL # BLD AUTO: 0.24 10*3/MM3 (ref 0–0.4)
EOSINOPHIL NFR BLD AUTO: 3.1 % (ref 0.3–6.2)
ERYTHROCYTE [DISTWIDTH] IN BLOOD BY AUTOMATED COUNT: 15.4 % (ref 12.3–15.4)
GLUCOSE BLDC GLUCOMTR-MCNC: 108 MG/DL (ref 70–130)
GLUCOSE BLDC GLUCOMTR-MCNC: 116 MG/DL (ref 70–130)
GLUCOSE BLDC GLUCOMTR-MCNC: 117 MG/DL (ref 70–130)
GLUCOSE BLDC GLUCOMTR-MCNC: 126 MG/DL (ref 70–130)
GLUCOSE SERPL-MCNC: 111 MG/DL (ref 65–99)
HCT VFR BLD AUTO: 35.2 % (ref 37.5–51)
HGB BLD-MCNC: 11.3 G/DL (ref 13–17.7)
IMM GRANULOCYTES # BLD AUTO: 0.07 10*3/MM3 (ref 0–0.05)
IMM GRANULOCYTES NFR BLD AUTO: 0.9 % (ref 0–0.5)
LYMPHOCYTES # BLD AUTO: 1.26 10*3/MM3 (ref 0.7–3.1)
LYMPHOCYTES NFR BLD AUTO: 16.1 % (ref 19.6–45.3)
MAGNESIUM SERPL-MCNC: 1.8 MG/DL (ref 1.6–2.4)
MCH RBC QN AUTO: 31.8 PG (ref 26.6–33)
MCHC RBC AUTO-ENTMCNC: 32.1 G/DL (ref 31.5–35.7)
MCV RBC AUTO: 99.2 FL (ref 79–97)
MONOCYTES # BLD AUTO: 0.43 10*3/MM3 (ref 0.1–0.9)
MONOCYTES NFR BLD AUTO: 5.5 % (ref 5–12)
NEUTROPHILS NFR BLD AUTO: 5.8 10*3/MM3 (ref 1.7–7)
NEUTROPHILS NFR BLD AUTO: 73.8 % (ref 42.7–76)
NRBC BLD AUTO-RTO: 0 /100 WBC (ref 0–0.2)
PLATELET # BLD AUTO: 133 10*3/MM3 (ref 140–450)
PMV BLD AUTO: 9.5 FL (ref 6–12)
POTASSIUM SERPL-SCNC: 3.6 MMOL/L (ref 3.5–5.2)
POTASSIUM SERPL-SCNC: 3.7 MMOL/L (ref 3.5–5.2)
QT INTERVAL: 358 MS
QT INTERVAL: 418 MS
QTC INTERVAL: 461 MS
QTC INTERVAL: 482 MS
RBC # BLD AUTO: 3.55 10*6/MM3 (ref 4.14–5.8)
SODIUM SERPL-SCNC: 138 MMOL/L (ref 136–145)
WBC NRBC COR # BLD AUTO: 7.85 10*3/MM3 (ref 3.4–10.8)

## 2024-09-05 PROCEDURE — 84132 ASSAY OF SERUM POTASSIUM: CPT | Performed by: INTERNAL MEDICINE

## 2024-09-05 PROCEDURE — 93308 TTE F-UP OR LMTD: CPT | Performed by: INTERNAL MEDICINE

## 2024-09-05 PROCEDURE — 99232 SBSQ HOSP IP/OBS MODERATE 35: CPT | Performed by: INTERNAL MEDICINE

## 2024-09-05 PROCEDURE — 25010000002 FUROSEMIDE PER 20 MG: Performed by: NURSE PRACTITIONER

## 2024-09-05 PROCEDURE — 92610 EVALUATE SWALLOWING FUNCTION: CPT

## 2024-09-05 PROCEDURE — 93325 DOPPLER ECHO COLOR FLOW MAPG: CPT

## 2024-09-05 PROCEDURE — 93321 DOPPLER ECHO F-UP/LMTD STD: CPT

## 2024-09-05 PROCEDURE — 93325 DOPPLER ECHO COLOR FLOW MAPG: CPT | Performed by: INTERNAL MEDICINE

## 2024-09-05 PROCEDURE — 25010000002 SULFUR HEXAFLUORIDE MICROSPH 60.7-25 MG RECONSTITUTED SUSPENSION

## 2024-09-05 PROCEDURE — 94799 UNLISTED PULMONARY SVC/PX: CPT

## 2024-09-05 PROCEDURE — 85025 COMPLETE CBC W/AUTO DIFF WBC: CPT | Performed by: INTERNAL MEDICINE

## 2024-09-05 PROCEDURE — 94664 DEMO&/EVAL PT USE INHALER: CPT

## 2024-09-05 PROCEDURE — 25010000002 MIDAZOLAM PER 1 MG: Performed by: INTERNAL MEDICINE

## 2024-09-05 PROCEDURE — 94761 N-INVAS EAR/PLS OXIMETRY MLT: CPT

## 2024-09-05 PROCEDURE — 25010000002 MAGNESIUM SULFATE 4 GM/100ML SOLUTION

## 2024-09-05 PROCEDURE — 80048 BASIC METABOLIC PNL TOTAL CA: CPT | Performed by: INTERNAL MEDICINE

## 2024-09-05 PROCEDURE — 93308 TTE F-UP OR LMTD: CPT

## 2024-09-05 PROCEDURE — 83735 ASSAY OF MAGNESIUM: CPT | Performed by: INTERNAL MEDICINE

## 2024-09-05 PROCEDURE — 25010000002 POTASSIUM CHLORIDE PER 2 MEQ

## 2024-09-05 PROCEDURE — 82948 REAGENT STRIP/BLOOD GLUCOSE: CPT

## 2024-09-05 PROCEDURE — 93321 DOPPLER ECHO F-UP/LMTD STD: CPT | Performed by: INTERNAL MEDICINE

## 2024-09-05 PROCEDURE — 25010000002 FENTANYL CITRATE (PF) 50 MCG/ML SOLUTION: Performed by: INTERNAL MEDICINE

## 2024-09-05 RX ORDER — FENTANYL CITRATE 50 UG/ML
INJECTION, SOLUTION INTRAMUSCULAR; INTRAVENOUS
Status: DISCONTINUED | OUTPATIENT
Start: 2024-09-05 | End: 2024-09-05 | Stop reason: HOSPADM

## 2024-09-05 RX ORDER — POTASSIUM CHLORIDE 29.8 MG/ML
20 INJECTION INTRAVENOUS
Status: COMPLETED | OUTPATIENT
Start: 2024-09-05 | End: 2024-09-05

## 2024-09-05 RX ORDER — FUROSEMIDE 40 MG
40 TABLET ORAL DAILY
Status: DISCONTINUED | OUTPATIENT
Start: 2024-09-06 | End: 2024-09-09

## 2024-09-05 RX ORDER — MAGNESIUM SULFATE HEPTAHYDRATE 40 MG/ML
4 INJECTION, SOLUTION INTRAVENOUS ONCE
Status: COMPLETED | OUTPATIENT
Start: 2024-09-05 | End: 2024-09-05

## 2024-09-05 RX ORDER — MIDAZOLAM HYDROCHLORIDE 1 MG/ML
INJECTION INTRAMUSCULAR; INTRAVENOUS
Status: DISCONTINUED | OUTPATIENT
Start: 2024-09-05 | End: 2024-09-05 | Stop reason: HOSPADM

## 2024-09-05 RX ORDER — INSULIN LISPRO 100 [IU]/ML
2-7 INJECTION, SOLUTION INTRAVENOUS; SUBCUTANEOUS
Status: DISCONTINUED | OUTPATIENT
Start: 2024-09-05 | End: 2024-09-10 | Stop reason: HOSPADM

## 2024-09-05 RX ORDER — SPIRONOLACTONE 25 MG/1
25 TABLET ORAL DAILY
Status: DISCONTINUED | OUTPATIENT
Start: 2024-09-05 | End: 2024-09-10 | Stop reason: HOSPADM

## 2024-09-05 RX ADMIN — FUROSEMIDE 80 MG: 10 INJECTION, SOLUTION INTRAMUSCULAR; INTRAVENOUS at 08:32

## 2024-09-05 RX ADMIN — KETOROLAC TROMETHAMINE 1 DROP: 5 SOLUTION/ DROPS OPHTHALMIC at 20:55

## 2024-09-05 RX ADMIN — OXYCODONE AND ACETAMINOPHEN 1 TABLET: 10; 325 TABLET ORAL at 08:32

## 2024-09-05 RX ADMIN — IPRATROPIUM BROMIDE AND ALBUTEROL SULFATE 3 ML: 2.5; .5 SOLUTION RESPIRATORY (INHALATION) at 19:04

## 2024-09-05 RX ADMIN — IPRATROPIUM BROMIDE AND ALBUTEROL SULFATE 3 ML: 2.5; .5 SOLUTION RESPIRATORY (INHALATION) at 07:05

## 2024-09-05 RX ADMIN — KETOROLAC TROMETHAMINE 1 DROP: 5 SOLUTION/ DROPS OPHTHALMIC at 15:19

## 2024-09-05 RX ADMIN — SACUBITRIL AND VALSARTAN 1 TABLET: 49; 51 TABLET, FILM COATED ORAL at 08:32

## 2024-09-05 RX ADMIN — MAGNESIUM SULFATE IN WATER FOR 4 G: 40 INJECTION INTRAVENOUS at 06:31

## 2024-09-05 RX ADMIN — IPRATROPIUM BROMIDE AND ALBUTEROL SULFATE 3 ML: 2.5; .5 SOLUTION RESPIRATORY (INHALATION) at 00:52

## 2024-09-05 RX ADMIN — OXYCODONE AND ACETAMINOPHEN 1 TABLET: 10; 325 TABLET ORAL at 21:39

## 2024-09-05 RX ADMIN — POTASSIUM CHLORIDE 20 MEQ: 400 INJECTION, SOLUTION INTRAVENOUS at 07:57

## 2024-09-05 RX ADMIN — SULFUR HEXAFLUORIDE 3 ML: KIT at 15:18

## 2024-09-05 RX ADMIN — Medication 10 ML: at 21:38

## 2024-09-05 RX ADMIN — POTASSIUM CHLORIDE 20 MEQ: 400 INJECTION, SOLUTION INTRAVENOUS at 06:31

## 2024-09-05 RX ADMIN — PREDNISOLONE ACETATE 1 DROP: 10 SUSPENSION/ DROPS OPHTHALMIC at 15:12

## 2024-09-05 RX ADMIN — KETOROLAC TROMETHAMINE 1 DROP: 5 SOLUTION/ DROPS OPHTHALMIC at 08:09

## 2024-09-05 RX ADMIN — SPIRONOLACTONE 25 MG: 25 TABLET ORAL at 13:38

## 2024-09-05 RX ADMIN — OXYCODONE AND ACETAMINOPHEN 1 TABLET: 10; 325 TABLET ORAL at 15:09

## 2024-09-05 RX ADMIN — Medication 10 ML: at 08:33

## 2024-09-05 RX ADMIN — GABAPENTIN 800 MG: 400 CAPSULE ORAL at 21:38

## 2024-09-05 RX ADMIN — PANTOPRAZOLE SODIUM 40 MG: 40 TABLET, DELAYED RELEASE ORAL at 06:40

## 2024-09-05 RX ADMIN — SACUBITRIL AND VALSARTAN 1 TABLET: 49; 51 TABLET, FILM COATED ORAL at 20:55

## 2024-09-05 RX ADMIN — PREDNISOLONE ACETATE 1 DROP: 10 SUSPENSION/ DROPS OPHTHALMIC at 08:09

## 2024-09-05 RX ADMIN — OXYCODONE AND ACETAMINOPHEN 1 TABLET: 10; 325 TABLET ORAL at 02:41

## 2024-09-05 RX ADMIN — APIXABAN 5 MG: 5 TABLET, FILM COATED ORAL at 20:55

## 2024-09-05 RX ADMIN — GABAPENTIN 800 MG: 400 CAPSULE ORAL at 13:38

## 2024-09-05 RX ADMIN — PREDNISOLONE ACETATE 1 DROP: 10 SUSPENSION/ DROPS OPHTHALMIC at 20:28

## 2024-09-05 RX ADMIN — IPRATROPIUM BROMIDE AND ALBUTEROL SULFATE 3 ML: 2.5; .5 SOLUTION RESPIRATORY (INHALATION) at 13:35

## 2024-09-05 RX ADMIN — GABAPENTIN 800 MG: 400 CAPSULE ORAL at 06:32

## 2024-09-05 RX ADMIN — EMPAGLIFLOZIN 10 MG: 10 TABLET, FILM COATED ORAL at 11:25

## 2024-09-05 NOTE — PROGRESS NOTES
Cardiology Progress Note      Reason for visit:    Wide-complex tachycardia  NSTEMI  Acute systolic heart failure  Frequent PVCs    IDENTIFICATION: 79-year-old gentleman who resides in [place], [place]    Active Hospital Problems    Diagnosis  POA    **Acute respiratory failure with hypoxia and hypercapnia [J96.01, J96.02]  Yes     Priority: High    Sepsis due to pneumonia [J18.9, A41.9]  Yes     Priority: High    NSTEMI (non-ST elevated myocardial infarction) [I21.4]  Yes     Priority: Medium     Reportedly normal coronaries on cath 2020.  Data deficit.  Elevated troponins in setting of respiratory distress      HFrEF (heart failure with reduced ejection fraction) [I50.20]  Yes     Priority: Medium     Echo (8/31/2024): LVEF 30%      Persistent atrial fibrillation [I48.19]  Yes     Followed by Dr. Bear with [facility]  General cardiologist Dr. Galvez  Echocardiogram reportedly done 1 month ago normal per family to reevaluate ascending aortic aneurysm which was stable  Chads Vasc= 3 (age >75, CHF)      Wide-complex tachycardia [R00.0]  Yes    Prolonged Q-T interval on ECG [R94.31]  Yes     Prolonged QTc on dofetilide, POA  Tikosyn discontinued      Frequent PVCs [I49.3]  Yes     Reportedly normal coronaries on cath 2020  Monitor summer 2024, data deficit: 27% PVC burden, 89 pauses longest 6.5 seconds-all pauses occurred during sleeping hours       Continuous tobacco abuse [Z72.0]  Yes    HTN (hypertension) [I10]  Yes            Patient alert, awake, on room air           Vital Sign Min/Max for last 24 hours  Temp  Min: 98.6 °F (37 °C)  Max: 100 °F (37.8 °C)   BP  Min: 100/54  Max: 162/82   Pulse  Min: 80  Max: 113   Resp  Min: 16  Max: 20   SpO2  Min: 91 %  Max: 100 %   Flow (L/min)  Min: 2  Max: 2      Intake/Output Summary (Last 24 hours) at 9/5/2024 0825  Last data filed at 9/5/2024 0800  Gross per 24 hour   Intake 922 ml   Output 5775 ml   Net -4853 ml           Physical Exam  Constitutional:        Appearance: Normal appearance.   Cardiovascular:      Rate and Rhythm: Normal rate. Occasional   Pulmonary:      Effort: Pulmonary effort is normal.   Neurological:      Mental Status: He is alert.         Tele: Sinus rhythm with PVCs     Results Review (reviewed the patient's recent labs in the electronic medical record):      EKG (9/4/2024): Sinus with PVCs     CXR (9/1/2024): No significant change.  Similar trace bilateral pleural effusions with bibasilar airspace disease left greater than right     ECHO (8/31/2024): LVEF 30%.  RVSP 32 mmHg      Results from last 7 days   Lab Units 09/05/24  0322 09/04/24  0320 09/03/24  0342 09/02/24 2111 09/02/24  0519 09/01/24  0519 08/31/24  0430   SODIUM mmol/L 138 137 141 133* 138 136 138   POTASSIUM mmol/L 3.6 3.9 4.1  4.1 3.2* 3.9 4.0 3.9   CHLORIDE mmol/L 98 102 108* 99 105 104 105   BUN mg/dL 21 32* 28* 22 19 21 23   CREATININE mg/dL 0.69* 0.64* 0.81 0.66* 0.64* 0.71* 0.92   MAGNESIUM mg/dL 1.8 2.1 2.4  --  2.1 2.3 2.1     Results from last 7 days   Lab Units 09/02/24  2327 09/02/24 2111 08/31/24  0430   HSTROP T ng/L 229* 227* 278*     Results from last 7 days   Lab Units 09/05/24  0322 09/04/24  0320 09/03/24  0342   WBC 10*3/mm3 7.85 7.65 8.22   HEMOGLOBIN g/dL 11.3* 10.3* 10.4*   HEMATOCRIT % 35.2* 31.9* 32.7*   PLATELETS 10*3/mm3 133* 123* 135*       Lab Results   Component Value Date    HGBA1C 5.00 08/30/2024       Lab Results   Component Value Date    CHOL 141 09/02/2024    TRIG 130 09/02/2024              NSTEMI, probable type II  Reportedly normal coronaries on cath 2020 but data deficit  Troponins elevated 88, 138 and 519  Echo this admission shows reduced LVEF 30%  Eventual ischemic evaluation     HFrEF  Echo this admission LVEF 30%  On Entresto  Carvedilol held due to pauses   Start empagliflozin 10 mg daily  Cardiac catheterization today for ischemic evaluation      Persistent atrial fibrillation  Established with Dr. Bear with [facility] in clinic  Abdirahman Ritter  mg twice daily  Dofetilide discontinued due to prolonged QTc and wide-complex tachycardia on admission  On telemetry having intermittent A-fib with frequent PVCs     Frequent PVCs/wide-complex tachycardia/pauses on telemetry  Monitor in summer 2024 reportedly 27% PVC burden and 89 pauses with the longest of 6.5 seconds all during sleeping hours  Reportedly wide-complex tachycardia while in ED that was concerning for real VT rather than aberrancy that was felt to be due secondary to have acute hypoxia.  No further reoccurrences  Frequent PVCs and wide-complex QRS  Beta-blocker discontinued due to frequent pauses on telemetry     Hypertension  Current /72  Entresto 49/51 mg 1 tablet twice daily                Cardiac catheterization via right radial approach this morning    Electronically signed by Yasir Canales IV, MD, 09/05/24, 8:27 AM EDT.        Addendum:    Patient brought to the Cath Lab but unable to lay flat on cath table due to severe back pain.  Case aborted.  We will treat HFrEF medically and patient can follow-up with primary cardiologist as outpatient.  Patient presently on Entresto and starting empagliflozin.  Will add spironolactone.  Beta-blockers have been held due to significant pauses both during the day and night.    JOSESITO Canales MD [facility], [place] [facility]  Interventional and General Cardiology

## 2024-09-05 NOTE — PROGRESS NOTES
Intensive Care Follow-up     Hospital:  LOS: 6 days   Mr. Bo Perez, 79 y.o. male is followed for:   Acute respiratory failure with hypoxia and hypercapnia        Subjective     Mr. Perez is a 80yo M with a history of Afib on Tikosyn and 3pk/day smoker with a recent cataract surgery who presented to the [facility] ED on 8/30/24 with respiratory distress with hypoxia and required intubation. He was hypotensive and was started on Levophed.   Interval History:  The chart has been reviewed.  The patient has remained hemodynamically stable overnight.  He continues to have ectopy from time to time.  No chest pain or shortness of breath currently.  He is complaining about his puréed diet and feels that he could probably take a regular diet at this point.  He was unable to tolerate lying flat secondary to back pain during left heart catheterization today.    The patient's past medical, surgical and social history were reviewed and updated in Epic as appropriate.        Objective     Infusions:     Medications:  apixaban, 5 mg, Oral, Q12H  [Held by provider] ARIPiprazole, 5 mg, Nasogastric, Daily  empagliflozin, 10 mg, Oral, Daily  [START ON 9/6/2024] furosemide, 40 mg, Oral, Daily  gabapentin, 800 mg, Oral, Q8H  insulin lispro, 2-7 Units, Subcutaneous, 4x Daily AC & at Bedtime  ipratropium-albuterol, 3 mL, Nebulization, Q6H - RT  ketorolac, 1 drop, Right Eye, TID   Followed by  [START ON 9/11/2024] ketorolac, 1 drop, Right Eye, BID   Followed by  [START ON 9/19/2024] ketorolac, 1 drop, Right Eye, Daily  pantoprazole, 40 mg, Oral, Q AM  prednisoLONE acetate, 1 drop, Right Eye, TID   Followed by  [START ON 9/11/2024] prednisoLONE acetate, 1 drop, Right Eye, BID   Followed by  [START ON 9/19/2024] prednisoLONE acetate, 1 drop, Right Eye, Daily  sacubitril-valsartan, 1 tablet, Oral, Q12H  sodium chloride, 10 mL, Intravenous, Q12H  spironolactone, 25 mg, Oral, Daily        Vital Sign Min/Max for last 24 hours  Temp  Min: 98.6 °F  "(37 °C)  Max: 100 °F (37.8 °C)   BP  Min: 100/54  Max: 202/122   Pulse  Min: 82  Max: 113   Resp  Min: 16  Max: 20   SpO2  Min: 91 %  Max: 100 %   Flow (L/min)  Min: 2  Max: 2       Input/Output for last 24 hour shift  09/04 0701 - 09/05 0700  In: 922 [P.O.:872]  Out: 5725 [Urine:5725]      Objective:  General Appearance:  In no acute distress, comfortable and well-appearing.    Vital signs: (most recent): Blood pressure 105/77, pulse 93, temperature 98.8 °F (37.1 °C), temperature source Bladder, resp. rate 16, height 185 cm (72.84\"), weight (!) 160 kg (352 lb 11.8 oz), SpO2 95%.    Lungs:  Normal effort.  There are decreased breath sounds.  No rales, wheezes or rhonchi.    Heart: Normal rate.  Regular rhythm.  S1 normal and S2 normal.  No murmur.   Chest: Symmetric chest wall expansion.   Abdomen: Abdomen is soft.  Bowel sounds are normal.   There is no abdominal tenderness.     Extremities: Normal range of motion.  There is dependent edema.    Neurological: (Alert and oriented x 3.  Clear speech.).    Pupils:  Pupils are equal, round, and reactive to light.  Pupils are equal.   Skin:  Warm.  No rash.               Results from last 7 days   Lab Units 09/05/24  0322 09/04/24 0320 09/03/24  0342   WBC 10*3/mm3 7.85 7.65 8.22   HEMOGLOBIN g/dL 11.3* 10.3* 10.4*   PLATELETS 10*3/mm3 133* 123* 135*     Results from last 7 days   Lab Units 09/05/24  0322 09/04/24  0320 09/03/24  0342 09/02/24  2111 09/02/24  1439   SODIUM mmol/L 138 137 141 133*  --    POTASSIUM mmol/L 3.6 3.9 4.1  4.1 3.2*  --    CO2 mmol/L 31.0* 25.0 27.0 27.0  --    BUN mg/dL 21 32* 28* 22  --    CREATININE mg/dL 0.69* 0.64* 0.81 0.66*  --    MAGNESIUM mg/dL 1.8 2.1 2.4  --   --    PHOSPHORUS mg/dL  --   --  2.4* 1.8* 1.4*   GLUCOSE mg/dL 111* 100* 120* 129*  --      Estimated Creatinine Clearance: 139.8 mL/min (A) (by C-G formula based on SCr of 0.69 mg/dL (L)).    Results from last 7 days   Lab Units 09/03/24  0557   PH, ARTERIAL pH units 7.407 "   PCO2, ARTERIAL mm Hg 46.0*   PO2 ART mm Hg 86.0         I reviewed the patient's results and images.     Assessment & Plan   Impression        Acute respiratory failure with hypoxia and hypercapnia    HTN (hypertension)    Continuous tobacco abuse    Persistent atrial fibrillation    Frequent PVCs    Wide-complex tachycardia    NSTEMI (non-ST elevated myocardial infarction)    HFrEF (heart failure with reduced ejection fraction)    Prolonged Q-T interval on ECG    Acute HFrEF (heart failure with reduced ejection fraction)       Plan        Respiratory failure has largely resolved.  I suspect that this was mainly brought on by flash pulmonary edema.  Will continue to watch the patient's QTc.  Continue to hold Abilify.  Blood pressure control as before.  Plan is for medical management of ischemic heart disease and ischemic cardiomyopathy as the patient cannot tolerate left heart catheterization at this time.  Further medications and antiarrhythmics per the cardiology service.  Mobilize as tolerated.  Continue anticoagulation when okay with cardiology.  Transition to telemetry.    Plan of care and goals reviewed with mulitdisciplinary/antibiotic stewardship team during rounds.   I discussed the patient's findings and my recommendations with patient and nursing staff         Herrera Gardner MD, [facility]  Pulmonology and Critical Care Medicine

## 2024-09-05 NOTE — THERAPY RE-EVALUATION
Acute Care - Speech Language Pathology   Swallow Re-Evaluation [facility]  Swallow Re-evaluation     Patient Name: Bo Perez  : 1945  MRN: [id number]  Today's Date: 2024               Admit Date: 2024    Visit Dx:     ICD-10-CM ICD-9-CM   1. Acute respiratory failure with hypoxemia  J96.01 518.81   2. Flash pulmonary edema  J81.0 518.4   3. Acute sepsis  A41.9 038.9     995.91   4. Pneumonia of both lower lobes due to infectious organism  J18.9 486   5. Hypertensive emergency  I16.1 401.9   6. Oropharyngeal dysphagia  R13.12 787.22     Patient Active Problem List   Diagnosis    Chronic anticoagulation    HTN (hypertension)    Morbid obesity    Acute respiratory failure with hypoxia and hypercapnia    Sepsis due to pneumonia    Continuous tobacco abuse    Persistent atrial fibrillation    Frequent PVCs    Mixed hyperlipidemia    Wide-complex tachycardia    NSTEMI (non-ST elevated myocardial infarction)    HFrEF (heart failure with reduced ejection fraction)    Prolonged Q-T interval on ECG    Acute HFrEF (heart failure with reduced ejection fraction)     Past Medical History:   Diagnosis Date    A-fib     Arthritis     Hard to intubate     Hypertension     Sleep apnea     NOT USING CPAP     Past Surgical History:   Procedure Laterality Date    BLADDER SURGERY  2016    STIMULATOR IMPLANTED, NEW ELECTRODE 2022    CARDIAC ABLATION      AFIB    CARPAL TUNNEL RELEASE Right     CHOLECYSTECTOMY      COLONOSCOPY      DENTAL PROCEDURE      FINGER SURGERY Left     FOREFINGER BONE TRIMMED    HEMORRHOIDECTOMY  1973    KNEE ARTHROSCOPY Left 2008    MOUTH SURGERY      FOR SLEEP APNEA    SPINAL FUSION  2007    L3 LUMBAR SPINAL STENOSIS    TONSILLECTOMY      TOTAL KNEE ARTHROPLASTY Left 2009    TOTAL KNEE ARTHROPLASTY Right 2023    Procedure: TOTAL KNEE ARTHROPLASTY - RIGHT;  Surgeon: Leland Elizalde MD;  Location: [facility];  Service: Orthopedics;  Laterality: Right;        SLP Recommendation and Plan                                                                               Plan of Care Reviewed With: patient      SWALLOW EVALUATION (Last 72 Hours)       SLP Adult Swallow Evaluation       Row Name 09/05/24 1110 09/03/24 1020 09/03/24 0950 09/02/24 1300          Rehab Evaluation    Document Type re-evaluation  -MM evaluation  -GA re-evaluation  -GA evaluation  -     Subjective Information -- no complaints  -GA no complaints  -GA no complaints  -     Patient Observations cooperative;alert  -MM alert;cooperative;agree to therapy  -GA alert;cooperative;agree to therapy  able to rouse following 5 minutes of conversation and stimulation  -GA alert;cooperative  -     Patient/Family/Caregiver Comments/Observations -- no family present  -GA no family present  -GA no family present  -     Patient Effort good  -MM adequate  -GA good  -GA adequate  -     Symptoms Noted During/After Treatment -- none  -GA none  -GA none  -     Oral Care -- -- teeth brushed - suction toothbrush  -GA suction provided  -        General Information    Patient Profile Reviewed yes  -MM yes  -GA yes  -GA yes  -CJ     Pertinent History Of Current Problem --  Re-evaluation on this date due to requests for diet advancement per RN who reported the pt has been tolerating current diet without difficulty.  -MM see SLP hx  -GA see SLP hx. RN requesting a re-evaluation readiness d/t patient lethargic and sleeping.  -GA Pt adm w/ resp failure w/ hypoxia/hypercapnia; sig h/o HTN, sepsis, afib, PVC's NSTEMI, HLD. Per chart has h/o esophageal dilation. CXR: bilateral perihilar airspace opacities. Intubated 8/30-9/2  -     Current Method of Nutrition pureed;nectar/syrup-thick liquids  -MM NPO;nasogastric feedings;large-bore  -GA NPO;nasogastric feedings;large-bore  -GA NPO;nasogastric feedings;large-bore  -CJ     Precautions/Limitations, Vision -- WFL;for purposes of eval  -GA WFL;for purposes of eval  -GA  WFL;for purposes of eval  -     Precautions/Limitations, Hearing -- WFL;for purposes of eval  -GA WFL;for purposes of eval  -GA WFL;for purposes of eval  -     Prior Level of Function-Communication -- unknown  -GA unknown  -GA unknown  -     Prior Level of Function-Swallowing -- unknown;esophageal concerns  -GA unknown;esophageal concerns  dilation procedure in hx  -GA unknown;esophageal concerns  -     Plans/Goals Discussed with -- patient;agreed upon  -GA patient;agreed upon  -GA patient;agreed upon  -     Barriers to Rehab -- medically complex  -GA medically complex  -GA medically complex  -     Patient's Goals for Discharge -- return to PO diet  -GA return to PO diet  -GA return to PO diet  -        Pain    Additional Documentation Pain Scale: Numbers Pre/Post-Treatment (Group)  -MM Pain Scale: FACES Pre/Post-Treatment (Group)  -GA Pain Scale: FACES Pre/Post-Treatment (Group)  -GA Pain Scale: FACES Pre/Post-Treatment (Group)  -        Pain Scale: Numbers Pre/Post-Treatment    Pretreatment Pain Rating 0/10 - no pain  -MM -- -- --     Posttreatment Pain Rating 0/10 - no pain  -MM -- -- --     Pre/Posttreatment Pain Comment --  pt reported no pain on this date  -MM -- -- --        Pain Scale: FACES Pre/Post-Treatment    Pain: FACES Scale, Pretreatment -- 0-->no hurt  -GA 0-->no hurt  -GA 0-->no hurt  -CJ     Posttreatment Pain Rating -- 0-->no hurt  -GA 0-->no hurt  -GA 0-->no hurt  -CJ        Oral Motor Structure and Function    Dentition Assessment -- -- natural, present and adequate  -GA natural, present and adequate  -     Secretion Management -- -- WNL/WFL  -GA problems swallowing secretions  -     Mucosal Quality -- -- sticky  -GA sticky  -CJ        Oral Musculature and Cranial Nerve Assessment    Oral Motor General Assessment WFL  -MM -- vocal impairment  -GA vocal impairment  -     Vocal Impairment, Detail. Cranial Nerve X (Vagus) -- -- vocal quality abnormality (see comments)  -GA  vocal quality abnormality (see comments)  strained  -        General Eating/Swallowing Observations    Respiratory Support Currently in Use -- nasal cannula  -GA nasal cannula  -GA nasal cannula  -     O2 Liters -- 2L  -GA 2L  -GA --     Eating/Swallowing Skills -- -- fed by SLP  -GA fed by SLP  in  wrist restraints  -     Positioning During Eating -- -- upright in bed  -GA upright in bed  -     Utensils Used -- -- spoon;cup  -GA spoon;cup;straw  -     Consistencies Trialed -- -- pureed;ice chips;thin liquids  -GA pureed;ice chips;thin liquids  -        Respiratory    Date of Intubation -- -- -- 8/30-9/2  -        Clinical Swallow Eval    Oral Prep Phase WFL  -MM -- WFL  -GA --     Oral Transit WFL  -MM -- WFL  -GA --     Oral Residue WFL  -MM -- -- --     Pharyngeal Phase no overt signs/symptoms of pharyngeal impairment  - -- suspected pharyngeal impairment  -GA suspected pharyngeal impairment  -     Esophageal Phase unremarkable  -MM -- unremarkable  -GA --     Clinical Swallow Evaluation Summary -- -- Patient without overt s/sx with ice chips, teaspoon sips, and puree solids. Patient with multiple swallows noted across liquid consistencies. Voice quality clear following PO. Voice intensity strong but continues to be mildly strained. FEES recommended.  -GA Pt w/ decreased vocal intensity, strained in quality. Overt s/s of aspiration w/ thins w/ cough response, multiple swallows w/ puree. Will plan for FEES tomorrow to further assess  -        Pharyngeal Phase Concerns    Pharyngeal Phase Concerns -- -- multiple swallows  -GA cough;multiple swallows  -     Multiple Swallows -- -- thin;other (see comments)  ice chips  -GA --     Pharyngeal Phase Concerns, Comment Patient presents with single swallows, +hyolaryngeal elevation and excursion, and no overt s/sx aspiration on this date.  -MM -- -- --        Fiberoptic Endoscopic Evaluation of Swallowing (FEES)    Risks/Benefits Reviewed --  risks/benefits explained;patient;agreed to eval  -GA -- --     Nasal Entry -- right:  -GA -- --     Scope serial number/identification -- [id number]  -GA -- --        Anatomy and Physiology    Anatomic Considerations -- erythema;edema;vocal folds;false vocal folds  -GA -- --     Comment -- NG causing excess secretions between BOT and posterior pharyngeal wall. However laryngea vestibule and pyiform sinus was clear of excess secretions.  -GA -- --     Velopharyngeal -- WFL  -GA -- --     Base of Tongue -- symmetrical  -GA -- --     Epiglottis -- WFL;omega shape  -GA -- --     Laryngeal Function Breathing -- symmetrical  -GA -- --     Laryngeal Function Phonation -- symmetrical  -GA -- --     Laryngeal Function to Breath Hold -- TVF contact;sustained closure  -GA -- --     Secretion Rating Scale (Declan alvarez alTeddy 1996) -- 1- secretions present around the laryngeal vestibule  -GA -- --     Secretion Description -- thick;white  -GA -- --     Ice Chips -- partially cleared secretions  -GA -- --     Spontaneous Swallow -- frequency adequate  -GA -- --     Sensory -- sensed scope  -GA -- --     Utensils Used -- Spoon;Cup  -GA -- --     Consistencies Trialed -- thin liquids;nectar-thick liquids;pudding/puree  no further trials were administered d/t scope becoming difficult to clear with excess secretions  -GA -- --        FEES Interpretation    Oral Phase -- prespill of liquids into pharynx  -GA -- --        Initiation of Pharyngeal Swallow    Initiation of Pharyngeal Swallow -- bolus in pyriform sinuses  -GA -- --     Pharyngeal Phase -- impaired pharyngeal phase of swallowing  -GA -- --     Penetration Before the Swallow -- thin liquids;secondary to delayed swallow initiation or mistiming  -GA -- --     Penetration After the Swallow -- thin liquids;secondary to residue;other (see comments);in pyriform sinuses  secretions pooling and mixing with secretions from oral cavity and pharyng  -GA -- --     Depth of Penetration --  deep;shallow  -GA -- --     Response to Penetration -- Yes  -GA -- --     Responsed to penetration with -- spontaneous swallow;effective  -GA -- --     Rosenbek's Scale -- thin:;4-->Level 4  -GA -- --     Residue -- thin liquids  -GA -- --     Response to Residue -- cleared residue;with spontaneous subsequent swallow  -GA -- --     Attempted Compensatory Maneuvers -- bolus size;bolus presentation style;additional subsequent swallow;throat clear after swallow  -GA -- --     Response to Attempted Compensatory Maneuvers -- reduced residue;prevented aspiration  -GA -- --     Successful Compensatory Maneuver Competency -- patient able to;demonstrate compensations;with cues  -GA -- --     Pharyngeal Phase, Comment -- Had difficulty observing puree trials immediately after initial swallow d/t camera quality. Following 2 swallow patient with limtied residue, nothing below VF, and no throat clear present.  -GA -- --     FEES Summary -- Patient demonstrated shallow penetration before and deep penetration of thin liquids. Suspect swallow function impacted by secretions and large bore NG. Patient demonstrating ability to clear residue and pooling secretions with spontaneous sequential swallows. Also able to produce throat clears when cued to help clear residue and re-swallow. Patient does well following directions and taking single small sips. Recommend patient repeat FEES soon/once large bore NG is removed. Swallow function and visualization will improve. Difficulty seeing puree trails d/t camera quality. However following 2 swallows no evidence of aspiration following the swallow and residue elimiated. Recommend diet of nectar via small cup sips and puree solids. strategies of 2 swallow per bolus and throat clear/re-swallow intermittently. Medication whole or crushed in puree. Puree recommended d/t scope being unable to clear and not able to visualize solids. Recommend trialing solids at bedside to determine readiness for  advancement or following repeat instrumental.  -GA -- --        SLP Evaluation Clinical Impression    SLP Swallowing Diagnosis -- mild;oral dysphagia;mild-moderate;pharyngeal dysphagia  -GA R/O pharyngeal dysphagia  -GA oral dysphagia;suspected pharyngeal dysphagia  -     Functional Impact -- risk of aspiration/pneumonia  -GA risk of aspiration/pneumonia  -GA risk of aspiration/pneumonia  -     Rehab Potential/Prognosis, Swallowing -- adequate, monitor progress closely  -GA -- adequate, monitor progress closely  -     Swallow Criteria for Skilled Therapeutic Interventions Met -- demonstrates skilled criteria  -GA -- demonstrates skilled criteria  -        Recommendations    Therapy Frequency (Swallow) -- 5 days per week  -GA -- --     Predicted Duration Therapy Intervention (Days) -- 1 week  -GA -- --     SLP Diet Recommendation -- puree  -GA NPO;temporary alternate methods of nutrition/hydration  -GA NPO;temporary alternate methods of nutrition/hydration  until FEES tomorrow  -     Recommended Diagnostics -- -- FEES  -GA reassess via FEES  9/3  -     Recommended Precautions and Strategies -- upright posture during/after eating;small bites of food and sips of liquid;multiple swallows per bite of food;multiple swallows per sip of liquid;volitional throat clear;general aspiration precautions  -GA general aspiration precautions  -GA general aspiration precautions  -     Oral Care Recommendations -- Oral Care BID/PRN;Suction toothbrush  -GA Oral Care BID/PRN;Suction toothbrush  -GA Oral Care BID/PRN;Suction toothbrush  -     SLP Rec. for Method of Medication Administration -- meds whole;with puree  -GA meds via alternate route  -GA meds via alternate route  -     Monitor for Signs of Aspiration -- yes;notify SLP if any concerns  -GA yes;notify SLP if any concerns  -GA yes;notify SLP if any concerns  -     Anticipated Discharge Disposition (SLP) -- inpatient rehabilitation facility  -GA inpatient  rehabilitation facility  -GA inpatient rehabilitation facility  -               User Key  (r) = Recorded By, (t) = Taken By, (c) = Cosigned By      Initials Name Effective Dates    CJ Diana Salazar MARTIN, MS CCC-SLP 06/03/24 -     GA Jennifer Rodriguez, MS CCC-SLP 09/14/23 -     MM Court Hernandez, MS CCC-SLP 08/30/24 -                     EDUCATION  The patient has been educated in the following areas:   Dysphagia (Swallowing Impairment).        SLP GOALS       Row Name 09/05/24 1100 09/03/24 1020          (LTG) Patient will demonstrate functional swallow for    Diet Texture (Demonstrate functional swallow) regular textures  -MM regular textures  -GA     Liquid viscosity (Demonstrate functional swallow) thin liquids  -MM thin liquids  -GA     Almo (Demonstrate functional swallow) independently (over 90% accuracy)  -MM independently (over 90% accuracy)  -GA     Time Frame (Demonstrate functional swallow) by discharge  -MM by discharge  -GA     Progress/Outcomes (Demonstrate functional swallow) good progress toward goal  Patient tolerated  -MM new goal  -GA     Comment (Demonstrate functional swallow) Patient tolerated trials of regular and thin liquids with functional mastication and no overt s/sx aspiration.  -MM --        (STG) Patient will tolerate trials of    Consistencies Trialed (Tolerate trials) pureed textures;nectar/ mildly thick liquids  -MM pureed textures;nectar/ mildly thick liquids  -GA     Desired Outcome (Tolerate trials) without signs/symptoms of aspiration  -MM without signs/symptoms of aspiration  -GA     Almo (Tolerate trials) with minimal cues (75-90% accuracy)  -MM with minimal cues (75-90% accuracy)  -GA     Progress/Outcomes (Tolerate trials) goal met  -MM new goal  -GA     Comment (Tolerate trials) Pt has been tolerating current diet without difficulty or reports of overt s/sx aspiration.  -MM --        (STG) Patient will tolerate therapeutic trials of    Consistencies Trialed  (Tolerate therapeutic trials) soft to chew (chopped) textures;thin liquids  -MM soft to chew (chopped) textures;thin liquids  -GA     Desired Outcome (Tolerate therapeutic trials) without signs/symptoms of aspiration;with use of compensatory strategies (see comments)  -MM without signs/symptoms of aspiration;with use of compensatory strategies (see comments)  throat clear and 2x swallow  -GA     [place] (Tolerate therapeutic trials) with minimal cues (75-90% accuracy)  -MM with minimal cues (75-90% accuracy)  -GA     Progress/Outcomes (Tolerate therapeutic trials) goal met  -MM new goal  -GA     Comment (Tolerate therapeutic trials) Patient tolerated trials without difficulty.  -MM --        (STG) Pharyngeal Strengthening Exercise Goal 1 (SLP)    Activity (Pharyngeal Strengthening Goal 1, SLP) -- increase timing;increase superior movement of the hyolaryngeal complex;increase anterior movement of the hyolaryngeal complex;increase closure at entrance to airway/closure of airway at glottis  -GA     Increase Timing -- prepping - 3 second prep or suck swallow or 3-step swallow  -GA     Increase Superior Movement of the Hyolaryngeal Complex -- Mendelsohn;falsetto  -GA     Increase Anterior Movement of the Hyolaryngeal Complex -- hard effortful swallow  -GA     Increase Closure at Entrance to Airway/Closure of Airway at Glottis -- chin tuck against resistance (CTAR)  -GA     [place]/Accuracy (Pharyngeal Strengthening Goal 1, SLP) -- with minimal cues (75-90% accuracy)  -GA     Time Frame (Pharyngeal Strengthening Goal 1, SLP) -- 1 week  -GA     Progress/Outcomes (Pharyngeal Strengthening Goal 1, SLP) goal ongoing  -MM new goal  -GA               User Key  (r) = Recorded By, (t) = Taken By, (c) = Cosigned By      Initials Name Provider Type    GA Jennifer Rodriguez, MS CCC-SLP Speech and Language Pathologist    Court Norwood, MS CCC-SLP Speech and Language Pathologist                         Time Calculation:     Time Calculation- SLP       Row Name 09/05/24 1154             Time Calculation- SLP    SLP Start Time 1110  -MM      SLP Received On 09/05/24  -MM         Untimed Charges    SLP Eval/Re-eval  ST Eval Oral Pharyng Swallow - [id number]  -MM      [id number] Eval Oral Pharyng Swallow Minutes 45  -MM         Total Minutes    Untimed Charges Total Minutes 45  -MM       Total Minutes 45  -MM                User Key  (r) = Recorded By, (t) = Taken By, (c) = Cosigned By      Initials Name Provider Type    Court Norwood, MS CCC-SLP Speech and Language Pathologist                    Therapy Charges for Today       Code Description Service Date Service Provider Modifiers Qty    [id number] HC ST EVAL ORAL PHARYNG SWALLOW 3 9/5/2024 Court Hernandez MS CCC-SLP GN 1                 Court Hernandez MS CCC-ANABELLA  9/5/2024

## 2024-09-05 NOTE — PLAN OF CARE
Goal Outcome Evaluation:   Pt remains A&O x 4. On RA. SR w/ 1st degree AVB, HR 40s-90s with frequent PVCs. PRN Percocet given x 2 for back pain. UOP of 1950 mL. No BM. Tmax-100. K & Mag replaced per protocol. NPO since midnight.

## 2024-09-05 NOTE — PLAN OF CARE
Goal Outcome Evaluation: Heart cath aborted due to inability to lay flat on table. A&Ox4. Denies dyspnea and chest pain. Regular cardiac diet ordered.            Problem: Communication Impairment (Mechanical Ventilation, Invasive)  Goal: Effective Communication  Intervention: Ensure Effective Communication  Recent Flowsheet Documentation  Taken 9/5/2024 0800 by Nura Maloney RN  Communication Enhancement Strategies: call light answered in person     Problem: Skin and Tissue Injury (Mechanical Ventilation, Invasive)  Goal: Absence of Device-Related Skin and Tissue Injury  Outcome: Ongoing, Progressing  Intervention: Maintain Skin and Tissue Health  Recent Flowsheet Documentation  Taken 9/5/2024 0800 by Nura Maloney RN  Device Skin Pressure Protection: absorbent pad utilized/changed     Problem: Adult Inpatient Plan of Care  Goal: Absence of Hospital-Acquired Illness or Injury  Intervention: Identify and Manage Fall Risk  Recent Flowsheet Documentation  Taken 9/5/2024 1800 by Nura Maloney RN  Safety Promotion/Fall Prevention:   safety round/check completed   activity supervised  Taken 9/5/2024 1600 by Nura Maloney RN  Safety Promotion/Fall Prevention:   safety round/check completed   activity supervised  Taken 9/5/2024 1400 by Nura Maloney RN  Safety Promotion/Fall Prevention:   safety round/check completed   activity supervised  Taken 9/5/2024 1200 by Nura Maloney RN  Safety Promotion/Fall Prevention:   safety round/check completed   activity supervised  Taken 9/5/2024 1000 by Nura Maloney RN  Safety Promotion/Fall Prevention:   safety round/check completed   activity supervised  Taken 9/5/2024 0800 by Nura Maloney RN  Safety Promotion/Fall Prevention:   safety round/check completed   activity supervised     Problem: Adult Inpatient Plan of Care  Goal: Absence of Hospital-Acquired Illness or Injury  Intervention: Prevent Skin Injury  Recent Flowsheet Documentation  Taken 9/5/2024 1800  by Nura Maloney RN  Body Position:   weight shifting   patient/family refused  Taken 9/5/2024 1600 by Nura Maloney RN  Body Position: patient/family refused  Taken 9/5/2024 1400 by Nura Maloney RN  Body Position: patient/family refused  Taken 9/5/2024 1200 by Nura Maloney RN  Body Position: patient/family refused  Taken 9/5/2024 1000 by Nura Maloney RN  Body Position:   weight shifting   side-lying   neutral body alignment   neutral head position  Taken 9/5/2024 0800 by Nura Maloney RN  Body Position: patient/family refused  Skin Protection:   tubing/devices free from skin contact   incontinence pads utilized     Problem: Adult Inpatient Plan of Care  Goal: Absence of Hospital-Acquired Illness or Injury  Intervention: Prevent and Manage VTE (Venous Thromboembolism) Risk  Recent Flowsheet Documentation  Taken 9/5/2024 1800 by Nura Maloney RN  Activity Management: activity encouraged  Taken 9/5/2024 1600 by Nura Maloney RN  Activity Management: activity encouraged  Taken 9/5/2024 1400 by Nura Maloney RN  Activity Management: activity encouraged  Taken 9/5/2024 1200 by Nura Maloney RN  Activity Management:   activity encouraged   bedrest  Taken 9/5/2024 1000 by Nura Maloney RN  Activity Management:   dorsiflexion/plantar flexion performed   activity encouraged  Taken 9/5/2024 0800 by Nura Maloney RN  Activity Management:   dorsiflexion/plantar flexion performed   activity encouraged  VTE Prevention/Management:   sequential compression devices off   patient refused intervention  Range of Motion: active ROM (range of motion) encouraged     Problem: Device-Related Complication Risk (Mechanical Ventilation, Invasive)  Goal: Optimal Device Function  Intervention: Optimize Device Care and Function  Recent Flowsheet Documentation  Taken 9/5/2024 1800 by Nura Maloney RN  Airway Safety Measures:   suction at bedside   oxygen flowmeter at bedside   mask valve resuscitator at  bedside  Taken 9/5/2024 1600 by Nura Maloney RN  Airway Safety Measures:   suction at bedside   oxygen flowmeter at bedside   mask valve resuscitator at bedside  Taken 9/5/2024 1400 by Nura Maloney RN  Airway Safety Measures:   suction at bedside   oxygen flowmeter at bedside   mask valve resuscitator at bedside  Taken 9/5/2024 1200 by Nura Maloney RN  Airway Safety Measures:   suction at bedside   oxygen flowmeter at bedside   mask valve resuscitator at bedside  Taken 9/5/2024 1000 by Nura Maloney RN  Airway Safety Measures:   suction at bedside   oxygen flowmeter at bedside   mask valve resuscitator at bedside  Taken 9/5/2024 0800 by Nura Maloney RN  Airway Safety Measures:   suction at bedside   oxygen flowmeter at bedside   mask valve resuscitator at bedside     Problem: Restraint, Nonviolent  Goal: Absence of Harm or Injury  Intervention: Protect Skin and Joint Integrity  Recent Flowsheet Documentation  Taken 9/5/2024 1800 by Nura Maloney RN  Body Position:   weight shifting   patient/family refused  Taken 9/5/2024 1600 by Nura Maloney RN  Body Position: patient/family refused  Taken 9/5/2024 1400 by Nura Maloney RN  Body Position: patient/family refused  Taken 9/5/2024 1200 by Nura Maloney RN  Body Position: patient/family refused  Taken 9/5/2024 1000 by Nura Maloney RN  Body Position:   weight shifting   side-lying   neutral body alignment   neutral head position  Taken 9/5/2024 0800 by Nura Maloney RN  Body Position: patient/family refused  Range of Motion: active ROM (range of motion) encouraged

## 2024-09-05 NOTE — PROGRESS NOTES
"  [facility] at [facility]  PROGRESS NOTE    Date of Admission: 8/30/2024  Date of Service: 09/05/24    Primary Care Physician: Srinivasan Cary MD    Chief Complaint: PVCs, WCT      Subjective      HPI: Patient improving. Lying in bed in no acute distress. Denies chest pain, dyspnea or palpitations. [facility] aborted today due to inability to lie flat from back pain      Objective   Vitals: /73   Pulse 92   Temp 98.8 °F (37.1 °C) (Bladder)   Resp 16   Ht 185 cm (72.84\")   Wt (!) 160 kg (352 lb 11.8 oz)   SpO2 96%   BMI 46.75 kg/m²     Physical Exam:   GENERAL: Alert, cooperative, in no acute distress.   HEART: Regular rate and irregular rhythm; no murmurs, rubs or gallops  LUNGS: Clear to auscultation bilaterally. No wheezing, rales or rhonchi. Nonlabored breathing    Results:  Results from last 7 days   Lab Units 09/05/24  0322 09/04/24  0320 09/03/24  0342   WBC 10*3/mm3 7.85 7.65 8.22   HEMOGLOBIN g/dL 11.3* 10.3* 10.4*   HEMATOCRIT % 35.2* 31.9* 32.7*   PLATELETS 10*3/mm3 133* 123* 135*     Results from last 7 days   Lab Units 09/05/24  1233 09/05/24  0322 09/04/24  0320 09/03/24  0342   SODIUM mmol/L  --  138 137 141   POTASSIUM mmol/L 3.7 3.6 3.9 4.1  4.1   CHLORIDE mmol/L  --  98 102 108*   CO2 mmol/L  --  31.0* 25.0 27.0   BUN mg/dL  --  21 32* 28*   CREATININE mg/dL  --  0.69* 0.64* 0.81   GLUCOSE mg/dL  --  111* 100* 120*      Lab Results   Component Value Date    CHOL 141 09/02/2024    TRIG 130 09/02/2024    AST 39 09/02/2024    ALT 15 09/02/2024     Results from last 7 days   Lab Units 08/30/24  0325   HEMOGLOBIN A1C % 5.00     Results from last 7 days   Lab Units 09/02/24  0519   CHOLESTEROL mg/dL 141   TRIGLYCERIDES mg/dL 130     Results from last 7 days   Lab Units 08/30/24  0401   TSH uIU/mL 2.900  2.900         Results from last 7 days   Lab Units 08/30/24  0325   PROTIME Seconds 14.0   INR  1.07   APTT seconds 25.0*     Results from last 7 days   Lab Units " 09/02/24  2327 09/02/24  2111 08/31/24  0430 08/30/24  0634 08/30/24  0401   CK TOTAL U/L  --   --   --   --  71   HSTROP T ng/L 229* 227* 278*   < > 88*    < > = values in this interval not displayed.     Results from last 7 days   Lab Units 08/30/24  0401   PROBNP pg/mL 1,061.0         Intake/Output Summary (Last 24 hours) at 9/5/2024 1428  Last data filed at 9/5/2024 1200  Gross per 24 hour   Intake 522 ml   Output 7350 ml   Net -6828 ml       I personally reviewed the patient's EKG/Telemetry data    Radiology Data:   XR Chest 1 View    Result Date: 9/2/2024  Impression: 1.Stable appearance of bilateral perihilar airspace opacities, suggestive of pulmonary edema with small pleural effusions. 2.Positioning of support tubes and lines, as above. Electronically Signed: Radha Lawrence  9/2/2024 8:08 AM EDT  Workstation ID: [id number]    XR Chest 1 View    Result Date: 9/1/2024  Impression: No significant interval change, with similar trace bilateral pleural effusions and bibasilar airspace disease, left greater than right. Electronically Signed: Boaz Schreiber MD  9/1/2024 8:27 AM EDT  Workstation ID: [id number]    XR Chest 1 View    Result Date: 8/31/2024  Impression: Nasogastric tube terminates in the stomach, and a right-sided PICC line terminates in the SVC. Some chronic interstitial changes appear similar and there is likely decreased component of edema/congestion with trace suspected left pleural effusion. There is no distinct pneumothorax. Electronically Signed: Timo Flores MD  8/31/2024 8:09 AM EDT  Workstation ID: [id number]    XR Abdomen KUB    Result Date: 8/30/2024  Impression: NG tube curves oriented retrograde terminating over the gastric fundus. No dilated loops of bowel in the upper abdomen. Electronically Signed: Daryn Toledo MD  8/30/2024 6:35 AM EDT  Workstation ID: [id number]    XR Chest 1 View    Result Date: 8/30/2024  Impression: Mild pulmonary edema pattern with patchy airspace disease within the  lung bases bilaterally, right greater than left, likely related to pneumonia. Probable small right-sided pleural effusion present. Electronically Signed: Gloria Pritchett MD  8/30/2024 3:38 AM EDT  Workstation ID: [id number]       Current Medications:  apixaban, 5 mg, Oral, Q12H  [Held by provider] ARIPiprazole, 5 mg, Nasogastric, Daily  empagliflozin, 10 mg, Oral, Daily  [START ON 9/6/2024] furosemide, 40 mg, Oral, Daily  gabapentin, 800 mg, Oral, Q8H  insulin lispro, 2-7 Units, Subcutaneous, 4x Daily AC & at Bedtime  ipratropium-albuterol, 3 mL, Nebulization, Q6H - RT  ketorolac, 1 drop, Right Eye, TID   Followed by  [START ON 9/11/2024] ketorolac, 1 drop, Right Eye, BID   Followed by  [START ON 9/19/2024] ketorolac, 1 drop, Right Eye, Daily  pantoprazole, 40 mg, Oral, Q AM  prednisoLONE acetate, 1 drop, Right Eye, TID   Followed by  [START ON 9/11/2024] prednisoLONE acetate, 1 drop, Right Eye, BID   Followed by  [START ON 9/19/2024] prednisoLONE acetate, 1 drop, Right Eye, Daily  sacubitril-valsartan, 1 tablet, Oral, Q12H  sodium chloride, 10 mL, Intravenous, Q12H  spironolactone, 25 mg, Oral, Daily           Assessment  History of paroxysmal atrial fibrillation  Established with Dr. Bear with [facility] in clinic  On Tikosyn prior to admission, now on hold due to long QT  Wide-complex tachycardia  In ER, resolved with no recurrence since being in ER  In setting of acute hypoxic, hypercapnic respiratory failure  PVCs  HFrEF  Possibly PVC-induced  TTE 9/3/24 LVEF 30%, possible stress induced CM vs CAD  Unable to tolerate [facility] d/t back pain, pursuing medical treatment           Plan  Wide complex tachycardia in the ER unclear if true VT vs atrial arrhythmia w/ aberrancy. This was in the setting of acute respiratory failure possibly from flash pulmonary edema  Tikosyn held earlier this admission d/t prolonged QTc. Beta blockade held due to pauses and intermittent av block. The patient is not a good candidate for antiarrhythmics  due to new reduced LVEF and prolonged QTc on Tikosyn. We could use amiodarone if the patient develops an unstable tachyarrhythmia, but would hold off if his rhythm remains stable  He continues to have intermittently frequent PVCs  Will document repeat LVEF to see if improved >35%. Will discuss pros vs cons of LifeVest if LVEF still <35%  Patient follows with Dr. Bear who is retiring. Will have patient follow up with Dr. Chamorro's office in 4-6 weeks  Patient will need 14 day holter at discharge to assess PVC burden as he improves clinically. We will discuss amiodarone vs PVC ablation at outpatient follow up  Cardiac EP will follow on an as needed basis    Care plan discussed with patient, wife and Summer Jones with interventional cards    Electronically signed by Sage Arora PA-C, 09/05/24, 2:34 PM EDT.

## 2024-09-06 LAB
ALBUMIN SERPL-MCNC: 2.7 G/DL (ref 3.5–5.2)
ALBUMIN/GLOB SERPL: 0.9 G/DL
ALP SERPL-CCNC: 43 U/L (ref 39–117)
ALT SERPL W P-5'-P-CCNC: 24 U/L (ref 1–41)
ANION GAP SERPL CALCULATED.3IONS-SCNC: 13 MMOL/L (ref 5–15)
AST SERPL-CCNC: 29 U/L (ref 1–40)
BASOPHILS # BLD AUTO: 0.04 10*3/MM3 (ref 0–0.2)
BASOPHILS NFR BLD AUTO: 0.6 % (ref 0–1.5)
BH CV ECHO MEAS - AO MAX PG: 17 MMHG
BH CV ECHO MEAS - AO MEAN PG: 9.5 MMHG
BH CV ECHO MEAS - AO ROOT DIAM: 3.2 CM
BH CV ECHO MEAS - AO V2 MAX: 206 CM/SEC
BH CV ECHO MEAS - AO V2 VTI: 36.1 CM
BH CV ECHO MEAS - EDV(CUBED): 140.6 ML
BH CV ECHO MEAS - EDV(MOD-SP2): 180 ML
BH CV ECHO MEAS - EDV(MOD-SP4): 218 ML
BH CV ECHO MEAS - EF(MOD-BP): 38.8 %
BH CV ECHO MEAS - EF(MOD-SP2): 38.9 %
BH CV ECHO MEAS - EF(MOD-SP4): 35.8 %
BH CV ECHO MEAS - ESV(CUBED): 79.5 ML
BH CV ECHO MEAS - ESV(MOD-SP2): 110 ML
BH CV ECHO MEAS - ESV(MOD-SP4): 140 ML
BH CV ECHO MEAS - FS: 17.3 %
BH CV ECHO MEAS - IVS/LVPW: 1 CM
BH CV ECHO MEAS - IVSD: 1.2 CM
BH CV ECHO MEAS - LA DIMENSION: 4.8 CM
BH CV ECHO MEAS - LV MASS(C)D: 248.8 GRAMS
BH CV ECHO MEAS - LV MAX PG: 3.2 MMHG
BH CV ECHO MEAS - LV MEAN PG: 2 MMHG
BH CV ECHO MEAS - LV V1 MAX: 89.4 CM/SEC
BH CV ECHO MEAS - LV V1 VTI: 12.7 CM
BH CV ECHO MEAS - LVIDD: 5.2 CM
BH CV ECHO MEAS - LVIDS: 4.3 CM
BH CV ECHO MEAS - LVPWD: 1.2 CM
BH CV ECHO MEAS - SV(MOD-SP2): 70 ML
BH CV ECHO MEAS - SV(MOD-SP4): 78 ML
BH CV VAS BP LEFT ARM: NORMAL MMHG
BILIRUB SERPL-MCNC: 0.9 MG/DL (ref 0–1.2)
BUN SERPL-MCNC: 17 MG/DL (ref 8–23)
BUN/CREAT SERPL: 25 (ref 7–25)
CALCIUM SPEC-SCNC: 8.6 MG/DL (ref 8.6–10.5)
CHLORIDE SERPL-SCNC: 100 MMOL/L (ref 98–107)
CO2 SERPL-SCNC: 21 MMOL/L (ref 22–29)
CREAT SERPL-MCNC: 0.68 MG/DL (ref 0.76–1.27)
DEPRECATED RDW RBC AUTO: 53.4 FL (ref 37–54)
EGFRCR SERPLBLD CKD-EPI 2021: 95.1 ML/MIN/1.73
EOSINOPHIL # BLD AUTO: 0.26 10*3/MM3 (ref 0–0.4)
EOSINOPHIL NFR BLD AUTO: 3.8 % (ref 0.3–6.2)
ERYTHROCYTE [DISTWIDTH] IN BLOOD BY AUTOMATED COUNT: 15.2 % (ref 12.3–15.4)
GLOBULIN UR ELPH-MCNC: 3.1 GM/DL
GLUCOSE BLDC GLUCOMTR-MCNC: 105 MG/DL (ref 70–130)
GLUCOSE BLDC GLUCOMTR-MCNC: 116 MG/DL (ref 70–130)
GLUCOSE BLDC GLUCOMTR-MCNC: 118 MG/DL (ref 70–130)
GLUCOSE BLDC GLUCOMTR-MCNC: 95 MG/DL (ref 70–130)
GLUCOSE SERPL-MCNC: 119 MG/DL (ref 65–99)
HCT VFR BLD AUTO: 35.8 % (ref 37.5–51)
HGB BLD-MCNC: 11.8 G/DL (ref 13–17.7)
IMM GRANULOCYTES # BLD AUTO: 0.07 10*3/MM3 (ref 0–0.05)
IMM GRANULOCYTES NFR BLD AUTO: 1 % (ref 0–0.5)
LV EF 2D ECHO EST: 35 %
LYMPHOCYTES # BLD AUTO: 1.34 10*3/MM3 (ref 0.7–3.1)
LYMPHOCYTES NFR BLD AUTO: 19.8 % (ref 19.6–45.3)
MAGNESIUM SERPL-MCNC: 2.6 MG/DL (ref 1.6–2.4)
MCH RBC QN AUTO: 31.5 PG (ref 26.6–33)
MCHC RBC AUTO-ENTMCNC: 33 G/DL (ref 31.5–35.7)
MCV RBC AUTO: 95.5 FL (ref 79–97)
MONOCYTES # BLD AUTO: 0.37 10*3/MM3 (ref 0.1–0.9)
MONOCYTES NFR BLD AUTO: 5.5 % (ref 5–12)
NEUTROPHILS NFR BLD AUTO: 4.7 10*3/MM3 (ref 1.7–7)
NEUTROPHILS NFR BLD AUTO: 69.3 % (ref 42.7–76)
NRBC BLD AUTO-RTO: 0 /100 WBC (ref 0–0.2)
PLATELET # BLD AUTO: 157 10*3/MM3 (ref 140–450)
PMV BLD AUTO: 9.8 FL (ref 6–12)
POTASSIUM SERPL-SCNC: 4 MMOL/L (ref 3.5–5.2)
PROT SERPL-MCNC: 5.8 G/DL (ref 6–8.5)
RBC # BLD AUTO: 3.75 10*6/MM3 (ref 4.14–5.8)
SODIUM SERPL-SCNC: 134 MMOL/L (ref 136–145)
WBC NRBC COR # BLD AUTO: 6.78 10*3/MM3 (ref 3.4–10.8)

## 2024-09-06 PROCEDURE — 99232 SBSQ HOSP IP/OBS MODERATE 35: CPT | Performed by: INTERNAL MEDICINE

## 2024-09-06 PROCEDURE — 99232 SBSQ HOSP IP/OBS MODERATE 35: CPT | Performed by: STUDENT IN AN ORGANIZED HEALTH CARE EDUCATION/TRAINING PROGRAM

## 2024-09-06 PROCEDURE — 94761 N-INVAS EAR/PLS OXIMETRY MLT: CPT

## 2024-09-06 PROCEDURE — 82948 REAGENT STRIP/BLOOD GLUCOSE: CPT

## 2024-09-06 PROCEDURE — 94664 DEMO&/EVAL PT USE INHALER: CPT

## 2024-09-06 PROCEDURE — 85025 COMPLETE CBC W/AUTO DIFF WBC: CPT | Performed by: INTERNAL MEDICINE

## 2024-09-06 PROCEDURE — 94799 UNLISTED PULMONARY SVC/PX: CPT

## 2024-09-06 PROCEDURE — 80053 COMPREHEN METABOLIC PANEL: CPT | Performed by: INTERNAL MEDICINE

## 2024-09-06 PROCEDURE — 83735 ASSAY OF MAGNESIUM: CPT | Performed by: INTERNAL MEDICINE

## 2024-09-06 RX ADMIN — Medication 10 ML: at 21:51

## 2024-09-06 RX ADMIN — SPIRONOLACTONE 25 MG: 25 TABLET ORAL at 09:58

## 2024-09-06 RX ADMIN — KETOROLAC TROMETHAMINE 1 DROP: 5 SOLUTION/ DROPS OPHTHALMIC at 15:55

## 2024-09-06 RX ADMIN — PREDNISOLONE ACETATE 1 DROP: 10 SUSPENSION/ DROPS OPHTHALMIC at 15:55

## 2024-09-06 RX ADMIN — SACUBITRIL AND VALSARTAN 1 TABLET: 49; 51 TABLET, FILM COATED ORAL at 21:44

## 2024-09-06 RX ADMIN — FUROSEMIDE 40 MG: 40 TABLET ORAL at 09:58

## 2024-09-06 RX ADMIN — SACUBITRIL AND VALSARTAN 1 TABLET: 49; 51 TABLET, FILM COATED ORAL at 09:58

## 2024-09-06 RX ADMIN — OXYCODONE AND ACETAMINOPHEN 1 TABLET: 10; 325 TABLET ORAL at 06:25

## 2024-09-06 RX ADMIN — PREDNISOLONE ACETATE 1 DROP: 10 SUSPENSION/ DROPS OPHTHALMIC at 21:47

## 2024-09-06 RX ADMIN — APIXABAN 5 MG: 5 TABLET, FILM COATED ORAL at 09:58

## 2024-09-06 RX ADMIN — IPRATROPIUM BROMIDE AND ALBUTEROL SULFATE 3 ML: 2.5; .5 SOLUTION RESPIRATORY (INHALATION) at 13:41

## 2024-09-06 RX ADMIN — IPRATROPIUM BROMIDE AND ALBUTEROL SULFATE 3 ML: 2.5; .5 SOLUTION RESPIRATORY (INHALATION) at 07:01

## 2024-09-06 RX ADMIN — APIXABAN 5 MG: 5 TABLET, FILM COATED ORAL at 21:44

## 2024-09-06 RX ADMIN — PANTOPRAZOLE SODIUM 40 MG: 40 TABLET, DELAYED RELEASE ORAL at 06:24

## 2024-09-06 RX ADMIN — KETOROLAC TROMETHAMINE 1 DROP: 5 SOLUTION/ DROPS OPHTHALMIC at 21:47

## 2024-09-06 RX ADMIN — IPRATROPIUM BROMIDE AND ALBUTEROL SULFATE 3 ML: 2.5; .5 SOLUTION RESPIRATORY (INHALATION) at 20:37

## 2024-09-06 RX ADMIN — GABAPENTIN 800 MG: 400 CAPSULE ORAL at 06:23

## 2024-09-06 RX ADMIN — OXYCODONE AND ACETAMINOPHEN 1 TABLET: 10; 325 TABLET ORAL at 12:54

## 2024-09-06 RX ADMIN — GABAPENTIN 800 MG: 400 CAPSULE ORAL at 13:09

## 2024-09-06 RX ADMIN — EMPAGLIFLOZIN 10 MG: 10 TABLET, FILM COATED ORAL at 09:58

## 2024-09-06 RX ADMIN — Medication 10 ML: at 09:59

## 2024-09-06 RX ADMIN — PREDNISOLONE ACETATE 1 DROP: 10 SUSPENSION/ DROPS OPHTHALMIC at 09:57

## 2024-09-06 RX ADMIN — KETOROLAC TROMETHAMINE 1 DROP: 5 SOLUTION/ DROPS OPHTHALMIC at 09:57

## 2024-09-06 RX ADMIN — GABAPENTIN 800 MG: 400 CAPSULE ORAL at 21:44

## 2024-09-06 NOTE — PROGRESS NOTES
[facility] at [facility]  IP Progress Note      Chief Complaint/Reason for service: #1 type II non-STEMI #2 wide-complex tachycardia #3 history of A-fib #4 LV dysfunction    Subjective   Subjective: The patient does not recall ever having a cardiac cath.  He is followed by [facility].  He denies chest pain.  He denies shortness of breath.    Past medical, surgical, social and family history reviewed in the patient's electronic medical record.    Objective     Vital Sign Min/Max for last 24 hours  Temp  Min: 97.7 °F (36.5 °C)  Max: 99.5 °F (37.5 °C)   BP  Min: 100/63  Max: 202/122   Pulse  Min: 86  Max: 112   Resp  Min: 16  Max: 18   SpO2  Min: 86 %  Max: 96 %   No data recorded      Intake/Output Summary (Last 24 hours) at 9/6/2024 0804  Last data filed at 9/6/2024 0500  Gross per 24 hour   Intake 1165 ml   Output 6510 ml   Net -5345 ml             Current Facility-Administered Medications:     acetaminophen (TYLENOL) 160 MG/5ML oral solution 650 mg, 650 mg, Oral, Q4H PRN **OR** acetaminophen (TYLENOL) suppository 650 mg, 650 mg, Rectal, Q4H PRN, aYsir Canales IV, MD    apixaban (ELIQUIS) tablet 5 mg, 5 mg, Oral, Q12H, Yasir Canales IV, MD, 5 mg at 09/05/24 2055    [Held by provider] ARIPiprazole (ABILIFY) tablet 5 mg, 5 mg, Nasogastric, Daily, Yasir Canales IV, MD    Calcium Replacement - Follow Nurse / BPA Driven Protocol, , Does not apply, PRN, Yasir Canales IV, MD    dextrose (D50W) (25 g/50 mL) IV injection 25 g, 25 g, Intravenous, Q15 Min PRN, Yasir Canales IV, MD    empagliflozin (JARDIANCE) tablet 10 mg, 10 mg, Oral, Daily, Yasir Canales IV, MD, 10 mg at 09/05/24 1125    furosemide (LASIX) tablet 40 mg, 40 mg, Oral, Daily, Yasir Canales IV, MD    gabapentin (NEURONTIN) capsule 800 mg, 800 mg, Oral, Q8H, Yasir Canales IV, MD, 800 mg at 09/06/24 0623    glucagon (GLUCAGEN) injection  1 mg, 1 mg, Intramuscular, Q15 Min PRN, Yasir Canales IV, MD    Insulin Lispro (humaLOG) injection 2-7 Units, 2-7 Units, Subcutaneous, 4x Daily AC & at Bedtime, Yasir Canales IV, MD    ipratropium-albuterol (DUO-NEB) nebulizer solution 3 mL, 3 mL, Nebulization, Q6H - RT, Yasir Canales IV, MD, 3 mL at 09/06/24 0701    ipratropium-albuterol (DUO-NEB) nebulizer solution 3 mL, 3 mL, Nebulization, Q6H PRN, Yasir Canales IV, MD    ketorolac (ACULAR) 0.5 % ophthalmic solution 1 drop, 1 drop, Right Eye, TID, 1 drop at 09/05/24 2055 **FOLLOWED BY** [START ON 9/11/2024] ketorolac (ACULAR) 0.5 % ophthalmic solution 1 drop, 1 drop, Right Eye, BID **FOLLOWED BY** [START ON 9/19/2024] ketorolac (ACULAR) 0.5 % ophthalmic solution 1 drop, 1 drop, Right Eye, Daily, Yasir Canales IV, MD    Magnesium Cardiology Dose Replacement - Follow Nurse / BPA Driven Protocol, , Does not apply, Parker YUEN Henry Charles T IV, MD    nitroglycerin (NITROSTAT) SL tablet 0.4 mg, 0.4 mg, Sublingual, Q5 Min PRN, Yasir Canales IV, MD    ondansetron (ZOFRAN) injection 4 mg, 4 mg, Intravenous, Q6H PRN, Yasir Canales IV, MD    oxyCODONE-acetaminophen (PERCOCET)  MG per tablet 1 tablet, 1 tablet, Oral, Q6H PRN, Yasir Canales IV, MD, 1 tablet at 09/06/24 0625    pantoprazole (PROTONIX) EC tablet 40 mg, 40 mg, Oral, Q AM, Yasir Canales IV, MD, 40 mg at 09/06/24 0624    Phosphorus Replacement - Follow Nurse / BPA Driven Protocol, , Does not apply, PRNParker Henry Charles T IV, MD    Potassium Replacement - Follow Nurse / BPA Driven Protocol, , Does not apply, ALPA, Yasir Canales IV, MD    prednisoLONE acetate (PRED FORTE) 1 % ophthalmic suspension 1 drop, 1 drop, Right Eye, TID, 1 drop at 09/05/24 2028 **FOLLOWED BY** [START ON 9/11/2024] prednisoLONE acetate (PRED FORTE) 1 % ophthalmic suspension 1 drop, 1 drop, Right Eye, BID  **FOLLOWED BY** [START ON 9/19/2024] prednisoLONE acetate (PRED FORTE) 1 % ophthalmic suspension 1 drop, 1 drop, Right Eye, Daily, Yasir Canales IV, MD    sacubitril-valsartan (ENTRESTO) 49-51 MG tablet 1 tablet, 1 tablet, Oral, Q12H, Yasir Canales IV, MD, 1 tablet at 09/05/24 2055    sodium chloride 0.9 % flush 10 mL, 10 mL, Intravenous, Q12H, Yasir Canales IV, MD, 10 mL at 09/05/24 2138    sodium chloride 0.9 % flush 10 mL, 10 mL, Intravenous, PRN, Yasir Canales IV, MD    sodium chloride 0.9 % infusion 40 mL, 40 mL, Intravenous, PRN, Yasir Canales IV, MD    spironolactone (ALDACTONE) tablet 25 mg, 25 mg, Oral, Daily, Yasir Canales IV, MD, 25 mg at 09/05/24 1338    tiZANidine (ZANAFLEX) tablet 4 mg, 4 mg, Oral, Q8H PRN, Yasir Canales IV, MD, 4 mg at 09/04/24 0109    Physical Exam: General Very pleasant overweight male in bed not dyspneic not tachypneic        HEENT: No JVP.  No icterus       Respiratory: Equal bilateral symmetrical expansion mostly clear bilaterally       Cardiovascular: Irregular rate and rhythm with no obvious murmur and trivial edema       GI: Obese and soft       Lower Extremities: Stasis abnormalities       Neuro: Facial expressions are symmetrical moves all 4 extremities       Skin: Warm and dry with trivial edema       Psych: Pleasant affect    Results Review: GFR is 94.  Bicarb 31.  Potassium 3.6.  Hemoglobin is 11.2.  Patient is a net -10 L.  Heart rate 89-1 04.  Blood pressures 1 10-1 30    Radiology Results:  Imaging Results (Last 72 Hours)       Procedure Component Value Units Date/Time    SLP FEES - Fiberoptic Endo Eval Swallow [[id number]] Resulted: 09/03/24 1121     Updated: 09/03/24 1121    Narrative:      This procedure was auto-finalized with no dictation required.            EKG: Sinus rhythm anterior T wave inversion with PVCs QTc 482.  Compared to EKG of 1/9/23 the T abnormalities are  new    ECHO: Ejection fraction 31 to 35%    Tele: Rare episode 3 beat nonsustained VT patient is going in and out of sinus rhythm and A-fib    Assessment   Assessment/Plan: Non-STEMI-probably type II  cardiac cath had to be aborted yesterday because of patient's back pain.  2 wide-complex tachycardia-resolved.  Patient tolerated Tikosyn rare 3 beat run noted  3 cardiomyopathy-coronary anatomy currently unknown.  He does have EKG abnormalities compared to 2023.  Cardiac cath has been deferred for now  No signs of active heart failure    Srinivasan Kramer MD  09/06/24  08:04 EDT

## 2024-09-06 NOTE — CASE MANAGEMENT/SOCIAL WORK
Continued Stay Note  [facility]     Patient Name: oB Perez  MRN: [id number]  Today's Date: 9/6/2024    Admit Date: 8/30/2024    Plan: Rehab   Discharge Plan       Row Name 09/06/24 1237       Plan    Plan Rehab    Plan Comments L heart cath was aborted yesterday since Pt was unable to lie flat due to back pain. Per PT note 9/4, Pt was a max assist to transfer from bed to chair. Therapy has recommended IRF, and Pt is agreeable. A patient choice list and CM's contact information was provided to Pt and spouse. They are to call me with 3 rehab selections. Pt is on room air. CM will continue to follow.    *Addendum: 9/6 @ 6369 - CM received a call from Pt's spouse, Kristen. At Pt/spouse's request, I called referrals to Manda with [facility] and Roxanne with [facility].     Final Discharge Disposition Code 03 - skilled nursing facility (SNF)                   Discharge Codes    No documentation.                 Expected Discharge Date and Time       Expected Discharge Date Expected Discharge Time    Sep 8, 2024               Irma Posey RN

## 2024-09-06 NOTE — NURSING NOTE
This nurse called 6A to give receiving nurse report at 1930.   Once report was given this nurse and Hyacinth (tech) got the patient ready for transfer.   The patient was put on the transport monitor and wheeled to 6A room 19.   6A staff (two nurses and tech) met this nurse and 2B tech at bedside.   Patients belongings taken to new room (phone, , glasses).   This nurse gave 6A charge nurse the patients eye drops. (Gray and pink tops).     The patient nor the receiving staff had any comments or concerns before this nurse left his new room.   This nurse tubed the patients chart up to 6A.

## 2024-09-06 NOTE — PROGRESS NOTES
[facility] Medicine Services  PROGRESS NOTE    Patient Name: Bo Perez  : 1945  MRN: [id number]    Date of Admission: 2024  Primary Care Physician: Srinivasan Cary MD    Subjective   Subjective     CC:  Shortness of breath    HPI:  Patient's wife is at bedside. Has a rare cough. No chest pain. No N/V/D. Wants to work w therapy again.     Objective   Objective     Vital Signs:   Temp:  [97.7 °F (36.5 °C)-99.5 °F (37.5 °C)] 98.5 °F (36.9 °C)  Heart Rate:  [] 89  Resp:  [16-18] 16  BP: (100-202)/() 120/77     Physical Exam:  Constitutional: No acute distress, awake, alert  HENT: NCAT, mucous membranes moist  Respiratory: Clear to auscultation bilaterally, respiratory effort normal   Cardiovascular: RRR, no murmurs  Gastrointestinal: Positive bowel sounds, soft, nontender, nondistended  Musculoskeletal: No bilateral ankle edema  Psychiatric: Appropriate affect, cooperative  Neurologic: Alert, oriented, HURLEY, speech clear    Results Reviewed:  LAB RESULTS:      Lab 24  0322 24  0320 24  0342 24  0519 24  0519 24  0430 24  1211   WBC 7.85 7.65 8.22 10.89* 10.07  --  24.70*   HEMOGLOBIN 11.3* 10.3* 10.4* 11.9* 11.0*  --  12.6*   HEMATOCRIT 35.2* 31.9* 32.7* 35.2* 33.9*  --  39.1   PLATELETS 133* 123* 135* 130* 111*  --  178   NEUTROS ABS 5.80 5.24  --   --   --   --  21.91*   IMMATURE GRANS (ABS) 0.07* 0.07*  --   --   --   --  0.16*   LYMPHS ABS 1.26 1.48  --   --   --   --  1.57   MONOS ABS 0.43 0.48  --   --   --   --  0.94*   EOS ABS 0.24 0.33  --   --   --   --  0.04   MCV 99.2* 98.5* 100.0* 96.7 96.9  --  99.5*   CRP  --   --   --  8.26*  --   --   --    PROCALCITONIN  --   --   --  0.89*  --  2.31*  --          Lab 24  1233 24  0322 24  0320 24  0342 24  2111 24  1439 24  0519 24  0519   SODIUM  --  138 137 141 133*  --  138 136   POTASSIUM 3.7 3.6 3.9 4.1  4.1 3.2*   --  3.9 4.0   CHLORIDE  --  98 102 108* 99  --  105 104   CO2  --  31.0* 25.0 27.0 27.0  --  23.0 25.0   ANION GAP  --  9.0 10.0 6.0 7.0  --  10.0 7.0   BUN  --  21 32* 28* 22  --  19 21   CREATININE  --  0.69* 0.64* 0.81 0.66*  --  0.64* 0.71*   EGFR  --  94.7 96.9 90.2 96.0  --  96.9 93.9   GLUCOSE  --  111* 100* 120* 129*  --  144* 117*   CALCIUM  --  8.3* 8.1* 8.0* 8.4*  --  8.2* 8.0*   MAGNESIUM  --  1.8 2.1 2.4  --   --  2.1 2.3   PHOSPHORUS  --   --   --  2.4* 1.8* 1.4* 1.5* 2.1*         Lab 09/02/24  0519 08/30/24  1211   TOTAL PROTEIN 5.6* 5.4*   ALBUMIN 2.9* 3.0*   GLOBULIN 2.7 2.4   ALT (SGPT) 15 14   AST (SGOT) 39 27   BILIRUBIN 0.7 0.8   ALK PHOS 48 42         Lab 09/02/24  2327 09/02/24 2111 08/31/24  0430 08/30/24  1211   HSTROP T 229* 227* 278* 519*         Lab 09/02/24  0519   CHOLESTEROL 141   TRIGLYCERIDES 130             Lab 09/03/24  0557 09/02/24 2113 09/02/24  1146   PH, ARTERIAL 7.407 7.452* 7.487*   PCO2, ARTERIAL 46.0* 40.3 35.5   PO2 ART 86.0 69.8* 82.8*   FIO2 28 28 35   HCO3 ART 28.9* 28.1* 26.8*   BASE EXCESS ART 3.6* 3.9* 3.5*   CARBOXYHEMOGLOBIN 2.2* 2.1* 1.7     Brief Urine Lab Results  (Last result in the past 365 days)        Color   Clarity   Blood   Leuk Est   Nitrite   Protein   CREAT   Urine HCG        08/30/24 0340 Yellow   Clear   Negative   Negative   Negative   100 mg/dL (2+)                   Microbiology Results Abnormal       Procedure Component Value - Date/Time    Blood Culture - Blood, Arm, Right [[id number]]  (Normal) Collected: 08/30/24 0344    Lab Status: Final result Specimen: Blood from Arm, Right Updated: 09/04/24 0415     Blood Culture No growth at 5 days    Blood Culture - Blood, Arm, Left [[id number]]  (Normal) Collected: 08/30/24 0344    Lab Status: Final result Specimen: Blood from Arm, Left Updated: 09/04/24 0415     Blood Culture No growth at 5 days    Respiratory Culture - Sputum, ET Suction [[id number]] Collected: 08/30/24 0633    Lab Status: Final  result Specimen: Sputum from ET Suction Updated: 09/01/24 1323     Respiratory Culture Heavy growth (4+) Normal respiratory ashish. No S. aureus or Pseudomonas aeruginosa detected. Final report.     Gram Stain Many (4+) WBCs per low power field      Rare (1+) Epithelial cells per low power field      No organisms seen    S. Pneumo Ag Urine or CSF - Urine, Indwelling Urethral Catheter [[id number]]  (Normal) Collected: 08/30/24 1121    Lab Status: Final result Specimen: Urine from Indwelling Urethral Catheter Updated: 08/30/24 1451     Strep Pneumo Ag Negative    Legionella Antigen, Urine - Urine, Indwelling Urethral Catheter [[id number]]  (Normal) Collected: 08/30/24 1121    Lab Status: Final result Specimen: Urine from Indwelling Urethral Catheter Updated: 08/30/24 1451     LEGIONELLA ANTIGEN, URINE Negative    MRSA Screen, PCR (Inpatient) - Swab, Nares [[id number]]  (Normal) Collected: 08/30/24 1107    Lab Status: Final result Specimen: Swab from Nares Updated: 08/30/24 1312     MRSA PCR Negative    Narrative:      The negative predictive value of this diagnostic test is high and should only be used to consider de-escalating anti-MRSA therapy. A positive result may indicate colonization with MRSA and must be correlated clinically.  MRSA Negative    Respiratory Panel PCR w/COVID-19(SARS-CoV-2) PATTI/MARVIN/JAY/PAD/[facility] In-House, NP Swab in UTM/VTM, 2 HR TAT - Swab, Nasopharynx [[id number]]  (Normal) Collected: 08/30/24 0325    Lab Status: Final result Specimen: Swab from Nasopharynx Updated: 08/30/24 0425     ADENOVIRUS, PCR Not Detected     Coronavirus 229E Not Detected     Coronavirus HKU1 Not Detected     Coronavirus NL63 Not Detected     Coronavirus OC43 Not Detected     COVID19 Not Detected     Human Metapneumovirus Not Detected     Human Rhinovirus/Enterovirus Not Detected     Influenza A PCR Not Detected     Influenza B PCR Not Detected     Parainfluenza Virus 1 Not Detected     Parainfluenza Virus 2 Not Detected      Parainfluenza Virus 3 Not Detected     Parainfluenza Virus 4 Not Detected     RSV, PCR Not Detected     Bordetella pertussis pcr Not Detected     Bordetella parapertussis PCR Not Detected     Chlamydophila pneumoniae PCR Not Detected     Mycoplasma pneumo by PCR Not Detected    Narrative:      In the setting of a positive respiratory panel with a viral infection PLUS a negative procalcitonin without other underlying concern for bacterial infection, consider observing off antibiotics or discontinuation of antibiotics and continue supportive care. If the respiratory panel is positive for atypical bacterial infection (Bordetella pertussis, Chlamydophila pneumoniae, or Mycoplasma pneumoniae), consider antibiotic de-escalation to target atypical bacterial infection.            Aborted Cath Cath    Result Date: 9/5/2024  Aborted Case     Results for orders placed during the hospital encounter of 08/30/24    Adult Transthoracic Echo Complete W/ Cont if Necessary Per Protocol    Interpretation Summary    Left ventricular systolic function is moderately decreased. Calculated left ventricular EF = 30% Left ventricular ejection fraction appears to be 31 - 35%.  The pattern could be easily stress-induced cardiomyopathy versus coronary artery disease    The left ventricular cavity is mildly dilated.    Left ventricular wall thickness is consistent with hypertrophy.    The left atrial cavity is dilated.    Left atrial volume is mildly increased.    Estimated right ventricular systolic pressure from tricuspid regurgitation is normal (<35 mmHg). Calculated right ventricular systolic pressure from tricuspid regurgitation is 32 mmHg.      Current medications:  Scheduled Meds:apixaban, 5 mg, Oral, Q12H  [Held by provider] ARIPiprazole, 5 mg, Nasogastric, Daily  empagliflozin, 10 mg, Oral, Daily  furosemide, 40 mg, Oral, Daily  gabapentin, 800 mg, Oral, Q8H  insulin lispro, 2-7 Units, Subcutaneous, 4x Daily AC & at  Bedtime  ipratropium-albuterol, 3 mL, Nebulization, Q6H - RT  ketorolac, 1 drop, Right Eye, TID   Followed by  [START ON 9/11/2024] ketorolac, 1 drop, Right Eye, BID   Followed by  [START ON 9/19/2024] ketorolac, 1 drop, Right Eye, Daily  pantoprazole, 40 mg, Oral, Q AM  prednisoLONE acetate, 1 drop, Right Eye, TID   Followed by  [START ON 9/11/2024] prednisoLONE acetate, 1 drop, Right Eye, BID   Followed by  [START ON 9/19/2024] prednisoLONE acetate, 1 drop, Right Eye, Daily  sacubitril-valsartan, 1 tablet, Oral, Q12H  sodium chloride, 10 mL, Intravenous, Q12H  spironolactone, 25 mg, Oral, Daily      Continuous Infusions:   PRN Meds:.  acetaminophen **OR** acetaminophen    Calcium Replacement - Follow Nurse / BPA Driven Protocol    dextrose    glucagon (human recombinant)    ipratropium-albuterol    Magnesium Cardiology Dose Replacement - Follow Nurse / BPA Driven Protocol    nitroglycerin    ondansetron    oxyCODONE-acetaminophen    Phosphorus Replacement - Follow Nurse / BPA Driven Protocol    Potassium Replacement - Follow Nurse / BPA Driven Protocol    sodium chloride    sodium chloride    tiZANidine    Assessment & Plan   Assessment & Plan     Active Hospital Problems    Diagnosis  POA    **Acute respiratory failure with hypoxia and hypercapnia [J96.01, J96.02]  Yes    Acute HFrEF (heart failure with reduced ejection fraction) [I50.21]  Yes    Persistent atrial fibrillation [I48.19]  Yes    Wide-complex tachycardia [R00.0]  Yes    NSTEMI (non-ST elevated myocardial infarction) [I21.4]  Yes    HFrEF (heart failure with reduced ejection fraction) [I50.20]  Yes    Prolonged Q-T interval on ECG [R94.31]  Yes    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 male with history of PAF, HFrEF, MARK  not on CPAP, recent COVID-19 infection (1 month prior to presentation), tobacco use disorder, recent cataract surgery, who presented to [facility] ER on 8/30 with respiratory distress with hypoxia required intubation.  He was also hypotensive and started on Levophed.  He was subsequently extubated and Levophed was weaned off.  Patient was initially treated in the ICU and transition to the hospitalist service on 9/6/2024.    Wide complex tachycardia  PVCs  A fib  -In the setting of acute hypoxic, hypercapnic respiratory failure  -Unclear if represented true VT versus atrial arrhythmia with aberrancy per cardiology  -Tikosyn held due to prolonged QTc; beta-blockade held due to pauses and intermittent AV block  -Cardiology and EP following  -Follow-up with Dr. Chamorro's office in 4 to 6 weeks  -Patient will need 14-day Holter monitor at discharge  -Continue Eliquis    HFrEF  HTN  -TTE on 9/3 showed LVEF 30%, possible stress-induced cardiomyopathy versus CAD  -Unable to tolerate left heart cath due to back pain  -Repeat echo pending  -If LVEF less than 35%, may require LifeVest  -Entresto, Jardiance, spironolactone  -Intolerant of beta-blockade    Discontinue walker    Abilify due to prolonged QT    Expected Discharge Location and Transportation: IRF  Expected Discharge   Expected Discharge Date: 9/6/2024; Expected Discharge Time:      VTE Prophylaxis:  Pharmacologic & mechanical VTE prophylaxis orders are present.         AM-PAC 6 Clicks Score (PT): 8 (09/05/24 0800)    CODE STATUS:   Code Status and Medical Interventions: CPR (Attempt to Resuscitate); Full Support   Ordered at: 08/30/24 0533     Code Status (Patient has no pulse and is not breathing):    CPR (Attempt to Resuscitate)     Medical Interventions (Patient has pulse or is breathing):    Full Support       Lelo Martínez MD  09/06/24

## 2024-09-07 LAB
ALBUMIN SERPL-MCNC: 3.2 G/DL (ref 3.5–5.2)
ALBUMIN/GLOB SERPL: 1.1 G/DL
ALP SERPL-CCNC: 43 U/L (ref 39–117)
ALT SERPL W P-5'-P-CCNC: 22 U/L (ref 1–41)
ANION GAP SERPL CALCULATED.3IONS-SCNC: 8 MMOL/L (ref 5–15)
AST SERPL-CCNC: 24 U/L (ref 1–40)
BILIRUB SERPL-MCNC: 0.8 MG/DL (ref 0–1.2)
BUN SERPL-MCNC: 16 MG/DL (ref 8–23)
BUN/CREAT SERPL: 23.5 (ref 7–25)
CALCIUM SPEC-SCNC: 8.8 MG/DL (ref 8.6–10.5)
CHLORIDE SERPL-SCNC: 102 MMOL/L (ref 98–107)
CO2 SERPL-SCNC: 28 MMOL/L (ref 22–29)
CREAT SERPL-MCNC: 0.68 MG/DL (ref 0.76–1.27)
EGFRCR SERPLBLD CKD-EPI 2021: 95.1 ML/MIN/1.73
GLOBULIN UR ELPH-MCNC: 2.8 GM/DL
GLUCOSE BLDC GLUCOMTR-MCNC: 102 MG/DL (ref 70–130)
GLUCOSE BLDC GLUCOMTR-MCNC: 106 MG/DL (ref 70–130)
GLUCOSE BLDC GLUCOMTR-MCNC: 121 MG/DL (ref 70–130)
GLUCOSE BLDC GLUCOMTR-MCNC: 97 MG/DL (ref 70–130)
GLUCOSE SERPL-MCNC: 114 MG/DL (ref 65–99)
POTASSIUM SERPL-SCNC: 3.6 MMOL/L (ref 3.5–5.2)
POTASSIUM SERPL-SCNC: 4.1 MMOL/L (ref 3.5–5.2)
PROT SERPL-MCNC: 6 G/DL (ref 6–8.5)
SODIUM SERPL-SCNC: 138 MMOL/L (ref 136–145)

## 2024-09-07 PROCEDURE — 99232 SBSQ HOSP IP/OBS MODERATE 35: CPT | Performed by: INTERNAL MEDICINE

## 2024-09-07 PROCEDURE — 94799 UNLISTED PULMONARY SVC/PX: CPT

## 2024-09-07 PROCEDURE — 80053 COMPREHEN METABOLIC PANEL: CPT | Performed by: INTERNAL MEDICINE

## 2024-09-07 PROCEDURE — 94664 DEMO&/EVAL PT USE INHALER: CPT

## 2024-09-07 PROCEDURE — 82948 REAGENT STRIP/BLOOD GLUCOSE: CPT

## 2024-09-07 PROCEDURE — 84132 ASSAY OF SERUM POTASSIUM: CPT | Performed by: INTERNAL MEDICINE

## 2024-09-07 RX ORDER — POTASSIUM CHLORIDE 1500 MG/1
40 TABLET, EXTENDED RELEASE ORAL EVERY 4 HOURS
Status: COMPLETED | OUTPATIENT
Start: 2024-09-07 | End: 2024-09-07

## 2024-09-07 RX ORDER — OXYCODONE AND ACETAMINOPHEN 10; 325 MG/1; MG/1
1 TABLET ORAL EVERY 4 HOURS PRN
Status: DISCONTINUED | OUTPATIENT
Start: 2024-09-07 | End: 2024-09-10 | Stop reason: HOSPADM

## 2024-09-07 RX ADMIN — PANTOPRAZOLE SODIUM 40 MG: 40 TABLET, DELAYED RELEASE ORAL at 05:28

## 2024-09-07 RX ADMIN — KETOROLAC TROMETHAMINE 1 DROP: 5 SOLUTION/ DROPS OPHTHALMIC at 14:47

## 2024-09-07 RX ADMIN — GABAPENTIN 800 MG: 400 CAPSULE ORAL at 13:25

## 2024-09-07 RX ADMIN — Medication 5 MG: at 22:05

## 2024-09-07 RX ADMIN — APIXABAN 5 MG: 5 TABLET, FILM COATED ORAL at 09:35

## 2024-09-07 RX ADMIN — OXYCODONE AND ACETAMINOPHEN 1 TABLET: 10; 325 TABLET ORAL at 14:47

## 2024-09-07 RX ADMIN — OXYCODONE AND ACETAMINOPHEN 1 TABLET: 10; 325 TABLET ORAL at 19:59

## 2024-09-07 RX ADMIN — IPRATROPIUM BROMIDE AND ALBUTEROL SULFATE 3 ML: 2.5; .5 SOLUTION RESPIRATORY (INHALATION) at 07:35

## 2024-09-07 RX ADMIN — SPIRONOLACTONE 25 MG: 25 TABLET ORAL at 09:35

## 2024-09-07 RX ADMIN — KETOROLAC TROMETHAMINE 1 DROP: 5 SOLUTION/ DROPS OPHTHALMIC at 09:36

## 2024-09-07 RX ADMIN — GABAPENTIN 800 MG: 400 CAPSULE ORAL at 22:05

## 2024-09-07 RX ADMIN — PREDNISOLONE ACETATE 1 DROP: 10 SUSPENSION/ DROPS OPHTHALMIC at 09:36

## 2024-09-07 RX ADMIN — GABAPENTIN 800 MG: 400 CAPSULE ORAL at 05:28

## 2024-09-07 RX ADMIN — PREDNISOLONE ACETATE 1 DROP: 10 SUSPENSION/ DROPS OPHTHALMIC at 14:47

## 2024-09-07 RX ADMIN — EMPAGLIFLOZIN 10 MG: 10 TABLET, FILM COATED ORAL at 09:35

## 2024-09-07 RX ADMIN — POTASSIUM CHLORIDE 40 MEQ: 1500 TABLET, EXTENDED RELEASE ORAL at 09:35

## 2024-09-07 RX ADMIN — APIXABAN 5 MG: 5 TABLET, FILM COATED ORAL at 20:00

## 2024-09-07 RX ADMIN — FUROSEMIDE 40 MG: 40 TABLET ORAL at 09:41

## 2024-09-07 RX ADMIN — PREDNISOLONE ACETATE 1 DROP: 10 SUSPENSION/ DROPS OPHTHALMIC at 20:01

## 2024-09-07 RX ADMIN — SACUBITRIL AND VALSARTAN 1 TABLET: 49; 51 TABLET, FILM COATED ORAL at 20:00

## 2024-09-07 RX ADMIN — IPRATROPIUM BROMIDE AND ALBUTEROL SULFATE 3 ML: 2.5; .5 SOLUTION RESPIRATORY (INHALATION) at 13:47

## 2024-09-07 RX ADMIN — Medication 10 ML: at 09:37

## 2024-09-07 RX ADMIN — SACUBITRIL AND VALSARTAN 1 TABLET: 49; 51 TABLET, FILM COATED ORAL at 09:35

## 2024-09-07 RX ADMIN — Medication 10 ML: at 21:53

## 2024-09-07 RX ADMIN — POTASSIUM CHLORIDE 40 MEQ: 1500 TABLET, EXTENDED RELEASE ORAL at 13:25

## 2024-09-07 RX ADMIN — OXYCODONE AND ACETAMINOPHEN 1 TABLET: 10; 325 TABLET ORAL at 09:35

## 2024-09-07 RX ADMIN — KETOROLAC TROMETHAMINE 1 DROP: 5 SOLUTION/ DROPS OPHTHALMIC at 20:00

## 2024-09-07 RX ADMIN — OXYCODONE AND ACETAMINOPHEN 1 TABLET: 10; 325 TABLET ORAL at 02:08

## 2024-09-07 NOTE — PROGRESS NOTES
Bo Perez  [id number]  1945   LOS: 8 days   Patient Care Team:  Srinivasan Cary MD as PCP - General (Family Medicine)  Shekhar Galvez MD as Consulting Physician (Cardiology)  Mahendra Bear MD as Consulting Physician (Cardiac Electrophysiology)    Chief Complaint:  WCT / NSTEMI / HFrEF / PVCs    Subjective     Comfortable semisupine in bed off supplemental oxygen therapy (room air oximetry 95%).  He denies current cardiopulmonary complaints and ate majority of his breakfast.  No headache or focal motor-sensory changes.    Objective     Vital Sign Min/Max for last 24 hours  Temp  Min: 98 °F (36.7 °C)  Max: 98.8 °F (37.1 °C)   BP  Min: 114/70  Max: 153/62   Pulse  Min: 83  Max: 103   Resp  Min: 1  Max: 18   SpO2  Min: 92 %  Max: 95 %               09/01/24  0530 09/05/24  1517   Weight: (unable to weight d/t bed scale not being zeroed) (!) 160 kg (352 lb)         Intake/Output Summary (Last 24 hours) at 9/7/2024 0826  Last data filed at 9/7/2024 0723  Gross per 24 hour   Intake 610 ml   Output 5485 ml   Net -4875 ml       Physical Exam:     General Appearance:    Alert, cooperative, in no acute distress   Lungs:     Clear to auscultation,respirations regular, even and                unlabored    Heart:    Regular and normal rate, normal S1 and S2, no            murmur, no gallop, no rub, no click   Abdomen:  Extremities:   Soft, nontender, bowel sounds audible x4    No edema, normal range of motion   Pulses:   Pulses palpable and equal bilaterally      Results Review:   Results from last 7 days   Lab Units 09/07/24  0418 09/06/24  0920 09/05/24  1233 09/05/24  0322   SODIUM mmol/L 138 134*  --  138   POTASSIUM mmol/L 3.6 4.0 3.7 3.6   CHLORIDE mmol/L 102 100  --  98   CO2 mmol/L 28.0 21.0*  --  31.0*   BUN mg/dL 16 17  --  21   CREATININE mg/dL 0.68* 0.68*  --  0.69*   GLUCOSE mg/dL 114* 119*  --  111*   CALCIUM mg/dL 8.8 8.6  --  8.3*     Results from last 7 days   Lab Units 09/06/24  6096  09/05/24  0322 09/04/24  0320   WBC 10*3/mm3 6.78 7.85 7.65   HEMOGLOBIN g/dL 11.8* 11.3* 10.3*   HEMATOCRIT % 35.8* 35.2* 31.9*   PLATELETS 10*3/mm3 157 133* 123*     Results from last 7 days   Lab Units 09/02/24  0519   CHOLESTEROL mg/dL 141   TRIGLYCERIDES mg/dL 130     Results from last 7 days   Lab Units 09/02/24  2327 09/02/24  2111   HSTROP T ng/L 229* 227*     ALBUMIN: 3.2    LFTs: WNL    NO NEW CXR / EKG / MAGNESIUM LEVEL.    Medication Review: REVIEWED; NO DRIPS.    Assessment & Plan     Excellent diuresis with stable GFR.  Will continue current medications and close observation and monitoring on telemetry.  Awaiting eventual transfer to inpatient rehabilitation.      Acute respiratory failure with hypoxia and hypercapnia    HTN (hypertension)    Continuous tobacco abuse    Persistent atrial fibrillation    Frequent PVCs    Wide-complex tachycardia    NSTEMI (non-ST elevated myocardial infarction)    HFrEF (heart failure with reduced ejection fraction)    Prolonged Q-T interval on ECG    Acute HFrEF (heart failure with reduced ejection fraction)    09/07/24  08:26 EDT

## 2024-09-07 NOTE — PROGRESS NOTES
[facility] Medicine Services  PROGRESS NOTE    Patient Name: Bo Perez  : 1945  MRN: [id number]    Date of Admission: 2024  Primary Care Physician: Srinivasan Cary MD    Subjective   Subjective     CC:  Shortness of breath    HPI:  Patient was seen and examined this morning.  Comfortable in bed.  Complaining of lower back pain and pelvic pain from his previous fracture years ago.  Wants to get out of bed.    Objective   Objective     Vital Signs:   Temp:  [98 °F (36.7 °C)-98.8 °F (37.1 °C)] 98.5 °F (36.9 °C)  Heart Rate:  [] 83  Resp:  [1-18] 1  BP: (114-153)/(60-95) 126/95  Flow (L/min):  [2] 2     Physical Exam:  General: Comfortable, not in distress, conversant and cooperative  Head: Atraumatic and normocephalic  Eyes: No Icterus. No pallor  Ears:  Ears appear intact with no abnormalities noted  Throat: No oral lesions, no thrush  Neck: Supple, trachea midline  Lungs: Clear to auscultation bilaterally, equal air entry, no wheezing or crackles  Heart:  Normal S1 and S2, no murmur, no gallop, No JVD, no lower extremity swelling  Abdomen:  Soft, no tenderness, no organomegaly, normal bowel sounds, no organomegaly  Extremities: pulses equal bilaterally  Skin: No bleeding, bruising or rash, normal skin turgor and elasticity  Neurologic: Cranial nerves appear intact with no evidence of facial asymmetry, normal motor and sensory functions in all 4 extremities  Psych: Alert and oriented x 3, normal mood    Results Reviewed:  LAB RESULTS:      Lab 24  0920 24  0322 24  0320 24  0342 24  0519   WBC 6.78 7.85 7.65 8.22 10.89*   HEMOGLOBIN 11.8* 11.3* 10.3* 10.4* 11.9*   HEMATOCRIT 35.8* 35.2* 31.9* 32.7* 35.2*   PLATELETS 157 133* 123* 135* 130*   NEUTROS ABS 4.70 5.80 5.24  --   --    IMMATURE GRANS (ABS) 0.07* 0.07* 0.07*  --   --    LYMPHS ABS 1.34 1.26 1.48  --   --    MONOS ABS 0.37 0.43 0.48  --   --    EOS ABS 0.26 0.24 0.33  --    --    MCV 95.5 99.2* 98.5* 100.0* 96.7   CRP  --   --   --   --  8.26*   PROCALCITONIN  --   --   --   --  0.89*         Lab 09/07/24 0418 09/06/24 0920 09/05/24  1233 09/05/24  0322 09/04/24  0320 09/03/24  0342 09/02/24  2111 09/02/24  1439 09/02/24 0519 09/01/24 0519   SODIUM 138 134*  --  138 137 141 133*  --  138 136   POTASSIUM 3.6 4.0 3.7 3.6 3.9 4.1  4.1 3.2*  --  3.9 4.0   CHLORIDE 102 100  --  98 102 108* 99  --  105 104   CO2 28.0 21.0*  --  31.0* 25.0 27.0 27.0  --  23.0 25.0   ANION GAP 8.0 13.0  --  9.0 10.0 6.0 7.0  --  10.0 7.0   BUN 16 17  --  21 32* 28* 22  --  19 21   CREATININE 0.68* 0.68*  --  0.69* 0.64* 0.81 0.66*  --  0.64* 0.71*   EGFR 95.1 95.1  --  94.7 96.9 90.2 96.0  --  96.9 93.9   GLUCOSE 114* 119*  --  111* 100* 120* 129*  --  144* 117*   CALCIUM 8.8 8.6  --  8.3* 8.1* 8.0* 8.4*  --  8.2* 8.0*   MAGNESIUM  --  2.6*  --  1.8 2.1 2.4  --   --  2.1 2.3   PHOSPHORUS  --   --   --   --   --  2.4* 1.8* 1.4* 1.5* 2.1*         Lab 09/07/24 0418 09/06/24 0920 09/02/24 0519   TOTAL PROTEIN 6.0 5.8* 5.6*   ALBUMIN 3.2* 2.7* 2.9*   GLOBULIN 2.8 3.1 2.7   ALT (SGPT) 22 24 15   AST (SGOT) 24 29 39   BILIRUBIN 0.8 0.9 0.7   ALK PHOS 43 43 48         Lab 09/02/24  2327 09/02/24  2111   HSTROP T 229* 227*         Lab 09/02/24  0519   CHOLESTEROL 141   TRIGLYCERIDES 130             Lab 09/03/24  0557 09/02/24  2113 09/02/24  1146   PH, ARTERIAL 7.407 7.452* 7.487*   PCO2, ARTERIAL 46.0* 40.3 35.5   PO2 ART 86.0 69.8* 82.8*   FIO2 28 28 35   HCO3 ART 28.9* 28.1* 26.8*   BASE EXCESS ART 3.6* 3.9* 3.5*   CARBOXYHEMOGLOBIN 2.2* 2.1* 1.7     Brief Urine Lab Results  (Last result in the past 365 days)        Color   Clarity   Blood   Leuk Est   Nitrite   Protein   CREAT   Urine HCG        08/30/24 0340 Yellow   Clear   Negative   Negative   Negative   100 mg/dL (2+)                   Microbiology Results Abnormal       Procedure Component Value - Date/Time    Blood Culture - Blood, Arm, Right  [[id number]]  (Normal) Collected: 08/30/24 0344    Lab Status: Final result Specimen: Blood from Arm, Right Updated: 09/04/24 0415     Blood Culture No growth at 5 days    Blood Culture - Blood, Arm, Left [[id number]]  (Normal) Collected: 08/30/24 0344    Lab Status: Final result Specimen: Blood from Arm, Left Updated: 09/04/24 0415     Blood Culture No growth at 5 days    Respiratory Culture - Sputum, ET Suction [[id number]] Collected: 08/30/24 0633    Lab Status: Final result Specimen: Sputum from ET Suction Updated: 09/01/24 1323     Respiratory Culture Heavy growth (4+) Normal respiratory ashish. No S. aureus or Pseudomonas aeruginosa detected. Final report.     Gram Stain Many (4+) WBCs per low power field      Rare (1+) Epithelial cells per low power field      No organisms seen    S. Pneumo Ag Urine or CSF - Urine, Indwelling Urethral Catheter [[id number]]  (Normal) Collected: 08/30/24 1121    Lab Status: Final result Specimen: Urine from Indwelling Urethral Catheter Updated: 08/30/24 1451     Strep Pneumo Ag Negative    Legionella Antigen, Urine - Urine, Indwelling Urethral Catheter [[id number]]  (Normal) Collected: 08/30/24 1121    Lab Status: Final result Specimen: Urine from Indwelling Urethral Catheter Updated: 08/30/24 1451     LEGIONELLA ANTIGEN, URINE Negative    MRSA Screen, PCR (Inpatient) - Swab, Nares [[id number]]  (Normal) Collected: 08/30/24 1107    Lab Status: Final result Specimen: Swab from Nares Updated: 08/30/24 1312     MRSA PCR Negative    Narrative:      The negative predictive value of this diagnostic test is high and should only be used to consider de-escalating anti-MRSA therapy. A positive result may indicate colonization with MRSA and must be correlated clinically.  MRSA Negative    Respiratory Panel PCR w/COVID-19(SARS-CoV-2) PATTI/MARVIN/JAY/PAD/COR/[facility] In-House, NP Swab in UTM/VTM, 2 HR TAT - Swab, Nasopharynx [[id number]]  (Normal) Collected: 08/30/24 0325    Lab Status: Final result  Specimen: Swab from Nasopharynx Updated: 08/30/24 0425     ADENOVIRUS, PCR Not Detected     Coronavirus 229E Not Detected     Coronavirus HKU1 Not Detected     Coronavirus NL63 Not Detected     Coronavirus OC43 Not Detected     COVID19 Not Detected     Human Metapneumovirus Not Detected     Human Rhinovirus/Enterovirus Not Detected     Influenza A PCR Not Detected     Influenza B PCR Not Detected     Parainfluenza Virus 1 Not Detected     Parainfluenza Virus 2 Not Detected     Parainfluenza Virus 3 Not Detected     Parainfluenza Virus 4 Not Detected     RSV, PCR Not Detected     Bordetella pertussis pcr Not Detected     Bordetella parapertussis PCR Not Detected     Chlamydophila pneumoniae PCR Not Detected     Mycoplasma pneumo by PCR Not Detected    Narrative:      In the setting of a positive respiratory panel with a viral infection PLUS a negative procalcitonin without other underlying concern for bacterial infection, consider observing off antibiotics or discontinuation of antibiotics and continue supportive care. If the respiratory panel is positive for atypical bacterial infection (Bordetella pertussis, Chlamydophila pneumoniae, or Mycoplasma pneumoniae), consider antibiotic de-escalation to target atypical bacterial infection.            Adult Transthoracic Echo Limited W/ Cont if Necessary Per Protocol    Result Date: 9/6/2024    Left ventricular systolic function is moderately decreased. Estimated left ventricular EF = 35%   The left ventricular cavity is mildly dilated.   The left atrial cavity is dilated.     Aborted Cath Cath    Result Date: 9/5/2024  Aborted Case     Results for orders placed during the hospital encounter of 08/30/24    Adult Transthoracic Echo Limited W/ Cont if Necessary Per Protocol    Interpretation Summary    Left ventricular systolic function is moderately decreased. Estimated left ventricular EF = 35%    The left ventricular cavity is mildly dilated.    The left atrial cavity is  dilated.      Current medications:  Scheduled Meds:apixaban, 5 mg, Oral, Q12H  [Held by provider] ARIPiprazole, 5 mg, Nasogastric, Daily  empagliflozin, 10 mg, Oral, Daily  furosemide, 40 mg, Oral, Daily  gabapentin, 800 mg, Oral, Q8H  insulin lispro, 2-7 Units, Subcutaneous, 4x Daily AC & at Bedtime  ipratropium-albuterol, 3 mL, Nebulization, Q6H - RT  ketorolac, 1 drop, Right Eye, TID   Followed by  [START ON 9/11/2024] ketorolac, 1 drop, Right Eye, BID   Followed by  [START ON 9/19/2024] ketorolac, 1 drop, Right Eye, Daily  pantoprazole, 40 mg, Oral, Q AM  potassium chloride ER, 40 mEq, Oral, Q4H  prednisoLONE acetate, 1 drop, Right Eye, TID   Followed by  [START ON 9/11/2024] prednisoLONE acetate, 1 drop, Right Eye, BID   Followed by  [START ON 9/19/2024] prednisoLONE acetate, 1 drop, Right Eye, Daily  sacubitril-valsartan, 1 tablet, Oral, Q12H  sodium chloride, 10 mL, Intravenous, Q12H  spironolactone, 25 mg, Oral, Daily      Continuous Infusions:   PRN Meds:.  acetaminophen **OR** acetaminophen    Calcium Replacement - Follow Nurse / BPA Driven Protocol    dextrose    glucagon (human recombinant)    ipratropium-albuterol    Magnesium Cardiology Dose Replacement - Follow Nurse / BPA Driven Protocol    nitroglycerin    ondansetron    oxyCODONE-acetaminophen    Phosphorus Replacement - Follow Nurse / BPA Driven Protocol    Potassium Replacement - Follow Nurse / BPA Driven Protocol    sodium chloride    sodium chloride    tiZANidine    Assessment & Plan   Assessment & Plan     Active Hospital Problems    Diagnosis  POA    **Acute respiratory failure with hypoxia and hypercapnia [J96.01, J96.02]  Yes    Acute HFrEF (heart failure with reduced ejection fraction) [I50.21]  Yes    Persistent atrial fibrillation [I48.19]  Yes    Wide-complex tachycardia [R00.0]  Yes    NSTEMI (non-ST elevated myocardial infarction) [I21.4]  Yes    HFrEF (heart failure with reduced ejection fraction) [I50.20]  Yes    Prolonged Q-T  interval on ECG [R94.31]  Yes    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 male with history of PAF, HFrEF, MARK not on CPAP, recent COVID-19 infection (1 month prior to presentation), tobacco use disorder, recent cataract surgery, who presented to [facility] ER on 8/30 with respiratory distress with hypoxia required intubation.  He was also hypotensive and started on Levophed.  He was subsequently extubated and Levophed was weaned off.  Suffered wide-complex tachycardia x 2.  Having intermittent episodes of AV block.  Tikosyn and beta-blockers held.  Cardiology following. Patient was initially treated in the ICU and transition to the hospitalist service on 9/6/2024.    Wide complex tachycardia  Prolonged QTc  A fib  In the setting of acute hypoxic, hypercapnic respiratory failure  Unclear if represented true VT versus atrial arrhythmia with aberrancy per cardiology  Tikosyn held due to prolonged Qtc  Intolerant to beta-blockers because of intermittent AV block   Cardiology and EP following  Follow-up with Dr. Chamorro's office in 4 to 6 weeks  Patient will need 14-day Holter monitor at discharge  Continue Eliquis    Newly diagnosed systolic heart failure, EF 35%  HTN  Echo with EF 35%.  Per family, echo a month ago was normal  Possible stress-induced cardiomyopathy versus CAD  Left heart cath heart cath attempted and aborted because patient could not tolerate because of back pain  Continue Entresto, Jardiance, spironolactone    Acute on chronic debility  Continue PT and OT  Will need acute rehab.  Case management on board    Expected Discharge Location and Transportation: IRF  Expected Discharge   Expected Discharge Date: 9/8/2024; Expected Discharge Time:      VTE Prophylaxis:  Pharmacologic &  mechanical VTE prophylaxis orders are present.         AM-PAC 6 Clicks Score (PT): 8 (09/06/24 0800)    CODE STATUS:   Code Status and Medical Interventions: CPR (Attempt to Resuscitate); Full Support   Ordered at: 08/30/24 0533     Code Status (Patient has no pulse and is not breathing):    CPR (Attempt to Resuscitate)     Medical Interventions (Patient has pulse or is breathing):    Full Support       Marquis Case MD  09/07/24

## 2024-09-08 LAB
ALBUMIN SERPL-MCNC: 3.3 G/DL (ref 3.5–5.2)
ALBUMIN/GLOB SERPL: 1.3 G/DL
ALP SERPL-CCNC: 43 U/L (ref 39–117)
ALT SERPL W P-5'-P-CCNC: 20 U/L (ref 1–41)
ANION GAP SERPL CALCULATED.3IONS-SCNC: 10 MMOL/L (ref 5–15)
AST SERPL-CCNC: 22 U/L (ref 1–40)
BILIRUB SERPL-MCNC: 0.9 MG/DL (ref 0–1.2)
BUN SERPL-MCNC: 16 MG/DL (ref 8–23)
BUN/CREAT SERPL: 20 (ref 7–25)
CALCIUM SPEC-SCNC: 8.9 MG/DL (ref 8.6–10.5)
CHLORIDE SERPL-SCNC: 100 MMOL/L (ref 98–107)
CO2 SERPL-SCNC: 26 MMOL/L (ref 22–29)
CREAT SERPL-MCNC: 0.8 MG/DL (ref 0.76–1.27)
EGFRCR SERPLBLD CKD-EPI 2021: 90.6 ML/MIN/1.73
GLOBULIN UR ELPH-MCNC: 2.6 GM/DL
GLUCOSE BLDC GLUCOMTR-MCNC: 103 MG/DL (ref 70–130)
GLUCOSE BLDC GLUCOMTR-MCNC: 110 MG/DL (ref 70–130)
GLUCOSE BLDC GLUCOMTR-MCNC: 113 MG/DL (ref 70–130)
GLUCOSE BLDC GLUCOMTR-MCNC: 119 MG/DL (ref 70–130)
GLUCOSE SERPL-MCNC: 104 MG/DL (ref 65–99)
POTASSIUM SERPL-SCNC: 4 MMOL/L (ref 3.5–5.2)
PROT SERPL-MCNC: 5.9 G/DL (ref 6–8.5)
QT INTERVAL: 418 MS
QTC INTERVAL: 505 MS
SODIUM SERPL-SCNC: 136 MMOL/L (ref 136–145)

## 2024-09-08 PROCEDURE — 97116 GAIT TRAINING THERAPY: CPT

## 2024-09-08 PROCEDURE — 99232 SBSQ HOSP IP/OBS MODERATE 35: CPT | Performed by: INTERNAL MEDICINE

## 2024-09-08 PROCEDURE — 80053 COMPREHEN METABOLIC PANEL: CPT | Performed by: INTERNAL MEDICINE

## 2024-09-08 PROCEDURE — 82948 REAGENT STRIP/BLOOD GLUCOSE: CPT

## 2024-09-08 PROCEDURE — 97535 SELF CARE MNGMENT TRAINING: CPT

## 2024-09-08 RX ORDER — IPRATROPIUM BROMIDE AND ALBUTEROL SULFATE 2.5; .5 MG/3ML; MG/3ML
3 SOLUTION RESPIRATORY (INHALATION)
Status: DISCONTINUED | OUTPATIENT
Start: 2024-09-08 | End: 2024-09-08

## 2024-09-08 RX ORDER — IPRATROPIUM BROMIDE AND ALBUTEROL SULFATE 2.5; .5 MG/3ML; MG/3ML
3 SOLUTION RESPIRATORY (INHALATION) EVERY 6 HOURS PRN
Status: DISCONTINUED | OUTPATIENT
Start: 2024-09-08 | End: 2024-09-10 | Stop reason: HOSPADM

## 2024-09-08 RX ADMIN — FUROSEMIDE 40 MG: 40 TABLET ORAL at 08:29

## 2024-09-08 RX ADMIN — OXYCODONE AND ACETAMINOPHEN 1 TABLET: 10; 325 TABLET ORAL at 03:48

## 2024-09-08 RX ADMIN — OXYCODONE AND ACETAMINOPHEN 1 TABLET: 10; 325 TABLET ORAL at 21:53

## 2024-09-08 RX ADMIN — SPIRONOLACTONE 25 MG: 25 TABLET ORAL at 08:29

## 2024-09-08 RX ADMIN — OXYCODONE AND ACETAMINOPHEN 1 TABLET: 10; 325 TABLET ORAL at 14:25

## 2024-09-08 RX ADMIN — KETOROLAC TROMETHAMINE 1 DROP: 5 SOLUTION/ DROPS OPHTHALMIC at 08:32

## 2024-09-08 RX ADMIN — KETOROLAC TROMETHAMINE 1 DROP: 5 SOLUTION/ DROPS OPHTHALMIC at 21:55

## 2024-09-08 RX ADMIN — APIXABAN 5 MG: 5 TABLET, FILM COATED ORAL at 21:53

## 2024-09-08 RX ADMIN — PREDNISOLONE ACETATE 1 DROP: 10 SUSPENSION/ DROPS OPHTHALMIC at 21:55

## 2024-09-08 RX ADMIN — Medication 10 ML: at 21:55

## 2024-09-08 RX ADMIN — PREDNISOLONE ACETATE 1 DROP: 10 SUSPENSION/ DROPS OPHTHALMIC at 08:32

## 2024-09-08 RX ADMIN — SACUBITRIL AND VALSARTAN 1 TABLET: 49; 51 TABLET, FILM COATED ORAL at 08:29

## 2024-09-08 RX ADMIN — GABAPENTIN 800 MG: 400 CAPSULE ORAL at 21:53

## 2024-09-08 RX ADMIN — APIXABAN 5 MG: 5 TABLET, FILM COATED ORAL at 08:29

## 2024-09-08 RX ADMIN — EMPAGLIFLOZIN 10 MG: 10 TABLET, FILM COATED ORAL at 08:29

## 2024-09-08 RX ADMIN — GABAPENTIN 800 MG: 400 CAPSULE ORAL at 14:24

## 2024-09-08 RX ADMIN — OXYCODONE AND ACETAMINOPHEN 1 TABLET: 10; 325 TABLET ORAL at 08:29

## 2024-09-08 RX ADMIN — SACUBITRIL AND VALSARTAN 1 TABLET: 49; 51 TABLET, FILM COATED ORAL at 21:53

## 2024-09-08 RX ADMIN — GABAPENTIN 800 MG: 400 CAPSULE ORAL at 05:18

## 2024-09-08 RX ADMIN — PREDNISOLONE ACETATE 1 DROP: 10 SUSPENSION/ DROPS OPHTHALMIC at 14:25

## 2024-09-08 RX ADMIN — Medication 10 ML: at 08:30

## 2024-09-08 RX ADMIN — Medication 5 MG: at 21:53

## 2024-09-08 RX ADMIN — KETOROLAC TROMETHAMINE 1 DROP: 5 SOLUTION/ DROPS OPHTHALMIC at 14:25

## 2024-09-08 RX ADMIN — TIZANIDINE 4 MG: 4 TABLET ORAL at 21:53

## 2024-09-08 RX ADMIN — PANTOPRAZOLE SODIUM 40 MG: 40 TABLET, DELAYED RELEASE ORAL at 05:18

## 2024-09-08 NOTE — PROGRESS NOTES
[facility] Medicine Services  PROGRESS NOTE    Patient Name: oB Perez  : 1945  MRN: [id number]    Date of Admission: 2024  Primary Care Physician: Srinivasan Cary MD    Subjective   Subjective     CC:  Shortness of breath    HPI:  Patient was seen and examined this morning.  Comfortable in bed.  No acute complaints today.  Nurse to move him out of bed to the recliner at bedside.  Awaiting acute rehab    Objective   Objective     Vital Signs:   Temp:  [98.1 °F (36.7 °C)-98.4 °F (36.9 °C)] 98.2 °F (36.8 °C)  Heart Rate:  [] 88  Resp:  [16-20] 20  BP: (114-142)/(61-81) 116/67     Physical Exam:  General: Comfortable, not in distress, conversant and cooperative  Head: Atraumatic and normocephalic  Eyes: No Icterus. No pallor  Ears:  Ears appear intact with no abnormalities noted  Throat: No oral lesions, no thrush  Neck: Supple, trachea midline  Lungs: Clear to auscultation bilaterally, equal air entry, no wheezing or crackles  Heart:  Normal S1 and S2, no murmur, no gallop, No JVD, no lower extremity swelling  Abdomen:  Soft, no tenderness, no organomegaly, normal bowel sounds, no organomegaly  Extremities: pulses equal bilaterally  Skin: No bleeding, bruising or rash, normal skin turgor and elasticity  Neurologic: Cranial nerves appear intact with no evidence of facial asymmetry, normal motor and sensory functions in all 4 extremities  Psych: Alert and oriented x 3, normal mood    Results Reviewed:  LAB RESULTS:      Lab 24  0920 24  0322 24  0320 24  0342 24  0519   WBC 6.78 7.85 7.65 8.22 10.89*   HEMOGLOBIN 11.8* 11.3* 10.3* 10.4* 11.9*   HEMATOCRIT 35.8* 35.2* 31.9* 32.7* 35.2*   PLATELETS 157 133* 123* 135* 130*   NEUTROS ABS 4.70 5.80 5.24  --   --    IMMATURE GRANS (ABS) 0.07* 0.07* 0.07*  --   --    LYMPHS ABS 1.34 1.26 1.48  --   --    MONOS ABS 0.37 0.43 0.48  --   --    EOS ABS 0.26 0.24 0.33  --   --    MCV 95.5 99.2*  98.5* 100.0* 96.7   CRP  --   --   --   --  8.26*   PROCALCITONIN  --   --   --   --  0.89*         Lab 09/08/24 0525 09/07/24  1734 09/07/24 0418 09/06/24 0920 09/05/24  1233 09/05/24  0322 09/04/24  0320 09/03/24  0342 09/02/24  2111 09/02/24  1439 09/02/24  0519   SODIUM 136  --  138 134*  --  138 137 141 133*  --  138   POTASSIUM 4.0 4.1 3.6 4.0 3.7 3.6 3.9 4.1  4.1 3.2*  --  3.9   CHLORIDE 100  --  102 100  --  98 102 108* 99  --  105   CO2 26.0  --  28.0 21.0*  --  31.0* 25.0 27.0 27.0  --  23.0   ANION GAP 10.0  --  8.0 13.0  --  9.0 10.0 6.0 7.0  --  10.0   BUN 16  --  16 17  --  21 32* 28* 22  --  19   CREATININE 0.80  --  0.68* 0.68*  --  0.69* 0.64* 0.81 0.66*  --  0.64*   EGFR 90.6  --  95.1 95.1  --  94.7 96.9 90.2 96.0  --  96.9   GLUCOSE 104*  --  114* 119*  --  111* 100* 120* 129*  --  144*   CALCIUM 8.9  --  8.8 8.6  --  8.3* 8.1* 8.0* 8.4*  --  8.2*   MAGNESIUM  --   --   --  2.6*  --  1.8 2.1 2.4  --   --  2.1   PHOSPHORUS  --   --   --   --   --   --   --  2.4* 1.8* 1.4* 1.5*         Lab 09/08/24 0525 09/07/24 0418 09/06/24 0920 09/02/24  0519   TOTAL PROTEIN 5.9* 6.0 5.8* 5.6*   ALBUMIN 3.3* 3.2* 2.7* 2.9*   GLOBULIN 2.6 2.8 3.1 2.7   ALT (SGPT) 20 22 24 15   AST (SGOT) 22 24 29 39   BILIRUBIN 0.9 0.8 0.9 0.7   ALK PHOS 43 43 43 48         Lab 09/02/24  2327 09/02/24  2111   HSTROP T 229* 227*         Lab 09/02/24  0519   CHOLESTEROL 141   TRIGLYCERIDES 130             Lab 09/03/24  0557 09/02/24  2113 09/02/24  1146   PH, ARTERIAL 7.407 7.452* 7.487*   PCO2, ARTERIAL 46.0* 40.3 35.5   PO2 ART 86.0 69.8* 82.8*   FIO2 28 28 35   HCO3 ART 28.9* 28.1* 26.8*   BASE EXCESS ART 3.6* 3.9* 3.5*   CARBOXYHEMOGLOBIN 2.2* 2.1* 1.7     Brief Urine Lab Results  (Last result in the past 365 days)        Color   Clarity   Blood   Leuk Est   Nitrite   Protein   CREAT   Urine HCG        08/30/24 0340 Yellow   Clear   Negative   Negative   Negative   100 mg/dL (2+)                   Microbiology Results  Abnormal       Procedure Component Value - Date/Time    Blood Culture - Blood, Arm, Right [[id number]]  (Normal) Collected: 08/30/24 0344    Lab Status: Final result Specimen: Blood from Arm, Right Updated: 09/04/24 0415     Blood Culture No growth at 5 days    Blood Culture - Blood, Arm, Left [[id number]]  (Normal) Collected: 08/30/24 0344    Lab Status: Final result Specimen: Blood from Arm, Left Updated: 09/04/24 0415     Blood Culture No growth at 5 days    Respiratory Culture - Sputum, ET Suction [[id number]] Collected: 08/30/24 0633    Lab Status: Final result Specimen: Sputum from ET Suction Updated: 09/01/24 1323     Respiratory Culture Heavy growth (4+) Normal respiratory ashish. No S. aureus or Pseudomonas aeruginosa detected. Final report.     Gram Stain Many (4+) WBCs per low power field      Rare (1+) Epithelial cells per low power field      No organisms seen    S. Pneumo Ag Urine or CSF - Urine, Indwelling Urethral Catheter [[id number]]  (Normal) Collected: 08/30/24 1121    Lab Status: Final result Specimen: Urine from Indwelling Urethral Catheter Updated: 08/30/24 1451     Strep Pneumo Ag Negative    Legionella Antigen, Urine - Urine, Indwelling Urethral Catheter [[id number]]  (Normal) Collected: 08/30/24 1121    Lab Status: Final result Specimen: Urine from Indwelling Urethral Catheter Updated: 08/30/24 1451     LEGIONELLA ANTIGEN, URINE Negative    MRSA Screen, PCR (Inpatient) - Swab, Nares [[id number]]  (Normal) Collected: 08/30/24 1107    Lab Status: Final result Specimen: Swab from Nares Updated: 08/30/24 1312     MRSA PCR Negative    Narrative:      The negative predictive value of this diagnostic test is high and should only be used to consider de-escalating anti-MRSA therapy. A positive result may indicate colonization with MRSA and must be correlated clinically.  MRSA Negative    Respiratory Panel PCR w/COVID-19(SARS-CoV-2) PATTI/MARVIN/JAY/PAD/COR/[facility] In-House, NP Swab in UTM/VTM, 2 HR TAT - Swab,  Nasopharynx [[id number]]  (Normal) Collected: 08/30/24 0325    Lab Status: Final result Specimen: Swab from Nasopharynx Updated: 08/30/24 0421     ADENOVIRUS, PCR Not Detected     Coronavirus 229E Not Detected     Coronavirus HKU1 Not Detected     Coronavirus NL63 Not Detected     Coronavirus OC43 Not Detected     COVID19 Not Detected     Human Metapneumovirus Not Detected     Human Rhinovirus/Enterovirus Not Detected     Influenza A PCR Not Detected     Influenza B PCR Not Detected     Parainfluenza Virus 1 Not Detected     Parainfluenza Virus 2 Not Detected     Parainfluenza Virus 3 Not Detected     Parainfluenza Virus 4 Not Detected     RSV, PCR Not Detected     Bordetella pertussis pcr Not Detected     Bordetella parapertussis PCR Not Detected     Chlamydophila pneumoniae PCR Not Detected     Mycoplasma pneumo by PCR Not Detected    Narrative:      In the setting of a positive respiratory panel with a viral infection PLUS a negative procalcitonin without other underlying concern for bacterial infection, consider observing off antibiotics or discontinuation of antibiotics and continue supportive care. If the respiratory panel is positive for atypical bacterial infection (Bordetella pertussis, Chlamydophila pneumoniae, or Mycoplasma pneumoniae), consider antibiotic de-escalation to target atypical bacterial infection.            No radiology results from the last 24 hrs    Results for orders placed during the hospital encounter of 08/30/24    Adult Transthoracic Echo Limited W/ Cont if Necessary Per Protocol    Interpretation Summary    Left ventricular systolic function is moderately decreased. Estimated left ventricular EF = 35%    The left ventricular cavity is mildly dilated.    The left atrial cavity is dilated.      Current medications:  Scheduled Meds:apixaban, 5 mg, Oral, Q12H  [Held by provider] ARIPiprazole, 5 mg, Nasogastric, Daily  empagliflozin, 10 mg, Oral, Daily  furosemide, 40 mg, Oral,  Daily  gabapentin, 800 mg, Oral, Q8H  insulin lispro, 2-7 Units, Subcutaneous, 4x Daily AC & at Bedtime  ketorolac, 1 drop, Right Eye, TID   Followed by  [START ON 9/11/2024] ketorolac, 1 drop, Right Eye, BID   Followed by  [START ON 9/19/2024] ketorolac, 1 drop, Right Eye, Daily  pantoprazole, 40 mg, Oral, Q AM  prednisoLONE acetate, 1 drop, Right Eye, TID   Followed by  [START ON 9/11/2024] prednisoLONE acetate, 1 drop, Right Eye, BID   Followed by  [START ON 9/19/2024] prednisoLONE acetate, 1 drop, Right Eye, Daily  sacubitril-valsartan, 1 tablet, Oral, Q12H  sodium chloride, 10 mL, Intravenous, Q12H  spironolactone, 25 mg, Oral, Daily      Continuous Infusions:   PRN Meds:.  acetaminophen **OR** acetaminophen    Calcium Replacement - Follow Nurse / BPA Driven Protocol    dextrose    glucagon (human recombinant)    ipratropium-albuterol    ipratropium-albuterol    Magnesium Cardiology Dose Replacement - Follow Nurse / BPA Driven Protocol    melatonin    nitroglycerin    ondansetron    oxyCODONE-acetaminophen    Phosphorus Replacement - Follow Nurse / BPA Driven Protocol    Potassium Replacement - Follow Nurse / BPA Driven Protocol    sodium chloride    sodium chloride    tiZANidine    Assessment & Plan   Assessment & Plan     Active Hospital Problems    Diagnosis  POA    **Acute respiratory failure with hypoxia and hypercapnia [J96.01, J96.02]  Yes    Acute HFrEF (heart failure with reduced ejection fraction) [I50.21]  Yes    Persistent atrial fibrillation [I48.19]  Yes    Wide-complex tachycardia [R00.0]  Yes    NSTEMI (non-ST elevated myocardial infarction) [I21.4]  Yes    HFrEF (heart failure with reduced ejection fraction) [I50.20]  Yes    Prolonged Q-T interval on ECG [R94.31]  Yes    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 male with history of PAF, HFrEF, MARK not on CPAP, recent COVID-19 infection (1 month prior to presentation), tobacco use disorder, recent cataract surgery, who presented to [facility] ER on 8/30 with respiratory distress with hypoxia required intubation.  He was also hypotensive and started on Levophed.  He was subsequently extubated and Levophed was weaned off.  Suffered wide-complex tachycardia x 2.  Having intermittent episodes of AV block.  Tikosyn and beta-blockers held.  Cardiology following. Patient was initially treated in the ICU and transition to the hospitalist service on 9/6/2024.    Wide complex tachycardia  Prolonged QTc  A fib  In the setting of acute hypoxic, hypercapnic respiratory failure  Unclear if represented true VT versus atrial arrhythmia with aberrancy per cardiology  Tikosyn held due to prolonged Qtc  Intolerant to beta-blockers because of intermittent AV block   Cardiology and EP following  Will need to follow-up with Dr. Chamorro's office in 4 to 6 weeks from discharge  Patient will need 14-day Holter monitor at discharge  Continue Eliquis    Newly diagnosed systolic heart failure, EF 35%  HTN  Echo with EF 35%.  Per family, echo a month ago was normal  Possible stress-induced cardiomyopathy versus CAD  Left heart cath heart cath attempted and aborted because patient could not tolerate because of back pain  Continue Entresto, Jardiance, spironolactone    Acute on chronic debility  Continue PT and OT  Will need acute rehab.  Case management on board    Expected Discharge Location and Transportation: IRF  Expected Discharge   Expected Discharge Date: 9/10/2024; Expected Discharge Time:      VTE Prophylaxis:  Pharmacologic & mechanical VTE prophylaxis orders are present.         AM-PAC 6 Clicks Score (PT): 8 (09/06/24 0800)    CODE STATUS:   Code Status and Medical Interventions: CPR (Attempt to Resuscitate); Full Support   Ordered at: 08/30/24 6901     Code  Status (Patient has no pulse and is not breathing):    CPR (Attempt to Resuscitate)     Medical Interventions (Patient has pulse or is breathing):    Full Support       Marquis Case MD  09/08/24

## 2024-09-08 NOTE — PLAN OF CARE
Goal Outcome Evaluation:  Plan of Care Reviewed With: patient        Progress: improving  Outcome Evaluation: Pt able to take a few steps forward from recliner with RW and Mikaela x2 with recliner closely in tow. Pt dem forward flexed posture (which he reports is baseline for him due to hx of back surgery) and RUE tremor, which he states is also baseline due to Parkinsons. Further distance limited by weakness and fatigue, as well as fear of falling. Pt gave excellent effort and is motivated to progress his mobility. PT continues to rec d/c IRF.      Anticipated Discharge Disposition (PT): inpatient rehabilitation facility

## 2024-09-08 NOTE — THERAPY TREATMENT NOTE
Patient Name: Bo Perez  : 1945    MRN: [id number]                              Today's Date: 2024       Admit Date: 2024    Visit Dx:     ICD-10-CM ICD-9-CM   1. Acute respiratory failure with hypoxemia  J96.01 518.81   2. Flash pulmonary edema  J81.0 518.4   3. Acute sepsis  A41.9 038.9     995.91   4. Pneumonia of both lower lobes due to infectious organism  J18.9 486   5. Hypertensive emergency  I16.1 401.9   6. Oropharyngeal dysphagia  R13.12 787.22   7. Frequent PVCs  I49.3 427.69     Patient Active Problem List   Diagnosis    Chronic anticoagulation    HTN (hypertension)    Morbid obesity    Acute respiratory failure with hypoxia and hypercapnia    Sepsis due to pneumonia    Continuous tobacco abuse    Persistent atrial fibrillation    Frequent PVCs    Mixed hyperlipidemia    Wide-complex tachycardia    NSTEMI (non-ST elevated myocardial infarction)    HFrEF (heart failure with reduced ejection fraction)    Prolonged Q-T interval on ECG    Acute HFrEF (heart failure with reduced ejection fraction)     Past Medical History:   Diagnosis Date    A-fib     Arthritis     Hard to intubate     Hypertension     Sleep apnea     NOT USING CPAP     Past Surgical History:   Procedure Laterality Date    BLADDER SURGERY  2016    STIMULATOR IMPLANTED, NEW ELECTRODE 2022    CARDIAC ABLATION      AFIB    CARDIAC CATHETERIZATION N/A 2024    Procedure: CASE ABORT;  Surgeon: Yasir Canales IV, MD;  Location: [facility] INVASIVE LOCATION;  Service: Cardiovascular;  Laterality: N/A;    CARPAL TUNNEL RELEASE Right     CHOLECYSTECTOMY      COLONOSCOPY      DENTAL PROCEDURE      FINGER SURGERY Left     FOREFINGER BONE TRIMMED    HEMORRHOIDECTOMY  1973    KNEE ARTHROSCOPY Left     MOUTH SURGERY      FOR SLEEP APNEA    SPINAL FUSION  2007    L3 LUMBAR SPINAL STENOSIS    TONSILLECTOMY      TOTAL KNEE ARTHROPLASTY Left 2009    TOTAL KNEE ARTHROPLASTY Right  6/22/2023    Procedure: TOTAL KNEE ARTHROPLASTY - RIGHT;  Surgeon: Leland Elizalde MD;  Location: [facility];  Service: Orthopedics;  Laterality: Right;      General Information       Row Name 09/08/24 1312          Physical Therapy Time and Intention    Document Type therapy note (daily note)  -SD     Mode of Treatment physical therapy  -SD       Row Name 09/08/24 1312          General Information    Existing Precautions/Restrictions fall  -SD       Row Name 09/08/24 1312          Cognition    Orientation Status (Cognition) oriented x 4  -SD       Row Name 09/08/24 1312          Safety Issues, Functional Mobility    Safety Issues Affecting Function (Mobility) insight into deficits/self-awareness;sequencing abilities  -SD     Impairments Affecting Function (Mobility) balance;coordination;endurance/activity tolerance;shortness of breath;strength;pain;postural/trunk control;range of motion (ROM)  -SD               User Key  (r) = Recorded By, (t) = Taken By, (c) = Cosigned By      Initials Name Provider Type    SD Kathie Bhatia, PT Physical Therapist                   Mobility       Row Name 09/08/24 1353          Bed Mobility    Comment, (Bed Mobility) UIC  -SD       Row Name 09/08/24 1353          Transfers    Comment, (Transfers) STS x3 reps from recliner with cues for safe hand placement and sequencing. Pt required CGAx2 for the first two attempts, then Mikaela x2 for the 3rd attempt.  -SD       Row Name 09/08/24 1353          Sit-Stand Transfer    Sit-Stand Bond (Transfers) minimum assist (75% patient effort);2 person assist;verbal cues  -SD     Assistive Device (Sit-Stand Transfers) other (see comments);walker, front-wheeled  -SD       Row Name 09/08/24 1353          Gait/Stairs (Locomotion)    Bond Level (Gait) minimum assist (75% patient effort);2 person assist;verbal cues  -SD     Assistive Device (Gait) walker, front-wheeled  -SD     Distance in Feet (Gait) 3  -SD     Deviations/Abnormal  Patterns (Gait) bilateral deviations;base of support, wide;traec decreased;festinating/shuffling;gait speed decreased  -SD     Bilateral Gait Deviations forward flexed posture;heel strike decreased  -SD     Comment, (Gait/Stairs) Pt able to take a few steps forward from recliner with RW and Mikaela x2 with recliner closely in tow. Pt dem forward flexed posture (which he reports is baseline for him due to hx of back surgery) and RUE tremor, which he states is also baseline due to Parkinsons. Further distance limited by weakness and fatigue, as well as fear of falling. Pt gave excellent effort and is motivated to progress his mobility.  -SD               User Key  (r) = Recorded By, (t) = Taken By, (c) = Cosigned By      Initials Name Provider Type    Kathie Barron PT Physical Therapist                   Obj/Interventions       Row Name 09/08/24 1402          Balance    Balance Assessment sitting static balance;standing static balance  -SD     Static Sitting Balance supervision  -SD     Position, Sitting Balance unsupported;sitting in chair  -SD     Static Standing Balance contact guard;2-person assist  -SD     Position/Device Used, Standing Balance supported;walker, front-wheeled  -SD               User Key  (r) = Recorded By, (t) = Taken By, (c) = Cosigned By      Initials Name Provider Type    Kathie Barron PT Physical Therapist                   Goals/Plan    No documentation.                  Clinical Impression       Row Name 09/08/24 1404          Pain Scale: FACES Pre/Post-Treatment    Pain: FACES Scale, Pretreatment 2-->hurts little bit  -SD     Posttreatment Pain Rating 2-->hurts little bit  -SD     Pain Location - back  -SD       Row Name 09/08/24 1409          Plan of Care Review    Plan of Care Reviewed With patient  -SD     Progress improving  -SD     Outcome Evaluation Pt able to take a few steps forward from recliner with RW and Mikaela x2 with recliner closely in tow. Pt dem forward  flexed posture (which he reports is baseline for him due to hx of back surgery) and RUE tremor, which he states is also baseline due to Parkinsons. Further distance limited by weakness and fatigue, as well as fear of falling. Pt gave excellent effort and is motivated to progress his mobility. PT continues to rec d/c IRF.  -SD       Row Name 09/08/24 1404          Vital Signs    Pre Systolic BP Rehab 116  -SD     Pre Treatment Diastolic BP 67  -SD     Intra Systolic BP Rehab 120  -SD     Intra Treatment Diastolic BP 74  -SD     Post Systolic BP Rehab 131  -SD     Post Treatment Diastolic BP 99  -SD     Pretreatment Heart Rate (beats/min) 104  -SD     Intratreatment Heart Rate (beats/min) 120  -SD     Posttreatment Heart Rate (beats/min) 111  -SD     Post SpO2 (%) 96  -SD     O2 Delivery Post Treatment room air  -SD     Pre Patient Position Sitting  -SD     Post Patient Position Sitting  -SD       Row Name 09/08/24 1404          Positioning and Restraints    Pre-Treatment Position sitting in chair/recliner  -SD     Post Treatment Position chair  -SD     In Chair notified nsg;reclined;call light within reach;encouraged to call for assist;exit alarm on;with family/caregiver;on mechanical lift sling;heels elevated;waffle cushion  -SD               User Key  (r) = Recorded By, (t) = Taken By, (c) = Cosigned By      Initials Name Provider Type    Kathie Barron PT Physical Therapist                   Outcome Measures       Row Name 09/08/24 1405          How much help from another person do you currently need...    Turning from your back to your side while in flat bed without using bedrails? 3  -SD     Moving from lying on back to sitting on the side of a flat bed without bedrails? 2  -SD     Moving to and from a bed to a chair (including a wheelchair)? 2  -SD     Standing up from a chair using your arms (e.g., wheelchair, bedside chair)? 3  -SD     Climbing 3-5 steps with a railing? 1  -SD     To walk in  hospital room? 2  -SD     AM-PAC 6 Clicks Score (PT) 13  -SD     Highest Level of Mobility Goal 4 --> Transfer to chair/commode  -SD       Row Name 09/08/24 1405          Functional Assessment    Outcome Measure Options AM-PAC 6 Clicks Basic Mobility (PT)  -SD               User Key  (r) = Recorded By, (t) = Taken By, (c) = Cosigned By      Initials Name Provider Type    SD Kathie Bhatia, PT Physical Therapist                                 Physical Therapy Education       Title: PT OT SLP Therapies (In Progress)       Topic: Physical Therapy (Done)       Point: Mobility training (Done)       Learning Progress Summary             Patient Eager, E, VU,DU by SD at 9/8/2024 1406    Acceptance, E,TB, NR by ES at 9/4/2024 1142   Significant Other Eager, E, VU,DU by SD at 9/8/2024 1406                         Point: Home exercise program (Done)       Learning Progress Summary             Patient Eager, E, VU,DU by SD at 9/8/2024 1406   Significant Other Eager, E, VU,DU by SD at 9/8/2024 1406                         Point: Body mechanics (Done)       Learning Progress Summary             Patient Eager, E, VU,DU by SD at 9/8/2024 1406    Acceptance, E,TB, NR by ES at 9/4/2024 1142   Significant Other Eager, E, VU,DU by SD at 9/8/2024 1406                         Point: Precautions (Done)       Learning Progress Summary             Patient Eager, E, VU,DU by SD at 9/8/2024 1406    Acceptance, E,TB, NR by ES at 9/4/2024 1142   Significant Other Eager, E, VU,DU by SD at 9/8/2024 1406                                         User Key       Initials Effective Dates Name Provider Type Discipline    SD 03/13/23 -  Kathie Bhatia, PT Physical Therapist PT    ES 08/11/22 -  Francesca Valadez PT Physical Therapist PT                  PT Recommendation and Plan     Plan of Care Reviewed With: patient  Progress: improving  Outcome Evaluation: Pt able to take a few steps forward from recliner with RW and Mikaela x2 with  recliner closely in tow. Pt dem forward flexed posture (which he reports is baseline for him due to hx of back surgery) and RUE tremor, which he states is also baseline due to Parkinsons. Further distance limited by weakness and fatigue, as well as fear of falling. Pt gave excellent effort and is motivated to progress his mobility. PT continues to rec d/c IRF.     Time Calculation:         PT Charges       Row Name 09/08/24 1406             Time Calculation    Start Time 1312  -SD      PT Received On 09/08/24  -SD      PT Goal Re-Cert Due Date 09/14/24  -SD         Time Calculation- PT    Total Timed Code Minutes- PT 15 minute(s)  -SD         Timed Charges    12829 - Gait Training Minutes  15  -SD         Total Minutes    Timed Charges Total Minutes 15  -SD       Total Minutes 15  -SD                User Key  (r) = Recorded By, (t) = Taken By, (c) = Cosigned By      Initials Name Provider Type    Kathie Barron, ABRAHAN Physical Therapist                  Therapy Charges for Today       Code Description Service Date Service Provider Modifiers Qty    [id number] HC GAIT TRAINING EA 15 MIN 9/8/2024 Kathie Bhatia, ABRAHAN GP 1            PT G-Codes  Outcome Measure Options: AM-PAC 6 Clicks Basic Mobility (PT)  AM-PAC 6 Clicks Score (PT): 13  AM-PAC 6 Clicks Score (OT): 12  PT Discharge Summary  Anticipated Discharge Disposition (PT): inpatient rehabilitation facility    Kathie Bhatia PT  9/8/2024

## 2024-09-08 NOTE — THERAPY TREATMENT NOTE
Patient Name: Bo Perez  : 1945    MRN: [id number]                              Today's Date: 2024       Admit Date: 2024    Visit Dx:     ICD-10-CM ICD-9-CM   1. Acute respiratory failure with hypoxemia  J96.01 518.81   2. Flash pulmonary edema  J81.0 518.4   3. Acute sepsis  A41.9 038.9     995.91   4. Pneumonia of both lower lobes due to infectious organism  J18.9 486   5. Hypertensive emergency  I16.1 401.9   6. Oropharyngeal dysphagia  R13.12 787.22   7. Frequent PVCs  I49.3 427.69     Patient Active Problem List   Diagnosis    Chronic anticoagulation    HTN (hypertension)    Morbid obesity    Acute respiratory failure with hypoxia and hypercapnia    Sepsis due to pneumonia    Continuous tobacco abuse    Persistent atrial fibrillation    Frequent PVCs    Mixed hyperlipidemia    Wide-complex tachycardia    NSTEMI (non-ST elevated myocardial infarction)    HFrEF (heart failure with reduced ejection fraction)    Prolonged Q-T interval on ECG    Acute HFrEF (heart failure with reduced ejection fraction)     Past Medical History:   Diagnosis Date    A-fib     Arthritis     Hard to intubate     Hypertension     Sleep apnea     NOT USING CPAP     Past Surgical History:   Procedure Laterality Date    BLADDER SURGERY  2016    STIMULATOR IMPLANTED, NEW ELECTRODE 2022    CARDIAC ABLATION      AFIB    CARDIAC CATHETERIZATION N/A 2024    Procedure: CASE ABORT;  Surgeon: Yasir Canales IV, MD;  Location: [facility] INVASIVE LOCATION;  Service: Cardiovascular;  Laterality: N/A;    CARPAL TUNNEL RELEASE Right     CHOLECYSTECTOMY      COLONOSCOPY      DENTAL PROCEDURE      FINGER SURGERY Left     FOREFINGER BONE TRIMMED    HEMORRHOIDECTOMY  1973    KNEE ARTHROSCOPY Left     MOUTH SURGERY      FOR SLEEP APNEA    SPINAL FUSION  2007    L3 LUMBAR SPINAL STENOSIS    TONSILLECTOMY      TOTAL KNEE ARTHROPLASTY Left 2009    TOTAL KNEE ARTHROPLASTY Right  6/22/2023    Procedure: TOTAL KNEE ARTHROPLASTY - RIGHT;  Surgeon: Leland Elizalde MD;  Location: [facility];  Service: Orthopedics;  Laterality: Right;      General Information       Row Name 09/08/24 1425          OT Time and Intention    Document Type therapy note (daily note)  -     Mode of Treatment occupational therapy  -       Row Name 09/08/24 1425          General Information    Patient Profile Reviewed yes  -JR     Existing Precautions/Restrictions fall  resting tremor  -     Barriers to Rehab medically complex;previous functional deficit  -       Row Name 09/08/24 1425          Cognition    Orientation Status (Cognition) oriented x 4  -       Row Name 09/08/24 1425          Safety Issues, Functional Mobility    Safety Issues Affecting Function (Mobility) insight into deficits/self-awareness;sequencing abilities  -     Impairments Affecting Function (Mobility) balance;coordination;endurance/activity tolerance;shortness of breath;strength;postural/trunk control;range of motion (ROM);pain  -               User Key  (r) = Recorded By, (t) = Taken By, (c) = Cosigned By      Initials Name Provider Type    JR Hyacinth Collado OT Occupational Therapist                     Mobility/ADL's       Row Name 09/08/24 1426          Bed Mobility    Comment, (Bed Mobility) defer-up in chair upon arrival  -[facility] Name 09/08/24 1426          Transfers    Transfers sit-stand transfer  -[facility] Name 09/08/24 1426          Sit-Stand Transfer    Sit-Stand [place] (Transfers) minimum assist (75% patient effort);2 person assist;verbal cues  -     Assistive Device (Sit-Stand Transfers) walker, front-wheeled  -     Comment, (Sit-Stand Transfer) Pt completed sit to stand x3 this date with increased time and verbal cues for hand placement and pre-stand positioning. Pt requiring CGA x2 for first 2 attempts and min A x2 with 3rd atempt.  -       Row Name 09/08/24 1426          Functional Mobility     Functional Mobility- Ind. Level minimum assist (75% patient effort);2 person assist required;verbal cues required  -     Functional Mobility- Device walker, front-wheeled  -     Functional Mobility-Distance (Feet) --  a few steps forward  -     Functional Mobility- Safety Issues step length decreased  -     Functional Mobility- Comment Pt able to take a few steps forward with close chair follow for safety. Limited due to fatigue  -       Row Name 09/08/24 1426          Activities of Daily Living    BADL Assessment/Intervention lower body dressing;grooming;upper body dressing  -       Row Name 09/08/24 1426          Lower Body Dressing Assessment/Training    [place] Level (Lower Body Dressing) don;doff;socks;dependent (less than 25% patient effort)  -JR     Position (Lower Body Dressing) supported sitting  -JR       Row Name 09/08/24 1426          Grooming Assessment/Training    [place] Level (Grooming) wash face, hands;set up  -JR     Position (Grooming) supported sitting  -JR       Row Name 09/08/24 1426          Upper Body Dressing Assessment/Training    [place] Level (Upper Body Dressing) don;doff;front opening garment;maximum assist (25% patient effort);verbal cues  -JR     Position (Upper Body Dressing) supported sitting  -               User Key  (r) = Recorded By, (t) = Taken By, (c) = Cosigned By      Initials Name Provider Type    Hyacinth Flanagan OT Occupational Therapist                   Obj/Interventions       [facility] Name 09/08/24 1433          Balance    Balance Assessment sitting static balance;standing dynamic balance  -     Static Sitting Balance supervision  -     Dynamic Standing Balance minimal assist;2-person assist;verbal cues  -     Position/Device Used, Standing Balance supported;walker, rolling  -               User Key  (r) = Recorded By, (t) = Taken By, (c) = Cosigned By      Initials Name Provider Type    Hyacinth Flanagan OT Occupational  Therapist                   Goals/Plan       Row Name 09/08/24 1437          Transfer Goal 1 (OT)    Activity/Assistive Device (Transfer Goal 1, OT) sit-to-stand/stand-to-sit;toilet;commode, bedside without drop arms  -JR     [place] Level/Cues Needed (Transfer Goal 1, OT) contact guard required  -JR     Time Frame (Transfer Goal 1, OT) short term goal (STG);5 days  -JR     Progress/Outcome (Transfer Goal 1, OT) goal revised this date  -JR       Row Name 09/08/24 1437          Toileting Goal 1 (OT)    Activity/Device (Toileting Goal 1, OT) adjust/manage clothing;perform perineal hygiene  -JR     [place] Level/Cues Needed (Toileting Goal 1, OT) minimum assist (75% or more patient effort)  -JR     Time Frame (Toileting Goal 1, OT) long term goal (LTG);10 days  -JR     Progress/Outcome (Toileting Goal 1, OT) new goal  -JR       Row Name 09/08/24 1437          Grooming Goal 1 (OT)    Activity/Device (Grooming Goal 1, OT) hair care;oral care;wash face, hands  -JR     [place] (Grooming Goal 1, OT) contact guard required  -JR     Time Frame (Grooming Goal 1, OT) long term goal (LTG);10 days  -JR     Strategies/Barriers (Grooming Goal 1, OT) supported stand  -JR     Progress/Outcome (Grooming Goal 1, OT) new goal  -               User Key  (r) = Recorded By, (t) = Taken By, (c) = Cosigned By      Initials Name Provider Type    JR Hyacinth Collado, OT Occupational Therapist                   Clinical Impression       Row Name 09/08/24 1433          Pain Assessment    Pretreatment Pain Rating 0/10 - no pain  -JR     Posttreatment Pain Rating 0/10 - no pain  -JR     Additional Documentation Pain Scale: Word Pre/Post-Treatment (Group)  -JR       Row Name 09/08/24 1433          Plan of Care Review    Plan of Care Reviewed With patient  -JR     Progress improving  -JR     Outcome Evaluation Pt improving with transfers, mobility and ADL's. Pt remains below baseline due to decreased strength, balance, activity  tolerance and coordination. Recommend continued skilled OT services and transfer to IRF at d/c for best functional outcomes.  -JR       Row Name 09/08/24 1433          Therapy Assessment/Plan (OT)    Patient/Family Therapy Goal Statement (OT) go home  -JR       Row Name 09/08/24 1433          Therapy Plan Review/Discharge Plan (OT)    Anticipated Discharge Disposition (OT) inpatient rehabilitation facility  -JR       Row Name 09/08/24 1433          Vital Signs    Pre Systolic BP Rehab 116  -JR     Pre Treatment Diastolic BP 67  -JR     Post Systolic BP Rehab 131  -JR     Post Treatment Diastolic BP 99  -JR     Pretreatment Heart Rate (beats/min) 104  -JR     Posttreatment Heart Rate (beats/min) 111  -JR     Post SpO2 (%) 94  -JR     O2 Delivery Post Treatment room air  -JR     Pre Patient Position Sitting  -JR     Intra Patient Position Standing  -JR     Post Patient Position Sitting  -JR       Row Name 09/08/24 1433          Positioning and Restraints    Pre-Treatment Position sitting in chair/recliner  -JR     Post Treatment Position chair  -JR     In Chair notified nsg;reclined;call light within reach;encouraged to call for assist;exit alarm on;with family/caregiver;waffle cushion;legs elevated  -JR               User Key  (r) = Recorded By, (t) = Taken By, (c) = Cosigned By      Initials Name Provider Type    JR Hyacinth Collado, OT Occupational Therapist                   Outcome Measures       Row Name 09/08/24 1437          How much help from another is currently needed...    Putting on and taking off regular lower body clothing? 1  -JR     Bathing (including washing, rinsing, and drying) 2  -JR     Toileting (which includes using toilet bed pan or urinal) 1  -JR     Putting on and taking off regular upper body clothing 2  -JR     Taking care of personal grooming (such as brushing teeth) 3  -JR     Eating meals 3  -JR     AM-PAC 6 Clicks Score (OT) 12  -JR       Row Name 09/08/24 1405          How much help  from another person do you currently need...    Turning from your back to your side while in flat bed without using bedrails? 3  -SD     Moving from lying on back to sitting on the side of a flat bed without bedrails? 2  -SD     Moving to and from a bed to a chair (including a wheelchair)? 2  -SD     Standing up from a chair using your arms (e.g., wheelchair, bedside chair)? 3  -SD     Climbing 3-5 steps with a railing? 1  -SD     To walk in hospital room? 2  -SD     AM-PAC 6 Clicks Score (PT) 13  -SD     Highest Level of Mobility Goal 4 --> Transfer to chair/commode  -SD       Row Name 09/08/24 1437 09/08/24 1405       Functional Assessment    Outcome Measure Options AM-PAC 6 Clicks Daily Activity (OT)  -JR AM-PAC 6 Clicks Basic Mobility (PT)  -SD              User Key  (r) = Recorded By, (t) = Taken By, (c) = Cosigned By      Initials Name Provider Type    Kathie Barron, PT Physical Therapist    Hyacinth Flanagan, OT Occupational Therapist                    Occupational Therapy Education       Title: PT OT SLP Therapies (In Progress)       Topic: Occupational Therapy (In Progress)       Point: ADL training (Done)       Description:   Instruct learner(s) on proper safety adaptation and remediation techniques during self care or transfers.   Instruct in proper use of assistive devices.                  Learning Progress Summary             Patient Acceptance, E, VU,NR by  at 9/8/2024 1313    Acceptance, E, NR by MINNIE at 9/4/2024 1101   Family Acceptance, E, VU,NR by  at 9/8/2024 1313                         Point: Home exercise program (Not Started)       Description:   Instruct learner(s) on appropriate technique for monitoring, assisting and/or progressing therapeutic exercises/activities.                  Learner Progress:  Not documented in this visit.              Point: Precautions (Done)       Description:   Instruct learner(s) on prescribed precautions during self-care and functional  transfers.                  Learning Progress Summary             Patient Acceptance, E, VU,NR by  at 9/8/2024 1313    Acceptance, E, NR by MINNIE at 9/4/2024 1101   Family Acceptance, E, VU,NR by  at 9/8/2024 1313                         Point: Body mechanics (In Progress)       Description:   Instruct learner(s) on proper positioning and spine alignment during self-care, functional mobility activities and/or exercises.                  Learning Progress Summary             Patient Acceptance, E, NR by  at 9/4/2024 1101                                         User Key       Initials Effective Dates Name Provider Type Discipline     02/03/23 -  Hyacinth Collado, OT Occupational Therapist OT    MINNIE 02/05/24 -  Monisha Morgan OT Occupational Therapist OT                  OT Recommendation and Plan     Plan of Care Review  Plan of Care Reviewed With: patient  Progress: improving  Outcome Evaluation: Pt improving with transfers, mobility and ADL's. Pt remains below baseline due to decreased strength, balance, activity tolerance and coordination. Recommend continued skilled OT services and transfer to IRF at d/c for best functional outcomes.     Time Calculation:         Time Calculation- OT       Row Name 09/08/24 1438 09/08/24 1406          Time Calculation- OT    OT Start Time 1313  -JR --     OT Received On 09/08/24  - --        Timed Charges    98975 - Gait Training Minutes  -- 15  -SD     84924 - OT Self Care/Mgmt Minutes 15  -JR --        Total Minutes    Timed Charges Total Minutes 15  -JR 15  -SD      Total Minutes 15  -JR 15  -SD               User Key  (r) = Recorded By, (t) = Taken By, (c) = Cosigned By      Initials Name Provider Type    Kathie Barron, PT Physical Therapist     Hyacinth Collado, OT Occupational Therapist                  Therapy Charges for Today       Code Description Service Date Service Provider Modifiers Qty    36429220297 HC OT SELF CARE/MGMT/TRAIN EA 15 MIN 9/8/2024  Tobias, Hyacinth RENE, OT GO 1                 Hyacinth RENE. Tobias, OT  9/8/2024

## 2024-09-08 NOTE — PROGRESS NOTES
"Bo Perez  [id number]  1945   LOS: 9 days   Patient Care Team:  Srinivasan Cary MD as PCP - General (Family Medicine)  Shekhar Galvez MD as Consulting Physician (Cardiology)  Mahendra Bear MD as Consulting Physician (Cardiac Electrophysiology)    Chief Complaint:  WCT / NSTEMI / HFrEF / PVCs / MORBID OBESITY / DEBILITY    Subjective     Generally good spirits and slept well last night with usual frequent nocturia.  No current cardiopulmonary complaints.  Nominal room air oximetry (94%).  Ate all of breakfast.  His only complaint is that he wants his Percocet administered \"on time.\"    Objective     Vital Sign Min/Max for last 24 hours  Temp  Min: 98.1 °F (36.7 °C)  Max: 98.7 °F (37.1 °C)   BP  Min: 114/75  Max: 153/69   Pulse  Min: 82  Max: 106   Resp  Min: 16  Max: 20   SpO2  Min: 91 %  Max: 95 %      Weight  Min: 217 kg (478 lb 1.6 oz)  Max: 217 kg (478 lb 1.6 oz)         09/05/24  1517 09/08/24  0309   Weight: (!) 160 kg (352 lb) (!) 217 kg (478 lb 1.6 oz)         Intake/Output Summary (Last 24 hours) at 9/8/2024 0804  Last data filed at 9/8/2024 0700  Gross per 24 hour   Intake 1080 ml   Output 4100 ml   Net -3020 ml       Physical Exam:     General Appearance:    Alert, cooperative, in no acute distress   Lungs:     Clear to auscultation,respirations regular, even and                unlabored    Heart:    Regular and normal rate, normal S1 and S2, grade 1/6 systolic murmur, no gallop, no rub, no click   Abdomen:  Extremities:   Soft, nontender, bowel sounds audible x4    1+ edema, normal range of motion   Pulses:   Pulses palpable and equal bilaterally      Results Review:   Results from last 7 days   Lab Units 09/08/24  0525 09/07/24  1734 09/07/24  0418 09/06/24  0920   SODIUM mmol/L 136  --  138 134*   POTASSIUM mmol/L 4.0 4.1 3.6 4.0   CHLORIDE mmol/L 100  --  102 100   CO2 mmol/L 26.0  --  28.0 21.0*   BUN mg/dL 16  --  16 17   CREATININE mg/dL 0.80  --  0.68* 0.68*   GLUCOSE mg/dL " 104*  --  114* 119*   CALCIUM mg/dL 8.9  --  8.8 8.6     Results from last 7 days   Lab Units 09/06/24  0920 09/05/24  0322 09/04/24  0320   WBC 10*3/mm3 6.78 7.85 7.65   HEMOGLOBIN g/dL 11.8* 11.3* 10.3*   HEMATOCRIT % 35.8* 35.2* 31.9*   PLATELETS 10*3/mm3 157 133* 123*     Results from last 7 days   Lab Units 09/02/24  0519   CHOLESTEROL mg/dL 141   TRIGLYCERIDES mg/dL 130     Results from last 7 days   Lab Units 09/02/24  2327 09/02/24  2111   HSTROP T ng/L 229* 227*     ALBUMIN: 3.3    LFTs: WNL    NO NEW CXR / EKG / MAGNESIUM LEVEL.    Medication Review: REVIEWED; NO DRIPS.    Assessment & Plan     Excellent diuresis with stable GFR and acceptable hemodynamics.  Unable to undergo LHC and medical therapy continues.  Accu-Cheks remain stable.  Would continue current cardiac medications.  Will reassess electrocardiogram in a.m.      Acute respiratory failure with hypoxia and hypercapnia    HTN (hypertension)    Continuous tobacco abuse    Persistent atrial fibrillation    Frequent PVCs    Wide-complex tachycardia    NSTEMI (non-ST elevated myocardial infarction)    HFrEF (heart failure with reduced ejection fraction)    Prolonged Q-T interval on ECG    Acute HFrEF (heart failure with reduced ejection fraction)    09/08/24  08:04 EDT

## 2024-09-08 NOTE — PLAN OF CARE
Goal Outcome Evaluation:  Plan of Care Reviewed With: patient        Progress: improving  Outcome Evaluation: Pt improving with transfers, mobility and ADL's. Pt remains below baseline due to decreased strength, balance, activity tolerance and coordination. Recommend continued skilled OT services and transfer to IRF at d/c for best functional outcomes.      Anticipated Discharge Disposition (OT): inpatient rehabilitation facility

## 2024-09-09 PROBLEM — I21.A1 TYPE 2 MYOCARDIAL INFARCTION: Status: ACTIVE | Noted: 2024-09-09

## 2024-09-09 PROBLEM — I49.9 CARDIAC ARRHYTHMIA: Status: ACTIVE | Noted: 2024-09-09

## 2024-09-09 LAB
ALBUMIN SERPL-MCNC: 3 G/DL (ref 3.5–5.2)
ALBUMIN/GLOB SERPL: 1 G/DL
ALP SERPL-CCNC: 41 U/L (ref 39–117)
ALT SERPL W P-5'-P-CCNC: 17 U/L (ref 1–41)
ANION GAP SERPL CALCULATED.3IONS-SCNC: 11 MMOL/L (ref 5–15)
AST SERPL-CCNC: 18 U/L (ref 1–40)
BASOPHILS # BLD AUTO: 0.06 10*3/MM3 (ref 0–0.2)
BASOPHILS NFR BLD AUTO: 0.7 % (ref 0–1.5)
BILIRUB SERPL-MCNC: 0.7 MG/DL (ref 0–1.2)
BUN SERPL-MCNC: 22 MG/DL (ref 8–23)
BUN/CREAT SERPL: 20 (ref 7–25)
CALCIUM SPEC-SCNC: 8.7 MG/DL (ref 8.6–10.5)
CHLORIDE SERPL-SCNC: 101 MMOL/L (ref 98–107)
CO2 SERPL-SCNC: 25 MMOL/L (ref 22–29)
CREAT SERPL-MCNC: 1.1 MG/DL (ref 0.76–1.27)
DEPRECATED RDW RBC AUTO: 52.9 FL (ref 37–54)
EGFRCR SERPLBLD CKD-EPI 2021: 68.7 ML/MIN/1.73
EOSINOPHIL # BLD AUTO: 0.3 10*3/MM3 (ref 0–0.4)
EOSINOPHIL NFR BLD AUTO: 3.3 % (ref 0.3–6.2)
ERYTHROCYTE [DISTWIDTH] IN BLOOD BY AUTOMATED COUNT: 15.1 % (ref 12.3–15.4)
GEN 5 2HR TROPONIN T REFLEX: 64 NG/L
GLOBULIN UR ELPH-MCNC: 2.9 GM/DL
GLUCOSE BLDC GLUCOMTR-MCNC: 107 MG/DL (ref 70–130)
GLUCOSE BLDC GLUCOMTR-MCNC: 110 MG/DL (ref 70–130)
GLUCOSE BLDC GLUCOMTR-MCNC: 120 MG/DL (ref 70–130)
GLUCOSE BLDC GLUCOMTR-MCNC: 126 MG/DL (ref 70–130)
GLUCOSE BLDC GLUCOMTR-MCNC: 99 MG/DL (ref 70–130)
GLUCOSE SERPL-MCNC: 121 MG/DL (ref 65–99)
HCT VFR BLD AUTO: 35.4 % (ref 37.5–51)
HGB BLD-MCNC: 11.6 G/DL (ref 13–17.7)
IMM GRANULOCYTES # BLD AUTO: 0.08 10*3/MM3 (ref 0–0.05)
IMM GRANULOCYTES NFR BLD AUTO: 0.9 % (ref 0–0.5)
LYMPHOCYTES # BLD AUTO: 2.09 10*3/MM3 (ref 0.7–3.1)
LYMPHOCYTES NFR BLD AUTO: 22.8 % (ref 19.6–45.3)
MAGNESIUM SERPL-MCNC: 2.2 MG/DL (ref 1.6–2.4)
MCH RBC QN AUTO: 31.6 PG (ref 26.6–33)
MCHC RBC AUTO-ENTMCNC: 32.8 G/DL (ref 31.5–35.7)
MCV RBC AUTO: 96.5 FL (ref 79–97)
MONOCYTES # BLD AUTO: 0.56 10*3/MM3 (ref 0.1–0.9)
MONOCYTES NFR BLD AUTO: 6.1 % (ref 5–12)
NEUTROPHILS NFR BLD AUTO: 6.06 10*3/MM3 (ref 1.7–7)
NEUTROPHILS NFR BLD AUTO: 66.2 % (ref 42.7–76)
NRBC BLD AUTO-RTO: 0 /100 WBC (ref 0–0.2)
PLATELET # BLD AUTO: 213 10*3/MM3 (ref 140–450)
PMV BLD AUTO: 9.9 FL (ref 6–12)
POTASSIUM SERPL-SCNC: 4.2 MMOL/L (ref 3.5–5.2)
PROT SERPL-MCNC: 5.9 G/DL (ref 6–8.5)
QT INTERVAL: 434 MS
QTC INTERVAL: 516 MS
RBC # BLD AUTO: 3.67 10*6/MM3 (ref 4.14–5.8)
SODIUM SERPL-SCNC: 137 MMOL/L (ref 136–145)
TROPONIN T DELTA: 2 NG/L
TROPONIN T SERPL HS-MCNC: 62 NG/L
WBC NRBC COR # BLD AUTO: 9.15 10*3/MM3 (ref 3.4–10.8)

## 2024-09-09 PROCEDURE — 82948 REAGENT STRIP/BLOOD GLUCOSE: CPT

## 2024-09-09 PROCEDURE — 93010 ELECTROCARDIOGRAM REPORT: CPT | Performed by: INTERNAL MEDICINE

## 2024-09-09 PROCEDURE — 25010000002 ALBUMIN HUMAN 25% PER 50 ML: Performed by: INTERNAL MEDICINE

## 2024-09-09 PROCEDURE — 84484 ASSAY OF TROPONIN QUANT: CPT | Performed by: INTERNAL MEDICINE

## 2024-09-09 PROCEDURE — 92526 ORAL FUNCTION THERAPY: CPT

## 2024-09-09 PROCEDURE — 85025 COMPLETE CBC W/AUTO DIFF WBC: CPT | Performed by: INTERNAL MEDICINE

## 2024-09-09 PROCEDURE — P9047 ALBUMIN (HUMAN), 25%, 50ML: HCPCS | Performed by: INTERNAL MEDICINE

## 2024-09-09 PROCEDURE — 25810000003 SODIUM CHLORIDE 0.9 % SOLUTION: Performed by: INTERNAL MEDICINE

## 2024-09-09 PROCEDURE — 83735 ASSAY OF MAGNESIUM: CPT | Performed by: INTERNAL MEDICINE

## 2024-09-09 PROCEDURE — 94799 UNLISTED PULMONARY SVC/PX: CPT

## 2024-09-09 PROCEDURE — 99232 SBSQ HOSP IP/OBS MODERATE 35: CPT | Performed by: INTERNAL MEDICINE

## 2024-09-09 PROCEDURE — 93005 ELECTROCARDIOGRAM TRACING: CPT | Performed by: INTERNAL MEDICINE

## 2024-09-09 PROCEDURE — 99232 SBSQ HOSP IP/OBS MODERATE 35: CPT | Performed by: NURSE PRACTITIONER

## 2024-09-09 PROCEDURE — 80053 COMPREHEN METABOLIC PANEL: CPT | Performed by: INTERNAL MEDICINE

## 2024-09-09 RX ORDER — ALBUMIN (HUMAN) 12.5 G/50ML
25 SOLUTION INTRAVENOUS ONCE
Status: COMPLETED | OUTPATIENT
Start: 2024-09-09 | End: 2024-09-09

## 2024-09-09 RX ORDER — MIDODRINE HYDROCHLORIDE 5 MG/1
2.5 TABLET ORAL
Status: DISCONTINUED | OUTPATIENT
Start: 2024-09-09 | End: 2024-09-09

## 2024-09-09 RX ORDER — MIDODRINE HYDROCHLORIDE 5 MG/1
2.5 TABLET ORAL ONCE
Status: DISCONTINUED | OUTPATIENT
Start: 2024-09-09 | End: 2024-09-10 | Stop reason: HOSPADM

## 2024-09-09 RX ADMIN — GABAPENTIN 800 MG: 400 CAPSULE ORAL at 22:48

## 2024-09-09 RX ADMIN — GABAPENTIN 800 MG: 400 CAPSULE ORAL at 15:45

## 2024-09-09 RX ADMIN — PANTOPRAZOLE SODIUM 40 MG: 40 TABLET, DELAYED RELEASE ORAL at 05:33

## 2024-09-09 RX ADMIN — Medication 10 ML: at 09:48

## 2024-09-09 RX ADMIN — SPIRONOLACTONE 25 MG: 25 TABLET ORAL at 09:46

## 2024-09-09 RX ADMIN — SODIUM CHLORIDE 1000 ML: 9 INJECTION, SOLUTION INTRAVENOUS at 02:55

## 2024-09-09 RX ADMIN — Medication 10 ML: at 20:04

## 2024-09-09 RX ADMIN — EMPAGLIFLOZIN 10 MG: 10 TABLET, FILM COATED ORAL at 09:46

## 2024-09-09 RX ADMIN — SACUBITRIL AND VALSARTAN 1 TABLET: 24; 26 TABLET, FILM COATED ORAL at 20:03

## 2024-09-09 RX ADMIN — APIXABAN 5 MG: 5 TABLET, FILM COATED ORAL at 20:03

## 2024-09-09 RX ADMIN — OXYCODONE AND ACETAMINOPHEN 1 TABLET: 10; 325 TABLET ORAL at 20:03

## 2024-09-09 RX ADMIN — SODIUM CHLORIDE 500 ML: 9 INJECTION, SOLUTION INTRAVENOUS at 00:47

## 2024-09-09 RX ADMIN — KETOROLAC TROMETHAMINE 1 DROP: 5 SOLUTION/ DROPS OPHTHALMIC at 09:50

## 2024-09-09 RX ADMIN — PREDNISOLONE ACETATE 1 DROP: 10 SUSPENSION/ DROPS OPHTHALMIC at 15:45

## 2024-09-09 RX ADMIN — KETOROLAC TROMETHAMINE 1 DROP: 5 SOLUTION/ DROPS OPHTHALMIC at 15:45

## 2024-09-09 RX ADMIN — SODIUM CHLORIDE 500 ML: 9 INJECTION, SOLUTION INTRAVENOUS at 01:00

## 2024-09-09 RX ADMIN — GABAPENTIN 800 MG: 400 CAPSULE ORAL at 05:33

## 2024-09-09 RX ADMIN — PREDNISOLONE ACETATE 1 DROP: 10 SUSPENSION/ DROPS OPHTHALMIC at 09:50

## 2024-09-09 RX ADMIN — FUROSEMIDE 40 MG: 40 TABLET ORAL at 09:46

## 2024-09-09 RX ADMIN — ALBUMIN (HUMAN) 25 G: 0.25 INJECTION, SOLUTION INTRAVENOUS at 01:44

## 2024-09-09 RX ADMIN — PREDNISOLONE ACETATE 1 DROP: 10 SUSPENSION/ DROPS OPHTHALMIC at 20:03

## 2024-09-09 RX ADMIN — OXYCODONE AND ACETAMINOPHEN 1 TABLET: 10; 325 TABLET ORAL at 13:57

## 2024-09-09 RX ADMIN — APIXABAN 5 MG: 5 TABLET, FILM COATED ORAL at 09:46

## 2024-09-09 RX ADMIN — SACUBITRIL AND VALSARTAN 1 TABLET: 49; 51 TABLET, FILM COATED ORAL at 09:46

## 2024-09-09 RX ADMIN — KETOROLAC TROMETHAMINE 1 DROP: 5 SOLUTION/ DROPS OPHTHALMIC at 20:03

## 2024-09-09 NOTE — THERAPY TREATMENT NOTE
Acute Care - Speech Language Pathology   Swallow Treatment Note [facility]     Patient Name: Bo Perez  : 1945  MRN: [id number]  Today's Date: 2024               Admit Date: 2024    Visit Dx:     ICD-10-CM ICD-9-CM   1. Acute respiratory failure with hypoxemia  J96.01 518.81   2. Flash pulmonary edema  J81.0 518.4   3. Acute sepsis  A41.9 038.9     995.91   4. Pneumonia of both lower lobes due to infectious organism  J18.9 486   5. Hypertensive emergency  I16.1 401.9   6. Frequent PVCs  I49.3 427.69     Patient Active Problem List   Diagnosis    Chronic anticoagulation    HTN (hypertension)    Morbid obesity    Acute respiratory failure with hypoxia and hypercapnia    Sepsis due to pneumonia    Continuous tobacco abuse    Persistent atrial fibrillation    Frequent PVCs    Mixed hyperlipidemia    Wide-complex tachycardia    NSTEMI (non-ST elevated myocardial infarction)    HFrEF (heart failure with reduced ejection fraction)    Prolonged Q-T interval on ECG    Acute HFrEF (heart failure with reduced ejection fraction)     Past Medical History:   Diagnosis Date    A-fib     Arthritis     Hard to intubate     Hypertension     Sleep apnea     NOT USING CPAP     Past Surgical History:   Procedure Laterality Date    BLADDER SURGERY  2016    STIMULATOR IMPLANTED, NEW ELECTRODE 2022    CARDIAC ABLATION      AFIB    CARDIAC CATHETERIZATION N/A 2024    Procedure: CASE ABORT;  Surgeon: Yasir Canales IV, MD;  Location: [facility] INVASIVE LOCATION;  Service: Cardiovascular;  Laterality: N/A;    CARPAL TUNNEL RELEASE Right 2002    CHOLECYSTECTOMY      COLONOSCOPY      DENTAL PROCEDURE  89    FINGER SURGERY Left     FOREFINGER BONE TRIMMED    HEMORRHOIDECTOMY  1973    KNEE ARTHROSCOPY Left     MOUTH SURGERY      FOR SLEEP APNEA    SPINAL FUSION  2007    L3 LUMBAR SPINAL STENOSIS    TONSILLECTOMY      TOTAL KNEE ARTHROPLASTY Left     TOTAL KNEE  ARTHROPLASTY Right 6/22/2023    Procedure: TOTAL KNEE ARTHROPLASTY - RIGHT;  Surgeon: Leland Elizalde MD;  Location: [facility];  Service: Orthopedics;  Laterality: Right;       SLP Recommendation and Plan     SLP Diet Recommendation: regular textures, thin liquids (09/09/24 0900)  Recommended Precautions and Strategies: upright posture during/after eating, general aspiration precautions (09/09/24 0900)  SLP Rec. for Method of Medication Administration: meds whole, with thin liquids (09/09/24 0900)     Monitor for Signs of Aspiration: notify SLP if any concerns (09/09/24 0900)  Recommended Diagnostics: No further SLP services recommended (09/09/24 0900)           Therapy Frequency (Swallow): evaluation only (No further tx or follow up) (09/09/24 0900)     Oral Care Recommendations: Oral Care BID/PRN, Toothbrush (09/09/24 0900)        Daily Summary of Progress (SLP): prepare for discharge (09/09/24 0900)               Treatment Assessment (SLP): toleration of diet, no clinical signs of, oral dysphagia, pharyngeal dysphagia (09/09/24 0900)  Treatment Assessment Comments (SLP): Pt reporting no difficulty with po. Observed eating regular, thin, mixed, and puree. No evidence of impairment.  Quick oral phase. No pharyngeal signs.  No c/o residue or difficulty.Observed pt taking medication with liquids- pt able to take all meds at one time without evidence of difficulty. (09/09/24 0900)  Plan for Continued Treatment (SLP): treatment no longer indicated as all goals met (09/09/24 0900)         Outcome Evaluation: Follow up re: diet tolerance. No difficulty observed or reported with regular/thin diet. No further ST services indicated.      SWALLOW EVALUATION (Last 72 Hours)       SLP Adult Swallow Evaluation       Row Name 09/09/24 0900                   Rehab Evaluation    Document Type therapy note (daily note)  -ML        Subjective Information no complaints  -ML        Patient Observations alert;cooperative  -ML         Patient/Family/Caregiver Comments/Observations Wife came in towards the end of the session.  -ML        Patient Effort good  -ML        Comment Pt dislikes hospital food but is able to find some foods he likes/will eat.  -ML        Symptoms Noted During/After Treatment none  -ML           General Information    Patient Profile Reviewed yes  -ML        Pertinent History Of Current Problem See ST hx- admitted with respiratory failure with hypoxia/hypercapnia.  FEES completed 9/3.  ST follow u with diet advancement to regular.  Today's plan is to follow up to ensure tolerance of diet upgrade.  -ML           Pain Scale: Numbers Pre/Post-Treatment    Pretreatment Pain Rating 8/10  -ML        Posttreatment Pain Rating 8/10  -ML        Pain Location - back;shoulder  Related to lying in bed and not being at home in his recliner.  -ML        Pre/Posttreatment Pain Comment RN in room and aware of pt's pain level- RN has just given pt medicaition for BP- provided pt tolerates BP meds without decline in respiratory status/HR/BP, RN will provide medication for pain.  -ML        Pain Intervention(s) Nursing Notified  -ML           SLP Treatment Clinical Impressions    Treatment Assessment (SLP) toleration of diet;no clinical signs of;oral dysphagia;pharyngeal dysphagia  -ML        Treatment Assessment Comments (SLP) Pt reporting no difficulty with po. Observed eating regular, thin, mixed, and puree. No evidence of impairment.  Quick oral phase. No pharyngeal signs.  No c/o residue or difficulty.Observed pt taking medication with liquids- pt able to take all meds at one time without evidence of difficulty.  -ML        Daily Summary of Progress (SLP) prepare for discharge  -ML        Plan for Continued Treatment (SLP) treatment no longer indicated as all goals met  -        Care Plan Review evaluation/treatment results reviewed;care plan/treatment goals reviewed;patient/other agree to care plan  -        Care Plan Review, Other  Participant(s) spouse  -ML           Recommendations    Therapy Frequency (Swallow) evaluation only  No further tx or follow up  -ML        SLP Diet Recommendation regular textures;thin liquids  -ML        Recommended Diagnostics No further SLP services recommended  -ML        Recommended Precautions and Strategies upright posture during/after eating;general aspiration precautions  -ML        Oral Care Recommendations Oral Care BID/PRN;Toothbrush  -ML        SLP Rec. for Method of Medication Administration meds whole;with thin liquids  -ML        Monitor for Signs of Aspiration notify SLP if any concerns  -ML                  User Key  (r) = Recorded By, (t) = Taken By, (c) = Cosigned By      Initials Name Effective Dates    ML Reyna Mera MS CCC-SLP 08/30/24 -                     EDUCATION  The patient has been educated in the following areas:   Dysphagia (Swallowing Impairment).        SLP GOALS       Row Name 09/09/24 0900             (LTG) Patient will demonstrate functional swallow for    Diet Texture (Demonstrate functional swallow) regular textures  -ML      Liquid viscosity (Demonstrate functional swallow) thin liquids  -ML      [place] (Demonstrate functional swallow) independently (over 90% accuracy)  -ML      Time Frame (Demonstrate functional swallow) 1 week  -ML      Progress/Outcomes (Demonstrate functional swallow) goal met  -ML      Comment (Demonstrate functional swallow) Tolerating current diet- no further ST services indicated.  -ML         (STG) Pharyngeal Strengthening Exercise Goal 1 (SLP)    Activity (Pharyngeal Strengthening Goal 1, SLP) increase timing;increase superior movement of the hyolaryngeal complex;increase anterior movement of the hyolaryngeal complex;increase closure at entrance to airway/closure of airway at glottis  -ML      Increase Timing prepping - 3 second prep or suck swallow or 3-step swallow  -ML      Increase Superior Movement of the Hyolaryngeal Complex  Mendelsohn;falsetto  -ML      Increase Anterior Movement of the Hyolaryngeal Complex hard effortful swallow  -ML      Increase Closure at Entrance to Airway/Closure of Airway at Glottis chin tuck against resistance (CTAR)  -ML      [place]/Accuracy (Pharyngeal Strengthening Goal 1, SLP) with minimal cues (75-90% accuracy)  -ML      Time Frame (Pharyngeal Strengthening Goal 1, SLP) 1 week  -ML      Progress/Outcomes (Pharyngeal Strengthening Goal 1, SLP) goal no longer appropriate  -ML      Comment (Pharyngeal Strengthening Goal 1, SLP) Tolerating diet. Diet advanced to regular/thin. No further ST services indicated.  -ML                User Key  (r) = Recorded By, (t) = Taken By, (c) = Cosigned By      Initials Name Provider Type    Reyna Lui MS CCC-SLP Speech and Language Pathologist                         Time Calculation:    Time Calculation- SLP       Row Name 09/09/24 1153             Time Calculation- SLP    SLP Start Time 0900  -ML      SLP Received On 09/09/24  -ML                User Key  (r) = Recorded By, (t) = Taken By, (c) = Cosigned By      Initials Name Provider Type    Reyna Lui MS CCC-SLP Speech and Language Pathologist                    Therapy Charges for Today       Code Description Service Date Service Provider Modifiers Qty    [id number]  ST TREATMENT SWALLOW 4 9/9/2024 Reyna Mera MS CCC-SLP GN 1                 MS MARY Rayo  9/9/2024

## 2024-09-09 NOTE — DISCHARGE PLACEMENT REQUEST
"Jessica Giles (79 y.o. Male)       Date of Birth   1945    Social Security Number       Address   [address] [facility] [address]    Home Phone   [phone]    MRN   [id number]       [employer]    Marital Status                               Admission Date   8/30/24    Admission Type   Emergency    Admitting Provider   Latoya Snyder MD    Attending Provider   Latoya Snyder MD    Department, Room/Bed   [facility] 6A, N619/1       Discharge Date       Discharge Disposition       Discharge Destination                                 Attending Provider: Latoya Snyder MD    Allergies: Amoxicillin    Isolation: None   Infection: None   Code Status: CPR    Ht: 185 cm (72.84\")   Wt: 220 kg (485 lb 9.6 oz)    Admission Cmt: None   Principal Problem: Acute respiratory failure with hypoxia and hypercapnia [J96.01,J96.02]                   Active Insurance as of 8/30/2024       Primary Coverage       Payor Plan Insurance Group Employer/Plan Group    HUMANA MEDICARE REPLACEMENT HU[employer] MED ADV GROUP [id number]       Payor Plan Address Payor Plan Phone Number Payor Plan Fax Number Effective Dates    [address] [address] [phone]  1/1/2018 - None Entered    [facility] [id number]         Subscriber Name Subscriber Birth Date Member ID       JESSICA GILES 1945 [id number]                     Emergency Contacts        (Rel.) Home Phone Work Phone Mobile Phone    Kristen Giles (Spouse) [phone] -- [phone]    jessica giles (Son) -- -- [phone]                 History & Physical        Jessica Thomas MD at 08/30/24 0458            Intensive Care Admission Note     Acute respiratory failure with hypoxia and hypercapnia    History of Present Illness     Limited Hx available. Patient intubated and sedated. Hx from ED and patient's wife at bedside.   79 yr old male Hx atrial fibrillation, HTN, MARK. Had cataract surgery on Right " "eye 1 day PTA.   Hx Covid approx 1 month ago.  Presents with acute shortness of breath that started in the evening. Found in severe hypoxemia by EMS with frothy Sputum. Intubated emergently in ED with Acute Hypoxemic, Hypercapneic Respiratory Failure.    Wife denies fever, chills, chest pain. + cough since having Covid. Former Smoker.   Viral/Respiratory panel NEGATIVE. WBC Elevated.  EKG Atrial Fibrillation. Non spec ST-T changes.       Problem List, Surgical History, Family, Social History, and ROS     Past Medical History:   Diagnosis Date    A-fib     Arthritis     Hard to intubate     Hypertension     Sleep apnea     NOT USING CPAP      Past Surgical History:   Procedure Laterality Date    BLADDER SURGERY  04/14/2016    STIMULATOR IMPLANTED, NEW ELECTRODE 08/2022    CARDIAC ABLATION  2013    AFIB    CARPAL TUNNEL RELEASE Right 2002    CHOLECYSTECTOMY  2011    COLONOSCOPY      DENTAL PROCEDURE  1962    FINGER SURGERY Left 2000    FOREFINGER BONE TRIMMED    HEMORRHOIDECTOMY  1973    KNEE ARTHROSCOPY Left 2008    MOUTH SURGERY  2008    FOR SLEEP APNEA    SPINAL FUSION  2007    L3 LUMBAR SPINAL STENOSIS    TONSILLECTOMY  1954    TOTAL KNEE ARTHROPLASTY Left 2009    TOTAL KNEE ARTHROPLASTY Right 6/22/2023    Procedure: TOTAL KNEE ARTHROPLASTY - RIGHT;  Surgeon: Leland Elizalde MD;  [facility];  Service: Orthopedics;  Laterality: Right;       Allergies   Allergen Reactions    Amoxicillin Other (See Comments)     Pt states \"Made me very sick\"     No current facility-administered medications on file prior to encounter.     Current Outpatient Medications on File Prior to Encounter   Medication Sig    acetaminophen (TYLENOL) 650 MG 8 hr tablet Take 1 tablet by mouth Every 8 (Eight) Hours As Needed for Mild Pain.    ARIPiprazole (ABILIFY) 5 MG tablet Take 1 tablet by mouth Daily.    atenolol (TENORMIN) 25 MG tablet Take 1 tablet by mouth Daily.    clobetasol (TEMOVATE) 0.05 % cream Apply 1 application topically " to the appropriate area as directed As Needed (itching).    docusate sodium (Colace) 100 MG capsule Take 1 capsule by mouth 2 (Two) Times a Day.    dofetilide (TIKOSYN) 250 MCG capsule Take 1 capsule by mouth 2 (Two) Times a Day.    doxycycline (VIBRAMYICN) 100 MG tablet Take 1 tablet by mouth Every 12 (Twelve) Hours.    Eliquis 5 MG tablet tablet Take 1 tablet by mouth Every 12 (Twelve) Hours. Take 2.5 mg twice daily through 6/29/23 evening dose then 5 mg twice daily.    escitalopram (LEXAPRO) 10 MG tablet Take 1 tablet by mouth Every Morning.    fexofenadine (ALLEGRA) 180 MG tablet Take 1 tablet by mouth Daily.    furosemide (LASIX) 40 MG tablet Take 1 tablet by mouth Daily. Patient to take 1 tablet on Sunday, Tuesday, Thursday, and Saturday.  2 tablets on Monday, Wednesday, and Friday.    gabapentin (NEURONTIN) 800 MG tablet Take 1 tablet by mouth 3 (Three) Times a Day.    ketoconazole (NIZORAL) 2 % shampoo 1 (One) Time Per Week.    Misc Natural Products (OSTEO BI-FLEX TRIPLE STRENGTH PO) Take 1 tablet by mouth 2 (Two) Times a Day.    Multiple Vitamins-Minerals (PRESERVISION AREDS 2 PO) Take 1 tablet by mouth 2 (Two) Times a Day.    multivitamin with minerals tablet tablet Take 1 tablet by mouth Daily.    ondansetron (Zofran) 4 MG tablet Take 1 tablet by mouth Every 8 (Eight) Hours As Needed for Nausea or Vomiting.    oxyCODONE-acetaminophen (PERCOCET)  MG per tablet Take 1 tablet by mouth 4 (Four) Times a Day.    potassium chloride (K-DUR,KLOR-CON) 10 MEQ CR tablet Take 1 tablet by mouth Daily.    sennosides-docusate (senna-docusate sodium) 8.6-50 MG per tablet Take 1 tablet by mouth Daily.    TiZANidine (ZANAFLEX) 4 MG capsule Take 1 capsule by mouth Daily As Needed for Muscle Spasms.    traMADol (ULTRAM) 50 MG tablet Take 1 tablet by mouth Every 6 (Six) Hours As Needed for Moderate Pain.    trihexyphenidyl (ARTANE) 2 MG tablet Take 1 tablet by mouth 2 (Two) Times a Day With Meals.    trospium 60 MG  "capsule sustained-release 24 hr capsule Take 1 capsule by mouth Daily.     MEDICATION LIST AND ALLERGIES REVIEWED.    No family history on file.  Social History     Tobacco Use    Smoking status: Former     Current packs/day: 3.00     Types: Cigarettes    Smokeless tobacco: Never    Tobacco comments:     QUIT 40 YRS AGO PER PT   Vaping Use    Vaping status: Never Used   Substance Use Topics    Alcohol use: Not Currently    Drug use: Never     Social History     Social History Narrative    Not on file     FAMILY AND SOCIAL HISTORY REVIEWED.    Review of Systems: limited. Pertinent per HPI. Hx Lymphedema LE's.  ALL OTHER SYSTEMS REVIEWED AND ARE NEGATIVE.    Physical Exam and Clinical Information   BP 92/44   Pulse 104   Temp 100.5 °F (38.1 °C) (Axillary)   Resp 16   Ht 185 cm (72.84\")   Wt (!) 174 kg (384 lb 7.7 oz)   SpO2 94%   BMI 50.96 kg/m²   Physical Exam  Gen: Intubated, sedated       HEENT: Atraumatic, RICHARD, non icteric. Mouth no lesions/thrush.Upper missing teeth.  Neck: No JVD no adenopathy. ETT  CV S1S2 IRRR. No M/R  Lungs:Bilateral Rhonchi. No wheeze.   Abd: Distended, Obese, Soft, + BS. Non tender Rectal: deferred  : No lesions  Ext: Bilateral Lymphedema with chronic changes . Wrapped.   Neuro: Sedated, on ventilator support.   Psych: Unable to assess  Results from last 7 days   Lab Units 08/30/24  0325   WBC 10*3/mm3 26.65*   HEMOGLOBIN g/dL 13.6   PLATELETS 10*3/mm3 242           Invalid input(s): \"CHLORIDE\"  CrCl cannot be calculated (Patient's most recent lab result is older than the maximum 30 days allowed.).      Results from last 7 days   Lab Units 08/30/24  0337   PH, ARTERIAL pH units 7.086*   PCO2, ARTERIAL mm Hg 78.8*   PO2 ART mm Hg 80.1*     Lab Results   Component Value Date    LACTATE 7.3 (C) 08/30/2024        Images: CXR: ETT, cardiomegaly. Pulmonary Edema with Bilateral patchy infiltrates R> L.     I reviewed the patient's results and images.     Impression     Acute respiratory " "failure with hypoxia and hypercapnia  Acute Pulmonary Edema  Atrial Fibrillation  Possible Pneumonia  Hx Covid  Hx MARK  H HTN  Cataract Surgery R Eye 8/29/24  Plan/Recommendations     ICU admission  Vent Support: PRVC 16/500/100% and PEEP 5 cm  ABG  Duonebs  Sputum Culture, Blood Culture  Sedation: Propofol, Fentanyl PRN  Lasix IV now and BID  Galindo Catheter: Strict I/O's.   Cont Eliquis  Cont Tikosyn( Dofetilide)  Serial Troponin  Echocardiogram  Repeat Lactate  Cont Cefepime . Received 1x vanco . Cont Doxycycline.   NPO  OGT  Protonix  Continue Lymphedema Leg Wraps  Full Code          Time spent Critical care 50 min (exclusive of procedure time)  including high complexity decision making to assess, manipulate, and support vital organ system failure in this individual who has impairment of one or more vital organ systems such that there is a high probability of imminent or life threatening deterioration in the patient’s condition.      Bo Thomas MD  Pulmonary and Critical Care Medicine  08/30/24 04:58 EDT     CC: Octavio Haines MD    Electronically signed by Bo Thomas MD at 08/30/24 0523          Physician Progress Notes (most recent note)        Leland Sandoval MD at 09/08/24 0803          Bo Perez  [id number]  1945   LOS: 9 days   Patient Care Team:  Srinivasan Cary MD as PCP - General (Family Medicine)  Shekhar Galvez MD as Consulting Physician (Cardiology)  Mahendra Bear MD as Consulting Physician (Cardiac Electrophysiology)    Chief Complaint:  WCT / NSTEMI / HFrEF / PVCs / MORBID OBESITY / DEBILITY    Subjective     Generally good spirits and slept well last night with usual frequent nocturia.  No current cardiopulmonary complaints.  Nominal room air oximetry (94%).  Ate all of breakfast.  His only complaint is that he wants his Percocet administered \"on time.\"    Objective     Vital Sign Min/Max for last 24 hours  Temp  Min: 98.1 °F (36.7 °C)  Max: 98.7 °F (37.1 °C) "   BP  Min: 114/75  Max: 153/69   Pulse  Min: 82  Max: 106   Resp  Min: 16  Max: 20   SpO2  Min: 91 %  Max: 95 %      Weight  Min: 217 kg (478 lb 1.6 oz)  Max: 217 kg (478 lb 1.6 oz)         09/05/24  1517 09/08/24  0309   Weight: (!) 160 kg (352 lb) (!) 217 kg (478 lb 1.6 oz)         Intake/Output Summary (Last 24 hours) at 9/8/2024 0804  Last data filed at 9/8/2024 0700  Gross per 24 hour   Intake 1080 ml   Output 4100 ml   Net -3020 ml       Physical Exam:     General Appearance:    Alert, cooperative, in no acute distress   Lungs:     Clear to auscultation,respirations regular, even and                unlabored    Heart:    Regular and normal rate, normal S1 and S2, grade 1/6 systolic murmur, no gallop, no rub, no click   Abdomen:  Extremities:   Soft, nontender, bowel sounds audible x4    1+ edema, normal range of motion   Pulses:   Pulses palpable and equal bilaterally      Results Review:   Results from last 7 days   Lab Units 09/08/24  0525 09/07/24  1734 09/07/24  0418 09/06/24  0920   SODIUM mmol/L 136  --  138 134*   POTASSIUM mmol/L 4.0 4.1 3.6 4.0   CHLORIDE mmol/L 100  --  102 100   CO2 mmol/L 26.0  --  28.0 21.0*   BUN mg/dL 16  --  16 17   CREATININE mg/dL 0.80  --  0.68* 0.68*   GLUCOSE mg/dL 104*  --  114* 119*   CALCIUM mg/dL 8.9  --  8.8 8.6     Results from last 7 days   Lab Units 09/06/24  0920 09/05/24  0322 09/04/24  0320   WBC 10*3/mm3 6.78 7.85 7.65   HEMOGLOBIN g/dL 11.8* 11.3* 10.3*   HEMATOCRIT % 35.8* 35.2* 31.9*   PLATELETS 10*3/mm3 157 133* 123*     Results from last 7 days   Lab Units 09/02/24  0519   CHOLESTEROL mg/dL 141   TRIGLYCERIDES mg/dL 130     Results from last 7 days   Lab Units 09/02/24  2327 09/02/24  2111   HSTROP T ng/L 229* 227*     ALBUMIN: 3.3    LFTs: WNL    NO NEW CXR / EKG / MAGNESIUM LEVEL.    Medication Review: REVIEWED; NO DRIPS.    Assessment & Plan     Excellent diuresis with stable GFR and acceptable hemodynamics.  Unable to undergo LHC and medical therapy  continues.  Accu-Cheks remain stable.  Would continue current cardiac medications.  Will reassess electrocardiogram in a.m.      Acute respiratory failure with hypoxia and hypercapnia    HTN (hypertension)    Continuous tobacco abuse    Persistent atrial fibrillation    Frequent PVCs    Wide-complex tachycardia    NSTEMI (non-ST elevated myocardial infarction)    HFrEF (heart failure with reduced ejection fraction)    Prolonged Q-T interval on ECG    Acute HFrEF (heart failure with reduced ejection fraction)    24  08:04 EDT     Electronically signed by Leland Sandoval MD at 24 0814          Physical Therapy Notes (most recent note)        Kathie Bhatia, PT at 24 1312  Version 1 of 1         Patient Name: Bo Perez  : 1945    MRN: [id number]                              Today's Date: 2024       Admit Date: 2024    Visit Dx:     ICD-10-CM ICD-9-CM   1. Acute respiratory failure with hypoxemia  J96.01 518.81   2. Flash pulmonary edema  J81.0 518.4   3. Acute sepsis  A41.9 038.9     995.91   4. Pneumonia of both lower lobes due to infectious organism  J18.9 486   5. Hypertensive emergency  I16.1 401.9   6. Oropharyngeal dysphagia  R13.12 787.22   7. Frequent PVCs  I49.3 427.69     Patient Active Problem List   Diagnosis    Chronic anticoagulation    HTN (hypertension)    Morbid obesity    Acute respiratory failure with hypoxia and hypercapnia    Sepsis due to pneumonia    Continuous tobacco abuse    Persistent atrial fibrillation    Frequent PVCs    Mixed hyperlipidemia    Wide-complex tachycardia    NSTEMI (non-ST elevated myocardial infarction)    HFrEF (heart failure with reduced ejection fraction)    Prolonged Q-T interval on ECG    Acute HFrEF (heart failure with reduced ejection fraction)     Past Medical History:   Diagnosis Date    A-fib     Arthritis     Hard to intubate     Hypertension     Sleep apnea     NOT USING CPAP     Past Surgical History:   Procedure  Laterality Date    BLADDER SURGERY  04/14/2016    STIMULATOR IMPLANTED, NEW ELECTRODE 08/2022    CARDIAC ABLATION  2013    AFIB    CARDIAC CATHETERIZATION N/A 9/5/2024    Procedure: CASE ABORT;  Surgeon: Yasir Canales IV, MD;  Location:  [facility];  Service: Cardiovascular;  Laterality: N/A;    CARPAL TUNNEL RELEASE Right 2002    CHOLECYSTECTOMY  2011    COLONOSCOPY      DENTAL PROCEDURE  1962    FINGER SURGERY Left 2000    FOREFINGER BONE TRIMMED    HEMORRHOIDECTOMY  1973    KNEE ARTHROSCOPY Left 2008    MOUTH SURGERY  2008    FOR SLEEP APNEA    SPINAL FUSION  2007    L3 LUMBAR SPINAL STENOSIS    TONSILLECTOMY  1954    TOTAL KNEE ARTHROPLASTY Left 2009    TOTAL KNEE ARTHROPLASTY Right 6/22/2023    Procedure: TOTAL KNEE ARTHROPLASTY - RIGHT;  Surgeon: Leland Elizalde MD;  Location:  [facility];  Service: Orthopedics;  Laterality: Right;      General Information       Row Name 09/08/24 1312          Physical Therapy Time and Intention    Document Type therapy note (daily note)  -SD     Mode of Treatment physical therapy  -SD       Row Name 09/08/24 1312          General Information    Existing Precautions/Restrictions fall  -SD       Row Name 09/08/24 1312          Cognition    Orientation Status (Cognition) oriented x 4  -SD       Row Name 09/08/24 1312          Safety Issues, Functional Mobility    Safety Issues Affecting Function (Mobility) insight into deficits/self-awareness;sequencing abilities  -SD     Impairments Affecting Function (Mobility) balance;coordination;endurance/activity tolerance;shortness of breath;strength;pain;postural/trunk control;range of motion (ROM)  -SD               User Key  (r) = Recorded By, (t) = Taken By, (c) = Cosigned By      Initials Name Provider Type    SD Kathie Bhatia, ABRAHAN Physical Therapist                   Mobility       Row Name 09/08/24 1353          Bed Mobility    Comment, (Bed Mobility) UIC  -SD       Row Name 09/08/24 3565           Transfers    Comment, (Transfers) STS x3 reps from recliner with cues for safe hand placement and sequencing. Pt required CGAx2 for the first two attempts, then Mikaela x2 for the 3rd attempt.  -SD       Row Name 09/08/24 1353          Sit-Stand Transfer    Sit-Stand [place] (Transfers) minimum assist (75% patient effort);2 person assist;verbal cues  -SD     Assistive Device (Sit-Stand Transfers) other (see comments);walker, front-wheeled  -SD       Row Name 09/08/24 1353          Gait/Stairs (Locomotion)    [place] Level (Gait) minimum assist (75% patient effort);2 person assist;verbal cues  -SD     Assistive Device (Gait) walker, front-wheeled  -SD     Distance in Feet (Gait) 3  -SD     Deviations/Abnormal Patterns (Gait) bilateral deviations;base of support, wide;trace decreased;festinating/shuffling;gait speed decreased  -SD     Bilateral Gait Deviations forward flexed posture;heel strike decreased  -SD     Comment, (Gait/Stairs) Pt able to take a few steps forward from recliner with RW and Mikaela x2 with recliner closely in tow. Pt dem forward flexed posture (which he reports is baseline for him due to hx of back surgery) and RUE tremor, which he states is also baseline due to Parkinsons. Further distance limited by weakness and fatigue, as well as fear of falling. Pt gave excellent effort and is motivated to progress his mobility.  -SD               User Key  (r) = Recorded By, (t) = Taken By, (c) = Cosigned By      Initials Name Provider Type    Kathie Barron PT Physical Therapist                   Obj/Interventions       Row Name 09/08/24 1402          Balance    Balance Assessment sitting static balance;standing static balance  -SD     Static Sitting Balance supervision  -SD     Position, Sitting Balance unsupported;sitting in chair  -SD     Static Standing Balance contact guard;2-person assist  -SD     Position/Device Used, Standing Balance supported;walker, front-wheeled  -SD                User Key  (r) = Recorded By, (t) = Taken By, (c) = Cosigned By      Initials Name Provider Type    Kathie Barron PT Physical Therapist                   Goals/Plan    No documentation.                  Clinical Impression       Row Name 09/08/24 1407          Pain Scale: FACES Pre/Post-Treatment    Pain: FACES Scale, Pretreatment 2-->hurts little bit  -SD     Posttreatment Pain Rating 2-->hurts little bit  -SD     Pain Location - back  -SD       Row Name 09/08/24 1401          Plan of Care Review    Plan of Care Reviewed With patient  -SD     Progress improving  -SD     Outcome Evaluation Pt able to take a few steps forward from recliner with RW and Mikaela x2 with recliner closely in tow. Pt dem forward flexed posture (which he reports is baseline for him due to hx of back surgery) and RUE tremor, which he states is also baseline due to Parkinsons. Further distance limited by weakness and fatigue, as well as fear of falling. Pt gave excellent effort and is motivated to progress his mobility. PT continues to rec d/c IRF.  -SD       Row Name 09/08/24 1402          Vital Signs    Pre Systolic BP Rehab 116  -SD     Pre Treatment Diastolic BP 67  -SD     Intra Systolic BP Rehab 120  -SD     Intra Treatment Diastolic BP 74  -SD     Post Systolic BP Rehab 131  -SD     Post Treatment Diastolic BP 99  -SD     Pretreatment Heart Rate (beats/min) 104  -SD     Intratreatment Heart Rate (beats/min) 120  -SD     Posttreatment Heart Rate (beats/min) 111  -SD     Post SpO2 (%) 96  -SD     O2 Delivery Post Treatment room air  -SD     Pre Patient Position Sitting  -SD     Post Patient Position Sitting  -SD       Row Name 09/08/24 1407          Positioning and Restraints    Pre-Treatment Position sitting in chair/recliner  -SD     Post Treatment Position chair  -SD     In Chair notified nsg;reclined;call light within reach;encouraged to call for assist;exit alarm on;with family/caregiver;on mechanical lift sling;heels  elevated;waffle cushion  -SD               User Key  (r) = Recorded By, (t) = Taken By, (c) = Cosigned By      Initials Name Provider Type    Kathie Barron PT Physical Therapist                   Outcome Measures       Row Name 09/08/24 1405          How much help from another person do you currently need...    Turning from your back to your side while in flat bed without using bedrails? 3  -SD     Moving from lying on back to sitting on the side of a flat bed without bedrails? 2  -SD     Moving to and from a bed to a chair (including a wheelchair)? 2  -SD     Standing up from a chair using your arms (e.g., wheelchair, bedside chair)? 3  -SD     Climbing 3-5 steps with a railing? 1  -SD     To walk in hospital room? 2  -SD     AM-PAC 6 Clicks Score (PT) 13  -SD     Highest Level of Mobility Goal 4 --> Transfer to chair/commode  -SD       Row Name 09/08/24 1405          Functional Assessment    Outcome Measure Options AM-PAC 6 Clicks Basic Mobility (PT)  -SD               User Key  (r) = Recorded By, (t) = Taken By, (c) = Cosigned By      Initials Name Provider Type    Kathie Barron PT Physical Therapist                                 Physical Therapy Education       Title: PT OT SLP Therapies (In Progress)       Topic: Physical Therapy (Done)       Point: Mobility training (Done)       Learning Progress Summary             Patient Eager, E, VU,DU by SD at 9/8/2024 1406    Acceptance, E,TB, NR by ES at 9/4/2024 1142   Significant Other Eager, E, VU,DU by SD at 9/8/2024 1406                         Point: Home exercise program (Done)       Learning Progress Summary             Patient Eager, E, VU,DU by SD at 9/8/2024 1406   Significant Other Eager, E, VU,DU by SD at 9/8/2024 1406                         Point: Body mechanics (Done)       Learning Progress Summary             Patient Eager, E, VU,DU by SD at 9/8/2024 1406    Acceptance, E,TB, NR by ES at 9/4/2024 1142   Significant Other Eager,  E, VU,DU by SD at 9/8/2024 1406                         Point: Precautions (Done)       Learning Progress Summary             Patient Eager, E, VU,DU by SD at 9/8/2024 1406    Acceptance, E,TB, NR by ES at 9/4/2024 1142   Significant Other Eager, E, VU,DU by SD at 9/8/2024 1406                                         User Key       Initials Effective Dates Name Provider Type Discipline    SD 03/13/23 -  Kathie Bhatia, PT Physical Therapist PT    BEAU 08/11/22 -  Francesca Valadez PT Physical Therapist PT                  PT Recommendation and Plan     Plan of Care Reviewed With: patient  Progress: improving  Outcome Evaluation: Pt able to take a few steps forward from recliner with RW and Mikaela x2 with recliner closely in tow. Pt dem forward flexed posture (which he reports is baseline for him due to hx of back surgery) and RUE tremor, which he states is also baseline due to Parkinsons. Further distance limited by weakness and fatigue, as well as fear of falling. Pt gave excellent effort and is motivated to progress his mobility. PT continues to rec d/c IRF.     Time Calculation:         PT Charges       Row Name 09/08/24 1406             Time Calculation    Start Time 1312  -SD      PT Received On 09/08/24  -SD      PT Goal Re-Cert Due Date 09/14/24  -SD         Time Calculation- PT    Total Timed Code Minutes- PT 15 minute(s)  -SD         Timed Charges    70763 - Gait Training Minutes  15  -SD         Total Minutes    Timed Charges Total Minutes 15  -SD       Total Minutes 15  -SD                User Key  (r) = Recorded By, (t) = Taken By, (c) = Cosigned By      Initials Name Provider Type    SD Kathie Bhatia, PT Physical Therapist                  Therapy Charges for Today       Code Description Service Date Service Provider Modifiers Qty    [id number] HC GAIT TRAINING EA 15 MIN 9/8/2024 Kathie Bhatia, PT GP 1            PT G-Codes  Outcome Measure Options: AM-PAC 6 Clicks Basic Mobility  (PT)  AM-PAC 6 Clicks Score (PT): 13  AM-PAC 6 Clicks Score (OT): 12  PT Discharge Summary  Anticipated Discharge Disposition (PT): inpatient rehabilitation facility    Kathie Bhatia, PT  2024      Electronically signed by Kathie Bhatia, PT at 24 1407          Occupational Therapy Notes (most recent note)        Hyacinth Collado, OT at 24 1313          Patient Name: Bo Perze  : 1945    MRN: [id number]                              Today's Date: 2024       Admit Date: 2024    Visit Dx:     ICD-10-CM ICD-9-CM   1. Acute respiratory failure with hypoxemia  J96.01 518.81   2. Flash pulmonary edema  J81.0 518.4   3. Acute sepsis  A41.9 038.9     995.91   4. Pneumonia of both lower lobes due to infectious organism  J18.9 486   5. Hypertensive emergency  I16.1 401.9   6. Oropharyngeal dysphagia  R13.12 787.22   7. Frequent PVCs  I49.3 427.69     Patient Active Problem List   Diagnosis    Chronic anticoagulation    HTN (hypertension)    Morbid obesity    Acute respiratory failure with hypoxia and hypercapnia    Sepsis due to pneumonia    Continuous tobacco abuse    Persistent atrial fibrillation    Frequent PVCs    Mixed hyperlipidemia    Wide-complex tachycardia    NSTEMI (non-ST elevated myocardial infarction)    HFrEF (heart failure with reduced ejection fraction)    Prolonged Q-T interval on ECG    Acute HFrEF (heart failure with reduced ejection fraction)     Past Medical History:   Diagnosis Date    A-fib     Arthritis     Hard to intubate     Hypertension     Sleep apnea     NOT USING CPAP     Past Surgical History:   Procedure Laterality Date    BLADDER SURGERY  2016    STIMULATOR IMPLANTED, NEW ELECTRODE 2022    CARDIAC ABLATION      AFIB    CARDIAC CATHETERIZATION N/A 2024    Procedure: CASE ABORT;  Surgeon: Yasir Canales IV, MD;  Location: [facility] CATH INVASIVE LOCATION;  Service: Cardiovascular;  Laterality: N/A;    CARPAL TUNNEL  RELEASE Right 2002    CHOLECYSTECTOMY  2011    COLONOSCOPY      DENTAL PROCEDURE  1962    FINGER SURGERY Left 2000    FOREFINGER BONE TRIMMED    HEMORRHOIDECTOMY  1973    KNEE ARTHROSCOPY Left 2008    MOUTH SURGERY  2008    FOR SLEEP APNEA    SPINAL FUSION  2007    L3 LUMBAR SPINAL STENOSIS    TONSILLECTOMY  1954    TOTAL KNEE ARTHROPLASTY Left 2009    TOTAL KNEE ARTHROPLASTY Right 6/22/2023    Procedure: TOTAL KNEE ARTHROPLASTY - RIGHT;  Surgeon: Leland Elizalde MD;  Location: [facility];  Service: Orthopedics;  Laterality: Right;      General Information       Row Name 09/08/24 1425          OT Time and Intention    Document Type therapy note (daily note)  -     Mode of Treatment occupational therapy  -       Row Name 09/08/24 1425          General Information    Patient Profile Reviewed yes  -JR     Existing Precautions/Restrictions fall  resting tremor  -     Barriers to Rehab medically complex;previous functional deficit  -       Row Name 09/08/24 1425          Cognition    Orientation Status (Cognition) oriented x 4  -       Row Name 09/08/24 1425          Safety Issues, Functional Mobility    Safety Issues Affecting Function (Mobility) insight into deficits/self-awareness;sequencing abilities  -     Impairments Affecting Function (Mobility) balance;coordination;endurance/activity tolerance;shortness of breath;strength;postural/trunk control;range of motion (ROM);pain  -               User Key  (r) = Recorded By, (t) = Taken By, (c) = Cosigned By      Initials Name Provider Type     Hyacinth Collado OT Occupational Therapist                     Mobility/ADL's       Row Name 09/08/24 1426          Bed Mobility    Comment, (Bed Mobility) defer-up in chair upon arrival  -       Row Name 09/08/24 1426          Transfers    Transfers sit-stand transfer  -       Row Name 09/08/24 1426          Sit-Stand Transfer    Sit-Stand [place] (Transfers) minimum assist (75% patient effort);2 person  assist;verbal cues  -     Assistive Device (Sit-Stand Transfers) walker, front-wheeled  -     Comment, (Sit-Stand Transfer) Pt completed sit to stand x3 this date with increased time and verbal cues for hand placement and pre-stand positioning. Pt requiring CGA x2 for first 2 attempts and min A x2 with 3rd atempt.  -[facility] 09/08/24 1426          Functional Mobility    Functional Mobility- Ind. Level minimum assist (75% patient effort);2 person assist required;verbal cues required  -     Functional Mobility- Device walker, front-wheeled  -     Functional Mobility-Distance (Feet) --  a few steps forward  -     Functional Mobility- Safety Issues step length decreased  -     Functional Mobility- Comment Pt able to take a few steps forward with close chair follow for safety. Limited due to fatigue  -[facility] Name 09/08/24 1426          Activities of Daily Living    BADL Assessment/Intervention lower body dressing;grooming;upper body dressing  -JR       Row Name 09/08/24 1426          Lower Body Dressing Assessment/Training    [place] Level (Lower Body Dressing) don;doff;socks;dependent (less than 25% patient effort)  -     Position (Lower Body Dressing) supported sitting  -JR       Row Name 09/08/24 1426          Grooming Assessment/Training    [place] Level (Grooming) wash face, hands;set up  -     Position (Grooming) supported sitting  -JR       Row Name 09/08/24 1426          Upper Body Dressing Assessment/Training    [place] Level (Upper Body Dressing) don;doff;front opening garment;maximum assist (25% patient effort);verbal cues  -     Position (Upper Body Dressing) supported sitting  -               User Key  (r) = Recorded By, (t) = Taken By, (c) = Cosigned By      Initials Name Provider Type    Hyacinth Flanagan, OT Occupational Therapist                   Obj/Interventions       [facility] Name 09/08/24 1433          Balance    Balance Assessment sitting static  balance;standing dynamic balance  -JR     Static Sitting Balance supervision  -JR     Dynamic Standing Balance minimal assist;2-person assist;verbal cues  -JR     Position/Device Used, Standing Balance supported;walker, rolling  -JR               User Key  (r) = Recorded By, (t) = Taken By, (c) = Cosigned By      Initials Name Provider Type    Hyacinth Flanagan OT Occupational Therapist                   Goals/Plan       Row Name 09/08/24 1437          Transfer Goal 1 (OT)    Activity/Assistive Device (Transfer Goal 1, OT) sit-to-stand/stand-to-sit;toilet;commode, bedside without drop arms  -JR     [place] Level/Cues Needed (Transfer Goal 1, OT) contact guard required  -JR     Time Frame (Transfer Goal 1, OT) short term goal (STG);5 days  -JR     Progress/Outcome (Transfer Goal 1, OT) goal revised this date  -JR       Row Name 09/08/24 1437          Toileting Goal 1 (OT)    Activity/Device (Toileting Goal 1, OT) adjust/manage clothing;perform perineal hygiene  -JR     [place] Level/Cues Needed (Toileting Goal 1, OT) minimum assist (75% or more patient effort)  -JR     Time Frame (Toileting Goal 1, OT) long term goal (LTG);10 days  -JR     Progress/Outcome (Toileting Goal 1, OT) new goal  -JR       Row Name 09/08/24 1437          Grooming Goal 1 (OT)    Activity/Device (Grooming Goal 1, OT) hair care;oral care;wash face, hands  -JR     [place] (Grooming Goal 1, OT) contact guard required  -JR     Time Frame (Grooming Goal 1, OT) long term goal (LTG);10 days  -JR     Strategies/Barriers (Grooming Goal 1, OT) supported stand  -JR     Progress/Outcome (Grooming Goal 1, OT) new goal  -JR               User Key  (r) = Recorded By, (t) = Taken By, (c) = Cosigned By      Initials Name Provider Type    Hyacinth Flanagan OT Occupational Therapist                   Clinical Impression       Row Name 09/08/24 1433          Pain Assessment    Pretreatment Pain Rating 0/10 - no pain  -JR     Posttreatment  Pain Rating 0/10 - no pain  -     Additional Documentation Pain Scale: Word Pre/Post-Treatment (Group)  -[facility] Name 09/08/24 1433          Plan of Care Review    Plan of Care Reviewed With patient  -     Progress improving  -     Outcome Evaluation Pt improving with transfers, mobility and ADL's. Pt remains below baseline due to decreased strength, balance, activity tolerance and coordination. Recommend continued skilled OT services and transfer to IRF at d/c for best functional outcomes.  -[facility] Name 09/08/24 1433          Therapy Assessment/Plan (OT)    Patient/Family Therapy Goal Statement (OT) go home  -[facility] Name 09/08/24 1433          Therapy Plan Review/Discharge Plan (OT)    Anticipated Discharge Disposition (OT) inpatient rehabilitation facility  -[facility] Name 09/08/24 1433          Vital Signs    Pre Systolic BP Rehab 116  -     Pre Treatment Diastolic BP 67  -JR     Post Systolic BP Rehab 131  -JR     Post Treatment Diastolic BP 99  -JR     Pretreatment Heart Rate (beats/min) 104  -JR     Posttreatment Heart Rate (beats/min) 111  -     Post SpO2 (%) 94  -JR     O2 Delivery Post Treatment room air  -     Pre Patient Position Sitting  -     Intra Patient Position Standing  -JR     Post Patient Position Sitting  -[facility] Name 09/08/24 1433          Positioning and Restraints    Pre-Treatment Position sitting in chair/recliner  -JR     Post Treatment Position chair  -JR     In Chair notified nsg;reclined;call light within reach;encouraged to call for assist;exit alarm on;with family/caregiver;waffle cushion;legs elevated  -               User Key  (r) = Recorded By, (t) = Taken By, (c) = Cosigned By      Initials Name Provider Type     Hyacinth Collado, OT Occupational Therapist                   Outcome Measures       Row Name 09/08/24 1437          How much help from another is currently needed...    Putting on and taking off regular lower body clothing? 1   -JR     Bathing (including washing, rinsing, and drying) 2  -JR     Toileting (which includes using toilet bed pan or urinal) 1  -JR     Putting on and taking off regular upper body clothing 2  -JR     Taking care of personal grooming (such as brushing teeth) 3  -JR     Eating meals 3  -JR     AM-PAC 6 Clicks Score (OT) 12  -JR       Row Name 09/08/24 1405          How much help from another person do you currently need...    Turning from your back to your side while in flat bed without using bedrails? 3  -SD     Moving from lying on back to sitting on the side of a flat bed without bedrails? 2  -SD     Moving to and from a bed to a chair (including a wheelchair)? 2  -SD     Standing up from a chair using your arms (e.g., wheelchair, bedside chair)? 3  -SD     Climbing 3-5 steps with a railing? 1  -SD     To walk in hospital room? 2  -SD     AM-PAC 6 Clicks Score (PT) 13  -SD     Highest Level of Mobility Goal 4 --> Transfer to chair/commode  -SD       Row Name 09/08/24 1437 09/08/24 1405       Functional Assessment    Outcome Measure Options AM-PAC 6 Clicks Daily Activity (OT)  -JR AM-PAC 6 Clicks Basic Mobility (PT)  -SD              User Key  (r) = Recorded By, (t) = Taken By, (c) = Cosigned By      Initials Name Provider Type    SD Kathie Bhatia, PT Physical Therapist    Hyacinth Flanagan OT Occupational Therapist                    Occupational Therapy Education       Title: PT OT SLP Therapies (In Progress)       Topic: Occupational Therapy (In Progress)       Point: ADL training (Done)       Description:   Instruct learner(s) on proper safety adaptation and remediation techniques during self care or transfers.   Instruct in proper use of assistive devices.                  Learning Progress Summary             Patient Acceptance, E, VU,NR by  at 9/8/2024 1313    Acceptance, E, NR by MINNIE at 9/4/2024 1101   Family Acceptance, E, VU,NR by  at 9/8/2024 1313                         Point: Home  exercise program (Not Started)       Description:   Instruct learner(s) on appropriate technique for monitoring, assisting and/or progressing therapeutic exercises/activities.                  Learner Progress:  Not documented in this visit.              Point: Precautions (Done)       Description:   Instruct learner(s) on prescribed precautions during self-care and functional transfers.                  Learning Progress Summary             Patient Acceptance, E, VU,NR by  at 9/8/2024 1313    Acceptance, E, NR by  at 9/4/2024 1101   Family Acceptance, E, VU,NR by  at 9/8/2024 1313                         Point: Body mechanics (In Progress)       Description:   Instruct learner(s) on proper positioning and spine alignment during self-care, functional mobility activities and/or exercises.                  Learning Progress Summary             Patient Acceptance, E, NR by  at 9/4/2024 1101                                         User Key       Initials Effective Dates Name Provider Type Discipline     02/03/23 -  Hyacinth Collado OT Occupational Therapist OT     02/05/24 -  Monisha Morgan OT Occupational Therapist OT                  OT Recommendation and Plan     Plan of Care Review  Plan of Care Reviewed With: patient  Progress: improving  Outcome Evaluation: Pt improving with transfers, mobility and ADL's. Pt remains below baseline due to decreased strength, balance, activity tolerance and coordination. Recommend continued skilled OT services and transfer to IRF at d/c for best functional outcomes.     Time Calculation:         Time Calculation- OT       Row Name 09/08/24 1438 09/08/24 1406          Time Calculation- OT    OT Start Time 1313  -JR --     OT Received On 09/08/24  - --        Timed Charges    54404 - Gait Training Minutes  -- 15  -SD     49340 - OT Self Care/Mgmt Minutes 15  -JR --        Total Minutes    Timed Charges Total Minutes 15  -JR 15  -SD      Total Minutes 15  -JR 15  -SD                User Key  (r) = Recorded By, (t) = Taken By, (c) = Cosigned By      Initials Name Provider Type    SD Kathie Bhatia, PT Physical Therapist    JR Hyacinth Collado OT Occupational Therapist                  Therapy Charges for Today       Code Description Service Date Service Provider Modifiers Qty    [id number]  OT SELF CARE/MGMT/TRAIN EA 15 MIN 9/8/2024 Hyacinth Collado OT GO 1                 Hyacinth Collado OT  9/8/2024    Electronically signed by Hyacinth Collado OT at 09/08/24 6415

## 2024-09-09 NOTE — PROGRESS NOTES
[facility] Medicine Services  PROGRESS NOTE    Patient Name: Bo Perez  : 1945  MRN: [id number]    Date of Admission: 2024  Primary Care Physician: Srinivasan Cary MD    Subjective   Subjective     CC:  Shortness of breath    HPI:  Complains of some back pain this am- chronic per patient and wife.  Last evening, had episode of hypotension after getting all his pain meds- required two liter boluses plus albumin. Now all better.      Objective   Objective     Vital Signs:   Temp:  [97.8 °F (36.6 °C)-98.3 °F (36.8 °C)] 97.8 °F (36.6 °C)  Heart Rate:  [] 101  Resp:  [16-18] 18  BP: ()/(21-74) 128/49  Flow (L/min):  [2] 2     Physical Exam:  General: Comfortable, not in distress, conversant and cooperative  Head: Atraumatic and normocephalic  Eyes: No Icterus. No pallor  Ears:  Ears appear intact with no abnormalities noted  Throat: No oral lesions, no thrush  Neck: Supple, trachea midline  Lungs: Clear to auscultation bilaterally, equal air entry, no wheezing or crackles  Heart:  Normal S1 and S2, no murmur, no gallop, No JVD, no lower extremity swelling  Abdomen:  Soft, no tenderness, no organomegaly, normal bowel sounds, no organomegaly  Extremities: pulses equal bilaterally  Skin: No bleeding, bruising or rash, normal skin turgor and elasticity. BLE chronic erythema and previous wounds noted.  Neurologic: Cranial nerves appear intact with no evidence of facial asymmetry, normal motor and sensory functions in all 4 extremities  Psych: Alert and oriented x 3, normal mood    Results Reviewed:  LAB RESULTS:      Lab 24  0043 24  0920 24  0322 24  0320 24  0342   WBC 9.15 6.78 7.85 7.65 8.22   HEMOGLOBIN 11.6* 11.8* 11.3* 10.3* 10.4*   HEMATOCRIT 35.4* 35.8* 35.2* 31.9* 32.7*   PLATELETS 213 157 133* 123* 135*   NEUTROS ABS 6.06 4.70 5.80 5.24  --    IMMATURE GRANS (ABS) 0.08* 0.07* 0.07* 0.07*  --    LYMPHS ABS 2.09 1.34 1.26  1.48  --    MONOS ABS 0.56 0.37 0.43 0.48  --    EOS ABS 0.30 0.26 0.24 0.33  --    MCV 96.5 95.5 99.2* 98.5* 100.0*         Lab 09/09/24  0044 09/08/24  0525 09/07/24  1734 09/07/24  0418 09/06/24  0920 09/05/24  1233 09/05/24  0322 09/04/24  0320 09/03/24 0342 09/02/24 2111 09/02/24 2111 09/02/24  1439   SODIUM 137 136  --  138 134*  --  138 137 141   < > 133*  --    POTASSIUM 4.2 4.0 4.1 3.6 4.0   < > 3.6 3.9 4.1  4.1   < > 3.2*  --    CHLORIDE 101 100  --  102 100  --  98 102 108*   < > 99  --    CO2 25.0 26.0  --  28.0 21.0*  --  31.0* 25.0 27.0   < > 27.0  --    ANION GAP 11.0 10.0  --  8.0 13.0  --  9.0 10.0 6.0   < > 7.0  --    BUN 22 16  --  16 17  --  21 32* 28*   < > 22  --    CREATININE 1.10 0.80  --  0.68* 0.68*  --  0.69* 0.64* 0.81   < > 0.66*  --    EGFR 68.7 90.6  --  95.1 95.1  --  94.7 96.9 90.2   < > 96.0  --    GLUCOSE 121* 104*  --  114* 119*  --  111* 100* 120*   < > 129*  --    CALCIUM 8.7 8.9  --  8.8 8.6  --  8.3* 8.1* 8.0*   < > 8.4*  --    MAGNESIUM 2.2  --   --   --  2.6*  --  1.8 2.1 2.4  --   --   --    PHOSPHORUS  --   --   --   --   --   --   --   --  2.4*  --  1.8* 1.4*    < > = values in this interval not displayed.         Lab 09/09/24  0044 09/08/24  0525 09/07/24  0418 09/06/24  0920   TOTAL PROTEIN 5.9* 5.9* 6.0 5.8*   ALBUMIN 3.0* 3.3* 3.2* 2.7*   GLOBULIN 2.9 2.6 2.8 3.1   ALT (SGPT) 17 20 22 24   AST (SGOT) 18 22 24 29   BILIRUBIN 0.7 0.9 0.8 0.9   ALK PHOS 41 43 43 43         Lab 09/09/24  0254 09/09/24  0044 09/02/24  2327 09/02/24  2111   HSTROP T 64* 62* 229* 227*                   Lab 09/03/24  0557 09/02/24  2113   PH, ARTERIAL 7.407 7.452*   PCO2, ARTERIAL 46.0* 40.3   PO2 ART 86.0 69.8*   FIO2 28 28   HCO3 ART 28.9* 28.1*   BASE EXCESS ART 3.6* 3.9*   CARBOXYHEMOGLOBIN 2.2* 2.1*     Brief Urine Lab Results  (Last result in the past 365 days)        Color   Clarity   Blood   Leuk Est   Nitrite   Protein   CREAT   Urine HCG        08/30/24 0340 Yellow   Clear    Negative   Negative   Negative   100 mg/dL (2+)                   Microbiology Results Abnormal       Procedure Component Value - Date/Time    Blood Culture - Blood, Arm, Right [[id number]]  (Normal) Collected: 08/30/24 0344    Lab Status: Final result Specimen: Blood from Arm, Right Updated: 09/04/24 0415     Blood Culture No growth at 5 days    Blood Culture - Blood, Arm, Left [[id number]]  (Normal) Collected: 08/30/24 0344    Lab Status: Final result Specimen: Blood from Arm, Left Updated: 09/04/24 0415     Blood Culture No growth at 5 days    Respiratory Culture - Sputum, ET Suction [[id number]] Collected: 08/30/24 0633    Lab Status: Final result Specimen: Sputum from ET Suction Updated: 09/01/24 1323     Respiratory Culture Heavy growth (4+) Normal respiratory ashish. No S. aureus or Pseudomonas aeruginosa detected. Final report.     Gram Stain Many (4+) WBCs per low power field      Rare (1+) Epithelial cells per low power field      No organisms seen    S. Pneumo Ag Urine or CSF - Urine, Indwelling Urethral Catheter [[id number]]  (Normal) Collected: 08/30/24 1121    Lab Status: Final result Specimen: Urine from Indwelling Urethral Catheter Updated: 08/30/24 1451     Strep Pneumo Ag Negative    Legionella Antigen, Urine - Urine, Indwelling Urethral Catheter [[id number]]  (Normal) Collected: 08/30/24 1121    Lab Status: Final result Specimen: Urine from Indwelling Urethral Catheter Updated: 08/30/24 1451     LEGIONELLA ANTIGEN, URINE Negative    MRSA Screen, PCR (Inpatient) - Swab, Nares [[id number]]  (Normal) Collected: 08/30/24 1107    Lab Status: Final result Specimen: Swab from Nares Updated: 08/30/24 1312     MRSA PCR Negative    Narrative:      The negative predictive value of this diagnostic test is high and should only be used to consider de-escalating anti-MRSA therapy. A positive result may indicate colonization with MRSA and must be correlated clinically.  MRSA Negative    Respiratory Panel PCR  w/COVID-19(SARS-CoV-2) PATTI/MARVIN/JAY/PAD/COR/MADISON In-House, NP Swab in UTM/VTM, 2 HR TAT - Swab, Nasopharynx [[id number]]  (Normal) Collected: 08/30/24 0325    Lab Status: Final result Specimen: Swab from Nasopharynx Updated: 08/30/24 0427     ADENOVIRUS, PCR Not Detected     Coronavirus 229E Not Detected     Coronavirus HKU1 Not Detected     Coronavirus NL63 Not Detected     Coronavirus OC43 Not Detected     COVID19 Not Detected     Human Metapneumovirus Not Detected     Human Rhinovirus/Enterovirus Not Detected     Influenza A PCR Not Detected     Influenza B PCR Not Detected     Parainfluenza Virus 1 Not Detected     Parainfluenza Virus 2 Not Detected     Parainfluenza Virus 3 Not Detected     Parainfluenza Virus 4 Not Detected     RSV, PCR Not Detected     Bordetella pertussis pcr Not Detected     Bordetella parapertussis PCR Not Detected     Chlamydophila pneumoniae PCR Not Detected     Mycoplasma pneumo by PCR Not Detected    Narrative:      In the setting of a positive respiratory panel with a viral infection PLUS a negative procalcitonin without other underlying concern for bacterial infection, consider observing off antibiotics or discontinuation of antibiotics and continue supportive care. If the respiratory panel is positive for atypical bacterial infection (Bordetella pertussis, Chlamydophila pneumoniae, or Mycoplasma pneumoniae), consider antibiotic de-escalation to target atypical bacterial infection.            No radiology results from the last 24 hrs    Results for orders placed during the hospital encounter of 08/30/24    Adult Transthoracic Echo Limited W/ Cont if Necessary Per Protocol    Interpretation Summary    Left ventricular systolic function is moderately decreased. Estimated left ventricular EF = 35%    The left ventricular cavity is mildly dilated.    The left atrial cavity is dilated.      Current medications:  Scheduled Meds:apixaban, 5 mg, Oral, Q12H  [Held by provider] ARIPiprazole, 5  mg, Nasogastric, Daily  empagliflozin, 10 mg, Oral, Daily  gabapentin, 800 mg, Oral, Q8H  insulin lispro, 2-7 Units, Subcutaneous, 4x Daily AC & at Bedtime  ketorolac, 1 drop, Right Eye, TID   Followed by  [START ON 9/11/2024] ketorolac, 1 drop, Right Eye, BID   Followed by  [START ON 9/19/2024] ketorolac, 1 drop, Right Eye, Daily  midodrine, 2.5 mg, Oral, Once  pantoprazole, 40 mg, Oral, Q AM  prednisoLONE acetate, 1 drop, Right Eye, TID   Followed by  [START ON 9/11/2024] prednisoLONE acetate, 1 drop, Right Eye, BID   Followed by  [START ON 9/19/2024] prednisoLONE acetate, 1 drop, Right Eye, Daily  sacubitril-valsartan, 1 tablet, Oral, Q12H  sodium chloride, 10 mL, Intravenous, Q12H  spironolactone, 25 mg, Oral, Daily      Continuous Infusions:   PRN Meds:.  acetaminophen **OR** acetaminophen    Calcium Replacement - Follow Nurse / BPA Driven Protocol    dextrose    glucagon (human recombinant)    ipratropium-albuterol    ipratropium-albuterol    Magnesium Cardiology Dose Replacement - Follow Nurse / BPA Driven Protocol    melatonin    nitroglycerin    ondansetron    oxyCODONE-acetaminophen    Phosphorus Replacement - Follow Nurse / BPA Driven Protocol    Potassium Replacement - Follow Nurse / BPA Driven Protocol    sodium chloride    sodium chloride    tiZANidine    Assessment & Plan   Assessment & Plan     Active Hospital Problems    Diagnosis  POA    **Acute respiratory failure with hypoxia and hypercapnia [J96.01, J96.02]  Yes    Cardiac arrhythmia [I49.9]  Yes    Type 2 myocardial infarction [I21.A1]  Yes    Acute HFrEF (heart failure with reduced ejection fraction) [I50.21]  Yes    Persistent atrial fibrillation [I48.19]  Yes    Wide-complex tachycardia [R00.0]  Yes    NSTEMI (non-ST elevated myocardial infarction) [I21.4]  Yes    HFrEF (heart failure with reduced ejection fraction) [I50.20]  Yes    Prolonged Q-T interval on ECG [R94.31]  Yes    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 male with history of PAF, HFrEF, MARK not on CPAP, recent COVID-19 infection (1 month prior to presentation), tobacco use disorder, recent cataract surgery, who presented to [facility] ER on 8/30 with respiratory distress with hypoxia requiring intubation.  He was also hypotensive and started on Levophed.  He was subsequently extubated and Levophed was weaned off.  Suffered wide-complex tachycardia x 2.  Having intermittent episodes of AV block.  Tikosyn and beta-blockers held.  Cardiology following. Patient was initially treated in the ICU and transitioned to the hospitalist service on 9/6/2024.    This patient's problems and plans were partially entered by my partner and updated as appropriate by me 09/09/24.      Wide complex tachycardia  Prolonged QTc  A fib  In the setting of acute hypoxic, hypercapnic respiratory failure  Unclear if represented true VT versus atrial arrhythmia with aberrancy per cardiology  Tikosyn held due to prolonged Qtc  Intolerant to beta-blockers because of intermittent AV block   Cardiology and EP following  Will need to follow-up with Dr. Chamorro's office in 4 to 6 weeks from discharge  Patient will need 14-day Holter monitor at discharge  Continue Eliquis    Newly diagnosed systolic heart failure, EF 35%  HTN  Echo with EF 35%.  Per family, echo a month ago was normal  Possible stress-induced cardiomyopathy versus CAD  Left heart cath heart cath attempted and aborted because patient could not tolerate lying flat because of back pain  Continue Entresto at lower dose, stop Lasix per Cards, continue Jardiance, spironolactone    Episode of hypotension   -- after getting pain meds  -- required bolus x 2 and albumin  -- stop Lasix, decrease dose of Entresto as per Cardiology   -- will need to  space out timing of pain meds from here on out.   -- monitor for fluid overload given new diagnosis HF    Acute on chronic debility  Continue PT and OT  Will need acute rehab.  Case management on board    Expected Discharge Location and Transportation: IRF if BP remains stable today  Expected Discharge   Expected Discharge Date: 9/10/2024; Expected Discharge Time:      VTE Prophylaxis:  Pharmacologic & mechanical VTE prophylaxis orders are present.         AM-PAC 6 Clicks Score (PT): 13 (09/08/24 1405)    CODE STATUS:   Code Status and Medical Interventions: CPR (Attempt to Resuscitate); Full Support   Ordered at: 08/30/24 0533     Code Status (Patient has no pulse and is not breathing):    CPR (Attempt to Resuscitate)     Medical Interventions (Patient has pulse or is breathing):    Full Support       Latoya Snyder MD  09/09/24

## 2024-09-09 NOTE — PROGRESS NOTES
Cardiology Progress Note      Reason for visit:    NSTEMI likely type II  Persistent atrial fibrillation/frequent PVCs  Acute on chronic systolic heart failure    IDENTIFICATION: 79-year-old gentleman who resides in [place], [place]    Active Hospital Problems    Diagnosis  POA    **Acute respiratory failure with hypoxia and hypercapnia [J96.01, J96.02]  Yes     Priority: High    Persistent atrial fibrillation [I48.19]  Yes     Priority: High     Followed by Dr. Bear with [facility]  General cardiologist Dr. Galvez  Echocardiogram reportedly done 1 month ago normal per family to reevaluate ascending aortic aneurysm which was stable  Chads Vasc= 3 (age >75, CHF)      Wide-complex tachycardia [R00.0]  Yes     Priority: High    NSTEMI (non-ST elevated myocardial infarction) [I21.4]  Yes     Priority: High     Reportedly normal coronaries on cath 2020.  Data deficit.  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  Reportedly last evening the patient had a episode of severe hypotension and required administration of IV fluids and albumin.  Blood pressure currently somewhat stable.  He is not on any IV pressors.  He has had over  19,000 mL diuresed since his admission.  He is having frequent PVCs on telemetry and is asymptomatic.  A cardiac catheterization was attempted last week but patient was unable to lie flat due to chronic back pain.           Vital Sign Min/Max for last 24 hours  Temp  Min: 97.8 °F (36.6 °C)  Max: 98.3 °F (36.8 °C)   BP  Min: 58/35  Max: 117/65   Pulse  Min: 35  Max: 103   Resp  Min: 16  Max: 20   SpO2  Min: 88 %  Max: 99 %   Flow (L/min)  Min: 2  Max: 2      Intake/Output Summary (Last 24 hours) at 2024 0900  Last data filed at 2024 0850  Gross per 24 hour   Intake 1700 ml   Output 2800 ml   Net -1100 ml           Physical Exam  Constitutional:       General: He is awake.   Cardiovascular:      Rate and Rhythm: Normal rate and regular rhythm. Frequent Extrasystoles are present.     Heart sounds: No murmur heard.  Pulmonary:      Effort: Pulmonary effort is normal.      Breath sounds: Decreased breath sounds present.   Musculoskeletal:      Right lower le+ Pitting Edema present.      Left lower le+ Pitting Edema present.   Skin:     General: Skin is warm and dry.   Neurological:      Mental Status: He is alert and oriented to person, place, and time.   Psychiatric:         Behavior: Behavior is cooperative.         Tele: Atrial fibrillation with frequent PVCs    Results Review (reviewed the patient's recent labs in the electronic medical record):     EKG (2024): Atrial fibrillation with frequent PVCs    CXR (2024): Pulmonary edema with small pleural effusion    ECHO (2024): LVEF has improved from 30 to 35%    Results from last 7 days   Lab Units 24  0044 24  0525 24  1734 24  0418 24  0920 24  1233 24  0322 24  0320 24  0342   SODIUM mmol/L 137 136  --  138 134*  --  138 137 141   POTASSIUM mmol/L 4.2 4.0 4.1 3.6 4.0   < > 3.6 3.9 4.1  4.1   CHLORIDE mmol/L 101 100  --  102 100  --  98 102 108*   BUN mg/dL 22 16  --  16 17  --  21 32*  28*   CREATININE mg/dL 1.10 0.80  --  0.68* 0.68*  --  0.69* 0.64* 0.81   MAGNESIUM mg/dL 2.2  --   --   --  2.6*  --  1.8 2.1 2.4    < > = values in this interval not displayed.     Results from last 7 days   Lab Units 09/09/24  0254 09/09/24  0044 09/02/24  2327   HSTROP T ng/L 64* 62* 229*     Results from last 7 days   Lab Units 09/09/24  0043 09/06/24  0920 09/05/24  0322   WBC 10*3/mm3 9.15 6.78 7.85   HEMOGLOBIN g/dL 11.6* 11.8* 11.3*   HEMATOCRIT % 35.4* 35.8* 35.2*   PLATELETS 10*3/mm3 213 157 133*       Lab Results   Component Value Date    HGBA1C 5.00 08/30/2024       Lab Results   Component Value Date    CHOL 141 09/02/2024    TRIG 130 09/02/2024              NSTEMI, probable type II  Reportedly normal coronaries on cath 2020 but data deficit  Troponins elevated 88, 138 and 519  Echo this admission shows reduced LVEF 30% with repeat echo showing improvement to 35%  Heart cath attempted on 9/5 but patient refused and unable to lie flat     Acute on chronic systolic heart failure  Echo this admission LVEF 30% but repeat echo shows improvement to 35%  Diuresed with IV Lasix now transition to p.o. with over 19,000 mL diurese  No beta-blocker due to pauses  Entresto 49/51 mg 1 tablet twice daily  Jardiance 10 mg daily  Lasix 40 mg daily  Spironolactone 25 mg daily        Persistent atrial fibrillation  Established with Dr. Bear with [facility] in clinic but is retiring so will be establishing care with Dr. Pedro Pablo Gary 5 mg twice daily  Tikosyn discontinued due to prolonged QTc and wide-complex tachycardia on admission  On telemetry having intermittent A-fib with frequent PVCs     Frequent PVCs/wide-complex tachycardia/pauses on telemetry  Monitor in summer 2024 reportedly 27% PVC burden and 89 pauses with the longest of 6.5 seconds all during sleeping hours  Reportedly wide-complex tachycardia while in ED that was concerning for real VT rather than aberrancy that was felt to be due secondary to have acute  hypoxia.  No further reoccurrences  Frequent PVCs and wide-complex QRS  Beta-blocker discontinued due to frequent pauses on telemetry  EP has signed off but recommends 2-week monitor at discharge to assess PVC burden and possible PVC ablation.  Patient to follow-up with Dr. Chamorro in 4 to 6 weeks at discharge     Hypertension  Current /69  Entresto 49/51 mg 1 tablet twice daily             Will defer ischemic evaluation as patient having no chest pain and symptoms have improved with diuresis.  Recent echo also showed LVEF has improved from 30% to now 35%  Avoid beta-blockers and stay off Tikosyn  Discontinue Lasix due to hypotension  Decrease Entresto to 29/26 mg 1 tablet twice daily  Continue guideline directed medical therapy for heart failure with Entresto, Jardiance and spironolactone  Patient will need rehab at discharge  2-week monitor to be placed at discharge.  Please contact our office when he is ready to be discharged  Patient follow-up with Dr. Chamorro in 6 weeks  Patient to follow-up with myself or Dr. Canales in 4 weeks.      Electronically signed by RAKEL Alcantar, 09/09/24, 9:00 AM EDT.

## 2024-09-09 NOTE — CASE MANAGEMENT/SOCIAL WORK
Continued Stay Note  [facility]     Patient Name: Bo Perez  MRN: [id number]  Today's Date: 9/9/2024    Admit Date: 8/30/2024    Plan: SNF at [facility]   Discharge Plan       Row Name 09/09/24 1356       Plan    Plan SNF at [facility]    Plan Comments Per discussion in MDR, Pt was hypotensive yesterday requiring 2 liter boluses and albumin. Last BP was 128/49. Pt is alert and on room air. He will need a 14d Holter monitor at discharge. Pt has received insurance approval for SNF valid thru 9/11 for [facility]. Per [facility] facility liaison, they do not have a bed at this time. CM will continue to follow.    Final Discharge Disposition Code 03 - skilled nursing facility (SNF)                   Discharge Codes    No documentation.                 Expected Discharge Date and Time       Expected Discharge Date Expected Discharge Time    Sep 10, 2024               Irma Posey RN

## 2024-09-09 NOTE — PLAN OF CARE
Goal Outcome Evaluation:              Outcome Evaluation: Follow up re: diet tolerance. No difficulty observed or reported with regular/thin diet. No further ST services indicated.                    Treatment Assessment (SLP): toleration of diet, no clinical signs of, oral dysphagia, pharyngeal dysphagia (09/09/24 0900)  Treatment Assessment Comments (SLP): Pt reporting no difficulty with po. Observed eating regular, thin, mixed, and puree. No evidence of impairment.  Quick oral phase. No pharyngeal signs.  No c/o residue or difficulty.Observed pt taking medication with liquids- pt able to take all meds at one time without evidence of difficulty. (09/09/24 0900)  Plan for Continued Treatment (SLP): treatment no longer indicated as all goals met (09/09/24 0900)

## 2024-09-10 VITALS
BODY MASS INDEX: 41.75 KG/M2 | WEIGHT: 315 LBS | RESPIRATION RATE: 18 BRPM | DIASTOLIC BLOOD PRESSURE: 60 MMHG | HEART RATE: 89 BPM | SYSTOLIC BLOOD PRESSURE: 90 MMHG | TEMPERATURE: 98.1 F | HEIGHT: 73 IN | OXYGEN SATURATION: 96 %

## 2024-09-10 DIAGNOSIS — I49.3 PVC (PREMATURE VENTRICULAR CONTRACTION): ICD-10-CM

## 2024-09-10 DIAGNOSIS — I49.9 VENTRICULAR ARRHYTHMIA: Primary | ICD-10-CM

## 2024-09-10 LAB
ALBUMIN SERPL-MCNC: 3.2 G/DL (ref 3.5–5.2)
ALBUMIN/GLOB SERPL: 1.3 G/DL
ALP SERPL-CCNC: 42 U/L (ref 39–117)
ALT SERPL W P-5'-P-CCNC: 15 U/L (ref 1–41)
ANION GAP SERPL CALCULATED.3IONS-SCNC: 11 MMOL/L (ref 5–15)
AST SERPL-CCNC: 15 U/L (ref 1–40)
BASOPHILS # BLD AUTO: 0.05 10*3/MM3 (ref 0–0.2)
BASOPHILS NFR BLD AUTO: 0.7 % (ref 0–1.5)
BILIRUB SERPL-MCNC: 0.5 MG/DL (ref 0–1.2)
BUN SERPL-MCNC: 20 MG/DL (ref 8–23)
BUN/CREAT SERPL: 23 (ref 7–25)
CALCIUM SPEC-SCNC: 8.5 MG/DL (ref 8.6–10.5)
CHLORIDE SERPL-SCNC: 104 MMOL/L (ref 98–107)
CO2 SERPL-SCNC: 24 MMOL/L (ref 22–29)
CREAT SERPL-MCNC: 0.87 MG/DL (ref 0.76–1.27)
DEPRECATED RDW RBC AUTO: 54.5 FL (ref 37–54)
EGFRCR SERPLBLD CKD-EPI 2021: 88.3 ML/MIN/1.73
EOSINOPHIL # BLD AUTO: 0.26 10*3/MM3 (ref 0–0.4)
EOSINOPHIL NFR BLD AUTO: 3.7 % (ref 0.3–6.2)
ERYTHROCYTE [DISTWIDTH] IN BLOOD BY AUTOMATED COUNT: 15.1 % (ref 12.3–15.4)
GLOBULIN UR ELPH-MCNC: 2.4 GM/DL
GLUCOSE BLDC GLUCOMTR-MCNC: 112 MG/DL (ref 70–130)
GLUCOSE BLDC GLUCOMTR-MCNC: 123 MG/DL (ref 70–130)
GLUCOSE SERPL-MCNC: 117 MG/DL (ref 65–99)
HCT VFR BLD AUTO: 36.2 % (ref 37.5–51)
HGB BLD-MCNC: 11.7 G/DL (ref 13–17.7)
IMM GRANULOCYTES # BLD AUTO: 0.04 10*3/MM3 (ref 0–0.05)
IMM GRANULOCYTES NFR BLD AUTO: 0.6 % (ref 0–0.5)
LYMPHOCYTES # BLD AUTO: 1.69 10*3/MM3 (ref 0.7–3.1)
LYMPHOCYTES NFR BLD AUTO: 24 % (ref 19.6–45.3)
MCH RBC QN AUTO: 32 PG (ref 26.6–33)
MCHC RBC AUTO-ENTMCNC: 32.3 G/DL (ref 31.5–35.7)
MCV RBC AUTO: 98.9 FL (ref 79–97)
MONOCYTES # BLD AUTO: 0.43 10*3/MM3 (ref 0.1–0.9)
MONOCYTES NFR BLD AUTO: 6.1 % (ref 5–12)
NEUTROPHILS NFR BLD AUTO: 4.58 10*3/MM3 (ref 1.7–7)
NEUTROPHILS NFR BLD AUTO: 64.9 % (ref 42.7–76)
NRBC BLD AUTO-RTO: 0 /100 WBC (ref 0–0.2)
PLATELET # BLD AUTO: 158 10*3/MM3 (ref 140–450)
PMV BLD AUTO: 9.8 FL (ref 6–12)
POTASSIUM SERPL-SCNC: 4.1 MMOL/L (ref 3.5–5.2)
PROT SERPL-MCNC: 5.6 G/DL (ref 6–8.5)
QT INTERVAL: 462 MS
QTC INTERVAL: 559 MS
RBC # BLD AUTO: 3.66 10*6/MM3 (ref 4.14–5.8)
SODIUM SERPL-SCNC: 139 MMOL/L (ref 136–145)
WBC NRBC COR # BLD AUTO: 7.05 10*3/MM3 (ref 3.4–10.8)

## 2024-09-10 PROCEDURE — 82948 REAGENT STRIP/BLOOD GLUCOSE: CPT

## 2024-09-10 PROCEDURE — 80053 COMPREHEN METABOLIC PANEL: CPT | Performed by: INTERNAL MEDICINE

## 2024-09-10 PROCEDURE — 99232 SBSQ HOSP IP/OBS MODERATE 35: CPT | Performed by: NURSE PRACTITIONER

## 2024-09-10 PROCEDURE — 99239 HOSP IP/OBS DSCHRG MGMT >30: CPT | Performed by: INTERNAL MEDICINE

## 2024-09-10 PROCEDURE — 85025 COMPLETE CBC W/AUTO DIFF WBC: CPT | Performed by: INTERNAL MEDICINE

## 2024-09-10 RX ORDER — PANTOPRAZOLE SODIUM 40 MG/1
40 TABLET, DELAYED RELEASE ORAL
Start: 2024-09-11

## 2024-09-10 RX ORDER — SPIRONOLACTONE 25 MG/1
25 TABLET ORAL DAILY
Qty: 90 TABLET | Refills: 1 | Status: SHIPPED | OUTPATIENT
Start: 2024-09-10

## 2024-09-10 RX ORDER — IPRATROPIUM BROMIDE AND ALBUTEROL SULFATE 2.5; .5 MG/3ML; MG/3ML
3 SOLUTION RESPIRATORY (INHALATION) EVERY 6 HOURS PRN
Start: 2024-09-10

## 2024-09-10 RX ORDER — NITROGLYCERIN 0.4 MG/1
0.4 TABLET SUBLINGUAL
Qty: 25 TABLET | Refills: 0 | Status: SHIPPED | OUTPATIENT
Start: 2024-09-10

## 2024-09-10 RX ORDER — OXYCODONE AND ACETAMINOPHEN 10; 325 MG/1; MG/1
1 TABLET ORAL EVERY 4 HOURS PRN
Start: 2024-09-10

## 2024-09-10 RX ADMIN — APIXABAN 5 MG: 5 TABLET, FILM COATED ORAL at 09:28

## 2024-09-10 RX ADMIN — PANTOPRAZOLE SODIUM 40 MG: 40 TABLET, DELAYED RELEASE ORAL at 06:07

## 2024-09-10 RX ADMIN — OXYCODONE AND ACETAMINOPHEN 1 TABLET: 10; 325 TABLET ORAL at 06:11

## 2024-09-10 RX ADMIN — EMPAGLIFLOZIN 10 MG: 10 TABLET, FILM COATED ORAL at 09:28

## 2024-09-10 RX ADMIN — GABAPENTIN 800 MG: 400 CAPSULE ORAL at 06:07

## 2024-09-10 RX ADMIN — PREDNISOLONE ACETATE 1 DROP: 10 SUSPENSION/ DROPS OPHTHALMIC at 09:27

## 2024-09-10 RX ADMIN — KETOROLAC TROMETHAMINE 1 DROP: 5 SOLUTION/ DROPS OPHTHALMIC at 09:27

## 2024-09-10 RX ADMIN — Medication 10 ML: at 09:29

## 2024-09-10 NOTE — PROGRESS NOTES
Cardiology Progress Note      Reason for visit:    Likely type II NSTEMI  Persistent atrial fibrillation/frequent PVCs  Acute on chronic systolic heart failure    IDENTIFICATION: 79-year-old gentleman who resides in [place], [place]    Active Hospital Problems    Diagnosis  POA    **Acute respiratory failure with hypoxia and hypercapnia [J96.01, J96.02]  Yes     Priority: High    Persistent atrial fibrillation [I48.19]  Yes     Priority: High     Followed by Dr. Bear with [facility]  General cardiologist Dr. Galvez  Echocardiogram reportedly done 1 month ago normal per family to reevaluate ascending aortic aneurysm which was stable  Chads Vasc= 3 (age >75, CHF)      Wide-complex tachycardia [R00.0]  Yes     Priority: High    NSTEMI (non-ST elevated myocardial infarction) [I21.4]  Yes     Priority: High     Reportedly normal coronaries on cath 2020.  Data deficit.  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  He remains in sinus with frequent PVCs on telemetry for which she is asymptomatic.  No events were noted overnight.  He denies any  chest pain.           Vital Sign Min/Max for last 24 hours  Temp  Min: 97.8 °F (36.6 °C)  Max: 98.4 °F (36.9 °C)   BP  Min: 94/49  Max: 146/58   Pulse  Min: 91  Max: 104   Resp  Min: 18  Max: 20   SpO2  Min: 90 %  Max: 96 %   Flow (L/min)  Min: 2  Max: 2      Intake/Output Summary (Last 24 hours) at 9/10/2024 0747  Last data filed at 9/10/2024 0100  Gross per 24 hour   Intake 1370 ml   Output 2970 ml   Net -1600 ml           Physical Exam  Constitutional:       General: He is awake.   Cardiovascular:      Rate and Rhythm: Normal rate and regular rhythm. Frequent Extrasystoles are present.     Heart sounds: No murmur heard.     Comments: Sinus with frequent PVCs  Pulmonary:      Effort: Pulmonary effort is normal.      Breath sounds: Decreased breath sounds present.   Musculoskeletal:      Right lower le+ Pitting Edema present.      Left lower le+ Pitting Edema present.   Skin:     General: Skin is warm and dry.   Neurological:      Mental Status: He is alert and oriented to person, place, and time.   Psychiatric:         Behavior: Behavior is cooperative.         Tele: Normal sinus with frequent PVCs     Results Review (reviewed the patient's recent labs in the electronic medical record):      EKG (2024): Sinus rhythm with frequent PVCs     CXR (2024): Pulmonary edema with small pleural effusion     ECHO (2024): LVEF has improved from 30 to 35%    Results from last 7 days   Lab Units 09/10/24  0528 24  0044 24  0525 24  1734 24  0418 24  0920 24  1233 24  0322 24  0320   SODIUM mmol/L 139 137 136  --  138 134*  --  138 137   POTASSIUM mmol/L 4.1 4.2 4.0   < > 3.6 4.0   < > 3.6 3.9   CHLORIDE mmol/L 104 101 100  --  102 100  --  98 102   BUN mg/dL 20 22 16  --  16 17  --  21 32*   CREATININE mg/dL 0.87 1.10 0.80  --  0.68* 0.68*  --  0.69* 0.64*   MAGNESIUM mg/dL  --  2.2  --   --   --  2.6*  --  1.8 2.1    < > = values in this interval not  displayed.     Results from last 7 days   Lab Units 09/09/24  0254 09/09/24  0044   HSTROP T ng/L 64* 62*     Results from last 7 days   Lab Units 09/10/24  0528 09/09/24  0043 09/06/24  0920   WBC 10*3/mm3 7.05 9.15 6.78   HEMOGLOBIN g/dL 11.7* 11.6* 11.8*   HEMATOCRIT % 36.2* 35.4* 35.8*   PLATELETS 10*3/mm3 158 213 157       Lab Results   Component Value Date    HGBA1C 5.00 08/30/2024       Lab Results   Component Value Date    CHOL 141 09/02/2024    TRIG 130 09/02/2024              NSTEMI, probable type II  Reportedly normal coronaries on cath 2020 but data deficit  Troponins elevated 88, 138 and 519  Echo this admission shows reduced LVEF 30% with repeat echo showing improvement to 35%  Heart cath attempted on 9/5 but patient refused and unable to lie flat     Acute on chronic systolic heart failure  Echo this admission LVEF 30% but repeat echo shows improvement to 35%  Over 20,000 mL diuresed since admission  No beta-blocker due to pauses  Entresto decreased to 24/26 mg 1 tablet twice daily due to hypotensive episode  Lasix discontinued 9/9 due to hypotensive episode  Jardiance 10 mg daily  Spironolactone 25 mg daily        Persistent atrial fibrillation  Established with Dr. Bear with [facility] in clinic but is retiring so will be establishing care with Dr. Chamorro  Eliqukenny 5 mg twice daily  Tikosyn discontinued due to prolonged QTc and wide-complex tachycardia on admission  On telemetry normal sinus with frequent PVCs for which patient asymptomatic     Frequent PVCs/wide-complex tachycardia/pauses on telemetry  Monitor in summer 2024 reportedly 27% PVC burden and 89 pauses with the longest of 6.5 seconds all during sleeping hours  Reportedly wide-complex tachycardia while in ED that was concerning for real VT rather than aberrancy that was felt to be due secondary to have acute hypoxia.  No further reoccurrences  Frequent PVCs and wide-complex QRS  Beta-blocker discontinued due to frequent pauses on telemetry  EP  has signed off but recommends 2-week monitor at discharge to assess PVC burden and possible PVC ablation.  Patient to follow-up with Dr. Chamorro in 4 to 6 weeks at discharge     Hypertension/hypotension  Episode of hypotension on 9/8/2024 requiring 2 L IV fluids and IV albumin  Lasix discontinued and Entresto decreased to 24/26 mg 1 tablet twice daily  Current /61             Defer ischemic evaluation as patient having no chest pain and symptoms have improved with diuresis.  Recent echo also showed LVEF has improved from 30% to now 35%.  Patient unable to lie flat on Cath Lab table due to back pain  Avoid beta-blockers and stay off Tikosyn  Continue Entresto and spironolactone  Continue Eliquis for stroke prophylaxis  Defer aspirin due to concomitant use of Eliquis  Okay for discharge to rehab when bed available  2-week monitor to be placed at discharge.  Please contact our office  Follow Dr. Chamorro in 6 weeks and with myself or Dr. Canales in 4 weeks  Follow-up with the heart failure clinic in 3 to 5 days at discharge  Please call cardiology with any further questions.    Electronically signed by RAKEL Alcantar, 09/10/24, 7:47 AM EDT.

## 2024-09-10 NOTE — DISCHARGE SUMMARY
[facility] Medicine Services  DISCHARGE SUMMARY    Patient Name: Bo Perez  : 1945  MRN: [id number]    Date of Admission: 2024  3:16 AM  Date of Discharge:  9/10/2024  Primary Care Physician: Srinivasan Cary MD    Consults       Date and Time Order Name Status Description    2024 11:20 AM Inpatient Cardiology Consult Completed             Hospital Course     Presenting Problem: Acute respiratory failure    Active Hospital Problems    Diagnosis  POA    **Acute respiratory failure with hypoxia and hypercapnia [J96.01, J96.02]  Yes    Cardiac arrhythmia [I49.9]  Yes    Type 2 myocardial infarction [I21.A1]  Yes    Acute HFrEF (heart failure with reduced ejection fraction) [I50.21]  Yes    Persistent atrial fibrillation [I48.19]  Yes    Wide-complex tachycardia [R00.0]  Yes    NSTEMI (non-ST elevated myocardial infarction) [I21.4]  Yes    HFrEF (heart failure with reduced ejection fraction) [I50.20]  Yes    Prolonged Q-T interval on ECG [R94.31]  Yes    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 male with history of PAF, HFrEF, MARK not on CPAP, recent COVID-19 infection (1 month prior to presentation), tobacco use disorder, recent cataract surgery, [facility]  with [facility] distress with hypoxia requiring intubation.  He was also hypotensive and started on Levophed.  He was subsequently extubated and Levophed was weaned off.  Suffered wide-complex tachycardia x 2.  Having intermittent episodes of AV block.  Tikosyn and beta-blockers held.  Cardiology following. Patient was initially treated in the ICU and transitioned to the hospitalist service on 2024.       Wide complex tachycardia  Prolonged QTc  A fib  In the setting of  acute hypoxic, hypercapnic respiratory failure  Unclear if represented true VT versus atrial arrhythmia with aberrancy per cardiology  Tikosyn held due to prolonged Qtc  Intolerant to beta-blockers because of intermittent AV block   Cardiology and EP followed  Will need to follow-up with Dr. Chamorro's office in 4 to 6 weeks from discharge  Patient will need 14-day Holter monitor at discharge  Continue Eliquis     Newly diagnosed systolic heart failure, EF 35%  HTN  Echo with EF 35%.  Per family, echo a month ago was normal  Possible stress-induced cardiomyopathy versus CAD  Left heart cath heart cath attempted and aborted because patient could not tolerate lying flat because of back pain  Continue Entresto at lower dose, stopped Lasix per Cards, continue Jardiance, spironolactone     Episode of hypotension   -- after getting pain meds  -- required bolus x 2 and albumin  -- stopped Lasix, decrease dose of Entresto as per Cardiology      Acute on chronic debility  Continue PT and OT  Will need acute rehab.  Case management on board      Discharge Follow Up Recommendations for outpatient labs/diagnostics:  Follow up with PCP within one week of discharge from rehab  2. Follow up with Dr. Chamorro in 6 weeks  3. Follow up with Dr. Canales/Summer ELLINGTON in 4 weeks  4. Follow up with Heart Failure Clinic in 3- 5 days post-discharge  5. Two week Holter monitor to be placed at d/c to rehab. Will follow up results at time of Cardiology follow up.     Day of Discharge     HPI:   Doing well this am, denies any issues overnight. Pain seems to be better controlled today.     Review of Systems  Gen- No fevers, chills  CV- No chest pain, palpitations  Resp- No cough, dyspnea  GI- No N/V/D, abd pain      Vital Signs:   Temp:  [97.8 °F (36.6 °C)-98.6 °F (37 °C)] 98.6 °F (37 °C)  Heart Rate:  [] 85  Resp:  [18-20] 18  BP: ()/(45-67) 111/46  Flow (L/min):  [2] 2      Physical Exam:  General: Comfortable, not in distress,  conversant and cooperative  Head: Atraumatic and normocephalic  Eyes: No Icterus. No pallor  Ears:  Ears appear intact with no abnormalities noted  Throat: No oral lesions, no thrush  Neck: Supple, trachea midline  Lungs: Clear to auscultation bilaterally, equal air entry, no wheezing or crackles  Heart:  Normal S1 and S2, no murmur, no gallop, No JVD, no lower extremity swelling  Abdomen:  Soft, no tenderness, no organomegaly, normal bowel sounds, no organomegaly  Extremities: pulses equal bilaterally  Skin: No bleeding, bruising or rash, normal skin turgor and elasticity. BLE chronic erythema and previous wounds noted.  Neurologic: Cranial nerves appear intact with no evidence of facial asymmetry, normal motor and sensory functions in all 4 extremities  Psych: Alert and oriented x 3, normal mood    Pertinent  and/or Most Recent Results     LAB RESULTS:      Lab 09/10/24  0528 09/09/24  0043 09/06/24  0920 09/05/24  0322 09/04/24  0320   WBC 7.05 9.15 6.78 7.85 7.65   HEMOGLOBIN 11.7* 11.6* 11.8* 11.3* 10.3*   HEMATOCRIT 36.2* 35.4* 35.8* 35.2* 31.9*   PLATELETS 158 213 157 133* 123*   NEUTROS ABS 4.58 6.06 4.70 5.80 5.24   IMMATURE GRANS (ABS) 0.04 0.08* 0.07* 0.07* 0.07*   LYMPHS ABS 1.69 2.09 1.34 1.26 1.48   MONOS ABS 0.43 0.56 0.37 0.43 0.48   EOS ABS 0.26 0.30 0.26 0.24 0.33   MCV 98.9* 96.5 95.5 99.2* 98.5*         Lab 09/10/24  0528 09/09/24  0044 09/08/24  0525 09/07/24  1734 09/07/24  0418 09/06/24  0920 09/05/24  1233 09/05/24  0322 09/04/24  0320   SODIUM 139 137 136  --  138 134*  --  138 137   POTASSIUM 4.1 4.2 4.0 4.1 3.6 4.0   < > 3.6 3.9   CHLORIDE 104 101 100  --  102 100  --  98 102   CO2 24.0 25.0 26.0  --  28.0 21.0*  --  31.0* 25.0   ANION GAP 11.0 11.0 10.0  --  8.0 13.0  --  9.0 10.0   BUN 20 22 16  --  16 17  --  21 32*   CREATININE 0.87 1.10 0.80  --  0.68* 0.68*  --  0.69* 0.64*   EGFR 88.3 68.7 90.6  --  95.1 95.1  --  94.7 96.9   GLUCOSE 117* 121* 104*  --  114* 119*  --  111* 100*    CALCIUM 8.5* 8.7 8.9  --  8.8 8.6  --  8.3* 8.1*   MAGNESIUM  --  2.2  --   --   --  2.6*  --  1.8 2.1    < > = values in this interval not displayed.         Lab 09/10/24  0528 09/09/24  0044 09/08/24  0525 09/07/24  0418 09/06/24  0920   TOTAL PROTEIN 5.6* 5.9* 5.9* 6.0 5.8*   ALBUMIN 3.2* 3.0* 3.3* 3.2* 2.7*   GLOBULIN 2.4 2.9 2.6 2.8 3.1   ALT (SGPT) 15 17 20 22 24   AST (SGOT) 15 18 22 24 29   BILIRUBIN 0.5 0.7 0.9 0.8 0.9   ALK PHOS 42 41 43 43 43         Lab 09/09/24  0254 09/09/24  0044   HSTROP T 64* 62*                 Brief Urine Lab Results  (Last result in the past 365 days)        Color   Clarity   Blood   Leuk Est   Nitrite   Protein   CREAT   Urine HCG        08/30/24 0340 Yellow   Clear   Negative   Negative   Negative   100 mg/dL (2+)                 Microbiology Results (last 10 days)       ** No results found for the last 240 hours. **            Adult Transthoracic Echo Limited W/ Cont if Necessary Per Protocol    Result Date: 9/6/2024    Left ventricular systolic function is moderately decreased. Estimated left ventricular EF = 35%   The left ventricular cavity is mildly dilated.   The left atrial cavity is dilated.     Aborted Cath Cath    Result Date: 9/5/2024  Aborted Case    SLP FEES - Fiberoptic Endo Eval Swallow    Result Date: 9/3/2024  This procedure was auto-finalized with no dictation required.    XR Chest 1 View    Result Date: 9/2/2024  XR CHEST 1 VW Date of Exam: 9/2/2024 1:58 AM EDT Indication: Intubated Patient Comparison: AP chest x-ray 9/1/2024, 8/31/2024, 8/30/2024 Findings: Tip of the endotracheal tube terminates in the midthoracic trachea. Tip of the enteric tube is not included, but courses below the level of the diaphragm. Tip of the right upper extremity PICC terminates near the cavoatrial junction. Cardiac silhouette remains enlarged. Bilateral perihilar airspace opacities appear stable. There is blunting of each lateral costophrenic angle. No pneumothorax is seen.      Impression: 1.Stable appearance of bilateral perihilar airspace opacities, suggestive of pulmonary edema with small pleural effusions. 2.Positioning of support tubes and lines, as above. Electronically Signed: Radha Melinda  9/2/2024 8:08 AM EDT  Workstation ID: [id number]    XR Chest 1 View    Result Date: 9/1/2024  XR CHEST 1 VW Date of Exam: 9/1/2024 6:14 AM EDT Indication: Intubated Patient Comparison: Chest radiograph 8/31/2024. Findings: Unchanged position of endotracheal tube, right PICC, and visualized portions of the nasogastric tube. Cardiomediastinal silhouette is unchanged. Unchanged trace bilateral pleural effusions with streaky bibasilar opacities, left greater than right. No pneumothorax. Osseous structures are unchanged.     Impression: No significant interval change, with similar trace bilateral pleural effusions and bibasilar airspace disease, left greater than right. Electronically Signed: Boaz Schreiber MD  9/1/2024 8:27 AM EDT  Workstation ID: [id number]    Adult Transthoracic Echo Complete W/ Cont if Necessary Per Protocol    Result Date: 8/31/2024    Left ventricular systolic function is moderately decreased. Calculated left ventricular EF = 30% Left ventricular ejection fraction appears to be 31 - 35%.  The pattern could be easily stress-induced cardiomyopathy versus coronary artery disease   The left ventricular cavity is mildly dilated.   Left ventricular wall thickness is consistent with hypertrophy.   The left atrial cavity is dilated.   Left atrial volume is mildly increased.   Estimated right ventricular systolic pressure from tricuspid regurgitation is normal (<35 mmHg). Calculated right ventricular systolic pressure from tricuspid regurgitation is 32 mmHg.     XR Chest 1 View    Result Date: 8/31/2024  XR CHEST 1 VW Date of Exam: 8/31/2024 2:07 AM EDT Indication: Intubated Patient Comparison: 1 day prior. Findings: Nasogastric tube terminates in the stomach, and a right-sided PICC line  terminates in the SVC. Some chronic interstitial changes appear similar and there is likely decreased component of edema/congestion with trace suspected left pleural effusion. There is no distinct pneumothorax.     Impression: Nasogastric tube terminates in the stomach, and a right-sided PICC line terminates in the SVC. Some chronic interstitial changes appear similar and there is likely decreased component of edema/congestion with trace suspected left pleural effusion. There is no distinct pneumothorax. Electronically Signed: Timo Flores MD  8/31/2024 8:09 AM EDT  Workstation ID: [id number]             Results for orders placed during the hospital encounter of 08/30/24    Adult Transthoracic Echo Limited W/ Cont if Necessary Per Protocol    Interpretation Summary    Left ventricular systolic function is moderately decreased. Estimated left ventricular EF = 35%    The left ventricular cavity is mildly dilated.    The left atrial cavity is dilated.      Plan for Follow-up of Pending Labs/Results:     Discharge Details        Discharge Medications        New Medications        Instructions Start Date   empagliflozin 10 MG tablet tablet  Commonly known as: JARDIANCE   10 mg, Oral, Daily      ipratropium-albuterol 0.5-2.5 mg/3 ml nebulizer  Commonly known as: DUO-NEB   3 mL, Nebulization, Every 6 Hours PRN      ipratropium-albuterol 0.5-2.5 mg/3 ml nebulizer  Commonly known as: DUO-NEB   3 mL, Nebulization, Every 6 Hours PRN      naloxone 4 MG/0.1ML nasal spray  Commonly known as: NARCAN   Call 911. Don't prime. [place] in 1 nostril for overdose. Repeat in 2-3 minutes in other nostril if no or minimal breathing/responsiveness.      nitroglycerin 0.4 MG SL tablet  Commonly known as: NITROSTAT   0.4 mg, Sublingual, Every 5 Minutes PRN, Take no more than 3 doses in 15 minutes.      pantoprazole 40 MG EC tablet  Commonly known as: PROTONIX   40 mg, Oral, Every Early Morning   Start Date: September 11, 2024      sacubitril-valsartan 24-26 MG tablet  Commonly known as: ENTRESTO   1 tablet, Oral, Every 12 Hours Scheduled      spironolactone 25 MG tablet  Commonly known as: ALDACTONE   25 mg, Oral, Daily      tiZANidine 4 MG tablet  Commonly known as: ZANAFLEX  Replaces: TiZANidine 4 MG capsule   4 mg, Oral, Every 8 Hours PRN             Changes to Medications        Instructions Start Date   oxyCODONE-acetaminophen  MG per tablet  Commonly known as: PERCOCET  What changed:   when to take this  reasons to take this   1 tablet, Oral, Every 4 Hours PRN             Continue These Medications        Instructions Start Date   acetaminophen 650 MG 8 hr tablet  Commonly known as: TYLENOL   650 mg, Oral, Every 8 Hours PRN      ARIPiprazole 5 MG tablet  Commonly known as: ABILIFY   5 mg, Oral, Daily      clobetasol propionate 0.05 % cream  Commonly known as: TEMOVATE   1 application , Topical, As Needed      docusate sodium 100 MG capsule  Commonly known as: Colace   100 mg, Oral, 2 Times Daily      Eliquis 5 MG tablet tablet  Generic drug: apixaban   5 mg, Oral, Every 12 Hours Scheduled, Take 2.5 mg twice daily through 6/29/23 evening dose then 5 mg twice daily.      escitalopram 10 MG tablet  Commonly known as: LEXAPRO   10 mg, Oral, Every Morning      fexofenadine 180 MG tablet  Commonly known as: ALLEGRA   180 mg, Oral, Daily      gabapentin 800 MG tablet  Commonly known as: NEURONTIN   800 mg, Oral, 3 Times Daily      ketoconazole 2 % shampoo  Commonly known as: NIZORAL   Weekly      PRESERVISION AREDS 2 PO   1 tablet, Oral, 2 Times Daily      multivitamin with minerals tablet tablet   1 tablet, Oral, Daily      ondansetron 4 MG tablet  Commonly known as: Zofran   4 mg, Oral, Every 8 Hours PRN      OSTEO BI-FLEX TRIPLE STRENGTH PO   1 tablet, Oral, 2 Times Daily      sennosides-docusate 8.6-50 MG per tablet  Commonly known as: PERICOLACE   1 tablet, Oral, Daily      trihexyphenidyl 2 MG tablet  Commonly known as: ARTANE    "2 mg, Oral, 2 Times Daily With Meals      trospium 60 MG capsule sustained-release 24 hr capsule   60 mg, Oral, Daily             Stop These Medications      atenolol 25 MG tablet  Commonly known as: TENORMIN     dofetilide 250 MCG capsule  Commonly known as: TIKOSYN     doxycycline 100 MG tablet  Commonly known as: VIBRAMYICN     furosemide 40 MG tablet  Commonly known as: LASIX     potassium chloride 10 MEQ CR tablet  Commonly known as: KLOR-CON M10     TiZANidine 4 MG capsule  Commonly known as: ZANAFLEX  Replaced by: tiZANidine 4 MG tablet     traMADol 50 MG tablet  Commonly known as: ULTRAM              Allergies   Allergen Reactions    Amoxicillin Other (See Comments)     Pt states \"Made me very sick\"         Discharge Disposition:  Rehab Facility or Unit (DC - External)    Diet:  Hospital:  Diet Order   Procedures    Diet: Cardiac; Healthy Heart (2-3 Na+); Fluid Consistency: Thin (IDDSI 0)            Activity:      Restrictions or Other Recommendations:         CODE STATUS:    Code Status and Medical Interventions: CPR (Attempt to Resuscitate); Full Support   Ordered at: 08/30/24 0533     Code Status (Patient has no pulse and is not breathing):    CPR (Attempt to Resuscitate)     Medical Interventions (Patient has pulse or is breathing):    Full Support       Future Appointments   Date Time Provider Department Center   10/8/2024 11:30 AM Antwan Chamorro MD [facility]       Additional Instructions for the Follow-ups that You Need to Schedule       Discharge Follow-up with PCP   As directed       Currently Documented PCP:    Srinivasan Cary MD    PCP Phone Number:    [phone]     Follow Up Details: in one week with PCP        Discharge Follow-up with Specified Provider: CHF clinic; 5 Days   As directed      To: CHF clinic   Follow Up: 5 Days        Discharge Follow-up with Specified Provider: Dr. Canales or Summer Jones APRN; 1 Month   As directed      To: Dr. Canales or Summer Jones APRBETZAIDA "   Follow Up: 1 Month        Discharge Follow-up with Specified Provider: Dr. Chamorro; 6 Weeks   As directed      To: Dr. Chamorro   Follow Up: 6 Weeks                      Latoya Snyder MD  09/10/24      Time Spent on Discharge:  I spent  35 minutes on this discharge activity which included: face-to-face encounter with the patient, reviewing the data in the system, coordination of the care with the nursing staff as well as consultants, documentation, and entering orders.

## 2024-09-10 NOTE — PLAN OF CARE
Problem: Adult Inpatient Plan of Care  Goal: Plan of Care Review  Outcome: Ongoing, Progressing  Goal: Patient-Specific Goal (Individualized)  Outcome: Ongoing, Progressing  Goal: Absence of Hospital-Acquired Illness or Injury  Outcome: Ongoing, Progressing  Intervention: Identify and Manage Fall Risk  Recent Flowsheet Documentation  Taken 9/10/2024 0000 by Lorena Ya RN  Safety Promotion/Fall Prevention:   safety round/check completed   activity supervised   assistive device/personal items within reach   clutter free environment maintained   fall prevention program maintained  Taken 9/9/2024 2200 by Lorena Ya RN  Safety Promotion/Fall Prevention:   safety round/check completed   activity supervised   assistive device/personal items within reach   clutter free environment maintained   fall prevention program maintained  Taken 9/9/2024 2000 by Lorena Ya RN  Safety Promotion/Fall Prevention:   activity supervised   assistive device/personal items within reach   clutter free environment maintained   fall prevention program maintained   safety round/check completed   room organization consistent  Intervention: Prevent Skin Injury  Recent Flowsheet Documentation  Taken 9/10/2024 0000 by Lorena Ya RN  Body Position: position changed independently  Taken 9/9/2024 2200 by Lorena Ya RN  Body Position: supine, legs elevated  Taken 9/9/2024 2000 by Lorena Ya RN  Body Position: supine, legs elevated  Intervention: Prevent and Manage VTE (Venous Thromboembolism) Risk  Recent Flowsheet Documentation  Taken 9/10/2024 0000 by Lorena Ya RN  Activity Management: activity minimized  Taken 9/9/2024 2200 by Lorena Ya RN  Activity Management: activity encouraged  Taken 9/9/2024 2000 by Lorena Ya RN  Activity Management: activity encouraged  Intervention: Prevent Infection  Recent Flowsheet Documentation  Taken 9/10/2024 0000 by Lorena Ya  RN  Infection Prevention: rest/sleep promoted  Taken 9/9/2024 2200 by Lorena Ya RN  Infection Prevention:   hand hygiene promoted   rest/sleep promoted  Taken 9/9/2024 2000 by Lorena Ya RN  Infection Prevention:   environmental surveillance performed   equipment surfaces disinfected   hand hygiene promoted   rest/sleep promoted   single patient room provided  Goal: Optimal Comfort and Wellbeing  Outcome: Ongoing, Progressing  Intervention: Monitor Pain and Promote Comfort  Recent Flowsheet Documentation  Taken 9/9/2024 2003 by Lorena Ya RN  Pain Management Interventions:   see MAR   position adjusted  Intervention: Provide Person-Centered Care  Recent Flowsheet Documentation  Taken 9/9/2024 2000 by Lorena Ya RN  Trust Relationship/Rapport:   care explained   choices provided   questions encouraged   questions answered  Goal: Readiness for Transition of Care  Outcome: Ongoing, Progressing     Problem: Hypertension Comorbidity  Goal: Blood Pressure in Desired Range  Outcome: Ongoing, Progressing  Intervention: Maintain Blood Pressure Management  Recent Flowsheet Documentation  Taken 9/9/2024 2000 by Lorena Ya RN  Medication Review/Management: medications reviewed     Problem: Obstructive Sleep Apnea Risk or Actual Comorbidity Management  Goal: Unobstructed Breathing During Sleep  Outcome: Ongoing, Progressing     Problem: Pain Chronic (Persistent) (Comorbidity Management)  Goal: Acceptable Pain Control and Functional Ability  Outcome: Ongoing, Progressing  Intervention: Manage Persistent Pain  Recent Flowsheet Documentation  Taken 9/9/2024 2000 by Lorena Ya RN  Sleep/Rest Enhancement:   awakenings minimized   consistent schedule promoted   noise level reduced  Medication Review/Management: medications reviewed  Intervention: Develop Pain Management Plan  Recent Flowsheet Documentation  Taken 9/9/2024 2003 by Lorena Ya RN  Pain Management Interventions:    see MAR   position adjusted  Intervention: Optimize Psychosocial Wellbeing  Recent Flowsheet Documentation  Taken 9/9/2024 2000 by Lorena Ya RN  Supportive Measures: active listening utilized  Diversional Activities:   smartphone   television     Problem: Skin Injury Risk Increased  Goal: Skin Health and Integrity  Outcome: Ongoing, Progressing  Intervention: Optimize Skin Protection  Recent Flowsheet Documentation  Taken 9/10/2024 0000 by Lorena Ya RN  Head of Bed (HOB) Positioning: HOB elevated  Taken 9/9/2024 2200 by Lorena Ya RN  Head of Bed (HOB) Positioning: HOB elevated  Taken 9/9/2024 2000 by Lorena Ya RN  Head of Bed (HOB) Positioning: HOB elevated     Problem: Fall Injury Risk  Goal: Absence of Fall and Fall-Related Injury  Outcome: Ongoing, Progressing  Intervention: Identify and Manage Contributors  Recent Flowsheet Documentation  Taken 9/9/2024 2000 by Lorena Ya RN  Medication Review/Management: medications reviewed  Intervention: Promote Injury-Free Environment  Recent Flowsheet Documentation  Taken 9/10/2024 0000 by Lorena Ya RN  Safety Promotion/Fall Prevention:   safety round/check completed   activity supervised   assistive device/personal items within reach   clutter free environment maintained   fall prevention program maintained  Taken 9/9/2024 2200 by Lorena Ya RN  Safety Promotion/Fall Prevention:   safety round/check completed   activity supervised   assistive device/personal items within reach   clutter free environment maintained   fall prevention program maintained  Taken 9/9/2024 2000 by Lorena Ya RN  Safety Promotion/Fall Prevention:   activity supervised   assistive device/personal items within reach   clutter free environment maintained   fall prevention program maintained   safety round/check completed   room organization consistent     Problem: Adjustment to Illness (Heart Failure)  Goal: Optimal Coping  Outcome:  Ongoing, Progressing  Intervention: Support Psychosocial Response  Recent Flowsheet Documentation  Taken 9/9/2024 2000 by Lorena Ya RN  Supportive Measures: active listening utilized     Problem: Cardiac Output Decreased (Heart Failure)  Goal: Optimal Cardiac Output  Outcome: Ongoing, Progressing     Problem: Dysrhythmia (Heart Failure)  Goal: Stable Heart Rate and Rhythm  Outcome: Ongoing, Progressing     Problem: Fluid Imbalance (Heart Failure)  Goal: Fluid Balance  Outcome: Ongoing, Progressing     Problem: Functional Ability Impaired (Heart Failure)  Goal: Optimal Functional Ability  Outcome: Ongoing, Progressing  Intervention: Optimize Functional Ability  Recent Flowsheet Documentation  Taken 9/10/2024 0000 by Lorena Ya RN  Activity Management: activity minimized  Taken 9/9/2024 2200 by Lorena Ya RN  Activity Management: activity encouraged  Taken 9/9/2024 2000 by Lorena Ya RN  Activity Management: activity encouraged     Problem: Oral Intake Inadequate (Heart Failure)  Goal: Optimal Nutrition Intake  Outcome: Ongoing, Progressing     Problem: Respiratory Compromise (Heart Failure)  Goal: Effective Oxygenation and Ventilation  Outcome: Ongoing, Progressing  Intervention: Promote Airway Secretion Clearance  Recent Flowsheet Documentation  Taken 9/10/2024 0000 by Lorena Ya RN  Cough And Deep Breathing: done independently per patient  Taken 9/9/2024 2200 by Lorena Ya RN  Cough And Deep Breathing: done independently per patient     Problem: Sleep Disordered Breathing (Heart Failure)  Goal: Effective Breathing Pattern During Sleep  Outcome: Ongoing, Progressing     Problem: Dysrhythmia  Goal: Normalized Cardiac Rhythm  Outcome: Ongoing, Progressing   Goal Outcome Evaluation:

## 2024-09-10 NOTE — PROGRESS NOTES
"                  Clinical Nutrition     Patient Name: Bo Perez  YOB: 1945  MRN: [id number]  Date of Encounter: 09/10/24 09:22 EDT  Admission date: 8/30/2024  Reason for Visit: Follow-up protocol    Assessment   Nutrition Assessment   Admission Diagnosis:  Acute respiratory failure with hypoxia and hypercapnia [J96.01, J96.02]  Acute HFrEF (heart failure with reduced ejection fraction) [I50.21]    Problem List:    Acute respiratory failure with hypoxia and hypercapnia    HTN (hypertension)    Continuous tobacco abuse    Persistent atrial fibrillation    Frequent PVCs    Wide-complex tachycardia    NSTEMI (non-ST elevated myocardial infarction)    HFrEF (heart failure with reduced ejection fraction)    Prolonged Q-T interval on ECG    Acute HFrEF (heart failure with reduced ejection fraction)    Cardiac arrhythmia    Type 2 myocardial infarction      PMH:   He  has a past medical history of A-fib, Arthritis, Hard to intubate, Hypertension, and Sleep apnea.    PSH:  He  has a past surgical history that includes Tonsillectomy (1954); Dental surgery (1962); Hemorrhoid surgery (1973); Finger surgery (Left, 2000); Carpal tunnel release (Right, 2002); Spinal fusion (2007); Mouth surgery (2008); Knee arthroscopy (Left, 2008); Total knee arthroplasty (Left, 2009); Cholecystectomy (2011); Cardiac Ablation (2013); Bladder surgery (04/14/2016); Colonoscopy; Total knee arthroplasty (Right, 6/22/2023); and Cardiac Catheterization/Vascular Study (N/A, 9/5/2024).    Applicable Nutrition History:   (9/3) FEES - SLP Swallowing Diagnosis: mild, oral dysphagia, mild-moderate, pharyngeal dysphagia; SLP Diet Recommendation: JOSÉ MIGUEL jones  9/9-SLP Diet Recommendation: regular textures, thin liquids (appears diet upgraded 9/5 per diet orders).  Anthropometrics     Height: Height: 185 cm (72.84\")  Last Filed Weight: Weight: (!) 216 kg (476 lb) (09/10/24 0346)  Method: Weight Method: Bed scale  BMI: BMI (Calculated): " 63.1    UBW: UTD   Weight change:  ? Weight gain per documented weight     Nutrition Focused Physical Exam     Date: 9/2    Pt does not meet criteria for malnutrition diagnosis, at this time.      Subjective   Reported/Observed/Food/Nutrition Related History:     9/10  Pt on regular/thin liquids, eating well per documented intakes.     9/4  Pt remains extubated and is tolerating dysphagia diet. RD explained SLP process and pureed/NTL diet, reviewed menu. Pt voiced understanding and stated he thinks he will be able to find enough of the foods he likes to eat on this diet. Pt denied further dietary needs/preferences or nutritional questions/concerns at this time.    9/3  Pt was extubated yesterday and remains so, passed FEES. Discussed with RN, pt ate 75% first meal and demonstrated ability to eat without difficulty, NG pulled.     9/2  Pt remains intubated/sedated, tolerating EN advancing to goal. Phos critically low. No BM yet.     9/1  NPO x 2 days, remains intubated. Start tube feeding per intensivisit note yesterday.  Discussed with RN, reports no plan for extubation today.  Has NGT.  RN reports wife indicated patient with history of esophageal dilation, food gets stuck sometimes.  Patient will more likely need SLP eval post extubation.    No family at bedside at time of visit.     Current Nutrition Prescription     PO: Diet: Cardiac; Healthy Heart (2-3 Na+); Fluid Consistency: Thin (IDDSI 0)  Oral Nutrition Supplement:  Intake: 80% x 11 meals    Assessment & Plan   Nutrition Diagnosis   Date: 9/1 Updated: 9/2, 9/3  Problem Inadequate energy intake    Etiology Intubated    Signs/Symptoms NPO    Status: Discontinued Extubated and now with dysphagia diet, 75% X first meal    Date:  9/3 Updated:  Problem Swallowing difficulty   Etiology mild, oral dysphagia, mild-moderate, pharyngeal dysphagia    Signs/Symptoms Pureed, NTL diet per SLP/FEES 9/3   Status: active    Goal:   Nutrition to support treatment and Maintain  intake    Nutrition Intervention      Follow treatment progress, Care plan reviewed    Pt noted to have adequate PO intake. RD will sign off at this time. If note that PO intake decrease to avg of <50%, please consult RD to re-evaluate interventions in place.     Monitoring/Evaluation:   Per protocol    Edith Medina MS,RD,LD  Time Spent: 10m

## 2024-09-10 NOTE — CASE MANAGEMENT/SOCIAL WORK
Case Management Discharge Note      Final Note: Pt is being discharged today to SNF at [facility]. CM confirmed with Manda facility liaison, Pt can be accepted today. Holter monitor is in place. Facility will retrieve discharge summary from [facility]. RN to call report to [phone]. Martha will provider bariatric W/C transportation at 1500. They will  Pt in room. Confirmation #[id number]. Pt aware/agreeable to plan. He stated he will inform spouse of transfer. No other discharge needs identified.    Tessa with [facility] will follow Pt while at [facility]. An order for  PT/OT/SN was entered in EPIC.        Selected Continued Care - Admitted Since 8/30/2024       Destination Coordination complete.      Service Provider Selected Services Address Phone Fax Patient Preferred    [facility] [address] [address] [phone] --              Durable Medical Equipment    No services have been selected for the patient.                Dialysis/Infusion    No services have been selected for the patient.                Home Medical Care    No services have been selected for the patient.                Therapy    No services have been selected for the patient.                Community Resources    No services have been selected for the patient.                Community & DME    No services have been selected for the patient.                    Transportation Services  W/C Van: [facility] (9/10 @ 1500)    Final Discharge Disposition Code: 03 - skilled nursing facility (SNF)

## 2024-09-30 ENCOUNTER — TELEPHONE (OUTPATIENT)
Dept: CARDIOLOGY | Facility: CLINIC | Age: 79
End: 2024-09-30

## 2024-10-02 ENCOUNTER — TELEPHONE (OUTPATIENT)
Dept: CARDIOLOGY | Facility: CLINIC | Age: 79
End: 2024-10-02
Payer: MEDICARE

## 2024-10-02 NOTE — TELEPHONE ENCOUNTER
Patient called regarding holter monitor to assess PVC burden. 29% PVCs. Discussed options amiodarone vs EP study +/- catheter ablation. I reached out to Dr. Chamorro about how likely this patient's PVCs would respond to ablation. I will call the patient with final plan when it is finalized. He is ok with whatever choice Dr. Chamorro chooses from options above.    Electronically signed by Sage Arora PA-C, 10/02/24, 11:36 AM EDT.

## 2024-10-02 NOTE — TELEPHONE ENCOUNTER
Pt called back after your conversation and he wanted your opinion on proceeding with cataract surgery on his left eye on 10/10. He states he had his right eye done about a week before he was in the hospital with no problems. They are not requesting he hold his blood thinners. Please advise if he can proceed with surgery or if he should postpone in your opinion. Thanks.

## 2024-10-08 ENCOUNTER — OFFICE VISIT (OUTPATIENT)
Dept: CARDIOLOGY | Facility: CLINIC | Age: 79
End: 2024-10-08
Payer: MEDICARE

## 2024-10-08 VITALS
SYSTOLIC BLOOD PRESSURE: 126 MMHG | HEART RATE: 94 BPM | BODY MASS INDEX: 42.66 KG/M2 | HEIGHT: 72 IN | DIASTOLIC BLOOD PRESSURE: 60 MMHG | WEIGHT: 315 LBS | OXYGEN SATURATION: 98 %

## 2024-10-08 DIAGNOSIS — I49.3 FREQUENT PVCS: Primary | ICD-10-CM

## 2024-10-08 DIAGNOSIS — I48.19 PERSISTENT ATRIAL FIBRILLATION: ICD-10-CM

## 2024-10-08 DIAGNOSIS — R00.0 WIDE-COMPLEX TACHYCARDIA: ICD-10-CM

## 2024-10-08 PROCEDURE — 3074F SYST BP LT 130 MM HG: CPT | Performed by: STUDENT IN AN ORGANIZED HEALTH CARE EDUCATION/TRAINING PROGRAM

## 2024-10-08 PROCEDURE — G2211 COMPLEX E/M VISIT ADD ON: HCPCS | Performed by: STUDENT IN AN ORGANIZED HEALTH CARE EDUCATION/TRAINING PROGRAM

## 2024-10-08 PROCEDURE — 99214 OFFICE O/P EST MOD 30 MIN: CPT | Performed by: STUDENT IN AN ORGANIZED HEALTH CARE EDUCATION/TRAINING PROGRAM

## 2024-10-08 PROCEDURE — 3078F DIAST BP <80 MM HG: CPT | Performed by: STUDENT IN AN ORGANIZED HEALTH CARE EDUCATION/TRAINING PROGRAM

## 2024-10-08 RX ORDER — IPRATROPIUM BROMIDE 42 UG/1
SPRAY, METERED NASAL
COMMUNITY
Start: 2024-09-23

## 2024-10-08 RX ORDER — METOPROLOL SUCCINATE 50 MG/1
50 TABLET, EXTENDED RELEASE ORAL DAILY
Qty: 30 TABLET | Refills: 11 | Status: SHIPPED | OUTPATIENT
Start: 2024-10-08

## 2024-10-08 RX ORDER — AMIODARONE HYDROCHLORIDE 200 MG/1
200 TABLET ORAL DAILY
Qty: 60 TABLET | Refills: 3 | Status: SHIPPED | OUTPATIENT
Start: 2024-10-08

## 2024-10-08 NOTE — PROGRESS NOTES
Cardiac Electrophysiology Outpatient Note  [facility]    Office Visit     Bo Perez  [id number]  10/08/2024    Primary Care Physician: Srinivasan Cary MD    Referred By: No ref. provider found    Subjective     Chief Complaint   Patient presents with    Atrial Fibrillation    Hypertension       History of Present Illness:   Bo Perez is a 79 y.o. male who presents to my electrophysiology clinic for follow up of persistent atrial fibrillation, frequent PVCs.  Patient was previously followed by Dr. Bear.  He has a history of morbid obesity.  He was on Tikosyn for persistent atrial fibrillation bradycardia presented the hospital with acute respiratory failure.  He had an EKG of wide-complex tachycardia, that likely represented an SVT with a Meza C, but cannot 100% rule out ventricular tachycardia.  His QT was found to be significantly prolonged so his Tikosyn was stopped.  During the hospitalization he was noted to have very frequent PVCs.  He was discharged with an ambulatory monitor showing approximately 30% PVC burden.    He overall says he is doing okay.  Says he has good days and bad days.  On his bad days he feels more tired and fatigued.  His wife thinks he has been breathing harder.  He does breathe heavy, but he says he has been doing this for years.  He does not think there is any significant recent change in this.  He has lost about 15 pounds over the past several weeks.  Has been trying to exercise more.  Did have a recent echo with normal ejection fraction.  Not feel PVCs..      Past Medical History:   Diagnosis Date    A-fib     Arthritis     Hard to intubate     Hypertension     Sleep apnea     NOT USING CPAP       Past Surgical History:   Procedure Laterality Date    BLADDER SURGERY  04/14/2016    STIMULATOR IMPLANTED, NEW ELECTRODE 08/2022    CARDIAC ABLATION  2013    AFIB    CARDIAC CATHETERIZATION N/A 9/5/2024    Procedure: CASE ABORT;   Surgeon: Yasir Canales IV, MD;  Location:  [facility];  Service: Cardiovascular;  Laterality: N/A;    CARPAL TUNNEL RELEASE Right 2002    CHOLECYSTECTOMY  2011    COLONOSCOPY      DENTAL PROCEDURE  1962    FINGER SURGERY Left 2000    FOREFINGER BONE TRIMMED    HEMORRHOIDECTOMY  1973    KNEE ARTHROSCOPY Left 2008    MOUTH SURGERY  2008    FOR SLEEP APNEA    SPINAL FUSION  2007    L3 LUMBAR SPINAL STENOSIS    TONSILLECTOMY  1954    TOTAL KNEE ARTHROPLASTY Left 2009    TOTAL KNEE ARTHROPLASTY Right 2023    Procedure: TOTAL KNEE ARTHROPLASTY - RIGHT;  Surgeon: Leland Elizalde MD;  Location:  [facility];  Service: Orthopedics;  Laterality: Right;       Family History   Problem Relation Age of Onset    No Known Problems Mother     Alcohol abuse Father        Social History     Socioeconomic History    Marital status:    Tobacco Use    Smoking status: Former     Current packs/day: 0.00     Types: Cigarettes     Quit date:      Years since quittin.7    Smokeless tobacco: Never    Tobacco comments:     QUIT 40 YRS AGO PER PT   Vaping Use    Vaping status: Never Used    Passive vaping exposure: Yes   Substance and Sexual Activity    Alcohol use: Not Currently    Drug use: Never    Sexual activity: Defer         Current Outpatient Medications:     acetaminophen (TYLENOL) 650 MG 8 hr tablet, Take 1 tablet by mouth Every 8 (Eight) Hours As Needed for Mild Pain., Disp: , Rfl:     ARIPiprazole (ABILIFY) 5 MG tablet, Take 1 tablet by mouth Daily., Disp: , Rfl:     Eliquis 5 MG tablet tablet, Take 1 tablet by mouth Every 12 (Twelve) Hours. Take 2.5 mg twice daily through 23 evening dose then 5 mg twice daily., Disp: 60 tablet, Rfl:     empagliflozin (JARDIANCE) 10 MG tablet tablet, Take 1 tablet by mouth Daily., Disp: 90 tablet, Rfl: 1    escitalopram (LEXAPRO) 10 MG tablet, Take 1 tablet by mouth Every Morning., Disp: , Rfl:     fexofenadine (ALLEGRA) 180 MG tablet, Take 1  tablet by mouth Daily., Disp: , Rfl:     gabapentin (NEURONTIN) 800 MG tablet, Take 1 tablet by mouth 3 (Three) Times a Day., Disp: , Rfl:     ipratropium (ATROVENT) 0.06 % nasal spray, USE 2 SPRAYS IN EACH NOSTRIL EVERY DAY 30 MINUTES BEFORE DINNER, Disp: , Rfl:     ketoconazole (NIZORAL) 2 % shampoo, 1 (One) Time Per Week., Disp: , Rfl:     Misc Natural Products (OSTEO BI-FLEX TRIPLE STRENGTH PO), Take 1 tablet by mouth 2 (Two) Times a Day., Disp: , Rfl:     Multiple Vitamins-Minerals (PRESERVISION AREDS 2 PO), Take 1 tablet by mouth 2 (Two) Times a Day., Disp: , Rfl:     multivitamin with minerals tablet tablet, Take 1 tablet by mouth Daily., Disp: , Rfl:     naloxone (NARCAN) 4 MG/0.1ML nasal spray, Call 911. Don't prime. [place] in 1 nostril for overdose. Repeat in 2-3 minutes in other nostril if no or minimal breathing/responsiveness., Disp: 2 each, Rfl: 0    oxyCODONE-acetaminophen (PERCOCET)  MG per tablet, Take 1 tablet by mouth Every 4 (Four) Hours As Needed for Moderate Pain., Disp: , Rfl:     sacubitril-valsartan (ENTRESTO) 24-26 MG tablet, Take 1 tablet by mouth Every 12 (Twelve) Hours., Disp: 120 tablet, Rfl: 1    spironolactone (ALDACTONE) 25 MG tablet, Take 1 tablet by mouth Daily., Disp: 90 tablet, Rfl: 1    tiZANidine (ZANAFLEX) 4 MG tablet, Take 1 tablet by mouth Every 8 (Eight) Hours As Needed for Muscle Spasms., Disp: , Rfl:     trihexyphenidyl (ARTANE) 2 MG tablet, Take 1 tablet by mouth 2 (Two) Times a Day With Meals., Disp: , Rfl:     trospium 60 MG capsule sustained-release 24 hr capsule, Take 1 capsule by mouth Daily., Disp: , Rfl:     amiodarone (PACERONE) 200 MG tablet, Take 1 tablet by mouth Daily. Take amiodarone 400 mg twice daily for 10 days then 200 mg daily, Disp: 60 tablet, Rfl: 3    metoprolol succinate XL (TOPROL-XL) 50 MG 24 hr tablet, Take 1 tablet by mouth Daily., Disp: 30 tablet, Rfl: 11    Allergies:   Allergies   Allergen Reactions    Amoxicillin Other (See Comments)     " Pt states \"Made me very sick\"       Objective   Vital Signs: Blood pressure 126/60, pulse 94, height 182.9 cm (72\"), weight (!) 150 kg (331 lb), SpO2 98%.    PHYSICAL EXAM  General appearance: Awake, alert, cooperative  Head: Normocephalic, without obvious abnormality, atraumatic  Neck: No JVD  Lungs: Clear to ascultation bilaterally  Heart: Regular rate and rhythm, no murmurs, 2+ LE pulses, no lower extremity swelling  Skin: Skin color, turgor normal, no rashes or lesions  Neurologic: Grossly normal     Lab Results   Component Value Date    GLUCOSE 117 (H) 09/10/2024    CALCIUM 8.5 (L) 09/10/2024     09/10/2024    K 4.1 09/10/2024    CO2 24.0 09/10/2024     09/10/2024    BUN 20 09/10/2024    CREATININE 0.87 09/10/2024    EGFRIFNONA 94 06/27/2016    BCR 23.0 09/10/2024    ANIONGAP 11.0 09/10/2024     Lab Results   Component Value Date    WBC 7.05 09/10/2024    HGB 11.7 (L) 09/10/2024    HCT 36.2 (L) 09/10/2024    MCV 98.9 (H) 09/10/2024     09/10/2024     Lab Results   Component Value Date    INR 1.07 08/30/2024    INR 1.17 (H) 06/09/2023    PROTIME 14.0 08/30/2024    PROTIME 15.0 (H) 06/09/2023     Lab Results   Component Value Date    TSH 2.900 08/30/2024    TSH 2.900 08/30/2024          Results for orders placed during the hospital encounter of 08/30/24    Adult Transthoracic Echo Limited W/ Cont if Necessary Per Protocol    Interpretation Summary    Left ventricular systolic function is moderately decreased. Estimated left ventricular EF = 35%    The left ventricular cavity is mildly dilated.    The left atrial cavity is dilated.     Results for orders placed during the hospital encounter of 08/30/24    Aborted Cath Cath    Narrative  Aborted Case      I personally viewed and interpreted the patient's EKG/Telemetry/lab data    Procedures    Bo Perez  reports that he quit smoking about 40 years ago. His smoking use included cigarettes. He has never used smokeless tobacco.     Advance Care " Planning   Advance Care Planning: ACP discussion was held with the patient during this visit. Patient has an advance directive in EMR which is still valid.      Assessment & Plan    1. Frequent PVCs  Patient has frequent PVCs.  Recent ambulatory monitor showed an approximate 30% burden.  EKG today shows bigeminal PVCs with a right bundle branch block, right inferior axis.  There is however discordance in lead II, with lead to being slightly negative.  Transition is between V1 and V2.  Previous EKG showed more of a pattern break in V2 but EKG today does not demonstrate this.  Location of this is somewhat difficult, but may be located in the epicardial cardiac crux.  We discussed management of this.      This is mostly based on symptoms.  It is possible his frequent PVCs could be contributing to his fatigue, however it is difficult to know for certain.  Recent echo was normal ejection fraction.  We discussed options including medications versus catheter ablation.  Ablation would have higher risk due to his obesity.  Not certain he is a good candidate for flecainide as he does have some evidence of baseline conduction system disease.  QT was significantly prolonged on Tikosyn 4.  Likely only good option is amiodarone.  He is open to a 3-month trial of this to see if this improves his symptoms.  Will also start him on metoprolol 50 mg daily.  Will plan for repeat monitor in approximately 3 months.    2. Wide-complex tachycardia  He presented to the ER with a wide-complex tachycardia.  I suspect this to be SVT with a aberrancy however cannot say for certain.  He did have an interesting pattern break in lead V2, I felt this was potentially due to lead reversal however could have represented ventricular tachycardia.  He did have significant acidosis at the time and had a prolonged QT on Tikosyn.  Will continue to monitor.    3. Persistent atrial fibrillation  Has a history of atrial fibrillation previously on Tikosyn.   Currently maintaining sinus rhythm.  Starting amiodarone as above.  Could potentially consider A-fib ablation the future if he is able to lose a significant amount of weight.       Follow Up:  Return in about 6 months (around 4/8/2025) for Recheck.      Thank you for allowing me to participate in the care of your patient. Please do not hesitate to contact me with additional questions or concerns.      Antwan Chamorro M.D.  Cardiac Electrophysiologist  [facility]

## 2024-10-09 ENCOUNTER — TELEPHONE (OUTPATIENT)
Dept: CARDIOLOGY | Facility: CLINIC | Age: 79
End: 2024-10-09
Payer: MEDICARE

## 2024-10-09 RX ORDER — SPIRONOLACTONE 25 MG/1
25 TABLET ORAL DAILY PRN
Start: 2024-10-09

## 2024-10-09 NOTE — TELEPHONE ENCOUNTER
Pt was called to follow up on recent medicine changes. He reports BP has been low around  systolic. Will change spironolactone from scheduled to as needed for swelling.    Electronically signed by Sage Arora PA-C, 10/09/24, 3:03 PM EDT.

## 2024-10-16 PROBLEM — I50.21 ACUTE HFREF (HEART FAILURE WITH REDUCED EJECTION FRACTION): Status: RESOLVED | Noted: 2024-09-05 | Resolved: 2024-10-16

## 2024-10-16 PROBLEM — I49.9 CARDIAC ARRHYTHMIA: Status: RESOLVED | Noted: 2024-09-09 | Resolved: 2024-10-16

## 2024-10-16 NOTE — PROGRESS NOTES
Cardiology Outpatient Visit      Identification: Bo Perez is a 79 y.o. male who resides in [place], [place]    Reason for visit:  Frequent PVCs      Subjective      Patient is a 79-year-old gentleman who returns today for follow-up of his systolic heart failure, persistent atrial fibrillation, frequent PVCs and cardiac risk factors.  The patient used to follow Dr. Bear.  He was recently hospitalized at [facility] after presenting with respiratory distress that required intubation.  He reportedly had an abnormal EKG with a wide-complex tachycardia while in the emergency room.  It was unclear whether this was true VT or not.the patient had been on Tikosyn for his atrial fibrillation but it was discontinued due to a prolonged QT.  During his hospital stay he was noted to have frequent PVCs in bigeminal pattern.  He also had several pauses and his carvedilol was discontinued.  Troponins were elevated raising the possibility of type I versus type II NSTEMI.  Initial echocardiogram showed a reduced LVEF of 30% which was apparently new when compared to an echo performed at [facility]. He was started on guideline directed medical therapy for his heart failure.  A cardiac catheterization was attempted but was aborted as patient refused and was unable to lie flat on the table due to chronic back problems.  He was treated with IV diuretics with improvement in his symptoms.  A repeat echocardiogram showed improvement in his LVEF from 30 to 35%.  Given his symptoms improved after diuretic management further attempts at and ischemic evaluation was deferred as it was felt it was likely a type II NSTEMI either stress-induced from respiratory distress or PVC induced.. Electrophysiology was consulted during his stay and recommended an ambulatory monitor at discharge.  He was eventually discharged to rehab but has since returned home.  The results of his monitor showed a 30% burden of PVCs.  He followed  "up with Dr. Chamorro and a ablation was discussed but it was not felt he would be a good candidate due to his obesity.  He was started on amiodarone.  He denies any chest pain or shortness of breath.  His wife has been weighing him daily.  He was initially 331 pounds at discharge but his weight has steadily increased and this morning was 346.  He is not necessarily having any shortness of breath but his wife is concerned about the steady weight gain.  He does not use any supplemental salt and has been trying to watch his sodium intake.  They are due to go on a cruise next month.  They do have insurance for it but really do not want to miss the cruise.             Review of Systems   Constitutional: Negative for malaise/fatigue.   Eyes:  Negative for vision loss in left eye and vision loss in right eye.   Cardiovascular:  Positive for leg swelling. Negative for chest pain, dyspnea on exertion, near-syncope, orthopnea, palpitations, paroxysmal nocturnal dyspnea and syncope.   Musculoskeletal:  Negative for myalgias.   Neurological:  Negative for brief paralysis, excessive daytime sleepiness, focal weakness, numbness, paresthesias and weakness.   All other systems reviewed and are negative.      Allergies   Allergen Reactions    Amoxicillin Other (See Comments)     Pt states \"Made me very sick\"         Current Outpatient Medications   Medication Instructions    acetaminophen (TYLENOL) 650 mg, Every 8 Hours PRN    amiodarone (PACERONE) 200 mg, Oral, Daily, Take amiodarone 400 mg twice daily for 10 days then 200 mg daily    ARIPiprazole (ABILIFY) 5 mg, Daily    doxycycline (VIBRAMYCIN) 100 mg    Eliquis 5 mg, Oral, Every 12 Hours Scheduled, Take 2.5 mg twice daily through 6/29/23 evening dose then 5 mg twice daily.    empagliflozin (JARDIANCE) 10 mg, Oral, Daily    escitalopram (LEXAPRO) 10 mg, Every Morning    fexofenadine (ALLEGRA) 180 mg, Oral, Daily    furosemide (LASIX) 40 mg, Oral, Daily    gabapentin (NEURONTIN) 800 " "mg, 3 Times Daily    ipratropium (ATROVENT) 0.06 % nasal spray USE 2 SPRAYS IN EACH NOSTRIL EVERY DAY 30 MINUTES BEFORE DINNER    ketoconazole (NIZORAL) 2 % shampoo Weekly    metoprolol succinate XL (TOPROL-XL) 50 mg, Oral, Daily    Misc Natural Products (OSTEO BI-FLEX TRIPLE STRENGTH PO) 1 tablet, 2 Times Daily    Multiple Vitamins-Minerals (PRESERVISION AREDS 2 PO) 1 tablet, 2 Times Daily    naloxone (NARCAN) 4 MG/0.1ML nasal spray Call 911. Don't prime. [place] in 1 nostril for overdose. Repeat in 2-3 minutes in other nostril if no or minimal breathing/responsiveness.    oxyCODONE-acetaminophen (PERCOCET)  MG per tablet 1 tablet, Oral, Every 4 Hours PRN    sacubitril-valsartan (ENTRESTO) 24-26 MG tablet 1 tablet, Oral, Every 12 Hours Scheduled    spironolactone (ALDACTONE) 25 mg, Oral, Daily    tiZANidine (ZANAFLEX) 4 mg, Oral, Every 8 Hours PRN    trihexyphenidyl (ARTANE) 2 mg, 2 Times Daily With Meals    trospium 60 mg, Daily         Objective     BP 94/50 (BP Location: Left arm, Patient Position: Sitting, Cuff Size: Adult)   Pulse 83   Ht 182.9 cm (72.01\")   Wt (!) 158 kg (349 lb)   SpO2 94%   BMI 47.32 kg/m²       Constitutional:       Appearance: Healthy appearance. Well-developed.   Eyes:      General: Lids are normal. No scleral icterus.     Conjunctiva/sclera: Conjunctivae normal.   HENT:      Head: Normocephalic and atraumatic.   Neck:      Thyroid: No thyromegaly.      Vascular: No carotid bruit or JVD.   Pulmonary:      Effort: Pulmonary effort is normal.      Breath sounds: Normal breath sounds. No wheezing. No rhonchi. No rales.   Cardiovascular:      Normal rate. Regular rhythm.      Murmurs: There is no murmur.      No gallop.  No rub.   Pulses:     Intact distal pulses.   Edema:     Peripheral edema absent.   Abdominal:      General: There is no distension.      Palpations: Abdomen is soft. There is no abdominal mass.   Musculoskeletal:      Cervical back: Normal range of motion. Skin:    "  General: Skin is warm and dry.      Findings: No rash.   Neurological:      General: No focal deficit present.      Mental Status: Alert and oriented to person, place, and time.      Gait: Gait is intact.   Psychiatric:         Attention and Perception: Attention normal.         Mood and Affect: Mood normal.         Behavior: Behavior normal.         Result Review  (reviewed with patient):            Lab Results   Component Value Date    GLUCOSE 117 (H) 09/10/2024    BUN 20 09/10/2024    CREATININE 0.87 09/10/2024     09/10/2024    K 4.1 09/10/2024     09/10/2024    CALCIUM 8.5 (L) 09/10/2024    PROTEINTOT 5.6 (L) 09/10/2024    ALBUMIN 3.2 (L) 09/10/2024    ALT 15 09/10/2024    AST 15 09/10/2024    ALKPHOS 42 09/10/2024    BILITOT 0.5 09/10/2024    GLOB 2.4 09/10/2024    AGRATIO 1.3 09/10/2024    BCR 23.0 09/10/2024    ANIONGAP 11.0 09/10/2024    EGFR 88.3 09/10/2024     Lab Results   Component Value Date    WBC 7.05 09/10/2024    HGB 11.7 (L) 09/10/2024    HCT 36.2 (L) 09/10/2024    MCV 98.9 (H) 09/10/2024     09/10/2024     Lab Results   Component Value Date    CHOL 141 09/02/2024    TRIG 130 09/02/2024     Lab Results   Component Value Date    HGBA1C 5.00 08/30/2024           Assessment     Diagnoses and all orders for this visit:    1. HFrEF (heart failure with reduced ejection fraction) (Primary)  Overview:  Echo at [facility] (9/2020): LVEF 55-60%  Echo at [facility] (8/16/2024): LVEF 55-60%  Echo (8/31/2024): LVEF 30%  Echo (9/6/2024): LVEF is improved to 35% after diuresis    Assessment & Plan:  14 pound weight gain over the past 4 weeks.  Start Lasix 40 mg daily  Obtain proBNP and BMP on Friday  Follow-up with the heart failure clinic on Friday for reassessment  If patient develops worsening heart failure symptoms despite medication adjustments would recommend stress test.  Discussed this with the patient and we will defer for now  Continue Entresto 24/26 mg 1 tablet twice  daily  Continue metoprolol succinate 50 mg daily  Spironolactone 25 mg daily as needed    Orders:  -     proBNP; Future  -     Basic Metabolic Panel; Future    2. Persistent atrial fibrillation  Overview:  Followed by Dr. Bear with [facility]  General cardiologist Dr. Galvez  Echocardiogram reportedly done 1 month ago normal per family to reevaluate ascending aortic aneurysm which was stable  Chads Vasc= 3 (age >75, CHF)    Assessment & Plan:  Continue amiodarone 200 mg daily  Continue Eliquis 5 mg twice daily      3. Primary hypertension  Assessment & Plan:  Continue Entresto 24/26 mg 1 tablet twice daily  Continue metoprolol succinate 50 mg daily      4. Mixed hyperlipidemia  Assessment & Plan:  Consider statin therapy      5. Frequent PVCs  Overview:  Reportedly normal coronaries on cath 2020  Monitor summer 2024, data deficit: 27% PVC burden, 89 pauses longest 6.5 seconds-all pauses occurred during sleeping hours     Assessment & Plan:  Continue metoprolol succinate 50 mg daily  Continue amiodarone 200 mg daily  Follow-up with Dr. Chamorro      Other orders  -     spironolactone (ALDACTONE) 25 MG tablet; Take 1 tablet by mouth Daily.  -     furosemide (LASIX) 40 MG tablet; Take 1 tablet by mouth Daily.  Dispense: 30 tablet; Refill: 1          Plan   Start Lasix 40 mg daily for 14 pound fluid gain  Obtain proBNP and BMP on Friday  Follow-up with Opal Martino on Friday at 10:45 AM for reassessment of symptoms  If patient develops heart failure symptoms refractory to medical therapy will order stress test      Follow-up   Return in about 3 months (around 1/22/2025), or if symptoms worsen or fail to improve, for Follow-up with Dr. Canales next visit.      Rafaela Jones, RAKEL  10/22/2024

## 2024-10-22 ENCOUNTER — OFFICE VISIT (OUTPATIENT)
Dept: CARDIOLOGY | Facility: CLINIC | Age: 79
End: 2024-10-22
Payer: MEDICARE

## 2024-10-22 VITALS
SYSTOLIC BLOOD PRESSURE: 94 MMHG | BODY MASS INDEX: 42.66 KG/M2 | WEIGHT: 315 LBS | OXYGEN SATURATION: 94 % | HEIGHT: 72 IN | HEART RATE: 83 BPM | DIASTOLIC BLOOD PRESSURE: 50 MMHG

## 2024-10-22 DIAGNOSIS — I48.19 PERSISTENT ATRIAL FIBRILLATION: ICD-10-CM

## 2024-10-22 DIAGNOSIS — I50.20 HFREF (HEART FAILURE WITH REDUCED EJECTION FRACTION): Primary | ICD-10-CM

## 2024-10-22 DIAGNOSIS — E78.2 MIXED HYPERLIPIDEMIA: ICD-10-CM

## 2024-10-22 DIAGNOSIS — I49.3 FREQUENT PVCS: ICD-10-CM

## 2024-10-22 DIAGNOSIS — I10 PRIMARY HYPERTENSION: ICD-10-CM

## 2024-10-22 PROCEDURE — 99214 OFFICE O/P EST MOD 30 MIN: CPT | Performed by: NURSE PRACTITIONER

## 2024-10-22 PROCEDURE — 3074F SYST BP LT 130 MM HG: CPT | Performed by: NURSE PRACTITIONER

## 2024-10-22 PROCEDURE — 3078F DIAST BP <80 MM HG: CPT | Performed by: NURSE PRACTITIONER

## 2024-10-22 RX ORDER — SPIRONOLACTONE 25 MG/1
25 TABLET ORAL DAILY
Start: 2024-10-22

## 2024-10-22 RX ORDER — FUROSEMIDE 40 MG
40 TABLET ORAL DAILY
Qty: 30 TABLET | Refills: 1 | Status: SHIPPED | OUTPATIENT
Start: 2024-10-22

## 2024-10-22 RX ORDER — DOXYCYCLINE 100 MG/1
100 CAPSULE ORAL
COMMUNITY
Start: 2024-10-17

## 2024-10-22 NOTE — ASSESSMENT & PLAN NOTE
Continue metoprolol succinate 50 mg daily  Continue amiodarone 200 mg daily  Follow-up with Dr. Chamorro

## 2024-10-22 NOTE — ASSESSMENT & PLAN NOTE
14 pound weight gain over the past 4 weeks.  Start Lasix 40 mg daily  Obtain proBNP and BMP on Friday  Follow-up with the heart failure clinic on Friday for reassessment  If patient develops worsening heart failure symptoms despite medication adjustments would recommend stress test.  Discussed this with the patient and we will defer for now  Continue Entresto 24/26 mg 1 tablet twice daily  Continue metoprolol succinate 50 mg daily  Spironolactone 25 mg daily as needed

## 2024-10-25 ENCOUNTER — LAB (OUTPATIENT)
Dept: LAB | Facility: HOSPITAL | Age: 79
End: 2024-10-25
Payer: MEDICARE

## 2024-10-25 ENCOUNTER — OFFICE VISIT (OUTPATIENT)
Dept: CARDIOLOGY | Facility: HOSPITAL | Age: 79
End: 2024-10-25
Payer: MEDICARE

## 2024-10-25 VITALS
DIASTOLIC BLOOD PRESSURE: 49 MMHG | BODY MASS INDEX: 42.66 KG/M2 | OXYGEN SATURATION: 96 % | HEART RATE: 72 BPM | WEIGHT: 315 LBS | SYSTOLIC BLOOD PRESSURE: 96 MMHG | HEIGHT: 72 IN

## 2024-10-25 DIAGNOSIS — I10 PRIMARY HYPERTENSION: ICD-10-CM

## 2024-10-25 DIAGNOSIS — I50.20 HFREF (HEART FAILURE WITH REDUCED EJECTION FRACTION): ICD-10-CM

## 2024-10-25 DIAGNOSIS — I49.3 FREQUENT PVCS: ICD-10-CM

## 2024-10-25 DIAGNOSIS — I50.23 ACUTE ON CHRONIC HFREF (HEART FAILURE WITH REDUCED EJECTION FRACTION): Primary | ICD-10-CM

## 2024-10-25 DIAGNOSIS — E78.2 MIXED HYPERLIPIDEMIA: ICD-10-CM

## 2024-10-25 LAB
ANION GAP SERPL CALCULATED.3IONS-SCNC: 6.6 MMOL/L (ref 5–15)
BUN SERPL-MCNC: 12 MG/DL (ref 8–23)
BUN/CREAT SERPL: 11.1 (ref 7–25)
CALCIUM SPEC-SCNC: 8.9 MG/DL (ref 8.6–10.5)
CHLORIDE SERPL-SCNC: 106 MMOL/L (ref 98–107)
CO2 SERPL-SCNC: 27.4 MMOL/L (ref 22–29)
CREAT SERPL-MCNC: 1.08 MG/DL (ref 0.76–1.27)
EGFRCR SERPLBLD CKD-EPI 2021: 70.2 ML/MIN/1.73
GLUCOSE SERPL-MCNC: 114 MG/DL (ref 65–99)
NT-PROBNP SERPL-MCNC: 276 PG/ML (ref 0–1800)
POTASSIUM SERPL-SCNC: 4.4 MMOL/L (ref 3.5–5.2)
SODIUM SERPL-SCNC: 140 MMOL/L (ref 136–145)

## 2024-10-25 PROCEDURE — 3078F DIAST BP <80 MM HG: CPT | Performed by: NURSE PRACTITIONER

## 2024-10-25 PROCEDURE — 1160F RVW MEDS BY RX/DR IN RCRD: CPT | Performed by: NURSE PRACTITIONER

## 2024-10-25 PROCEDURE — 3074F SYST BP LT 130 MM HG: CPT | Performed by: NURSE PRACTITIONER

## 2024-10-25 PROCEDURE — 1159F MED LIST DOCD IN RCRD: CPT | Performed by: NURSE PRACTITIONER

## 2024-10-25 PROCEDURE — 80048 BASIC METABOLIC PNL TOTAL CA: CPT

## 2024-10-25 PROCEDURE — 99214 OFFICE O/P EST MOD 30 MIN: CPT | Performed by: NURSE PRACTITIONER

## 2024-10-25 PROCEDURE — 83880 ASSAY OF NATRIURETIC PEPTIDE: CPT

## 2024-10-25 PROCEDURE — G2211 COMPLEX E/M VISIT ADD ON: HCPCS | Performed by: NURSE PRACTITIONER

## 2024-10-25 PROCEDURE — 36415 COLL VENOUS BLD VENIPUNCTURE: CPT

## 2024-10-25 RX ORDER — AMOXICILLIN 250 MG
1 CAPSULE ORAL DAILY
COMMUNITY
End: 2024-11-01

## 2024-10-25 RX ORDER — NITROGLYCERIN 0.4 MG/1
0.4 TABLET SUBLINGUAL
COMMUNITY

## 2024-10-25 RX ORDER — MULTIPLE VITAMINS W/ MINERALS TAB 9MG-400MCG
1 TAB ORAL DAILY
COMMUNITY

## 2024-10-28 NOTE — PROGRESS NOTES
"Chief Complaint  Congestive Heart Failure    Subjective    History of Present Illness {CC  Problem List  Visit  Diagnosis   Encounters  Notes  Medications  Labs  Result Review Imaging  Media :23}       History of Present Illness   79 year old male presents to the office today as a same day appt for ongoing evaluation of acute on chronic hfref. He was recently evaluated by Summer ELLINGTON and was initiated On Lasix and Aldactone.  Has a history of chronic HFrEF, persistent atrial fibrillation frequent PVCs.  Previously followed with Dr. Bear.  He was hospitalized at [facility] August 30, 2024 with respiratory distress requiring intubation.  Had an abnormal EKG with wide-complex tachycardia and it was unclear if it was true VT or not.  He had been on Tikosyn for A-fib and that was discontinued due to prolonged QT.  During hospital stay it was noted that he was having frequent PVCs and intermittent pauses and subsequently carvedilol was discontinued.  Troponins were elevated with possibility of type I versus type II non-STEMI.  Echo showed a reduced LVEF of 30% which was new compared to an echo at [facility].  He was initiated on GDMT.  A cath was attempted but aborted as patient refused and was unable to lie flat on the table due to chronic back problems .  Patient was treated with IV diuretics.  EP was consulted and patient wore a monitor which showed a 30% PVC burden.  He was initiated on amiodarone due to being a poor candidate for PVC ablation due to weight.  Weight at discharge was 331 pounds and is currently 344 pounds.  He reports pedal edema, dyspnea and fatigue.  He is scheduled to leave for a cruise on 11/3/2024.  Objective     Vital Signs:   Vitals:    10/25/24 1131 10/25/24 1132   BP: 107/55 96/49   BP Location: Left arm Left arm   Patient Position: Sitting Standing   Pulse: 74 72   SpO2: 96%    Weight: (!) 158 kg (348 lb)    Height: 182.9 cm (72\")      Body mass index is " 47.2 kg/m².  Physical Exam  Vitals and nursing note reviewed.   Constitutional:       Appearance: Normal appearance. He is obese.   HENT:      Head: Normocephalic.   Eyes:      Pupils: Pupils are equal, round, and reactive to light.   Cardiovascular:      Rate and Rhythm: Normal rate and regular rhythm.      Pulses: Normal pulses.      Heart sounds: Normal heart sounds. No murmur heard.  Pulmonary:      Effort: Pulmonary effort is normal.      Breath sounds: Rales present.   Abdominal:      General: Bowel sounds are normal. There is distension.      Palpations: Abdomen is soft.   Musculoskeletal:         General: Normal range of motion.      Cervical back: Normal range of motion.      Right lower leg: Edema present.      Left lower leg: Edema present.   Skin:     General: Skin is warm and dry.      Capillary Refill: Capillary refill takes less than 2 seconds.   Neurological:      Mental Status: He is alert and oriented to person, place, and time.   Psychiatric:         Mood and Affect: Mood normal.         Thought Content: Thought content normal.              Result Review  Data Reviewed:{ Labs  Result Review  Imaging  Med Tab  Media :23}     Lab Results   Component Value Date    GLUCOSE 114 (H) 10/25/2024    CALCIUM 8.9 10/25/2024     10/25/2024    K 4.4 10/25/2024    CO2 27.4 10/25/2024     10/25/2024    BUN 12 10/25/2024    CREATININE 1.08 10/25/2024    EGFR 70.2 10/25/2024    BCR 11.1 10/25/2024    ANIONGAP 6.6 10/25/2024     Lab Results   Component Value Date    WBC 7.05 09/10/2024    HGB 11.7 (L) 09/10/2024    HCT 36.2 (L) 09/10/2024    MCV 98.9 (H) 09/10/2024     09/10/2024     Adult Transthoracic Echo Limited W/ Cont if Necessary Per Protocol (09/05/2024 15:17)  Holter Monitor - 72 Hour Up To 15 Days (09/09/2024 10:15)  ECG 12 Lead QT Measurement (09/09/2024 05:27)  ECG 12 Lead Other; Low bp (09/09/2024 00:28)           Assessment and Plan {CC Problem List  Visit Diagnosis  ROS   Review (Popup)  [facility] Maintenance  Quality  BestPractice  Medications  SmartSets  SnapShot Encounters  Media :23}   1. Acute on chronic HFrEF (heart failure with reduced ejection fraction)  Multiple attempts to achieve IV access were unsuccessful  Begin lasix 40 mg bid, continue aldactone   Stable on jardiance, toprol, entresto, lasix  Heart failure education today including signs and symptoms, the role of the heart failure center, daily weights, low sodium diet (less than 1500 mg per day), and daily physical activity. Reviewed HF Zones with patient and family.  Patient to continue current medications as previously ordered.   2. Primary hypertension  Stable on toprol, entresto   Monitor closely     3. Frequent PVCs  Stable on toprol     4. Mixed hyperlipidemia  Diet controlled          Follow Up {Instructions Charge Capture  Follow-up Communications :23}   Return in about 4 days (around 10/29/2024) for Office visit, HF.    Patient was given instructions and counseling regarding his condition or for health maintenance advice. Please see specific information pulled into the AVS if appropriate.  Patient was instructed to call the [facility] with any questions, concerns, or worsening symptoms.

## 2024-10-29 ENCOUNTER — OFFICE VISIT (OUTPATIENT)
Dept: CARDIOLOGY | Facility: HOSPITAL | Age: 79
End: 2024-10-29
Payer: MEDICARE

## 2024-10-29 VITALS
RESPIRATION RATE: 16 BRPM | OXYGEN SATURATION: 92 % | DIASTOLIC BLOOD PRESSURE: 53 MMHG | HEART RATE: 66 BPM | SYSTOLIC BLOOD PRESSURE: 105 MMHG | WEIGHT: 315 LBS | TEMPERATURE: 97.5 F | HEIGHT: 72 IN | BODY MASS INDEX: 42.66 KG/M2

## 2024-10-29 DIAGNOSIS — I50.23 ACUTE ON CHRONIC HFREF (HEART FAILURE WITH REDUCED EJECTION FRACTION): Primary | ICD-10-CM

## 2024-10-29 DIAGNOSIS — I10 PRIMARY HYPERTENSION: ICD-10-CM

## 2024-10-29 DIAGNOSIS — I49.3 FREQUENT PVCS: ICD-10-CM

## 2024-10-29 DIAGNOSIS — E78.2 MIXED HYPERLIPIDEMIA: ICD-10-CM

## 2024-10-29 PROCEDURE — 3078F DIAST BP <80 MM HG: CPT | Performed by: NURSE PRACTITIONER

## 2024-10-29 PROCEDURE — 3074F SYST BP LT 130 MM HG: CPT | Performed by: NURSE PRACTITIONER

## 2024-10-29 PROCEDURE — 1159F MED LIST DOCD IN RCRD: CPT | Performed by: NURSE PRACTITIONER

## 2024-10-29 PROCEDURE — 99214 OFFICE O/P EST MOD 30 MIN: CPT | Performed by: NURSE PRACTITIONER

## 2024-10-29 PROCEDURE — 1160F RVW MEDS BY RX/DR IN RCRD: CPT | Performed by: NURSE PRACTITIONER

## 2024-10-29 PROCEDURE — G2211 COMPLEX E/M VISIT ADD ON: HCPCS | Performed by: NURSE PRACTITIONER

## 2024-10-29 NOTE — PROGRESS NOTES
Chief Complaint  Congestive Heart Failure and Follow-up    Subjective    History of Present Illness {CC  Problem List  Visit  Diagnosis   Encounters  Notes  Medications  Labs  Result Review Imaging  Media :23}       History of Present Illness   79 year old male presents to the office today as a same day appt for ongoing evaluation of acute on chronic hfref. He was recently evaluated by Summer ELLINGTON and was initiated On Lasix and Aldactone.  Has a history of chronic HFrEF, persistent atrial fibrillation frequent PVCs.  Previously followed with Dr. Bear.  He was hospitalized at [facility] August 30, 2024 with respiratory distress requiring intubation.  Had an abnormal EKG with wide-complex tachycardia and it was unclear if it was true VT or not.  He had been on Tikosyn for A-fib and that was discontinued due to prolonged QT.  During hospital stay it was noted that he was having frequent PVCs and intermittent pauses and subsequently carvedilol was discontinued.  Troponins were elevated with possibility of type I versus type II non-STEMI.  Echo showed a reduced LVEF of 30% which was new compared to an echo at [facility].  He was initiated on GDMT.  A cath was attempted but aborted as patient refused and was unable to lie flat on the table due to chronic back problems .  Patient was treated with IV diuretics.  EP was consulted and patient wore a monitor which showed a 30% PVC burden.  He was initiated on amiodarone due to being a poor candidate for PVC ablation due to weight.  Weight at discharge was 331 pounds and he was 344 lbs on Friday.  He is currently 339  pounds at home this amand notes some improvement in his dyspnea as well as pedal edema.  Notes ongoing fatigue.  He is scheduled to leave for a cruise on 11/3/2024.  Patient notes that he has been taking Lasix 40 mg twice daily since Friday's appointment.   Objective     Vital Signs:   Vitals:    10/29/24 1031   BP: 105/53   BP  "Location: Left arm   Patient Position: Sitting   Cuff Size: Adult   Pulse: 66   Resp: 16   Temp: 97.5 °F (36.4 °C)   TempSrc: Temporal   SpO2: 92%   Weight: (!) 154 kg (340 lb 8 oz)   Height: 182.9 cm (72.01\")     Body mass index is 46.17 kg/m².  Physical Exam  Vitals and nursing note reviewed.   Constitutional:       Appearance: Normal appearance. He is obese.   HENT:      Head: Normocephalic.   Eyes:      Pupils: Pupils are equal, round, and reactive to light.   Cardiovascular:      Rate and Rhythm: Normal rate and regular rhythm.      Pulses: Normal pulses.      Heart sounds: Normal heart sounds. No murmur heard.  Pulmonary:      Effort: Pulmonary effort is normal.      Breath sounds: Rales present.   Abdominal:      General: Bowel sounds are normal. There is distension.      Palpations: Abdomen is soft.   Musculoskeletal:         General: Normal range of motion.      Cervical back: Normal range of motion.      Right lower leg: Edema present.      Left lower leg: Edema present.   Skin:     General: Skin is warm and dry.      Capillary Refill: Capillary refill takes less than 2 seconds.   Neurological:      Mental Status: He is alert and oriented to person, place, and time.   Psychiatric:         Mood and Affect: Mood normal.         Thought Content: Thought content normal.              Result Review  Data Reviewed:{ Labs  Result Review  Imaging  Med Tab  Media :23}     Lab Results   Component Value Date    GLUCOSE 114 (H) 10/25/2024    CALCIUM 8.9 10/25/2024     10/25/2024    K 4.4 10/25/2024    CO2 27.4 10/25/2024     10/25/2024    BUN 12 10/25/2024    CREATININE 1.08 10/25/2024    EGFR 70.2 10/25/2024    BCR 11.1 10/25/2024    ANIONGAP 6.6 10/25/2024     Lab Results   Component Value Date    WBC 7.05 09/10/2024    HGB 11.7 (L) 09/10/2024    HCT 36.2 (L) 09/10/2024    MCV 98.9 (H) 09/10/2024     09/10/2024     Adult Transthoracic Echo Limited W/ Cont if Necessary Per Protocol (09/05/2024 " 15:17)  Holter Monitor - 72 Hour Up To 15 Days (09/09/2024 10:15)  ECG 12 Lead QT Measurement (09/09/2024 05:27)  ECG 12 Lead Other; Low bp (09/09/2024 00:28)           Assessment and Plan {CC Problem List  Visit Diagnosis  ROS  Review (Popup)  Health Maintenance  Quality  BestPractice  Medications  SmartSets  SnapShot Encounters  Media :23}   1. Acute on chronic HFrEF (heart failure with reduced ejection fraction)  Patient received 80 mg of IV Lasix in office today through the right hand.  Continue  lasix 40 mg bid, continue aldactone   Stable on jardiance, toprol, entresto, lasix  Heart failure education today including signs and symptoms, the role of the heart failure center, daily weights, low sodium diet (less than 1500 mg per day), and daily physical activity. Reviewed HF Zones with patient and family.  Patient to continue current medications as previously ordered.   2. Primary hypertension  Stable on toprol, entresto   Monitor closely     3. Frequent PVCs  Stable on toprol     4. Mixed hyperlipidemia  Diet controlled          Follow Up {Instructions Charge Capture  Follow-up Communications :23}   F/u on Friday 11/1    Patient was given instructions and counseling regarding his condition or for health maintenance advice. Please see specific information pulled into the AVS if appropriate.  Patient was instructed to call the [facility] with any questions, concerns, or worsening symptoms.

## 2024-11-01 ENCOUNTER — OFFICE VISIT (OUTPATIENT)
Dept: CARDIOLOGY | Facility: HOSPITAL | Age: 79
End: 2024-11-01
Payer: MEDICARE

## 2024-11-01 VITALS
OXYGEN SATURATION: 95 % | WEIGHT: 315 LBS | HEIGHT: 72 IN | TEMPERATURE: 97 F | HEART RATE: 75 BPM | DIASTOLIC BLOOD PRESSURE: 57 MMHG | BODY MASS INDEX: 42.66 KG/M2 | RESPIRATION RATE: 18 BRPM | SYSTOLIC BLOOD PRESSURE: 102 MMHG

## 2024-11-01 DIAGNOSIS — I49.3 FREQUENT PVCS: ICD-10-CM

## 2024-11-01 DIAGNOSIS — I48.19 PERSISTENT ATRIAL FIBRILLATION: ICD-10-CM

## 2024-11-01 DIAGNOSIS — R06.09 DOE (DYSPNEA ON EXERTION): ICD-10-CM

## 2024-11-01 DIAGNOSIS — I50.22 CHRONIC HFREF (HEART FAILURE WITH REDUCED EJECTION FRACTION): Primary | ICD-10-CM

## 2024-11-01 DIAGNOSIS — R53.83 OTHER FATIGUE: ICD-10-CM

## 2024-11-01 NOTE — PROGRESS NOTES
Chief Complaint  Follow-up and Congestive Heart Failure    Subjective    History of Present Illness {CC  Problem List  Visit  Diagnosis   Encounters  Notes  Medications  Labs  Result Review Imaging  Media :23}       History of Present Illness   79 year old male presents to the office today for ongoing evaluation of acute on chronic hfref. He was recently evaluated by Summer ELLINGTON and was initiated On Lasix and Aldactone.  Has a history of chronic HFrEF, persistent atrial fibrillation frequent PVCs.  Previously followed with Dr. Bear.  He was hospitalized at [facility] August 30, 2024 with respiratory distress requiring intubation.  Had an abnormal EKG with wide-complex tachycardia and it was unclear if it was true VT or not.  He had been on Tikosyn for A-fib and that was discontinued due to prolonged QT.  During hospital stay it was noted that he was having frequent PVCs and intermittent pauses and subsequently carvedilol was discontinued.  Troponins were elevated with possibility of type I versus type II non-STEMI.  Echo showed a reduced LVEF of 30% which was new compared to an echo at [facility].  He was initiated on GDMT.  A cath was attempted but aborted as patient refused and was unable to lie flat on the table due to chronic back problems .  Patient was treated with IV diuretics.  EP was consulted and patient wore a monitor which showed a 30% PVC burden.  He was initiated on amiodarone due to being a poor candidate for PVC ablation due to weight.  Weight at discharge was 331 pounds and he was 344 lbs on Friday.  He is currently 338.5 pounds at home this am and notes significant improvement in his dyspnea as well as pedal edema.  Notes ongoing fatigue.  He is scheduled to leave for a cruise on 11/3/2024.  Patient notes that he has been taking Lasix 40 mg twice daily since Friday's appointment. Reports that home health is due to come today to place his unna boots back  "on.  Objective     Vital Signs:   Vitals:    11/01/24 1100 11/01/24 1105   BP: 102/57    BP Location: Left arm    Patient Position: Sitting    Cuff Size: Large Adult    Pulse: 75    Resp: 18    Temp: 97 °F (36.1 °C)    TempSrc: Temporal    SpO2: 95%    Weight: (!) 154 kg (340 lb 2 oz) (!) 154 kg (338 lb 8 oz)   Height: 182.9 cm (72.01\") 182.9 cm (72.01\")     Body mass index is 45.9 kg/m².  Physical Exam  Vitals and nursing note reviewed.   Constitutional:       Appearance: Normal appearance. He is obese.   HENT:      Head: Normocephalic.   Eyes:      Pupils: Pupils are equal, round, and reactive to light.   Cardiovascular:      Rate and Rhythm: Normal rate and regular rhythm.      Pulses: Normal pulses.      Heart sounds: Normal heart sounds. No murmur heard.  Pulmonary:      Effort: Pulmonary effort is normal.      Breath sounds: No rales.   Abdominal:      General: Bowel sounds are normal. There is no distension.      Palpations: Abdomen is soft.   Musculoskeletal:         General: Normal range of motion.      Cervical back: Normal range of motion.      Right lower leg: Edema present.      Left lower leg: Edema present.   Skin:     General: Skin is warm and dry.      Capillary Refill: Capillary refill takes less than 2 seconds.   Neurological:      Mental Status: He is alert and oriented to person, place, and time.   Psychiatric:         Mood and Affect: Mood normal.         Thought Content: Thought content normal.              Result Review  Data Reviewed:{ Labs  Result Review  Imaging  Med Tab  Media :23}     Lab Results   Component Value Date    GLUCOSE 114 (H) 10/25/2024    CALCIUM 8.9 10/25/2024     10/25/2024    K 4.4 10/25/2024    CO2 27.4 10/25/2024     10/25/2024    BUN 12 10/25/2024    CREATININE 1.08 10/25/2024    EGFR 70.2 10/25/2024    BCR 11.1 10/25/2024    ANIONGAP 6.6 10/25/2024     Lab Results   Component Value Date    WBC 7.05 09/10/2024    HGB 11.7 (L) 09/10/2024    HCT 36.2 (L) " 09/10/2024    MCV 98.9 (H) 09/10/2024     09/10/2024     Adult Transthoracic Echo Limited W/ Cont if Necessary Per Protocol (09/05/2024 15:17)  Holter Monitor - 72 Hour Up To 15 Days (09/09/2024 10:15)  ECG 12 Lead QT Measurement (09/09/2024 05:27)  ECG 12 Lead Other; Low bp (09/09/2024 00:28)           Assessment and Plan {CC Problem List  Visit Diagnosis  ROS  Review (Popup)  Health Maintenance  Quality  BestPractice  Medications  SmartSets  SnapShot Encounters  Media :23}   1. chronic HFrEF (heart failure with reduced ejection fraction)    Continue  lasix 40 mg bid, continue aldactone   Stable on jardiance, toprol, entresto, lasix  Heart failure education today including signs and symptoms, the role of the heart failure center, daily weights, low sodium diet (less than 1500 mg per day), and daily physical activity. Reviewed HF Zones with patient and family.  Patient to continue current medications as previously ordered.   2. Frequent PVCs  Stable on toprol   Will schedule cardiac pet   3.  Persistent atrial fibrillation  CHADS-VASc Risk Assessment               3 Total Score    1 Hypertension    2 Age >/= 75    Criteria that do not apply:    CHF    DM    PRIOR STROKE/TIA/THROMBO    Vascular Disease    Age 65-74    Sex: Female        Stable on toprol , amiodarone, eliquis     4. Castaneda  Cardiac pet to rule out ischemia     5. Other fatigue   Stable at baseline     Follow Up {Instructions Charge Capture  Follow-up Communications :23}   Follow-up to be scheduled same day as cardiac PET    Patient was given instructions and counseling regarding his condition or for health maintenance advice. Please see specific information pulled into the AVS if appropriate.  Patient was instructed to call the [facility] with any questions, concerns, or worsening symptoms.

## 2024-11-15 ENCOUNTER — APPOINTMENT (OUTPATIENT)
Dept: GENERAL RADIOLOGY | Facility: HOSPITAL | Age: 79
End: 2024-11-15
Payer: MEDICARE

## 2024-11-15 ENCOUNTER — HOSPITAL ENCOUNTER (EMERGENCY)
Facility: HOSPITAL | Age: 79
Discharge: HOME | End: 2024-11-16
Attending: EMERGENCY MEDICINE
Payer: MEDICARE

## 2024-11-15 DIAGNOSIS — R60.9 DEPENDENT EDEMA: ICD-10-CM

## 2024-11-15 DIAGNOSIS — I48.20 ATRIAL FIBRILLATION, CHRONIC: ICD-10-CM

## 2024-11-15 DIAGNOSIS — I50.9 CHRONIC CONGESTIVE HEART FAILURE, UNSPECIFIED HEART FAILURE TYPE: Primary | ICD-10-CM

## 2024-11-15 LAB
ALBUMIN SERPL-MCNC: 3.7 G/DL (ref 3.5–5.2)
ALBUMIN/GLOB SERPL: 1.2 G/DL
ALP SERPL-CCNC: 57 U/L (ref 39–117)
ALT SERPL W P-5'-P-CCNC: 20 U/L (ref 1–41)
ANION GAP SERPL CALCULATED.3IONS-SCNC: 9 MMOL/L (ref 5–15)
AST SERPL-CCNC: 17 U/L (ref 1–40)
BASOPHILS # BLD AUTO: 0.05 10*3/MM3 (ref 0–0.2)
BASOPHILS NFR BLD AUTO: 0.6 % (ref 0–1.5)
BILIRUB SERPL-MCNC: 0.5 MG/DL (ref 0–1.2)
BUN SERPL-MCNC: 19 MG/DL (ref 8–23)
BUN/CREAT SERPL: 17.1 (ref 7–25)
CALCIUM SPEC-SCNC: 9 MG/DL (ref 8.6–10.5)
CHLORIDE SERPL-SCNC: 101 MMOL/L (ref 98–107)
CO2 SERPL-SCNC: 28 MMOL/L (ref 22–29)
CREAT SERPL-MCNC: 1.11 MG/DL (ref 0.76–1.27)
DEPRECATED RDW RBC AUTO: 51.2 FL (ref 37–54)
EGFRCR SERPLBLD CKD-EPI 2021: 68 ML/MIN/1.73
EOSINOPHIL # BLD AUTO: 0.25 10*3/MM3 (ref 0–0.4)
EOSINOPHIL NFR BLD AUTO: 2.9 % (ref 0.3–6.2)
ERYTHROCYTE [DISTWIDTH] IN BLOOD BY AUTOMATED COUNT: 14.6 % (ref 12.3–15.4)
GLOBULIN UR ELPH-MCNC: 3 GM/DL
GLUCOSE SERPL-MCNC: 108 MG/DL (ref 65–99)
HCT VFR BLD AUTO: 39.9 % (ref 37.5–51)
HGB BLD-MCNC: 13.4 G/DL (ref 13–17.7)
IMM GRANULOCYTES # BLD AUTO: 0.02 10*3/MM3 (ref 0–0.05)
IMM GRANULOCYTES NFR BLD AUTO: 0.2 % (ref 0–0.5)
LYMPHOCYTES # BLD AUTO: 1.91 10*3/MM3 (ref 0.7–3.1)
LYMPHOCYTES NFR BLD AUTO: 22.2 % (ref 19.6–45.3)
MAGNESIUM SERPL-MCNC: 2.1 MG/DL (ref 1.6–2.4)
MCH RBC QN AUTO: 32.3 PG (ref 26.6–33)
MCHC RBC AUTO-ENTMCNC: 33.6 G/DL (ref 31.5–35.7)
MCV RBC AUTO: 96.1 FL (ref 79–97)
MONOCYTES # BLD AUTO: 0.66 10*3/MM3 (ref 0.1–0.9)
MONOCYTES NFR BLD AUTO: 7.7 % (ref 5–12)
NEUTROPHILS NFR BLD AUTO: 5.73 10*3/MM3 (ref 1.7–7)
NEUTROPHILS NFR BLD AUTO: 66.4 % (ref 42.7–76)
NRBC BLD AUTO-RTO: 0 /100 WBC (ref 0–0.2)
NT-PROBNP SERPL-MCNC: 723.6 PG/ML (ref 0–1800)
PLATELET # BLD AUTO: 205 10*3/MM3 (ref 140–450)
PMV BLD AUTO: 9.7 FL (ref 6–12)
POTASSIUM SERPL-SCNC: 3.8 MMOL/L (ref 3.5–5.2)
PROT SERPL-MCNC: 6.7 G/DL (ref 6–8.5)
RBC # BLD AUTO: 4.15 10*6/MM3 (ref 4.14–5.8)
SODIUM SERPL-SCNC: 138 MMOL/L (ref 136–145)
TROPONIN T SERPL HS-MCNC: 24 NG/L
TSH SERPL DL<=0.05 MIU/L-ACNC: 3.8 UIU/ML (ref 0.27–4.2)
WBC NRBC COR # BLD AUTO: 8.62 10*3/MM3 (ref 3.4–10.8)

## 2024-11-15 PROCEDURE — 85025 COMPLETE CBC W/AUTO DIFF WBC: CPT | Performed by: EMERGENCY MEDICINE

## 2024-11-15 PROCEDURE — 84443 ASSAY THYROID STIM HORMONE: CPT | Performed by: EMERGENCY MEDICINE

## 2024-11-15 PROCEDURE — 83735 ASSAY OF MAGNESIUM: CPT | Performed by: EMERGENCY MEDICINE

## 2024-11-15 PROCEDURE — 36415 COLL VENOUS BLD VENIPUNCTURE: CPT

## 2024-11-15 PROCEDURE — 99284 EMERGENCY DEPT VISIT MOD MDM: CPT

## 2024-11-15 PROCEDURE — 83880 ASSAY OF NATRIURETIC PEPTIDE: CPT | Performed by: EMERGENCY MEDICINE

## 2024-11-15 PROCEDURE — 80053 COMPREHEN METABOLIC PANEL: CPT | Performed by: EMERGENCY MEDICINE

## 2024-11-15 PROCEDURE — 71045 X-RAY EXAM CHEST 1 VIEW: CPT

## 2024-11-15 PROCEDURE — 93005 ELECTROCARDIOGRAM TRACING: CPT | Performed by: EMERGENCY MEDICINE

## 2024-11-15 PROCEDURE — 84484 ASSAY OF TROPONIN QUANT: CPT | Performed by: EMERGENCY MEDICINE

## 2024-11-15 RX ORDER — SODIUM CHLORIDE 0.9 % (FLUSH) 0.9 %
10 SYRINGE (ML) INJECTION AS NEEDED
Status: DISCONTINUED | OUTPATIENT
Start: 2024-11-15 | End: 2024-11-16 | Stop reason: HOSPADM

## 2024-11-16 VITALS
HEIGHT: 73 IN | BODY MASS INDEX: 41.75 KG/M2 | SYSTOLIC BLOOD PRESSURE: 105 MMHG | HEART RATE: 98 BPM | OXYGEN SATURATION: 95 % | RESPIRATION RATE: 20 BRPM | TEMPERATURE: 98.4 F | WEIGHT: 315 LBS | DIASTOLIC BLOOD PRESSURE: 79 MMHG

## 2024-11-16 LAB
GEN 5 2HR TROPONIN T REFLEX: 25 NG/L
TROPONIN T DELTA: 1 NG/L

## 2024-11-16 PROCEDURE — 84484 ASSAY OF TROPONIN QUANT: CPT | Performed by: EMERGENCY MEDICINE

## 2024-11-16 NOTE — ED PROVIDER NOTES
"Subjective   History of Present Illness  Is a 79-year-old male with past medical history of atrial fibrillation and CHF presenting to the emergency department for medical evaluation.  Patient has a complex medical course recently.  He was on a vacation over to [place] where he got very confused.  He was transferred to a hospital and was admitted for heart failure at the time.  Patient was transferred back from the facility in [facility] and required medical evaluation.  EMS transported the patient here at this time.  Right now, the patient states that he is feeling fine.  He denies any symptoms.  He is not having any confusion, no fevers or chills.  No headache or change in vision.  No focal weakness.  No chest pain or shortness of breath.  No abdominal pain or vomiting.  No swelling.    History provided by:  Patient and EMS personnel   used: No        Review of Systems   Constitutional:  Negative for chills and fever.   HENT:  Negative for congestion, ear pain and sore throat.    Eyes:  Negative for visual disturbance.   Respiratory:  Negative for shortness of breath.    Cardiovascular:  Negative for chest pain.   Gastrointestinal:  Negative for abdominal pain.   Genitourinary:  Negative for difficulty urinating.   Musculoskeletal:  Negative for arthralgias.   Skin:  Negative for rash.   Neurological:  Negative for dizziness, weakness and numbness.   Psychiatric/Behavioral:  Negative for agitation.        Past Medical History:   Diagnosis Date    A-fib     Arthritis     Hard to intubate     Hypertension     Sleep apnea     NOT USING CPAP       Allergies   Allergen Reactions    Amoxicillin Other (See Comments)     Pt states \"Made me very sick\"       Past Surgical History:   Procedure Laterality Date    BLADDER SURGERY  04/14/2016    STIMULATOR IMPLANTED, NEW ELECTRODE 08/2022    CARDIAC ABLATION  2013    AFIB    CARDIAC CATHETERIZATION N/A 9/5/2024    Procedure: CASE ABORT;  Surgeon: Yasir Canales " Aaron MCKEON IV, MD;  Location:  [facility];  Service: Cardiovascular;  Laterality: N/A;    CARPAL TUNNEL RELEASE Right 2002    CHOLECYSTECTOMY  2011    COLONOSCOPY      DENTAL PROCEDURE  1962    FINGER SURGERY Left 2000    FOREFINGER BONE TRIMMED    HEMORRHOIDECTOMY  1973    KNEE ARTHROSCOPY Left 2008    MOUTH SURGERY  2008    FOR SLEEP APNEA    SPINAL FUSION  2007    L3 LUMBAR SPINAL STENOSIS    TONSILLECTOMY  195    TOTAL KNEE ARTHROPLASTY Left 2009    TOTAL KNEE ARTHROPLASTY Right 2023    Procedure: TOTAL KNEE ARTHROPLASTY - RIGHT;  Surgeon: Leland Elizalde MD;  Location: [facility] OR;  Service: Orthopedics;  Laterality: Right;       Family History   Problem Relation Age of Onset    No Known Problems Mother     Alcohol abuse Father        Social History     Socioeconomic History    Marital status:    Tobacco Use    Smoking status: Former     Current packs/day: 0.00     Types: Cigarettes     Quit date:      Years since quittin.9     Passive exposure: Never    Smokeless tobacco: Never    Tobacco comments:     QUIT 40 YRS AGO PER PT   Vaping Use    Vaping status: Never Used    Passive vaping exposure: Yes   Substance and Sexual Activity    Alcohol use: Not Currently    Drug use: Never    Sexual activity: Defer           Objective   Physical Exam  Vitals and nursing note reviewed.   Constitutional:       General: He is not in acute distress.     Appearance: He is not ill-appearing or toxic-appearing.   HENT:      Mouth/Throat:      Pharynx: No posterior oropharyngeal erythema.   Eyes:      Extraocular Movements: Extraocular movements intact.      Pupils: Pupils are equal, round, and reactive to light.   Cardiovascular:      Rate and Rhythm: Regular rhythm. Tachycardia present.   Pulmonary:      Effort: Pulmonary effort is normal. No respiratory distress.   Abdominal:      General: Abdomen is flat. There is no distension.      Palpations: There is no mass.      Tenderness: There is  no abdominal tenderness. There is no guarding or rebound.   Musculoskeletal:         General: No deformity. Normal range of motion.      Right lower leg: Edema present.      Left lower leg: Edema present.   Neurological:      General: No focal deficit present.      Mental Status: He is alert.      Sensory: No sensory deficit.      Motor: No weakness.         ECG 12 Lead      Date/Time: 11/15/2024 10:21 PM    Performed by: Daryn Hernandez MD  Authorized by: Daryn Hernandez MD  Interpreted by ED physician  Comparison: compared with previous ECG   Similar to previous ECG  Rhythm: atrial fibrillation  Rate: normal  BPM: 98  QRS axis: normal  Conduction: incomplete LBBB  Other findings comments: Nonspecific ST changes  Clinical impression: non-specific ECG and dysrhythmia - atrial  Comments: Interpretation:  EKG was directly visualized by myself, interpretations as documented in hospital course.               ED Course  ED Course as of 11/16/24 0023   Fri Nov 15, 2024   2222 BP: 122/76 [JK]   2222 Temp: 98.4 °F (36.9 °C) [JK]   2222 Heart Rate: 101 [JK]   2222 Resp: 20 [JK]   2222 SpO2: 96 % [JK]   2222 Device (Oxygen Therapy): room air  Interpretation:  Patient's repeat vitals, telemetry tracing, and pulse oximetry tracing were directly viewed and interpreted by myself.   O2 sat 96% on room air, interpreted as normal  Telemetry rhythm strip revealed a rate of 101 bpm, interpreted as atrial fibrillation with rapid rate [JK]   2223 Patient's heart rate was slightly elevated on initial presentation, however it is currently normal [JK]   Sat Nov 16, 2024   0021 Comprehensive Metabolic Panel(!) [JK]   0021 High Sensitivity Troponin T(!) [JK]   0021 TSH [JK]   0021 Magnesium [JK]   0021 High Sensitivity Troponin T 2Hr [JK]   0021 BNP [JK]   0021 CBC & Differential  Interpretation:  Laboratory studies were reviewed and interpreted directly by myself.  CMP was unremarkable, CBC normal, BNP normal, TSH normal, UA  normal, troponin was marginal at 24 [JK]   0021 XR Chest 1 View  Interpretation:  Imaging was directly visualized by myself, per my interpretations, chest x-ray was unremarkable. [JK]   0021 On reevaluation, the patient has remained hemodynamically stable.  No requirement of supplemental oxygen.  His troponin was slightly elevated 24, however this is improved from his levels that he brought with him on his paperwork from [place]. [JK]   0022 I had a long discussion with the patient regarding possible admission for observation, however he is declining at this time.  He would like to go home.  He is asymptomatic.  I encouraged him to follow-up with his primary cardiologist.  Given strict return precautions.  Verbalized understanding. [JK]   0022 I had a discussion with the patient/family regarding diagnosis, diagnostic results, treatment plan, and medications. The patient/family indicated understanding of these instructions. I spent adequate time at the bedside prior to discharge necessary to discuss the aftercare instructions, giving patient education, providing explanations of the results of our evaluations/findings, and my decision making to assure that the patient/family understand the plan of care. Time was allotted to answer questions at that time and throughout the ED course. Patient is required to maintain timely follow up, as discussed. I also discussed the potential for the development of an acute emergent condition requiring further evaluation, return to the ER, admission, or even surgical intervention.  I encouraged the patient to return to the emergency department immediately for any concerns, worsening symptoms, new complaints, or if symptoms persist and they are unable to seek follow-up in a timely fashion. The patient/family expressed understanding and agreement with this plan    Shared decision making:   After full review of the patient's clinical presentation, review of any work-up including but not  limited to laboratory studies and radiology obtained, I had a discussion with the patient.  Treatment options were discussed as well as the risks, benefits and consequences.  I discussed all findings with the patient and family members if available.  During the discussion, treatment goals were understood by all as well as any misconceptions which were addressed with the patient.  Ample time was given for any questions they may have had.  They are in agreement with the treatment plan as well as final disposition. [JK]      ED Course User Index  [JK] Daryn Hernandez MD                                                       Medical Decision Making  This is a 79-year-old male with a history of A-fib and heart failure presenting to the emergency department for medical screening evaluation.  Patient had a complex course recently and was [place] for heart failure and some confusion.  At this time, the patient appears asymptomatic.  He does have some minor edema, however this appears to be chronic.  He is not in any respiratory distress.  Not requiring supplemental oxygen.  Overall, the patient is nontoxic.  Afebrile.  IV access was established and the patient.  Placed on continuous telemetry monitoring.  Given the patient's presentation, differential is broad and will require further evaluation.  Workup initiated.      Differential diagnosis: CAD, ACS, peptic ulcer disease, dyspepsia, pneumonia, bronchitis, atypical chest pain, angina, musculoskeletal pain, CHF      Amount and/or Complexity of Data Reviewed  Independent Historian: EMS     Details: I did speak with the inflight physician who brought the patient to the emergency department.  We did go over his medical course.  External Data Reviewed: labs, radiology, ECG and notes.     Details: External laboratories, imaging as well as notes were reviewed personally by myself.  All relevant studies were used to guide decision making.     Date of previous  record: 11/1/2024    Source of note: Cardiology    Summary:  Patient was seen and evaluated for routine visit.  I did review basic laboratory studies on file as well as a previous chest x-ray and EKG.  Records reviewed    Labs: ordered. Decision-making details documented in ED Course.  Radiology: ordered and independent interpretation performed. Decision-making details documented in ED Course.  ECG/medicine tests: ordered and independent interpretation performed. Decision-making details documented in ED Course.    Risk  Prescription drug management.        Final diagnoses:   Chronic congestive heart failure, unspecified heart failure type   Atrial fibrillation, chronic   Dependent edema       ED Disposition  ED Disposition       ED Disposition   Discharge    Condition   Stable    Comment   --               Mario Rowland MD  [address] [place] [address]  [phone]    Call in 1 day           Medication List        Changed      fexofenadine 180 MG tablet  Commonly known as: ALLEGRA  Take 1 tablet by mouth Daily.  What changed: when to take this     metoprolol succinate XL 50 MG 24 hr tablet  Commonly known as: TOPROL-XL  Take 1 tablet by mouth Daily.  What changed: how much to take                 Daryn Hernandez MD  11/16/24 0023

## 2024-11-17 LAB
QT INTERVAL: 412 MS
QTC INTERVAL: 525 MS

## 2024-12-06 ENCOUNTER — OFFICE VISIT (OUTPATIENT)
Dept: CARDIOLOGY | Facility: HOSPITAL | Age: 79
End: 2024-12-06
Payer: MEDICARE

## 2024-12-06 ENCOUNTER — HOSPITAL ENCOUNTER (OUTPATIENT)
Dept: CARDIOLOGY | Facility: HOSPITAL | Age: 79
Discharge: HOME | End: 2024-12-06
Admitting: NURSE PRACTITIONER
Payer: MEDICARE

## 2024-12-06 VITALS
RESPIRATION RATE: 16 BRPM | HEART RATE: 66 BPM | BODY MASS INDEX: 42.66 KG/M2 | WEIGHT: 315 LBS | SYSTOLIC BLOOD PRESSURE: 106 MMHG | DIASTOLIC BLOOD PRESSURE: 64 MMHG | HEIGHT: 72 IN | TEMPERATURE: 97.4 F | OXYGEN SATURATION: 93 %

## 2024-12-06 VITALS
HEIGHT: 73 IN | OXYGEN SATURATION: 94 % | DIASTOLIC BLOOD PRESSURE: 58 MMHG | WEIGHT: 315 LBS | HEART RATE: 69 BPM | SYSTOLIC BLOOD PRESSURE: 117 MMHG | BODY MASS INDEX: 41.75 KG/M2

## 2024-12-06 DIAGNOSIS — I48.19 PERSISTENT ATRIAL FIBRILLATION: ICD-10-CM

## 2024-12-06 DIAGNOSIS — I49.3 FREQUENT PVCS: ICD-10-CM

## 2024-12-06 DIAGNOSIS — R06.09 DOE (DYSPNEA ON EXERTION): ICD-10-CM

## 2024-12-06 DIAGNOSIS — R53.83 OTHER FATIGUE: ICD-10-CM

## 2024-12-06 DIAGNOSIS — I50.22 CHRONIC HFREF (HEART FAILURE WITH REDUCED EJECTION FRACTION): ICD-10-CM

## 2024-12-06 DIAGNOSIS — I50.22 CHRONIC HFREF (HEART FAILURE WITH REDUCED EJECTION FRACTION): Primary | ICD-10-CM

## 2024-12-06 PROCEDURE — 78431 MYOCRD IMG PET RST&STRS CT: CPT

## 2024-12-06 PROCEDURE — 93017 CV STRESS TEST TRACING ONLY: CPT

## 2024-12-06 PROCEDURE — 34310000005 RUBIDIUM CHLORIDE: Performed by: NURSE PRACTITIONER

## 2024-12-06 PROCEDURE — A9555 RB82 RUBIDIUM: HCPCS | Performed by: NURSE PRACTITIONER

## 2024-12-06 RX ORDER — REGADENOSON 0.08 MG/ML
0.4 INJECTION, SOLUTION INTRAVENOUS ONCE
Status: DISCONTINUED | OUTPATIENT
Start: 2024-12-06 | End: 2024-12-07 | Stop reason: HOSPADM

## 2024-12-06 RX ORDER — BUMETANIDE 2 MG/1
2 TABLET ORAL DAILY
Qty: 30 TABLET | Refills: 3 | Status: SHIPPED | OUTPATIENT
Start: 2024-12-06

## 2024-12-06 RX ORDER — POTASSIUM CHLORIDE 1500 MG/1
20 TABLET, EXTENDED RELEASE ORAL DAILY
Qty: 30 TABLET | Refills: 11 | Status: SHIPPED | OUTPATIENT
Start: 2024-12-06

## 2024-12-06 RX ADMIN — RUBIDIUM CHLORIDE RB-82 1 DOSE: 150 INJECTION, SOLUTION INTRAVENOUS at 12:41

## 2024-12-06 NOTE — PATIENT INSTRUCTIONS
Stop lasix and begin bumex 2 mg once a day   Begin potassium 20 meq once a day   Hf patch to be mailed to your home

## 2024-12-08 NOTE — PROGRESS NOTES
Chief Complaint  Congestive Heart Failure and Follow-up    Subjective    History of Present Illness {CC  Problem List  Visit  Diagnosis   Encounters  Notes  Medications  Labs  Result Review Imaging  Media :23}       History of Present Illness   79 year old male presents to the office today for ongoing evaluation of acute on chronic hfref.  Has a history of chronic HFrEF, persistent atrial fibrillation frequent PVCs.  Previously followed with Dr. Bear.  He was hospitalized at [facility] August 30, 2024 with respiratory distress requiring intubation.  Had an abnormal EKG with wide-complex tachycardia and it was unclear if it was true VT or not.  He had been on Tikosyn for A-fib and that was discontinued due to prolonged QT.  During hospital stay it was noted that he was having frequent PVCs and intermittent pauses and subsequently carvedilol was discontinued.  Troponins were elevated with possibility of type I versus type II non-STEMI.  Echo showed a reduced LVEF of 30% which was new compared to an echo at [facility].  He was initiated on GDMT.  A cath was attempted but aborted as patient refused and was unable to lie flat on the table due to chronic back problems .  Patient was treated with IV diuretics.  EP was consulted and patient wore a monitor which showed a 30% PVC burden.  He was initiated on amiodarone due to being a poor candidate for PVC ablation due to weight.  Weight at discharge was 331 pounds and currently 349 lbs.  Notes ongoing fatigue.  Was recently hospitalized in [place] while on a cruise. He did have an echo that showed an ef of 48%. Was scheduled for a pet today but was unable to lay flat for 10 minutes for imaging. Does report ongoing pedal edema , WYATT.   Objective     Vital Signs:   Vitals:    12/06/24 1439   BP: 106/64   BP Location: Left arm   Patient Position: Sitting   Cuff Size: Adult   Pulse: 66   Resp: 16   Temp: 97.4 °F (36.3 °C)   TempSrc: Temporal   SpO2:  "93%   Weight: (!) 158 kg (349 lb 6 oz)   Height: 182.9 cm (72.01\")     Body mass index is 47.37 kg/m².  Physical Exam  Vitals and nursing note reviewed.   Constitutional:       Appearance: Normal appearance. He is obese.   HENT:      Head: Normocephalic.   Eyes:      Pupils: Pupils are equal, round, and reactive to light.   Cardiovascular:      Rate and Rhythm: Normal rate and regular rhythm.      Pulses: Normal pulses.      Heart sounds: Normal heart sounds. No murmur heard.  Pulmonary:      Effort: Pulmonary effort is normal.      Breath sounds: No rales.   Abdominal:      General: Bowel sounds are normal. There is no distension.      Palpations: Abdomen is soft.   Musculoskeletal:         General: Normal range of motion.      Cervical back: Normal range of motion.      Right lower leg: Edema present.      Left lower leg: Edema present.   Skin:     General: Skin is warm and dry.      Capillary Refill: Capillary refill takes less than 2 seconds.   Neurological:      Mental Status: He is alert and oriented to person, place, and time.   Psychiatric:         Mood and Affect: Mood normal.         Thought Content: Thought content normal.              Result Review  Data Reviewed:{ Labs  Result Review  Imaging  Med Tab  Media :23}     Lab Results   Component Value Date    GLUCOSE 108 (H) 11/15/2024    CALCIUM 9.0 11/15/2024     11/15/2024    K 3.8 11/15/2024    CO2 28.0 11/15/2024     11/15/2024    BUN 19 11/15/2024    CREATININE 1.11 11/15/2024    EGFR 68.0 11/15/2024    BCR 17.1 11/15/2024    ANIONGAP 9.0 11/15/2024     Lab Results   Component Value Date    WBC 8.62 11/15/2024    HGB 13.4 11/15/2024    HCT 39.9 11/15/2024    MCV 96.1 11/15/2024     11/15/2024     Adult Transthoracic Echo Limited W/ Cont if Necessary Per Protocol (09/05/2024 15:17)  Holter Monitor - 72 Hour Up To 15 Days (09/09/2024 10:15)  ECG 12 Lead QT Measurement (09/09/2024 05:27)  ECG 12 Lead Other; Low bp (09/09/2024 " 00:28)           Assessment and Plan {CC Problem List  Visit Diagnosis  ROS  Review (Popup)  Health Maintenance  Quality  BestPractice  Medications  SmartSets  SnapShot Encounters  Media :23}   1. chronic HFrEF (heart failure with reduced ejection fraction)  Stop lasix and begin bumex 2mg once a day    Stable on jardiance, toprol, entresto  Heart failure education today including signs and symptoms, the role of the heart failure center, daily weights, low sodium diet (less than 1500 mg per day), and daily physical activity. Reviewed HF Zones with patient and family.  Patient to continue current medications as previously ordered.   HF patch to be ordered   2. Frequent PVCs  Stable on toprol   Will schedule cardiac pet   3.  Persistent atrial fibrillation  CHADS-VASc Risk Assessment               3 Total Score    1 Hypertension    2 Age >/= 75    Criteria that do not apply:    CHF    DM    PRIOR STROKE/TIA/THROMBO    Vascular Disease    Age 65-74    Sex: Female        Stable on toprol , amiodarone, eliquis     4. Castaneda  Cardiac pet to rule out ischemia         Follow Up {Instructions Charge Capture  Follow-up Communications :23}   Follow-up 1 week     Patient was given instructions and counseling regarding his condition or for health maintenance advice. Please see specific information pulled into the AVS if appropriate.  Patient was instructed to call the [facility] with any questions, concerns, or worsening symptoms.

## 2024-12-09 ENCOUNTER — TELEPHONE (OUTPATIENT)
Dept: CARDIOLOGY | Facility: HOSPITAL | Age: 79
End: 2024-12-09
Payer: MEDICARE

## 2024-12-09 DIAGNOSIS — I49.3 FREQUENT PVCS: Primary | ICD-10-CM

## 2024-12-09 DIAGNOSIS — R06.09 DOE (DYSPNEA ON EXERTION): ICD-10-CM

## 2024-12-09 DIAGNOSIS — I25.10 CORONARY ARTERY CALCIFICATION: ICD-10-CM

## 2024-12-09 DIAGNOSIS — I50.22 CHRONIC HFREF (HEART FAILURE WITH REDUCED EJECTION FRACTION): ICD-10-CM

## 2024-12-09 LAB
BH CV REST NUCLEAR ISOTOPE DOSE: 26.9 MCI
BH CV STRESS DOSE REGADENOSON STAGE 1: 0.4
BH CV STRESS DURATION MIN STAGE 1: 1
BH CV STRESS DURATION MIN STAGE 2: 1
BH CV STRESS DURATION MIN STAGE 3: 1
BH CV STRESS DURATION MIN STAGE 4: 1
BH CV STRESS DURATION SEC STAGE 1: 0
BH CV STRESS DURATION SEC STAGE 2: 0
BH CV STRESS DURATION SEC STAGE 3: 0
BH CV STRESS DURATION SEC STAGE 4: 0
BH CV STRESS PROTOCOL 1: NORMAL
BH CV STRESS STAGE 1: 1
BH CV STRESS STAGE 2: 2
BH CV STRESS STAGE 3: 3
BH CV STRESS STAGE 4: 4
LV EF NUC BP: 48 %
MAXIMAL PREDICTED HEART RATE: 142 BPM
STRESS BASELINE BP: NORMAL MMHG
STRESS BASELINE HR: 71 BPM
STRESS O2 SAT REST: 94 %
STRESS POST ESTIMATED WORKLOAD: 1 METS
STRESS POST EXERCISE DUR MIN: 4 MIN
STRESS POST EXERCISE DUR SEC: 0 SEC
STRESS TARGET HR: 121 BPM

## 2024-12-09 NOTE — TELEPHONE ENCOUNTER
Reviewed PET images and Dr. Canales's recommendation to proceed with left heart cath.  Left heart cath to be scheduled in the near future.

## 2024-12-13 ENCOUNTER — OFFICE VISIT (OUTPATIENT)
Dept: CARDIOLOGY | Facility: HOSPITAL | Age: 79
End: 2024-12-13
Payer: MEDICARE

## 2024-12-13 VITALS
WEIGHT: 315 LBS | BODY MASS INDEX: 42.66 KG/M2 | HEART RATE: 57 BPM | HEIGHT: 72 IN | SYSTOLIC BLOOD PRESSURE: 135 MMHG | DIASTOLIC BLOOD PRESSURE: 61 MMHG | RESPIRATION RATE: 18 BRPM | OXYGEN SATURATION: 96 %

## 2024-12-13 DIAGNOSIS — I49.3 FREQUENT PVCS: ICD-10-CM

## 2024-12-13 DIAGNOSIS — R06.09 DOE (DYSPNEA ON EXERTION): ICD-10-CM

## 2024-12-13 DIAGNOSIS — I50.22 CHRONIC HFREF (HEART FAILURE WITH REDUCED EJECTION FRACTION): Primary | ICD-10-CM

## 2024-12-13 DIAGNOSIS — I48.19 PERSISTENT ATRIAL FIBRILLATION: ICD-10-CM

## 2024-12-13 RX ORDER — ARIPIPRAZOLE 5 MG/1
5 TABLET ORAL DAILY
COMMUNITY

## 2024-12-15 ENCOUNTER — PREP FOR SURGERY (OUTPATIENT)
Dept: OTHER | Facility: HOSPITAL | Age: 79
End: 2024-12-15
Payer: MEDICARE

## 2024-12-15 DIAGNOSIS — I50.21 ACUTE SYSTOLIC CHF (CONGESTIVE HEART FAILURE): Primary | ICD-10-CM

## 2024-12-15 RX ORDER — ASPIRIN 81 MG/1
324 TABLET, CHEWABLE ORAL ONCE
OUTPATIENT
Start: 2024-12-15 | End: 2024-12-15

## 2024-12-15 RX ORDER — SODIUM CHLORIDE 0.9 % (FLUSH) 0.9 %
10 SYRINGE (ML) INJECTION AS NEEDED
OUTPATIENT
Start: 2024-12-15

## 2024-12-15 RX ORDER — NITROGLYCERIN 0.4 MG/1
0.4 TABLET SUBLINGUAL
OUTPATIENT
Start: 2024-12-15

## 2024-12-15 RX ORDER — SODIUM CHLORIDE 0.9 % (FLUSH) 0.9 %
10 SYRINGE (ML) INJECTION EVERY 12 HOURS SCHEDULED
OUTPATIENT
Start: 2024-12-15

## 2024-12-15 RX ORDER — ASPIRIN 81 MG/1
81 TABLET ORAL DAILY
OUTPATIENT
Start: 2024-12-16

## 2024-12-15 RX ORDER — SODIUM CHLORIDE 9 MG/ML
40 INJECTION, SOLUTION INTRAVENOUS AS NEEDED
OUTPATIENT
Start: 2024-12-15

## 2024-12-15 RX ORDER — ACETAMINOPHEN 325 MG/1
650 TABLET ORAL EVERY 4 HOURS PRN
OUTPATIENT
Start: 2024-12-15

## 2024-12-16 RX ORDER — FUROSEMIDE 40 MG/1
40 TABLET ORAL DAILY
Qty: 30 TABLET | Refills: 1 | OUTPATIENT
Start: 2024-12-16

## 2024-12-23 NOTE — PROGRESS NOTES
Chief Complaint  Congestive Heart Failure    Subjective    History of Present Illness {  Problem List  Visit  Diagnosis   Encounters  Notes  Medications  Labs  Result Review Imaging  Media :23}       History of Present Illness   79 year old male presents to the office today for ongoing evaluation of acute on chronic hfref.  Has a history of chronic HFrEF, persistent atrial fibrillation frequent PVCs.  Previously followed with Dr. Bear.  He was hospitalized at [facility] August 30, 2024 with respiratory distress requiring intubation.  Had an abnormal EKG with wide-complex tachycardia and it was unclear if it was true VT or not.  He had been on Tikosyn for A-fib and that was discontinued due to prolonged QT.  During hospital stay it was noted that he was having frequent PVCs and intermittent pauses and subsequently carvedilol was discontinued.  Troponins were elevated with possibility of type I versus type II non-STEMI.  Echo showed a reduced LVEF of 30% which was new compared to an echo at [facility].  He was initiated on GDMT.  A cath was attempted but aborted as patient refused and was unable to lie flat on the table due to chronic back problems .  Patient was treated with IV diuretics.  EP was consulted and patient wore a monitor which showed a 30% PVC burden.  He was initiated on amiodarone due to being a poor candidate for PVC ablation due to weight.  Weight at discharge was 331 pounds and currently 349 lbs.  Notes ongoing fatigue.  Was recently hospitalized in [place] while on a cruise. He did have an echo that showed an ef of 48%. Was scheduled for a pet today but was unable to lay flat for 10 minutes for imaging. Does report ongoing WYATT but notes pedal edema is at baseline.  Plan for [facility] in near future with Dr Canales.  Objective     Vital Signs:   Vitals:    12/13/24 1518   BP: 135/61   BP Location: Left arm   Patient Position: Sitting   Cuff Size: Large Adult   Pulse: 57  "  Resp: 18   SpO2: 96%   Weight: (!) 159 kg (350 lb 12.8 oz)   Height: 182.9 cm (72\")     Body mass index is 47.58 kg/m².  Physical Exam  Vitals and nursing note reviewed.   Constitutional:       Appearance: Normal appearance. He is obese.   HENT:      Head: Normocephalic.   Eyes:      Pupils: Pupils are equal, round, and reactive to light.   Cardiovascular:      Rate and Rhythm: Normal rate and regular rhythm.      Pulses: Normal pulses.      Heart sounds: Normal heart sounds. No murmur heard.  Pulmonary:      Effort: Pulmonary effort is normal.      Breath sounds: No rales.   Abdominal:      General: Bowel sounds are normal. There is no distension.      Palpations: Abdomen is soft.   Musculoskeletal:         General: Normal range of motion.      Cervical back: Normal range of motion.      Right lower leg: Edema present.      Left lower leg: Edema present.   Skin:     General: Skin is warm and dry.      Capillary Refill: Capillary refill takes less than 2 seconds.   Neurological:      Mental Status: He is alert and oriented to person, place, and time.   Psychiatric:         Mood and Affect: Mood normal.         Thought Content: Thought content normal.              Result Review  Data Reviewed:{ Labs  Result Review  Imaging  Med Tab  Media :23}     Lab Results   Component Value Date    GLUCOSE 108 (H) 11/15/2024    CALCIUM 9.0 11/15/2024     11/15/2024    K 3.8 11/15/2024    CO2 28.0 11/15/2024     11/15/2024    BUN 19 11/15/2024    CREATININE 1.11 11/15/2024    EGFR 68.0 11/15/2024    BCR 17.1 11/15/2024    ANIONGAP 9.0 11/15/2024     Lab Results   Component Value Date    WBC 8.62 11/15/2024    HGB 13.4 11/15/2024    HCT 39.9 11/15/2024    MCV 96.1 11/15/2024     11/15/2024     Adult Transthoracic Echo Limited W/ Cont if Necessary Per Protocol (09/05/2024 15:17)  Holter Monitor - 72 Hour Up To 15 Days (09/09/2024 10:15)  ECG 12 Lead QT Measurement (09/09/2024 05:27)  ECG 12 Lead Other; Low bp " (09/09/2024 00:28)           Assessment and Plan {CC Problem List  Visit Diagnosis  ROS  Review (Popup)  Health Maintenance  Quality  BestPractice  Medications  SmartSets  SnapShot Encounters  Media :23}   1. chronic HFrEF (heart failure with reduced ejection fraction)  Stable on  bumex 2mg once a day    Stable on jardiance, toprol, entresto  Heart failure education today including signs and symptoms, the role of the heart failure center, daily weights, low sodium diet (less than 1500 mg per day), and daily physical activity. Reviewed HF Zones with patient and family.  Patient to continue current medications as previously ordered.   HF patch to be ordered   2. Frequent PVCs  Stable on toprol   [facility] in near future with Dr Canales   3.  Persistent atrial fibrillation  CHADS-VASc Risk Assessment               3 Total Score    1 Hypertension    2 Age >/= 75    Criteria that do not apply:    CHF    DM    PRIOR STROKE/TIA/THROMBO    Vascular Disease    Age 65-74    Sex: Female        Stable on toprol , amiodarone, eliquis     4. Castaneda  [facility] with Dr Canales in near future         Follow Up {Instructions Charge Capture  Follow-up Communications :23}       Patient was given instructions and counseling regarding his condition or for health maintenance advice. Please see specific information pulled into the AVS if appropriate.  Patient was instructed to call the [facility] with any questions, concerns, or worsening symptoms.

## 2024-12-23 NOTE — H&P (VIEW-ONLY)
Chief Complaint  Congestive Heart Failure    Subjective    History of Present Illness {  Problem List  Visit  Diagnosis   Encounters  Notes  Medications  Labs  Result Review Imaging  Media :23}       History of Present Illness   79 year old male presents to the office today for ongoing evaluation of acute on chronic hfref.  Has a history of chronic HFrEF, persistent atrial fibrillation frequent PVCs.  Previously followed with Dr. Bear.  He was hospitalized at [facility] August 30, 2024 with respiratory distress requiring intubation.  Had an abnormal EKG with wide-complex tachycardia and it was unclear if it was true VT or not.  He had been on Tikosyn for A-fib and that was discontinued due to prolonged QT.  During hospital stay it was noted that he was having frequent PVCs and intermittent pauses and subsequently carvedilol was discontinued.  Troponins were elevated with possibility of type I versus type II non-STEMI.  Echo showed a reduced LVEF of 30% which was new compared to an echo at [facility].  He was initiated on GDMT.  A cath was attempted but aborted as patient refused and was unable to lie flat on the table due to chronic back problems .  Patient was treated with IV diuretics.  EP was consulted and patient wore a monitor which showed a 30% PVC burden.  He was initiated on amiodarone due to being a poor candidate for PVC ablation due to weight.  Weight at discharge was 331 pounds and currently 349 lbs.  Notes ongoing fatigue.  Was recently hospitalized in [place] while on a cruise. He did have an echo that showed an ef of 48%. Was scheduled for a pet today but was unable to lay flat for 10 minutes for imaging. Does report ongoing WYATT but notes pedal edema is at baseline.  Plan for [facility] in near future with Dr Canales.  Objective     Vital Signs:   Vitals:    12/13/24 1518   BP: 135/61   BP Location: Left arm   Patient Position: Sitting   Cuff Size: Large Adult   Pulse: 57  "  Resp: 18   SpO2: 96%   Weight: (!) 159 kg (350 lb 12.8 oz)   Height: 182.9 cm (72\")     Body mass index is 47.58 kg/m².  Physical Exam  Vitals and nursing note reviewed.   Constitutional:       Appearance: Normal appearance. He is obese.   HENT:      Head: Normocephalic.   Eyes:      Pupils: Pupils are equal, round, and reactive to light.   Cardiovascular:      Rate and Rhythm: Normal rate and regular rhythm.      Pulses: Normal pulses.      Heart sounds: Normal heart sounds. No murmur heard.  Pulmonary:      Effort: Pulmonary effort is normal.      Breath sounds: No rales.   Abdominal:      General: Bowel sounds are normal. There is no distension.      Palpations: Abdomen is soft.   Musculoskeletal:         General: Normal range of motion.      Cervical back: Normal range of motion.      Right lower leg: Edema present.      Left lower leg: Edema present.   Skin:     General: Skin is warm and dry.      Capillary Refill: Capillary refill takes less than 2 seconds.   Neurological:      Mental Status: He is alert and oriented to person, place, and time.   Psychiatric:         Mood and Affect: Mood normal.         Thought Content: Thought content normal.              Result Review  Data Reviewed:{ Labs  Result Review  Imaging  Med Tab  Media :23}     Lab Results   Component Value Date    GLUCOSE 108 (H) 11/15/2024    CALCIUM 9.0 11/15/2024     11/15/2024    K 3.8 11/15/2024    CO2 28.0 11/15/2024     11/15/2024    BUN 19 11/15/2024    CREATININE 1.11 11/15/2024    EGFR 68.0 11/15/2024    BCR 17.1 11/15/2024    ANIONGAP 9.0 11/15/2024     Lab Results   Component Value Date    WBC 8.62 11/15/2024    HGB 13.4 11/15/2024    HCT 39.9 11/15/2024    MCV 96.1 11/15/2024     11/15/2024     Adult Transthoracic Echo Limited W/ Cont if Necessary Per Protocol (09/05/2024 15:17)  Holter Monitor - 72 Hour Up To 15 Days (09/09/2024 10:15)  ECG 12 Lead QT Measurement (09/09/2024 05:27)  ECG 12 Lead Other; Low bp " (09/09/2024 00:28)           Assessment and Plan {CC Problem List  Visit Diagnosis  ROS  Review (Popup)  Health Maintenance  Quality  BestPractice  Medications  SmartSets  SnapShot Encounters  Media :23}   1. chronic HFrEF (heart failure with reduced ejection fraction)  Stable on  bumex 2mg once a day    Stable on jardiance, toprol, entresto  Heart failure education today including signs and symptoms, the role of the heart failure center, daily weights, low sodium diet (less than 1500 mg per day), and daily physical activity. Reviewed HF Zones with patient and family.  Patient to continue current medications as previously ordered.   HF patch to be ordered   2. Frequent PVCs  Stable on toprol   [facility] in near future with Dr Canales   3.  Persistent atrial fibrillation  CHADS-VASc Risk Assessment               3 Total Score    1 Hypertension    2 Age >/= 75    Criteria that do not apply:    CHF    DM    PRIOR STROKE/TIA/THROMBO    Vascular Disease    Age 65-74    Sex: Female        Stable on toprol , amiodarone, eliquis     4. Castaneda  [facility] with Dr Canales in near future         Follow Up {Instructions Charge Capture  Follow-up Communications :23}       Patient was given instructions and counseling regarding his condition or for health maintenance advice. Please see specific information pulled into the AVS if appropriate.  Patient was instructed to call the [facility] with any questions, concerns, or worsening symptoms.

## 2024-12-30 ENCOUNTER — ANESTHESIA EVENT (OUTPATIENT)
Dept: CARDIOLOGY | Facility: HOSPITAL | Age: 79
End: 2024-12-30
Payer: MEDICARE

## 2024-12-30 ENCOUNTER — HOSPITAL ENCOUNTER (OUTPATIENT)
Facility: HOSPITAL | Age: 79
Setting detail: HOSPITAL OUTPATIENT SURGERY
Discharge: HOME | End: 2024-12-30
Attending: INTERNAL MEDICINE | Admitting: INTERNAL MEDICINE
Payer: MEDICARE

## 2024-12-30 ENCOUNTER — ANESTHESIA (OUTPATIENT)
Dept: CARDIOLOGY | Facility: HOSPITAL | Age: 79
End: 2024-12-30
Payer: MEDICARE

## 2024-12-30 VITALS
BODY MASS INDEX: 42.66 KG/M2 | HEART RATE: 86 BPM | WEIGHT: 315 LBS | SYSTOLIC BLOOD PRESSURE: 126 MMHG | HEIGHT: 72 IN | TEMPERATURE: 97.8 F | RESPIRATION RATE: 16 BRPM | DIASTOLIC BLOOD PRESSURE: 78 MMHG | OXYGEN SATURATION: 92 %

## 2024-12-30 DIAGNOSIS — I49.3 FREQUENT PVCS: ICD-10-CM

## 2024-12-30 DIAGNOSIS — R06.09 DOE (DYSPNEA ON EXERTION): ICD-10-CM

## 2024-12-30 DIAGNOSIS — I25.10 CORONARY ARTERY CALCIFICATION: ICD-10-CM

## 2024-12-30 DIAGNOSIS — I50.22 CHRONIC HFREF (HEART FAILURE WITH REDUCED EJECTION FRACTION): Primary | ICD-10-CM

## 2024-12-30 DIAGNOSIS — E78.5 HYPERLIPIDEMIA LDL GOAL <50: ICD-10-CM

## 2024-12-30 DIAGNOSIS — I50.21 ACUTE SYSTOLIC CHF (CONGESTIVE HEART FAILURE): ICD-10-CM

## 2024-12-30 DIAGNOSIS — I50.20 HFREF (HEART FAILURE WITH REDUCED EJECTION FRACTION): ICD-10-CM

## 2024-12-30 PROBLEM — I21.4 NSTEMI (NON-ST ELEVATED MYOCARDIAL INFARCTION): Status: RESOLVED | Noted: 2024-08-31 | Resolved: 2024-12-30

## 2024-12-30 PROBLEM — A41.9 SEPSIS DUE TO PNEUMONIA: Status: RESOLVED | Noted: 2024-08-30 | Resolved: 2024-12-30

## 2024-12-30 PROBLEM — J18.9 SEPSIS DUE TO PNEUMONIA: Status: RESOLVED | Noted: 2024-08-30 | Resolved: 2024-12-30

## 2024-12-30 PROBLEM — I21.A1 TYPE 2 MYOCARDIAL INFARCTION: Status: RESOLVED | Noted: 2024-09-09 | Resolved: 2024-12-30

## 2024-12-30 PROBLEM — R94.31 PROLONGED Q-T INTERVAL ON ECG: Status: RESOLVED | Noted: 2024-08-31 | Resolved: 2024-12-30

## 2024-12-30 LAB
ALBUMIN SERPL-MCNC: 4.1 G/DL (ref 3.5–5.2)
ALBUMIN/GLOB SERPL: 1.6 G/DL
ALP SERPL-CCNC: 47 U/L (ref 39–117)
ALT SERPL W P-5'-P-CCNC: 15 U/L (ref 1–41)
ANION GAP SERPL CALCULATED.3IONS-SCNC: 10 MMOL/L (ref 5–15)
AST SERPL-CCNC: 17 U/L (ref 1–40)
BASOPHILS # BLD AUTO: 0.02 10*3/MM3 (ref 0–0.2)
BASOPHILS NFR BLD AUTO: 0.3 % (ref 0–1.5)
BILIRUB SERPL-MCNC: 0.8 MG/DL (ref 0–1.2)
BUN SERPL-MCNC: 21 MG/DL (ref 8–23)
BUN/CREAT SERPL: 19.1 (ref 7–25)
CALCIUM SPEC-SCNC: 9.1 MG/DL (ref 8.6–10.5)
CHLORIDE SERPL-SCNC: 99 MMOL/L (ref 98–107)
CHOLEST SERPL-MCNC: 125 MG/DL (ref 0–200)
CO2 SERPL-SCNC: 28 MMOL/L (ref 22–29)
CREAT SERPL-MCNC: 1.1 MG/DL (ref 0.76–1.27)
DEPRECATED RDW RBC AUTO: 52.7 FL (ref 37–54)
EGFRCR SERPLBLD CKD-EPI 2021: 68.7 ML/MIN/1.73
EOSINOPHIL # BLD AUTO: 0.15 10*3/MM3 (ref 0–0.4)
EOSINOPHIL NFR BLD AUTO: 2.5 % (ref 0.3–6.2)
ERYTHROCYTE [DISTWIDTH] IN BLOOD BY AUTOMATED COUNT: 15 % (ref 12.3–15.4)
GLOBULIN UR ELPH-MCNC: 2.6 GM/DL
GLUCOSE SERPL-MCNC: 110 MG/DL (ref 65–99)
HBA1C MFR BLD: 5 % (ref 4.8–5.6)
HCT VFR BLD AUTO: 39.1 % (ref 37.5–51)
HDLC SERPL-MCNC: 38 MG/DL (ref 40–60)
HGB BLD-MCNC: 13.3 G/DL (ref 13–17.7)
IMM GRANULOCYTES # BLD AUTO: 0.04 10*3/MM3 (ref 0–0.05)
IMM GRANULOCYTES NFR BLD AUTO: 0.7 % (ref 0–0.5)
LDLC SERPL CALC-MCNC: 67 MG/DL (ref 0–100)
LDLC/HDLC SERPL: 1.71 {RATIO}
LV EF ANGIOGRAM EST: 35 %
LYMPHOCYTES # BLD AUTO: 1.1 10*3/MM3 (ref 0.7–3.1)
LYMPHOCYTES NFR BLD AUTO: 18 % (ref 19.6–45.3)
MCH RBC QN AUTO: 32.5 PG (ref 26.6–33)
MCHC RBC AUTO-ENTMCNC: 34 G/DL (ref 31.5–35.7)
MCV RBC AUTO: 95.6 FL (ref 79–97)
MONOCYTES # BLD AUTO: 0.4 10*3/MM3 (ref 0.1–0.9)
MONOCYTES NFR BLD AUTO: 6.5 % (ref 5–12)
NEUTROPHILS NFR BLD AUTO: 4.41 10*3/MM3 (ref 1.7–7)
NEUTROPHILS NFR BLD AUTO: 72 % (ref 42.7–76)
NRBC BLD AUTO-RTO: 0 /100 WBC (ref 0–0.2)
PLATELET # BLD AUTO: 136 10*3/MM3 (ref 140–450)
PMV BLD AUTO: 9.7 FL (ref 6–12)
POTASSIUM SERPL-SCNC: 4.5 MMOL/L (ref 3.5–5.2)
PROT SERPL-MCNC: 6.7 G/DL (ref 6–8.5)
RBC # BLD AUTO: 4.09 10*6/MM3 (ref 4.14–5.8)
SODIUM SERPL-SCNC: 137 MMOL/L (ref 136–145)
TRIGL SERPL-MCNC: 110 MG/DL (ref 0–150)
VLDLC SERPL-MCNC: 20 MG/DL (ref 5–40)
WBC NRBC COR # BLD AUTO: 6.12 10*3/MM3 (ref 3.4–10.8)

## 2024-12-30 PROCEDURE — 25010000002 DEXAMETHASONE PER 1 MG: Performed by: NURSE ANESTHETIST, CERTIFIED REGISTERED

## 2024-12-30 PROCEDURE — 25810000003 SODIUM CHLORIDE 0.9 % SOLUTION: Performed by: NURSE ANESTHETIST, CERTIFIED REGISTERED

## 2024-12-30 PROCEDURE — 25010000002 FENTANYL CITRATE (PF) 50 MCG/ML SOLUTION

## 2024-12-30 PROCEDURE — C1769 GUIDE WIRE: HCPCS | Performed by: INTERNAL MEDICINE

## 2024-12-30 PROCEDURE — C1894 INTRO/SHEATH, NON-LASER: HCPCS | Performed by: INTERNAL MEDICINE

## 2024-12-30 PROCEDURE — 83036 HEMOGLOBIN GLYCOSYLATED A1C: CPT | Performed by: NURSE PRACTITIONER

## 2024-12-30 PROCEDURE — 80053 COMPREHEN METABOLIC PANEL: CPT | Performed by: NURSE PRACTITIONER

## 2024-12-30 PROCEDURE — 25010000002 FENTANYL CITRATE (PF) 50 MCG/ML SOLUTION: Performed by: NURSE ANESTHETIST, CERTIFIED REGISTERED

## 2024-12-30 PROCEDURE — 25010000002 SUGAMMADEX 200 MG/2ML SOLUTION: Performed by: NURSE ANESTHETIST, CERTIFIED REGISTERED

## 2024-12-30 PROCEDURE — 36415 COLL VENOUS BLD VENIPUNCTURE: CPT

## 2024-12-30 PROCEDURE — 85025 COMPLETE CBC W/AUTO DIFF WBC: CPT | Performed by: NURSE PRACTITIONER

## 2024-12-30 PROCEDURE — 25010000002 LIDOCAINE PF 1% 1 % SOLUTION: Performed by: INTERNAL MEDICINE

## 2024-12-30 PROCEDURE — 25510000001 IOPAMIDOL PER 1 ML: Performed by: INTERNAL MEDICINE

## 2024-12-30 PROCEDURE — 25010000002 HEPARIN (PORCINE) PER 1000 UNITS: Performed by: INTERNAL MEDICINE

## 2024-12-30 PROCEDURE — 25010000002 ONDANSETRON PER 1 MG: Performed by: NURSE ANESTHETIST, CERTIFIED REGISTERED

## 2024-12-30 PROCEDURE — 80061 LIPID PANEL: CPT | Performed by: NURSE PRACTITIONER

## 2024-12-30 PROCEDURE — 85347 COAGULATION TIME ACTIVATED: CPT

## 2024-12-30 PROCEDURE — 93799 UNLISTED CV SVC/PROCEDURE: CPT | Performed by: INTERNAL MEDICINE

## 2024-12-30 PROCEDURE — C1887 CATHETER, GUIDING: HCPCS | Performed by: INTERNAL MEDICINE

## 2024-12-30 PROCEDURE — 93458 L HRT ARTERY/VENTRICLE ANGIO: CPT | Performed by: INTERNAL MEDICINE

## 2024-12-30 RX ORDER — IPRATROPIUM BROMIDE AND ALBUTEROL SULFATE 2.5; .5 MG/3ML; MG/3ML
3 SOLUTION RESPIRATORY (INHALATION) ONCE AS NEEDED
Status: DISCONTINUED | OUTPATIENT
Start: 2024-12-30 | End: 2024-12-30 | Stop reason: HOSPADM

## 2024-12-30 RX ORDER — SODIUM CHLORIDE, SODIUM LACTATE, POTASSIUM CHLORIDE, CALCIUM CHLORIDE 600; 310; 30; 20 MG/100ML; MG/100ML; MG/100ML; MG/100ML
9 INJECTION, SOLUTION INTRAVENOUS CONTINUOUS
Status: DISCONTINUED | OUTPATIENT
Start: 2024-12-30 | End: 2024-12-30 | Stop reason: HOSPADM

## 2024-12-30 RX ORDER — OXYCODONE AND ACETAMINOPHEN 10; 325 MG/1; MG/1
TABLET ORAL
Status: COMPLETED
Start: 2024-12-30 | End: 2024-12-30

## 2024-12-30 RX ORDER — SODIUM CHLORIDE 0.9 % (FLUSH) 0.9 %
10 SYRINGE (ML) INJECTION AS NEEDED
Status: DISCONTINUED | OUTPATIENT
Start: 2024-12-30 | End: 2024-12-30 | Stop reason: HOSPADM

## 2024-12-30 RX ORDER — SODIUM CHLORIDE 0.9 % (FLUSH) 0.9 %
3 SYRINGE (ML) INJECTION EVERY 12 HOURS SCHEDULED
Status: DISCONTINUED | OUTPATIENT
Start: 2024-12-30 | End: 2024-12-30 | Stop reason: HOSPADM

## 2024-12-30 RX ORDER — NITROGLYCERIN 0.4 MG/1
0.4 TABLET SUBLINGUAL
Status: DISCONTINUED | OUTPATIENT
Start: 2024-12-30 | End: 2024-12-30 | Stop reason: HOSPADM

## 2024-12-30 RX ORDER — ATORVASTATIN CALCIUM 40 MG/1
40 TABLET, FILM COATED ORAL DAILY
Qty: 90 TABLET | Refills: 3 | Status: SHIPPED | OUTPATIENT
Start: 2024-12-30

## 2024-12-30 RX ORDER — ETOMIDATE 2 MG/ML
INJECTION INTRAVENOUS AS NEEDED
Status: DISCONTINUED | OUTPATIENT
Start: 2024-12-30 | End: 2024-12-30 | Stop reason: SURG

## 2024-12-30 RX ORDER — MEPERIDINE HYDROCHLORIDE 25 MG/ML
12.5 INJECTION INTRAMUSCULAR; INTRAVENOUS; SUBCUTANEOUS
Status: DISCONTINUED | OUTPATIENT
Start: 2024-12-30 | End: 2024-12-30 | Stop reason: HOSPADM

## 2024-12-30 RX ORDER — SODIUM CHLORIDE 0.9 % (FLUSH) 0.9 %
10 SYRINGE (ML) INJECTION EVERY 12 HOURS SCHEDULED
Status: DISCONTINUED | OUTPATIENT
Start: 2024-12-30 | End: 2024-12-30 | Stop reason: HOSPADM

## 2024-12-30 RX ORDER — HYDRALAZINE HYDROCHLORIDE 20 MG/ML
5 INJECTION INTRAMUSCULAR; INTRAVENOUS
Status: DISCONTINUED | OUTPATIENT
Start: 2024-12-30 | End: 2024-12-30 | Stop reason: HOSPADM

## 2024-12-30 RX ORDER — HEPARIN SODIUM 1000 [USP'U]/ML
INJECTION, SOLUTION INTRAVENOUS; SUBCUTANEOUS
Status: DISCONTINUED | OUTPATIENT
Start: 2024-12-30 | End: 2024-12-30 | Stop reason: HOSPADM

## 2024-12-30 RX ORDER — SODIUM CHLORIDE 9 MG/ML
9 INJECTION, SOLUTION INTRAVENOUS AS NEEDED
Status: DISCONTINUED | OUTPATIENT
Start: 2024-12-30 | End: 2024-12-30 | Stop reason: HOSPADM

## 2024-12-30 RX ORDER — PROMETHAZINE HYDROCHLORIDE 25 MG/1
25 TABLET ORAL ONCE AS NEEDED
Status: DISCONTINUED | OUTPATIENT
Start: 2024-12-30 | End: 2024-12-30 | Stop reason: HOSPADM

## 2024-12-30 RX ORDER — LABETALOL HYDROCHLORIDE 5 MG/ML
5 INJECTION, SOLUTION INTRAVENOUS
Status: DISCONTINUED | OUTPATIENT
Start: 2024-12-30 | End: 2024-12-30 | Stop reason: HOSPADM

## 2024-12-30 RX ORDER — PROCHLORPERAZINE EDISYLATE 5 MG/ML
10 INJECTION INTRAMUSCULAR; INTRAVENOUS ONCE AS NEEDED
Status: DISCONTINUED | OUTPATIENT
Start: 2024-12-30 | End: 2024-12-30 | Stop reason: HOSPADM

## 2024-12-30 RX ORDER — LIDOCAINE HYDROCHLORIDE 10 MG/ML
INJECTION, SOLUTION EPIDURAL; INFILTRATION; INTRACAUDAL; PERINEURAL
Status: DISCONTINUED | OUTPATIENT
Start: 2024-12-30 | End: 2024-12-30 | Stop reason: HOSPADM

## 2024-12-30 RX ORDER — HYDROMORPHONE HYDROCHLORIDE 1 MG/ML
0.5 INJECTION, SOLUTION INTRAMUSCULAR; INTRAVENOUS; SUBCUTANEOUS
Status: DISCONTINUED | OUTPATIENT
Start: 2024-12-30 | End: 2024-12-30 | Stop reason: HOSPADM

## 2024-12-30 RX ORDER — FEXOFENADINE HCL 180 MG/1
180 TABLET ORAL 2 TIMES DAILY
Start: 2024-12-30

## 2024-12-30 RX ORDER — OXYCODONE AND ACETAMINOPHEN 10; 325 MG/1; MG/1
1 TABLET ORAL ONCE AS NEEDED
Status: DISCONTINUED | OUTPATIENT
Start: 2024-12-30 | End: 2024-12-30 | Stop reason: HOSPADM

## 2024-12-30 RX ORDER — ONDANSETRON 2 MG/ML
4 INJECTION INTRAMUSCULAR; INTRAVENOUS ONCE AS NEEDED
Status: DISCONTINUED | OUTPATIENT
Start: 2024-12-30 | End: 2024-12-30 | Stop reason: HOSPADM

## 2024-12-30 RX ORDER — METOPROLOL SUCCINATE 25 MG/1
25 TABLET, EXTENDED RELEASE ORAL DAILY
Qty: 90 TABLET | Refills: 1 | Status: SHIPPED | OUTPATIENT
Start: 2024-12-30 | End: 2025-01-06 | Stop reason: SDUPTHER

## 2024-12-30 RX ORDER — FENTANYL CITRATE 50 UG/ML
INJECTION, SOLUTION INTRAMUSCULAR; INTRAVENOUS
Status: COMPLETED
Start: 2024-12-30 | End: 2024-12-30

## 2024-12-30 RX ORDER — ACETAMINOPHEN 325 MG/1
650 TABLET ORAL EVERY 4 HOURS PRN
Status: DISCONTINUED | OUTPATIENT
Start: 2024-12-30 | End: 2024-12-30 | Stop reason: HOSPADM

## 2024-12-30 RX ORDER — FENTANYL CITRATE 50 UG/ML
INJECTION, SOLUTION INTRAMUSCULAR; INTRAVENOUS
Status: DISCONTINUED
Start: 2024-12-30 | End: 2024-12-30 | Stop reason: HOSPADM

## 2024-12-30 RX ORDER — DEXAMETHASONE SODIUM PHOSPHATE 10 MG/ML
INJECTION INTRAMUSCULAR; INTRAVENOUS AS NEEDED
Status: DISCONTINUED | OUTPATIENT
Start: 2024-12-30 | End: 2024-12-30 | Stop reason: SURG

## 2024-12-30 RX ORDER — HYDROCODONE BITARTRATE AND ACETAMINOPHEN 5; 325 MG/1; MG/1
1 TABLET ORAL ONCE AS NEEDED
Status: DISCONTINUED | OUTPATIENT
Start: 2024-12-30 | End: 2024-12-30 | Stop reason: HOSPADM

## 2024-12-30 RX ORDER — SODIUM CHLORIDE 9 MG/ML
INJECTION, SOLUTION INTRAVENOUS CONTINUOUS PRN
Status: DISCONTINUED | OUTPATIENT
Start: 2024-12-30 | End: 2024-12-30 | Stop reason: SURG

## 2024-12-30 RX ORDER — ASPIRIN 81 MG/1
81 TABLET ORAL DAILY
Status: DISCONTINUED | OUTPATIENT
Start: 2024-12-31 | End: 2024-12-30 | Stop reason: HOSPADM

## 2024-12-30 RX ORDER — ONDANSETRON 2 MG/ML
INJECTION INTRAMUSCULAR; INTRAVENOUS AS NEEDED
Status: DISCONTINUED | OUTPATIENT
Start: 2024-12-30 | End: 2024-12-30 | Stop reason: SURG

## 2024-12-30 RX ORDER — FENTANYL CITRATE 50 UG/ML
50 INJECTION, SOLUTION INTRAMUSCULAR; INTRAVENOUS
Status: DISCONTINUED | OUTPATIENT
Start: 2024-12-30 | End: 2024-12-30 | Stop reason: HOSPADM

## 2024-12-30 RX ORDER — NALOXONE HCL 0.4 MG/ML
0.4 VIAL (ML) INJECTION AS NEEDED
Status: DISCONTINUED | OUTPATIENT
Start: 2024-12-30 | End: 2024-12-30 | Stop reason: HOSPADM

## 2024-12-30 RX ORDER — IOPAMIDOL 755 MG/ML
INJECTION, SOLUTION INTRAVASCULAR
Status: DISCONTINUED | OUTPATIENT
Start: 2024-12-30 | End: 2024-12-30 | Stop reason: HOSPADM

## 2024-12-30 RX ORDER — SODIUM CHLORIDE 0.9 % (FLUSH) 0.9 %
3-10 SYRINGE (ML) INJECTION AS NEEDED
Status: DISCONTINUED | OUTPATIENT
Start: 2024-12-30 | End: 2024-12-30 | Stop reason: HOSPADM

## 2024-12-30 RX ORDER — PROMETHAZINE HYDROCHLORIDE 25 MG/1
25 SUPPOSITORY RECTAL ONCE AS NEEDED
Status: DISCONTINUED | OUTPATIENT
Start: 2024-12-30 | End: 2024-12-30 | Stop reason: HOSPADM

## 2024-12-30 RX ORDER — ROCURONIUM BROMIDE 10 MG/ML
INJECTION, SOLUTION INTRAVENOUS AS NEEDED
Status: DISCONTINUED | OUTPATIENT
Start: 2024-12-30 | End: 2024-12-30 | Stop reason: SURG

## 2024-12-30 RX ORDER — ASPIRIN 81 MG/1
324 TABLET, CHEWABLE ORAL ONCE
Status: COMPLETED | OUTPATIENT
Start: 2024-12-30 | End: 2024-12-30

## 2024-12-30 RX ORDER — SODIUM CHLORIDE 9 MG/ML
40 INJECTION, SOLUTION INTRAVENOUS AS NEEDED
Status: DISCONTINUED | OUTPATIENT
Start: 2024-12-30 | End: 2024-12-30 | Stop reason: HOSPADM

## 2024-12-30 RX ADMIN — FENTANYL CITRATE 50 MCG: 50 INJECTION, SOLUTION INTRAMUSCULAR; INTRAVENOUS at 13:30

## 2024-12-30 RX ADMIN — DEXAMETHASONE SODIUM PHOSPHATE 4 MG: 10 INJECTION INTRAMUSCULAR; INTRAVENOUS at 12:41

## 2024-12-30 RX ADMIN — ASPIRIN 81 MG 324 MG: 81 TABLET ORAL at 10:05

## 2024-12-30 RX ADMIN — ETOMIDATE 40 MG: 40 INJECTION, SOLUTION INTRAVENOUS at 12:37

## 2024-12-30 RX ADMIN — ROCURONIUM BROMIDE 50 MG: 10 INJECTION INTRAVENOUS at 12:37

## 2024-12-30 RX ADMIN — SODIUM CHLORIDE: 9 INJECTION, SOLUTION INTRAVENOUS at 12:29

## 2024-12-30 RX ADMIN — ONDANSETRON 4 MG: 2 INJECTION INTRAMUSCULAR; INTRAVENOUS at 12:41

## 2024-12-30 RX ADMIN — SUGAMMADEX 400 MG: 100 INJECTION, SOLUTION INTRAVENOUS at 13:03

## 2024-12-30 RX ADMIN — FENTANYL CITRATE 50 MCG: 50 INJECTION, SOLUTION INTRAMUSCULAR; INTRAVENOUS at 13:22

## 2024-12-30 RX ADMIN — OXYCODONE AND ACETAMINOPHEN 1 TABLET: 10; 325 TABLET ORAL at 13:40

## 2024-12-30 NOTE — INTERVAL H&P NOTE
H&P reviewed. The patient was examined and there are no changes to the H&P.      No changes physical exam      Active Hospital Problems    Diagnosis     **HFrEF (heart failure with reduced ejection fraction)      Echo at [facility] (9/2020): LVEF 55-60%  Echo at [facility] (8/16/2024): LVEF 55-60%  Echo (8/31/2024): LVEF 30%  Echo (9/6/2024): LVEF is improved to 35% after diuresis      Frequent PVCs      Reportedly normal coronaries on cath 2020  Monitor summer 2024, data deficit: 27% PVC burden, 89 pauses longest 6.5 seconds-all pauses occurred during sleeping hours       Persistent atrial fibrillation      Followed by Dr. Bear with [facility] EP  General cardiologist Dr. Galvez  Echocardiogram reportedly done 1 month ago normal per family to reevaluate ascending aortic aneurysm which was stable  Chads Vasc= 3 (age >75, CHF)      HTN (hypertension)     Hyperlipidemia LDL goal <50    Patient presents today to undergo cardiac catheterization for definitive assessment of his coronary arteries.  The patient was hospitalized in September for acute respiratory distress that required intubation.  The patient had a reduced LVEF of 30%.  He also had frequent PVCs in a bigeminal pattern.  His troponins were elevated ruling in for NSTEMI type I versus type II.  A cardiac catheterization was attempted during his stay but aborted as he refused to lay flat due to chronic back problems.  At follow-up visits the patient has steadily gained weight and CHF symptoms despite diuretic therapy.  His LVEF remains reduced around 35%.  A stress test was attempted but aborted as patient could not lie flat.  He presents today to undergo reattempt a cardiac catheterization.  The risk and benefits of the procedure were discussed the patient is agreeable to proceed.  Of note he is chronically anticoagulated with Eliquis for his history of atrial fibrillation.  His last dose was Friday evening and he has been holding it in  preparation for today's procedure.      Plan:  Lab results pending is acceptable proceed with cardiac cath by the right radial approach  Further recommendations to follow    RAKEL Vaughn

## 2024-12-30 NOTE — ANESTHESIA PROCEDURE NOTES
Airway  Urgency: elective    Date/Time: 12/30/2024 12:38 PM  Airway not difficult    General Information and Staff    Patient location during procedure: OR  CRNA/CAA: [place] CRNA    Indications and Patient Condition  Indications for airway management: airway protection    Preoxygenated: yes  MILS not maintained throughout  Mask difficulty assessment: 0 - not attempted    Final Airway Details  Final airway type: endotracheal airway      Successful airway: ETT  Cuffed: yes   Successful intubation technique: RSI  Endotracheal tube insertion site: oral  Blade: Teri  Blade size: 4  ETT size (mm): 8.0  Cormack-Lehane Classification: grade I - full view of glottis  Placement verified by: chest auscultation and capnometry   Measured from: lips  ETT/EBT  to lips (cm): 22  Number of attempts at approach: 1  Assessment: lips, teeth, and gum same as pre-op and atraumatic intubation    Additional Comments  Negative epigastric sounds, Breath sound equal bilaterally with symmetric chest rise and fall

## 2024-12-30 NOTE — ANESTHESIA PREPROCEDURE EVALUATION
Anesthesia Evaluation     Patient summary reviewed and Nursing notes reviewed   history of anesthetic complications (states difficulty with one intubation, attributes to inexperienced provider, no issues since):  difficult airway  NPO Solid Status: > 8 hours  NPO Liquid Status: > 2 hours           Airway   Mallampati: II  TM distance: >3 FB  Neck ROM: limited  Possible difficult intubation  Dental    (+) lower dentures and upper dentures    Pulmonary - normal exam    breath sounds clear to auscultation  (+) a smoker (remote) Former, cigarettes,sleep apnea (sleeps sitting, no sleep study confirmation NC)  (-) asthma, shortness of breath, recent URI  Cardiovascular   Exercise tolerance: poor (<4 METS)    ECG reviewed  PT is on anticoagulation therapy  Rhythm: irregular  Rate: normal    (+) hypertension, past MI , CAD, dysrhythmias Atrial Fib, hyperlipidemia    ROS comment: ECG Atrial FIB  incomplete LBBB NS ST changes   ECHO EF = 35% LV mildly dilated.LA cavity is dilated.           Neuro/Psych- negative ROS  (-) seizures, CVA  GI/Hepatic/Renal/Endo    (+) morbid obesity  (-) no renal disease, diabetes, no thyroid disorder    Musculoskeletal     (+) back pain (s/p PLIF), neck pain (no fusion, but remote fracture and continuous pain)  Abdominal    Substance History      OB/GYN          Other   arthritis,     ROS/Med Hx Other: Stress and Cath recently aborted secondary to Back pain when lies flat     States difficulty with one intubation, attributes to inexperienced provider, no issues since): this was remote past- when he had teeth                 Anesthesia Plan    ASA 3     general     (High FiO2 -GETA vs LMA     EF 35%   McG 4 )  intravenous induction     Anesthetic plan, risks, benefits, and alternatives have been provided, discussed and informed consent has been obtained with: patient.    Plan discussed with CRNA.      CODE STATUS:

## 2024-12-30 NOTE — Clinical Note
Prepped: right groin and Right Wrist. Prepped with: Betadine. The site was clipped. The patient was draped in a sterile fashion.

## 2024-12-30 NOTE — ANESTHESIA POSTPROCEDURE EVALUATION
Patient: Bo Perez    Procedure Summary       Date: 12/30/24 Room / Location: [facility] /  [facility]    Anesthesia Start: 1229 Anesthesia Stop: 1321    Procedure: Left Heart Cath Diagnosis:       Frequent PVCs      (Mild to moderate 1-vessel CAD (RCA)  LVEF 35%  Mildly elevated LV filling pressure)    Providers: Yasir Canales IV, MD Provider: Clifton Pena MD    Anesthesia Type: general ASA Status: 3            Anesthesia Type: general    Vitals  Vitals Value Taken Time   /80 12/30/24 1310   Temp 97.0    Pulse 88 12/30/24 1321   Resp 14 12/30/24 1310   SpO2 93 % 12/30/24 1321   Vitals shown include unfiled device data.        Post Anesthesia Care and Evaluation    Patient location during evaluation: PACU  Patient participation: complete - patient participated  Level of consciousness: awake and alert  Pain score: 8  Pain management: adequate    Airway patency: patent  Anesthetic complications: No anesthetic complications  PONV Status: none  Cardiovascular status: hemodynamically stable and acceptable  Respiratory status: nonlabored ventilation, acceptable and nasal cannula  Hydration status: acceptable

## 2024-12-31 LAB — ACT BLD: 343 SECONDS (ref 82–152)

## 2025-01-06 ENCOUNTER — TELEPHONE (OUTPATIENT)
Dept: CARDIOLOGY | Facility: CLINIC | Age: 80
End: 2025-01-06
Payer: MEDICARE

## 2025-01-06 DIAGNOSIS — I50.22 CHRONIC HFREF (HEART FAILURE WITH REDUCED EJECTION FRACTION): ICD-10-CM

## 2025-01-06 DIAGNOSIS — I48.19 PERSISTENT ATRIAL FIBRILLATION: Primary | ICD-10-CM

## 2025-01-06 RX ORDER — BUMETANIDE 2 MG/1
2 TABLET ORAL DAILY
Qty: 90 TABLET | Refills: 3 | Status: SHIPPED | OUTPATIENT
Start: 2025-01-06

## 2025-01-06 RX ORDER — METOPROLOL SUCCINATE 25 MG/1
25 TABLET, EXTENDED RELEASE ORAL DAILY
Qty: 90 TABLET | Refills: 3 | Status: SHIPPED | OUTPATIENT
Start: 2025-01-06

## 2025-01-06 RX ORDER — SPIRONOLACTONE 25 MG/1
25 TABLET ORAL DAILY
Qty: 90 TABLET | Refills: 3 | Status: SHIPPED | OUTPATIENT
Start: 2025-01-06

## 2025-01-06 RX ORDER — POTASSIUM CHLORIDE 1500 MG/1
20 TABLET, EXTENDED RELEASE ORAL DAILY
Qty: 90 TABLET | Refills: 3 | Status: SHIPPED | OUTPATIENT
Start: 2025-01-06

## 2025-01-06 RX ORDER — TRIHEXYPHENIDYL HYDROCHLORIDE 2 MG/1
2 TABLET ORAL
COMMUNITY

## 2025-01-06 RX ORDER — METOLAZONE 5 MG/1
5 TABLET ORAL DAILY
Qty: 90 TABLET | Refills: 3 | Status: SHIPPED | OUTPATIENT
Start: 2025-01-06

## 2025-01-06 RX ORDER — METOLAZONE 5 MG/1
5 TABLET ORAL DAILY
COMMUNITY
End: 2025-01-06 | Stop reason: SDUPTHER

## 2025-01-06 NOTE — TELEPHONE ENCOUNTER
I updated the patient's medicine list per his request.  He should continue medications as he has been taking    H. C. Jez Canales MD [facility], [facility]  Interventional and General Cardiology

## 2025-02-12 ENCOUNTER — HOSPITAL ENCOUNTER (OUTPATIENT)
Facility: HOSPITAL | Age: 80
Discharge: HOME | End: 2025-02-12
Admitting: INTERNAL MEDICINE
Payer: MEDICARE

## 2025-02-12 VITALS — HEIGHT: 72 IN | WEIGHT: 315 LBS | BODY MASS INDEX: 42.66 KG/M2

## 2025-02-12 DIAGNOSIS — I50.22 CHRONIC HFREF (HEART FAILURE WITH REDUCED EJECTION FRACTION): ICD-10-CM

## 2025-02-12 PROCEDURE — 93308 TTE F-UP OR LMTD: CPT

## 2025-02-12 PROCEDURE — 93308 TTE F-UP OR LMTD: CPT | Performed by: INTERNAL MEDICINE

## 2025-02-12 PROCEDURE — 25010000002 SULFUR HEXAFLUORIDE MICROSPH 60.7-25 MG RECONSTITUTED SUSPENSION: Performed by: INTERNAL MEDICINE

## 2025-02-12 RX ADMIN — SULFUR HEXAFLUORIDE 2 ML: KIT at 09:15

## 2025-02-14 NOTE — PROGRESS NOTES
"    Cardiology Outpatient Visit      Identification: Bo Perez is a 79 y.o. male who resides in [place], [place].     Reason for visit:  Heart failure with reduced ejection fraction  Mild CAD  Atrial fibrillation      Subjective      Bo returns to the office today.  He underwent cardiac catheterization 12/30/2024 which showed nonobstructive CAD and mildly elevated LV filling pressure.  Started on GDMT for HFrEF.    Review of Systems   Cardiovascular:  Positive for leg swelling.   Respiratory: Negative.         Allergies   Allergen Reactions    Amoxicillin Other (See Comments)     Pt states \"Made me very sick\"         Current Outpatient Medications   Medication Instructions    acetaminophen (TYLENOL) 650 mg, 3 times daily    amiodarone (PACERONE) 200 mg, Oral, Daily    apixaban (ELIQUIS) 5 mg, Oral, Every 12 Hours Scheduled    ARIPiprazole (ABILIFY) 5 mg, Daily    atorvastatin (LIPITOR) 40 mg, Oral, Daily    bumetanide (BUMEX) 2 mg, Oral, Daily    clobetasol (TEMOVATE) 0.05 % ointment 1 Application, Topical, As Needed    docusate sodium (COLACE) 50 mg, Daily    empagliflozin (JARDIANCE) 10 mg, Oral, Daily    escitalopram (LEXAPRO) 10 mg, Every Morning    fexofenadine (ALLEGRA) 180 mg, Oral, 2 Times Daily    gabapentin (NEURONTIN) 800 mg, 3 Times Daily    ipratropium (ATROVENT) 0.06 % nasal spray USE 2 SPRAYS IN EACH NOSTRIL EVERY DAY 30 MINUTES BEFORE DINNER    ketoconazole (NIZORAL) 2 % shampoo Weekly    metOLazone (ZAROXOLYN) 5 mg, Oral, Daily PRN    metoprolol succinate XL (TOPROL-XL) 25 mg, Oral, Daily    Misc Natural Products (OSTEO BI-FLEX TRIPLE STRENGTH PO) 1 tablet, 2 Times Daily    Multiple Vitamins-Minerals (PRESERVISION AREDS 2 PO) 1 tablet, 2 Times Daily    mupirocin (BACTROBAN) 2 % ointment 1 Application, As Needed    oxyCODONE-acetaminophen (PERCOCET)  MG per tablet 1 tablet, Oral, Every 4 Hours PRN    potassium chloride (KLOR-CON M20) 20 MEQ CR tablet 20 mEq, Oral, Daily    " "sacubitril-valsartan (ENTRESTO) 24-26 MG tablet 1 tablet, Oral, Every 12 Hours Scheduled    spironolactone (ALDACTONE) 25 mg, Oral, Daily    trihexyphenidyl (ARTANE) 2 mg, 4 Times Daily With Meals & Nightly    trospium 60 mg, Daily         Objective     BP 90/42 (BP Location: Left arm, Patient Position: Sitting, Cuff Size: Large Adult)   Pulse 72   Ht 182.9 cm (72.01\")   Wt (!) 163 kg (358 lb 12.8 oz)   SpO2 96%   BMI 48.65 kg/m²       Constitutional:       Appearance: Healthy appearance. Morbidly obese.   Eyes:      General: No scleral icterus.  Neck:      Thyroid: No thyroid mass.      Vascular: No carotid bruit or JVD. JVD normal.   Pulmonary:      Effort: Pulmonary effort is normal.      Breath sounds: Normal breath sounds.   Cardiovascular:      Normal rate. Regular rhythm.      Murmurs: There is no murmur.      No gallop.    Edema:     Peripheral edema absent.   Skin:     General: Skin is warm. There is no cyanosis.   Neurological:      General: No focal deficit present.      Mental Status: Alert.   Psychiatric:         Attention and Perception: Attention normal.         Result Review  (reviewed with patient):            Lab Results   Component Value Date    GLUCOSE 110 (H) 12/30/2024    BUN 21 12/30/2024    CREATININE 1.10 12/30/2024     12/30/2024    K 4.5 12/30/2024    CL 99 12/30/2024    CALCIUM 9.1 12/30/2024    PROTEINTOT 6.7 12/30/2024    ALBUMIN 4.1 12/30/2024    ALT 15 12/30/2024    AST 17 12/30/2024    ALKPHOS 47 12/30/2024    BILITOT 0.8 12/30/2024    GLOB 2.6 12/30/2024    AGRATIO 1.6 12/30/2024    BCR 19.1 12/30/2024    ANIONGAP 10.0 12/30/2024    EGFR 68.7 12/30/2024     Lab Results   Component Value Date    WBC 6.12 12/30/2024    HGB 13.3 12/30/2024    HCT 39.1 12/30/2024    MCV 95.6 12/30/2024     (L) 12/30/2024     Lab Results   Component Value Date    CHOL 125 12/30/2024    TRIG 110 12/30/2024    HDL 38 (L) 12/30/2024    LDL 67 12/30/2024     Lab Results   Component Value " Date    HGBA1C 5.00 12/30/2024     @lpa@        Assessment     Diagnoses and all orders for this visit:    1. Heart failure with improved ejection fraction (HFimpEF) (Primary)  Overview:  Echo at [facility] (9/2020): LVEF 55-60%  Echo at [facility] (8/16/2024): LVEF 55-60%  Echo (8/31/2024): LVEF 30%  Echo (9/6/2024): LVEF 35%  Cardiac catheterization (12/30/2024): Mild to moderate 1-vessel CAD (RCA).  This lesion was not hemodynamically significant (RFR 0.95).  Minimal left coronary disease.  LVEF 35%.  Mildly elevated LV filling pressure (LVEDP 16 mmHg)  Echo (2/12/2025): LVEF 53%.  Aortic valve calcification with no significant stenosis or regurgitation    Assessment & Plan:  Improved LV systolic function on recent echo  Continue GDMT for HFrEF including empagliflozin, metoprolol succinate, Entresto, spironolactone  Continue Bumex 2 mg daily  Change metolazone to as needed    Orders:  -     metOLazone (ZAROXOLYN) 5 MG tablet; Take 1 tablet by mouth Daily As Needed (weight gain > 2 lbs/day).  Dispense: 90 tablet; Refill: 3  -     Comprehensive Metabolic Panel; Future  -     proBNP; Future    2. Chronic HFrEF (heart failure with reduced ejection fraction)  Overview:  Echo at [facility] (9/2020): LVEF 55-60%  Echo at [facility] (8/16/2024): LVEF 55-60%  Echo (8/31/2024): LVEF 30%  Echo (9/6/2024): LVEF 35%  Cardiac catheterization (12/30/2024): Mild to moderate 1-vessel CAD (RCA).  This lesion was not hemodynamically significant (RFR 0.95).  Minimal left coronary disease.  LVEF 35%.  Mildly elevated LV filling pressure (LVEDP 16 mmHg)  Echo (2/12/2025): LVEF 53%.  Aortic valve calcification with no significant stenosis or regurgitation    Assessment & Plan:  Improved LV systolic function on recent echo  Continue GDMT for HFrEF including empagliflozin, metoprolol succinate, Entresto, spironolactone  Continue Bumex 2 mg daily  Change metolazone to as needed      3. Hyperlipidemia LDL goal  <50  Assessment & Plan:  Obtain direct LDL    Orders:  -     LDL Cholesterol, Direct; Future  -     Lipoprotein A (LPA); Future    4. Persistent atrial fibrillation  Overview:  Chads Vasc 4 (age >75, CHF, CAD)  Cardiac catheterization (12/30/2024): Mild to moderate 1-vessel CAD (RCA).  This lesion was not hemodynamically significant (RFR 0.95).  Minimal left coronary disease.  LVEF 35%.  Mildly elevated LV filling pressure (LVEDP 16 mmHg)    Assessment & Plan:  Maintaining sinus rhythm on amiodarone  Continue apixaban for stroke prophylaxis    Orders:  -     amiodarone (PACERONE) 200 MG tablet; Take 1 tablet by mouth Daily.  Dispense: 90 tablet; Refill: 1          Plan   Obtain LP(a), direct LDL, proBNP, CMP today  Change metolazone to as needed 2 pound weight gain in a day  Refer to [facility] pharmacist for GLP agonist therapy for CV risk reduction and weight loss      Follow-up   Return in about 6 months (around 8/18/2025).        Jez Canales MD, FACC, [facility]  2/18/2025

## 2025-02-17 LAB
BH CV ECHO MEAS - AO ROOT DIAM: 3.7 CM
BH CV ECHO MEAS - EDV(CUBED): 132.7 ML
BH CV ECHO MEAS - EDV(MOD-SP2): 131 ML
BH CV ECHO MEAS - EDV(MOD-SP4): 163 ML
BH CV ECHO MEAS - EF(MOD-SP2): 51.6 %
BH CV ECHO MEAS - EF(MOD-SP4): 50.8 %
BH CV ECHO MEAS - ESV(CUBED): 46.7 ML
BH CV ECHO MEAS - ESV(MOD-SP2): 63.4 ML
BH CV ECHO MEAS - ESV(MOD-SP4): 80.2 ML
BH CV ECHO MEAS - FS: 29.4 %
BH CV ECHO MEAS - IVS/LVPW: 0.82 CM
BH CV ECHO MEAS - IVSD: 0.9 CM
BH CV ECHO MEAS - LA DIMENSION: 4.1 CM
BH CV ECHO MEAS - LV DIASTOLIC VOL/BSA (35-75): 60.5 CM2
BH CV ECHO MEAS - LV MASS(C)D: 188 GRAMS
BH CV ECHO MEAS - LV SYSTOLIC VOL/BSA (12-30): 29.7 CM2
BH CV ECHO MEAS - LVIDD: 5.1 CM
BH CV ECHO MEAS - LVIDS: 3.6 CM
BH CV ECHO MEAS - LVOT AREA: 3.8 CM2
BH CV ECHO MEAS - LVOT DIAM: 2.2 CM
BH CV ECHO MEAS - LVPWD: 1.1 CM
BH CV ECHO MEAS - SV(MOD-SP2): 67.6 ML
BH CV ECHO MEAS - SV(MOD-SP4): 82.8 ML
BH CV ECHO MEAS - SVI(MOD-SP2): 25.1 ML/M2
BH CV ECHO MEAS - SVI(MOD-SP4): 30.7 ML/M2
BH CV VAS BP RIGHT ARM: NORMAL MMHG
BH CV XLRA - RV BASE: 3 CM
BH CV XLRA - RV LENGTH: 8.7 CM
BH CV XLRA - RV MID: 2.5 CM
LV EF 2D ECHO EST: 52 %
LV EF BIPLANE MOD: 50.2 %

## 2025-02-18 ENCOUNTER — LAB (OUTPATIENT)
Dept: LAB | Facility: HOSPITAL | Age: 80
End: 2025-02-18
Payer: MEDICARE

## 2025-02-18 ENCOUNTER — OFFICE VISIT (OUTPATIENT)
Dept: CARDIOLOGY | Facility: CLINIC | Age: 80
End: 2025-02-18
Payer: MEDICARE

## 2025-02-18 VITALS
BODY MASS INDEX: 42.66 KG/M2 | DIASTOLIC BLOOD PRESSURE: 42 MMHG | OXYGEN SATURATION: 96 % | SYSTOLIC BLOOD PRESSURE: 90 MMHG | HEIGHT: 72 IN | HEART RATE: 72 BPM | WEIGHT: 315 LBS

## 2025-02-18 DIAGNOSIS — E78.5 HYPERLIPIDEMIA LDL GOAL <50: ICD-10-CM

## 2025-02-18 DIAGNOSIS — I50.32 HEART FAILURE WITH IMPROVED EJECTION FRACTION (HFIMPEF): ICD-10-CM

## 2025-02-18 DIAGNOSIS — I50.32 HEART FAILURE WITH IMPROVED EJECTION FRACTION (HFIMPEF): Primary | ICD-10-CM

## 2025-02-18 DIAGNOSIS — E66.01 MORBID OBESITY: ICD-10-CM

## 2025-02-18 DIAGNOSIS — I48.19 PERSISTENT ATRIAL FIBRILLATION: ICD-10-CM

## 2025-02-18 PROBLEM — I10 HTN (HYPERTENSION): Status: RESOLVED | Noted: 2023-06-22 | Resolved: 2025-02-18

## 2025-02-18 PROBLEM — Z72.0 CONTINUOUS TOBACCO ABUSE: Status: RESOLVED | Noted: 2024-08-30 | Resolved: 2025-02-18

## 2025-02-18 PROBLEM — J96.02 ACUTE RESPIRATORY FAILURE WITH HYPOXIA AND HYPERCAPNIA: Status: RESOLVED | Noted: 2024-08-30 | Resolved: 2025-02-18

## 2025-02-18 PROBLEM — J96.01 ACUTE RESPIRATORY FAILURE WITH HYPOXIA AND HYPERCAPNIA: Status: RESOLVED | Noted: 2024-08-30 | Resolved: 2025-02-18

## 2025-02-18 LAB
ALBUMIN SERPL-MCNC: 4 G/DL (ref 3.5–5.2)
ALBUMIN/GLOB SERPL: 1.3 G/DL
ALP SERPL-CCNC: 61 U/L (ref 39–117)
ALT SERPL W P-5'-P-CCNC: 12 U/L (ref 1–41)
ANION GAP SERPL CALCULATED.3IONS-SCNC: 11 MMOL/L (ref 5–15)
ARTICHOKE IGE QN: 43 MG/DL (ref 0–100)
AST SERPL-CCNC: 14 U/L (ref 1–40)
BILIRUB SERPL-MCNC: 0.7 MG/DL (ref 0–1.2)
BUN SERPL-MCNC: 19 MG/DL (ref 8–23)
BUN/CREAT SERPL: 15.2 (ref 7–25)
CALCIUM SPEC-SCNC: 9.7 MG/DL (ref 8.6–10.5)
CHLORIDE SERPL-SCNC: 90 MMOL/L (ref 98–107)
CO2 SERPL-SCNC: 28 MMOL/L (ref 22–29)
CREAT SERPL-MCNC: 1.25 MG/DL (ref 0.76–1.27)
EGFRCR SERPLBLD CKD-EPI 2021: 58.9 ML/MIN/1.73
GLOBULIN UR ELPH-MCNC: 3.1 GM/DL
GLUCOSE SERPL-MCNC: 103 MG/DL (ref 65–99)
NT-PROBNP SERPL-MCNC: 401 PG/ML (ref 0–1800)
POTASSIUM SERPL-SCNC: 4.3 MMOL/L (ref 3.5–5.2)
PROT SERPL-MCNC: 7.1 G/DL (ref 6–8.5)
SODIUM SERPL-SCNC: 129 MMOL/L (ref 136–145)

## 2025-02-18 PROCEDURE — 1160F RVW MEDS BY RX/DR IN RCRD: CPT | Performed by: INTERNAL MEDICINE

## 2025-02-18 PROCEDURE — 83695 ASSAY OF LIPOPROTEIN(A): CPT

## 2025-02-18 PROCEDURE — 1159F MED LIST DOCD IN RCRD: CPT | Performed by: INTERNAL MEDICINE

## 2025-02-18 PROCEDURE — 80053 COMPREHEN METABOLIC PANEL: CPT

## 2025-02-18 PROCEDURE — 83721 ASSAY OF BLOOD LIPOPROTEIN: CPT

## 2025-02-18 PROCEDURE — 36415 COLL VENOUS BLD VENIPUNCTURE: CPT

## 2025-02-18 PROCEDURE — 99214 OFFICE O/P EST MOD 30 MIN: CPT | Performed by: INTERNAL MEDICINE

## 2025-02-18 PROCEDURE — 83880 ASSAY OF NATRIURETIC PEPTIDE: CPT

## 2025-02-18 RX ORDER — NITROGLYCERIN 0.4 MG/1
0.4 TABLET SUBLINGUAL
COMMUNITY
End: 2025-02-18

## 2025-02-18 RX ORDER — METOLAZONE 5 MG/1
5 TABLET ORAL DAILY PRN
Qty: 90 TABLET | Refills: 3 | Status: SHIPPED | OUTPATIENT
Start: 2025-02-18

## 2025-02-18 RX ORDER — CLOBETASOL PROPIONATE 0.5 MG/G
1 OINTMENT TOPICAL AS NEEDED
COMMUNITY

## 2025-02-18 RX ORDER — MUPIROCIN 20 MG/G
1 OINTMENT TOPICAL AS NEEDED
COMMUNITY
Start: 2025-02-04 | End: 2025-05-04

## 2025-02-18 RX ORDER — AMIODARONE HYDROCHLORIDE 200 MG/1
200 TABLET ORAL DAILY
Qty: 90 TABLET | Refills: 1 | Status: SHIPPED | OUTPATIENT
Start: 2025-02-18

## 2025-02-18 NOTE — ASSESSMENT & PLAN NOTE
Improved LV systolic function on recent echo  Continue GDMT for HFrEF including empagliflozin, metoprolol succinate, Entresto, spironolactone  Continue Bumex 2 mg daily  Change metolazone to as needed

## 2025-02-18 NOTE — LETTER
"February 18, 2025     Mario Rowland MD  [address] [facility] [address]    Patient: Bo Perez   YOB: 1945   Date of Visit: 2/18/2025     Dear Mario Rowland MD:       Thank you for referring Bo Perez to me for evaluation. Below are the relevant portions of my assessment and plan of care.    If you have questions, please do not hesitate to call me. I look forward to following Bo along with you.         Sincerely,        Yasir Canales IV, MD        CC: No Recipients    Yasir Canales IV, MD  02/18/25 [id number]  Signed      Cardiology Outpatient Visit      Identification: Bo Perez is a 79 y.o. male who resides in [place], [place].     Reason for visit:  Heart failure with reduced ejection fraction  Mild CAD  Atrial fibrillation      Subjective     Bo returns to the office today.  He underwent cardiac catheterization 12/30/2024 which showed nonobstructive CAD and mildly elevated LV filling pressure.  Started on GDMT for HFrEF.    Review of Systems   Cardiovascular:  Positive for leg swelling.   Respiratory: Negative.         Allergies   Allergen Reactions   • Amoxicillin Other (See Comments)     Pt states \"Made me very sick\"         Current Outpatient Medications   Medication Instructions   • acetaminophen (TYLENOL) 650 mg, 3 times daily   • amiodarone (PACERONE) 200 mg, Oral, Daily   • apixaban (ELIQUIS) 5 mg, Oral, Every 12 Hours Scheduled   • ARIPiprazole (ABILIFY) 5 mg, Daily   • atorvastatin (LIPITOR) 40 mg, Oral, Daily   • bumetanide (BUMEX) 2 mg, Oral, Daily   • clobetasol (TEMOVATE) 0.05 % ointment 1 Application, Topical, As Needed   • docusate sodium (COLACE) 50 mg, Daily   • empagliflozin (JARDIANCE) 10 mg, Oral, Daily   • escitalopram (LEXAPRO) 10 mg, Every Morning   • fexofenadine (ALLEGRA) 180 mg, Oral, 2 Times Daily   • gabapentin (NEURONTIN) 800 mg, 3 Times Daily   • ipratropium (ATROVENT) 0.06 % nasal spray USE 2 SPRAYS IN EACH NOSTRIL " "EVERY DAY 30 MINUTES BEFORE DINNER   • ketoconazole (NIZORAL) 2 % shampoo Weekly   • metOLazone (ZAROXOLYN) 5 mg, Oral, Daily PRN   • metoprolol succinate XL (TOPROL-XL) 25 mg, Oral, Daily   • Misc Natural Products (OSTEO BI-FLEX TRIPLE STRENGTH PO) 1 tablet, 2 Times Daily   • Multiple Vitamins-Minerals (PRESERVISION AREDS 2 PO) 1 tablet, 2 Times Daily   • mupirocin (BACTROBAN) 2 % ointment 1 Application, As Needed   • oxyCODONE-acetaminophen (PERCOCET)  MG per tablet 1 tablet, Oral, Every 4 Hours PRN   • potassium chloride (KLOR-CON M20) 20 MEQ CR tablet 20 mEq, Oral, Daily   • sacubitril-valsartan (ENTRESTO) 24-26 MG tablet 1 tablet, Oral, Every 12 Hours Scheduled   • spironolactone (ALDACTONE) 25 mg, Oral, Daily   • trihexyphenidyl (ARTANE) 2 mg, 4 Times Daily With Meals & Nightly   • trospium 60 mg, Daily         Objective    BP 90/42 (BP Location: Left arm, Patient Position: Sitting, Cuff Size: Large Adult)   Pulse 72   Ht 182.9 cm (72.01\")   Wt (!) 163 kg (358 lb 12.8 oz)   SpO2 96%   BMI 48.65 kg/m²       Constitutional:       Appearance: Healthy appearance. Morbidly obese.   Eyes:      General: No scleral icterus.  Neck:      Thyroid: No thyroid mass.      Vascular: No carotid bruit or JVD. JVD normal.   Pulmonary:      Effort: Pulmonary effort is normal.      Breath sounds: Normal breath sounds.   Cardiovascular:      Normal rate. Regular rhythm.      Murmurs: There is no murmur.      No gallop.    Edema:     Peripheral edema absent.   Skin:     General: Skin is warm. There is no cyanosis.   Neurological:      General: No focal deficit present.      Mental Status: Alert.   Psychiatric:         Attention and Perception: Attention normal.         Result Review (reviewed with patient):            Lab Results   Component Value Date    GLUCOSE 110 (H) 12/30/2024    BUN 21 12/30/2024    CREATININE 1.10 12/30/2024     12/30/2024    K 4.5 12/30/2024    CL 99 12/30/2024    CALCIUM 9.1 12/30/2024    " PROTEINTOT 6.7 12/30/2024    ALBUMIN 4.1 12/30/2024    ALT 15 12/30/2024    AST 17 12/30/2024    ALKPHOS 47 12/30/2024    BILITOT 0.8 12/30/2024    GLOB 2.6 12/30/2024    AGRATIO 1.6 12/30/2024    BCR 19.1 12/30/2024    ANIONGAP 10.0 12/30/2024    EGFR 68.7 12/30/2024     Lab Results   Component Value Date    WBC 6.12 12/30/2024    HGB 13.3 12/30/2024    HCT 39.1 12/30/2024    MCV 95.6 12/30/2024     (L) 12/30/2024     Lab Results   Component Value Date    CHOL 125 12/30/2024    TRIG 110 12/30/2024    HDL 38 (L) 12/30/2024    LDL 67 12/30/2024     Lab Results   Component Value Date    HGBA1C 5.00 12/30/2024     @lpa@        Assessment    Diagnoses and all orders for this visit:    1. Heart failure with improved ejection fraction (HFimpEF) (Primary)  Overview:  Echo at [facility] (9/2020): LVEF 55-60%  Echo at [facility] (8/16/2024): LVEF 55-60%  Echo (8/31/2024): LVEF 30%  Echo (9/6/2024): LVEF 35%  Cardiac catheterization (12/30/2024): Mild to moderate 1-vessel CAD (RCA).  This lesion was not hemodynamically significant (RFR 0.95).  Minimal left coronary disease.  LVEF 35%.  Mildly elevated LV filling pressure (LVEDP 16 mmHg)  Echo (2/12/2025): LVEF 53%.  Aortic valve calcification with no significant stenosis or regurgitation    Assessment & Plan:  Improved LV systolic function on recent echo  Continue GDMT for HFrEF including empagliflozin, metoprolol succinate, Entresto, spironolactone  Continue Bumex 2 mg daily  Change metolazone to as needed    Orders:  -     metOLazone (ZAROXOLYN) 5 MG tablet; Take 1 tablet by mouth Daily As Needed (weight gain > 2 lbs/day).  Dispense: 90 tablet; Refill: 3  -     Comprehensive Metabolic Panel; Future  -     proBNP; Future    2. Chronic HFrEF (heart failure with reduced ejection fraction)  Overview:  Echo at [facility] (9/2020): LVEF 55-60%  Echo at [facility] (8/16/2024): LVEF 55-60%  Echo (8/31/2024): LVEF 30%  Echo (9/6/2024): LVEF  35%  Cardiac catheterization (12/30/2024): Mild to moderate 1-vessel CAD (RCA).  This lesion was not hemodynamically significant (RFR 0.95).  Minimal left coronary disease.  LVEF 35%.  Mildly elevated LV filling pressure (LVEDP 16 mmHg)  Echo (2/12/2025): LVEF 53%.  Aortic valve calcification with no significant stenosis or regurgitation    Assessment & Plan:  Improved LV systolic function on recent echo  Continue GDMT for HFrEF including empagliflozin, metoprolol succinate, Entresto, spironolactone  Continue Bumex 2 mg daily  Change metolazone to as needed      3. Hyperlipidemia LDL goal <50  Assessment & Plan:  Obtain direct LDL    Orders:  -     LDL Cholesterol, Direct; Future  -     Lipoprotein A (LPA); Future    4. Persistent atrial fibrillation  Overview:  Chads Vasc 4 (age >75, CHF, CAD)  Cardiac catheterization (12/30/2024): Mild to moderate 1-vessel CAD (RCA).  This lesion was not hemodynamically significant (RFR 0.95).  Minimal left coronary disease.  LVEF 35%.  Mildly elevated LV filling pressure (LVEDP 16 mmHg)    Assessment & Plan:  Maintaining sinus rhythm on amiodarone  Continue apixaban for stroke prophylaxis    Orders:  -     amiodarone (PACERONE) 200 MG tablet; Take 1 tablet by mouth Daily.  Dispense: 90 tablet; Refill: 1          Plan  Obtain LP(a), direct LDL, proBNP, CMP today  Change metolazone to as needed 2 pound weight gain in a day  Refer to [facility] pharmacist for GLP agonist therapy for CV risk reduction and weight loss      Follow-up   Return in about 6 months (around 8/18/2025).        Jez Canales MD, FACC, [facility]  2/18/2025

## 2025-02-18 NOTE — ASSESSMENT & PLAN NOTE
Unsuccessful weight loss  Comorbidities include heart failure, atrial fibrillation, CAD  Refer to [facility] pharmacist for GLP agonist for weight loss and CV risk reduction

## 2025-02-19 ENCOUNTER — SPECIALTY PHARMACY (OUTPATIENT)
Facility: HOSPITAL | Age: 80
End: 2025-02-19
Payer: MEDICARE

## 2025-02-19 LAB — LPA SERPL-SCNC: 9.3 NMOL/L

## 2025-03-11 ENCOUNTER — OFFICE VISIT (OUTPATIENT)
Dept: CARDIOLOGY | Facility: CLINIC | Age: 80
End: 2025-03-11
Payer: MEDICARE

## 2025-03-11 VITALS
OXYGEN SATURATION: 96 % | DIASTOLIC BLOOD PRESSURE: 40 MMHG | SYSTOLIC BLOOD PRESSURE: 82 MMHG | BODY MASS INDEX: 42.66 KG/M2 | HEART RATE: 49 BPM | WEIGHT: 315 LBS | HEIGHT: 72 IN

## 2025-03-11 DIAGNOSIS — R00.0 WIDE-COMPLEX TACHYCARDIA: ICD-10-CM

## 2025-03-11 DIAGNOSIS — I49.3 FREQUENT PVCS: ICD-10-CM

## 2025-03-11 DIAGNOSIS — I25.10 CORONARY ARTERY DISEASE INVOLVING NATIVE CORONARY ARTERY OF NATIVE HEART WITHOUT ANGINA PECTORIS: ICD-10-CM

## 2025-03-11 DIAGNOSIS — E66.01 MORBID OBESITY: ICD-10-CM

## 2025-03-11 DIAGNOSIS — I48.19 PERSISTENT ATRIAL FIBRILLATION: Primary | ICD-10-CM

## 2025-03-11 RX ORDER — NITROGLYCERIN 0.4 MG/1
0.4 TABLET SUBLINGUAL
COMMUNITY

## 2025-03-13 NOTE — PROGRESS NOTES
Cardiac Electrophysiology Outpatient Note  [facility]    Office Visit     Bo Perez  [id number]  10/08/2024    Primary Care Physician: Mario Rowland MD    Referred By: No ref. provider found    Subjective     Chief Complaint   Patient presents with    Frequent PVCs       History of Present Illness:   Bo Perez is a 79 y.o. male who presents to my electrophysiology clinic for follow up of persistent atrial fibrillation, frequent PVCs.  Patient was previously followed by Dr. Bear.  He has a history of morbid obesity.  He was on Tikosyn for persistent atrial fibrillation. He presented the hospital with acute respiratory failure.  He had an EKG of wide-complex tachycardia, that likely represented an SVT with a aberrancy, but cannot 100% rule out ventricular tachycardia.  His QT was found to be significantly prolonged so his Tikosyn was stopped.  During the hospitalization he was noted to have very frequent PVCs.  He was discharged with an ambulatory monitor showing approximately 30% PVC burden.  He was not felt to be a candidate for ablation due to his severe morbid obesity.  He was started on a trial of amiodarone to see if reducing PVC burden could improve his symptoms.    More recently he underwent heart cath by Dr. Canales in December 2024 which showed nonobstructive CAD.  Echo in February 2025 showed normalization of his EF to 52%.    He does say that he continues to feel weak.  This however is not any worsened.  Continues to have some shortness of breath on exertion.  Says that Dr. Canales has been working on trying to get him on Ozempic.  Blood pressure is also quite low today and at times he has some low blood pressures at home as well.      He overall says he is doing okay.  Says he has good days and bad days.  On his bad days he feels more tired and fatigued.  His wife thinks he has been breathing harder.  He does breathe heavy, but he says he  has been doing this for years.  He does not think there is any significant recent change in this.  Has been trying to exercise more.  Did have a recent echo with normal ejection fraction.  Does not feel PVCs..      Past Medical History:   Diagnosis Date    A-fib     Arrhythmia     Arthritis     Hard to intubate     HTN (hypertension) 06/22/2023    Target blood pressure <130/80 mmHg      Hypertension     Sleep apnea     NOT USING CPAP       Past Surgical History:   Procedure Laterality Date    BLADDER SURGERY  04/14/2016    STIMULATOR IMPLANTED, NEW ELECTRODE 08/2022    CARDIAC ABLATION  2013    AFIB    CARDIAC CATHETERIZATION N/A 9/5/2024    Procedure: CASE ABORT;  Surgeon: Yasir Canales IV, MD;  Location:  [facility];  Service: Cardiovascular;  Laterality: N/A;    CARDIAC CATHETERIZATION N/A 12/30/2024    Procedure: Left Heart Cath;  Surgeon: Yasir Canales IV, MD;  Location:  [facility];  Service: Cardiovascular;  Laterality: N/A;    CARDIAC CATHETERIZATION  12/30/2024    Procedure: Functional Flow [place];  Surgeon: Yasir Canales IV, MD;  Location:  [facility];  Service: Cardiovascular;;    CARPAL TUNNEL RELEASE Right 2002    CHOLECYSTECTOMY  2011    COLONOSCOPY      DENTAL PROCEDURE  1962    FINGER SURGERY Left 2000    FOREFINGER BONE TRIMMED    HEMORRHOIDECTOMY  1973    KNEE ARTHROSCOPY Left 2008    MOUTH SURGERY  2008    FOR SLEEP APNEA    SPINAL FUSION  2007    L3 LUMBAR SPINAL STENOSIS    TONSILLECTOMY  1954    TOTAL KNEE ARTHROPLASTY Left 2009    TOTAL KNEE ARTHROPLASTY Right 6/22/2023    Procedure: TOTAL KNEE ARTHROPLASTY - RIGHT;  Surgeon: Leland Elizalde MD;  Location:  [facility];  Service: Orthopedics;  Laterality: Right;       Family History   Problem Relation Age of Onset    Asthma Mother     Alcohol abuse Father     Heart disease Father        Social History     Socioeconomic History    Marital status:     Tobacco Use    Smoking status: Former     Current packs/day: 0.00     Average packs/day: 3.0 packs/day for 24.0 years (72.0 ttl pk-yrs)     Types: Cigarettes     Start date: 1960     Quit date:      Years since quittin.2     Passive exposure: Never    Smokeless tobacco: Never    Tobacco comments:     QUIT 40 YRS AGO PER PT   Vaping Use    Vaping status: Never Used    Passive vaping exposure: Yes   Substance and Sexual Activity    Alcohol use: Not Currently    Drug use: Never    Sexual activity: Not Currently     Partners: Female         Current Outpatient Medications:     acetaminophen (TYLENOL) 650 MG 8 hr tablet, Take 1 tablet by mouth 3 times a day., Disp: , Rfl:     amiodarone (PACERONE) 200 MG tablet, Take 1 tablet by mouth Daily., Disp: 90 tablet, Rfl: 1    apixaban (Eliquis) 5 MG tablet tablet, Take 1 tablet by mouth Every 12 (Twelve) Hours., Disp: 180 tablet, Rfl: 3    ARIPiprazole (ABILIFY) 5 MG tablet, Take 1 tablet by mouth Daily., Disp: , Rfl:     atorvastatin (LIPITOR) 40 MG tablet, Take 1 tablet by mouth Daily., Disp: 90 tablet, Rfl: 3    bumetanide (BUMEX) 2 MG tablet, Take 1 tablet by mouth Daily., Disp: 90 tablet, Rfl: 3    clobetasol (TEMOVATE) 0.05 % ointment, Apply 1 Application topically to the appropriate area as directed As Needed., Disp: , Rfl:     docusate sodium (COLACE) 50 MG capsule, Take 1 capsule by mouth Daily., Disp: , Rfl:     empagliflozin (JARDIANCE) 10 MG tablet tablet, Take 1 tablet by mouth Daily., Disp: 90 tablet, Rfl: 3    escitalopram (LEXAPRO) 10 MG tablet, Take 1 tablet by mouth Every Morning., Disp: , Rfl:     fexofenadine (ALLEGRA) 180 MG tablet, Take 1 tablet by mouth 2 (Two) Times a Day., Disp: , Rfl:     gabapentin (NEURONTIN) 800 MG tablet, Take 1 tablet by mouth 3 (Three) Times a Day., Disp: , Rfl:     ipratropium (ATROVENT) 0.06 % nasal spray, USE 2 SPRAYS IN EACH NOSTRIL EVERY DAY 30 MINUTES BEFORE DINNER, Disp: , Rfl:     ketoconazole (NIZORAL) 2 %  "shampoo, 1 (One) Time Per Week., Disp: , Rfl:     metOLazone (ZAROXOLYN) 5 MG tablet, Take 1 tablet by mouth Daily As Needed (weight gain > 2 lbs/day)., Disp: 90 tablet, Rfl: 3    metoprolol succinate XL (TOPROL-XL) 25 MG 24 hr tablet, Take 1 tablet by mouth Daily., Disp: 90 tablet, Rfl: 3    Misc Natural Products (OSTEO BI-FLEX TRIPLE STRENGTH PO), Take 1 tablet by mouth 2 (Two) Times a Day., Disp: , Rfl:     Multiple Vitamins-Minerals (PRESERVISION AREDS 2 PO), Take 1 tablet by mouth 2 (Two) Times a Day., Disp: , Rfl:     mupirocin (BACTROBAN) 2 % ointment, Apply 1 Application topically to the appropriate area as directed As Needed., Disp: , Rfl:     nitroglycerin (NITROSTAT) 0.4 MG SL tablet, Place 1 tablet under the tongue Every 5 (Five) Minutes As Needed for Chest Pain. Take no more than 3 doses in 15 minutes., Disp: , Rfl:     oxyCODONE-acetaminophen (PERCOCET)  MG per tablet, Take 1 tablet by mouth Every 4 (Four) Hours As Needed for Moderate Pain., Disp: , Rfl:     potassium chloride (KLOR-CON M20) 20 MEQ CR tablet, Take 1 tablet by mouth Daily., Disp: 90 tablet, Rfl: 3    sacubitril-valsartan (ENTRESTO) 24-26 MG tablet, Take 1 tablet by mouth Every 12 (Twelve) Hours., Disp: 180 tablet, Rfl: 3    spironolactone (ALDACTONE) 25 MG tablet, Take 1 tablet by mouth Daily., Disp: 90 tablet, Rfl: 3    trihexyphenidyl (ARTANE) 2 MG tablet, Take 1 tablet by mouth 4 (Four) Times a Day With Meals & at Bedtime. (Patient taking differently: Take 1 tablet by mouth 2 (Two) Times a Day. 1x daily), Disp: , Rfl:     trospium 60 MG capsule sustained-release 24 hr capsule, Take 1 capsule by mouth Daily., Disp: , Rfl:     Allergies:   Allergies   Allergen Reactions    Amoxicillin Other (See Comments)     Pt states \"Made me very sick\"       Objective   Vital Signs: Blood pressure (!) 82/40, pulse (!) 49, height 182.9 cm (72.01\"), weight (!) 162 kg (358 lb), SpO2 96%.    PHYSICAL EXAM  General appearance: Awake, alert, " cooperative  Head: Normocephalic, without obvious abnormality, atraumatic  Neck: No JVD  Lungs: Clear to ascultation bilaterally  Heart: Regular rate and rhythm, no murmurs, 2+ LE pulses, no lower extremity swelling  Skin: Skin color, turgor normal, no rashes or lesions  Neurologic: Grossly normal     Lab Results   Component Value Date    GLUCOSE 103 (H) 02/18/2025    CALCIUM 9.7 02/18/2025     (L) 02/18/2025    K 4.3 02/18/2025    CO2 28.0 02/18/2025    CL 90 (L) 02/18/2025    BUN 19 02/18/2025    CREATININE 1.25 02/18/2025    EGFRIFNONA 94 06/27/2016    BCR 15.2 02/18/2025    ANIONGAP 11.0 02/18/2025     Lab Results   Component Value Date    WBC 6.12 12/30/2024    HGB 13.3 12/30/2024    HCT 39.1 12/30/2024    MCV 95.6 12/30/2024     (L) 12/30/2024     Lab Results   Component Value Date    INR 1.07 08/30/2024    INR 1.17 (H) 06/09/2023    PROTIME 14.0 08/30/2024    PROTIME 15.0 (H) 06/09/2023     Lab Results   Component Value Date    TSH 3.800 11/15/2024          Results for orders placed during the hospital encounter of 02/12/25    Adult Transthoracic Echo Limited W/ Cont if Necessary Per Protocol    Interpretation Summary    Left ventricular systolic function is normal. Estimated left ventricular EF = 52%    Left ventricular diastolic function is consistent with (grade I) impaired relaxation.    There is moderate calcification of the aortic valve.  There is no significant aortic valve stenosis or regurgitation present.     Results for orders placed during the hospital encounter of 12/30/24    Cardiac Catheterization/Vascular Study    Conclusion    Mild to moderate 1-vessel CAD (RCA).  This was not hemodynamically (RFR 0.95).    Moderate LV systolic dysfunction.  Estimated LVEF 35%    Mildly elevated LV filling pressure (LVEDP 16 mmHg)      I personally viewed and interpreted the patient's EKG/Telemetry/lab data    Procedures    Bo Perez  reports that he quit smoking about 41 years ago. His  smoking use included cigarettes. He started smoking about 65 years ago. He has a 72 pack-year smoking history. He has never been exposed to tobacco smoke. He has never used smokeless tobacco.     Advance Care Planning   Advance Care Planning: ACP discussion was held with the patient during this visit. Patient has an advance directive in EMR which is still valid.      Assessment & Plan    1. Frequent PVCs  Patient has frequent PVCs.  Ambulatory monitor showed an approximate 30% burden.  EKG today shows bigeminal PVCs with a right bundle branch block, right inferior axis.  There is however discordance in lead II, with lead to being slightly negative.  Transition is between V1 and V2.  Previous EKG showed more of a pattern break in V2 but EKG today does not demonstrate this.  Location of this is somewhat difficult, but may be located in the epicardial cardiac crux.  Not currently a candidate for an ablation due to his severe morbid obesity.    He was started on a trial of amiodarone to see if this improves his symptoms.  We will recheck a 2-week monitor to see if this is change his PVC burden.  Overall his symptom burden is not much changed.    2. Wide-complex tachycardia  He presented to the ER with a wide-complex tachycardia.  I suspect this to be SVT with a aberrancy however cannot say for certain.  He did have an interesting pattern break in lead V2, I felt this was potentially due to lead reversal however could have represented ventricular tachycardia.  He did have significant acidosis at the time and had a prolonged QT on Tikosyn.  Will continue to monitor.    3. Persistent atrial fibrillation  Has a history of atrial fibrillation previously on Tikosyn.  Currently maintaining sinus rhythm on amiodarone.  As above not a candidate for an ablation at his current weight.    4.  Hypotension  Blood pressure significantly low today at 82/40.  Most of the time at home his blood pressures been in the 1 teens.  I  recommended him to continue checking it at home and if it is significantly low frequently we may need to scale back on some of his medications.    5.  Morbid obesity  BMI today is 48.5.  The significant complicates all of his medical care.  He reports Dr. Ngo had been working to try to get him on a GLP-1 agonist.  I think this would be of significant benefit to him.    Follow Up:  Return in about 6 months (around 9/11/2025) for [facility].      Thank you for allowing me to participate in the care of your patient. Please do not hesitate to contact me with additional questions or concerns.      Antwan Chamorro M.D.  Cardiac Electrophysiologist  [facility]

## 2025-04-01 ENCOUNTER — SPECIALTY PHARMACY (OUTPATIENT)
Facility: HOSPITAL | Age: 80
End: 2025-04-01
Payer: MEDICARE

## 2025-04-01 RX ORDER — SEMAGLUTIDE 0.5 MG/.5ML
0.5 INJECTION, SOLUTION SUBCUTANEOUS WEEKLY
Qty: 2 ML | Refills: 0 | Status: SHIPPED | OUTPATIENT
Start: 2025-04-30 | End: 2025-05-28

## 2025-04-01 RX ORDER — SEMAGLUTIDE 1 MG/.5ML
1 INJECTION, SOLUTION SUBCUTANEOUS WEEKLY
Qty: 2 ML | Refills: 0 | Status: SHIPPED | OUTPATIENT
Start: 2025-05-29 | End: 2025-06-26

## 2025-04-01 RX ORDER — SEMAGLUTIDE 1.7 MG/.75ML
1.7 INJECTION, SOLUTION SUBCUTANEOUS WEEKLY
Qty: 3 ML | Refills: 0 | Status: SHIPPED | OUTPATIENT
Start: 2025-06-27 | End: 2025-07-25

## 2025-04-01 RX ORDER — SEMAGLUTIDE 2.4 MG/.75ML
2.4 INJECTION, SOLUTION SUBCUTANEOUS WEEKLY
Qty: 3 ML | Refills: 0 | Status: SHIPPED | OUTPATIENT
Start: 2025-07-24 | End: 2025-08-21

## 2025-04-01 NOTE — PROGRESS NOTES
Specialty Pharmacy Patient Management Program  Cardiology Initial Assessment     Bo Perez was referred by a Cardiology provider to the Cardiology Patient Management program offered by [facility] for Weight Management on 25.  An initial outreach was conducted, including assessment of therapy appropriateness and specialty medication education for Wegovy. The patient was introduced to services offered by [facility], including: regular assessments, refill coordination, mail order delivery options, prior authorization maintenance, and financial assistance programs as applicable. The patient was also provided with contact information for the pharmacy team.     Insurance Coverage & Financial Support   copay card (not covered on insurance)     Relevant Past Medical History and Comorbidities  Relevant medical history and concomitant health conditions were discussed with the patient. The patient's chart has been reviewed for relevant past medical history and comorbid conditions and updated as necessary.  Past Medical History:   Diagnosis Date    A-fib     Arrhythmia     Arthritis     Hard to intubate     HTN (hypertension) 2023    Target blood pressure <130/80 mmHg      Hypertension     Sleep apnea     NOT USING CPAP     Social History     Socioeconomic History    Marital status:    Tobacco Use    Smoking status: Former     Current packs/day: 0.00     Average packs/day: 3.0 packs/day for 24.0 years (72.0 ttl pk-yrs)     Types: Cigarettes     Start date: 1960     Quit date:      Years since quittin.2     Passive exposure: Never    Smokeless tobacco: Never    Tobacco comments:     QUIT 40 YRS AGO PER PT   Vaping Use    Vaping status: Never Used    Passive vaping exposure: Yes   Substance and Sexual Activity    Alcohol use: Not Currently    Drug use: Never    Sexual activity: Not Currently     Partners: Female       Problem list  "reviewed by Johana Dumas, PharmD on 4/1/2025 at 11:50 AM    Allergies  Known allergies and reactions were discussed with the patient. The patient's chart has been reviewed for  allergy information and updated as necessary.   Allergies   Allergen Reactions    Amoxicillin Other (See Comments)     Pt states \"Made me very sick\"       Allergies reviewed by Johana Dumas, PharmD on 4/1/2025 at 11:50 AM    Relevant Laboratory Values  Relevant laboratory values were discussed with the patient. The following specialty medication dose adjustment(s) are recommended: No changes    Lab Results   Component Value Date    GLUCOSE 103 (H) 02/18/2025    CALCIUM 9.7 02/18/2025     (L) 02/18/2025    K 4.3 02/18/2025    CO2 28.0 02/18/2025    CL 90 (L) 02/18/2025    BUN 19 02/18/2025    CREATININE 1.25 02/18/2025    EGFRIFNONA 94 06/27/2016    BCR 15.2 02/18/2025    ANIONGAP 11.0 02/18/2025     Lab Results   Component Value Date    CHOL 125 12/30/2024    TRIG 110 12/30/2024    HDL 38 (L) 12/30/2024    LDL 43 02/18/2025         Current Medication List  This medication list has been reviewed with the patient and evaluated for any interactions or necessary modifications/recommendations, and updated to include all prescription medications, OTC medications, and supplements the patient is currently taking.  This list reflects what is contained in the patient's profile, which has also been marked as reviewed to communicate to other providers it is the most up to date version of the patient's current medication therapy.     Current Outpatient Medications:     acetaminophen (TYLENOL) 650 MG 8 hr tablet, Take 1 tablet by mouth 3 times a day., Disp: , Rfl:     amiodarone (PACERONE) 200 MG tablet, Take 1 tablet by mouth Daily., Disp: 90 tablet, Rfl: 1    apixaban (Eliquis) 5 MG tablet tablet, Take 1 tablet by mouth Every 12 (Twelve) Hours., Disp: 180 tablet, Rfl: 3    ARIPiprazole (ABILIFY) 5 MG tablet, Take 1 tablet by mouth Daily., " Disp: , Rfl:     atorvastatin (LIPITOR) 40 MG tablet, Take 1 tablet by mouth Daily., Disp: 90 tablet, Rfl: 3    bumetanide (BUMEX) 2 MG tablet, Take 1 tablet by mouth Daily., Disp: 90 tablet, Rfl: 3    clobetasol (TEMOVATE) 0.05 % ointment, Apply 1 Application topically to the appropriate area as directed As Needed., Disp: , Rfl:     docusate sodium (COLACE) 50 MG capsule, Take 1 capsule by mouth Daily., Disp: , Rfl:     empagliflozin (JARDIANCE) 10 MG tablet tablet, Take 1 tablet by mouth Daily., Disp: 90 tablet, Rfl: 3    escitalopram (LEXAPRO) 10 MG tablet, Take 1 tablet by mouth Every Morning., Disp: , Rfl:     fexofenadine (ALLEGRA) 180 MG tablet, Take 1 tablet by mouth 2 (Two) Times a Day., Disp: , Rfl:     gabapentin (NEURONTIN) 800 MG tablet, Take 1 tablet by mouth 3 (Three) Times a Day., Disp: , Rfl:     ipratropium (ATROVENT) 0.06 % nasal spray, USE 2 SPRAYS IN EACH NOSTRIL EVERY DAY 30 MINUTES BEFORE DINNER, Disp: , Rfl:     ketoconazole (NIZORAL) 2 % shampoo, 1 (One) Time Per Week., Disp: , Rfl:     metOLazone (ZAROXOLYN) 5 MG tablet, Take 1 tablet by mouth Daily As Needed (weight gain > 2 lbs/day)., Disp: 90 tablet, Rfl: 3    metoprolol succinate XL (TOPROL-XL) 25 MG 24 hr tablet, Take 1 tablet by mouth Daily., Disp: 90 tablet, Rfl: 3    Misc Natural Products (OSTEO BI-FLEX TRIPLE STRENGTH PO), Take 1 tablet by mouth 2 (Two) Times a Day., Disp: , Rfl:     Multiple Vitamins-Minerals (PRESERVISION AREDS 2 PO), Take 1 tablet by mouth 2 (Two) Times a Day., Disp: , Rfl:     mupirocin (BACTROBAN) 2 % ointment, Apply 1 Application topically to the appropriate area as directed As Needed., Disp: , Rfl:     nitroglycerin (NITROSTAT) 0.4 MG SL tablet, Place 1 tablet under the tongue Every 5 (Five) Minutes As Needed for Chest Pain. Take no more than 3 doses in 15 minutes., Disp: , Rfl:     oxyCODONE-acetaminophen (PERCOCET)  MG per tablet, Take 1 tablet by mouth Every 4 (Four) Hours As Needed for Moderate  Pain., Disp: , Rfl:     potassium chloride (KLOR-CON M20) 20 MEQ CR tablet, Take 1 tablet by mouth Daily., Disp: 90 tablet, Rfl: 3    sacubitril-valsartan (ENTRESTO) 24-26 MG tablet, Take 1 tablet by mouth Every 12 (Twelve) Hours., Disp: 180 tablet, Rfl: 3    [START ON 4/30/2025] Semaglutide-Weight Management (Wegovy) 0.5 MG/0.5ML solution auto-injector, Inject 0.5 mL under the skin into the appropriate area as directed 1 (One) Time Per Week for 28 days., Disp: 2 mL, Rfl: 0    [START ON 5/29/2025] Semaglutide-Weight Management (Wegovy) 1 MG/0.5ML solution auto-injector, Inject 0.5 mL under the skin into the appropriate area as directed 1 (One) Time Per Week for 28 days., Disp: 2 mL, Rfl: 0    [START ON 6/27/2025] Semaglutide-Weight Management (Wegovy) 1.7 MG/0.75ML solution auto-injector, Inject 0.75 mL under the skin into the appropriate area as directed 1 (One) Time Per Week for 28 days., Disp: 3 mL, Rfl: 0    [START ON 7/24/2025] Semaglutide-Weight Management (Wegovy) 2.4 MG/0.75ML solution auto-injector, Inject 0.75 mL under the skin into the appropriate area as directed 1 (One) Time Per Week for 28 days., Disp: 3 mL, Rfl: 0    Semaglutide-Weight Management 0.25 MG/0.5ML solution auto-injector, Inject 0.5 mL under the skin into the appropriate area as directed 1 (One) Time Per Week., Disp: , Rfl:     Semaglutide-Weight Management 0.25 MG/0.5ML solution auto-injector, Inject 0.5 mL under the skin into the appropriate area as directed 1 (One) Time Per Week for 28 days., Disp: 2 mL, Rfl: 0    spironolactone (ALDACTONE) 25 MG tablet, Take 1 tablet by mouth Daily., Disp: 90 tablet, Rfl: 3    trihexyphenidyl (ARTANE) 2 MG tablet, Take 1 tablet by mouth 4 (Four) Times a Day With Meals & at Bedtime. (Patient taking differently: Take 1 tablet by mouth 2 (Two) Times a Day. 1x daily), Disp: , Rfl:     trospium 60 MG capsule sustained-release 24 hr capsule, Take 1 capsule by mouth Daily., Disp: , Rfl:     Medicines reviewed  by Johana Dumas, PharmD on 4/1/2025 at 11:50 AM    Drug Interactions  None    Initial Education Provided for Specialty Medication  The patient has been provided with the following education and any applicable administration techniques (i.e. self-injection) have been demonstrated for the therapies indicated. All questions and concerns have been addressed prior to the patient receiving the medication, and the patient has verbalized comprehension of the education and any materials provided. Additional patient education shall be provided and documented upon request by the patient, provider, or payer.    WEGOVY™ (semaglutide)  Medication Expectations   Why am I taking this medication? You are taking Wegovy to help you lose weight and to help with weight-related medical problems. Wegovy can also help to reduce your risk of heart attack or stroke if you are overweight and have cardiovascular disease.    What should I expect while on this medication? You can expect some weight loss, but this may vary between patients. It should be used along with a reduced calorie diet and increased physical activity.   How does the medication work? Wegovy is an injection that works with one of your body's natural responses to weight loss.  It works like a hormone, called GLP1, that helps regulate your appetite; this helps you eat less and can lead to weight loss.  This medication also slows down food from leaving your stomach, making you feel chavira for longer.   How long will I be on this medication for? The amount of time you will be on this medication will be determined by your doctor based on your weight loss and how well you tolerate the medication. Do not abruptly stop this medication without talking to your doctor first.    How do I take this medication? Take as directed on your prescription label. Wegovy is supplied in a single-use pen for each dose and you will use a new pre-filled pen each week.  It is injected under the skin  (subcutaneously) of your stomach, thigh or upper arm. Use this medication once weekly, on the same day each week, with or without food.     What are some possible side effects? You may notice you don't feel as hungry, especially when you first start using Wegovy.  Some common side effects are nausea, stomach pain, constipation, and heartburn. Redness, itching, and/or swelling can occur where the shot was given.  You should also monitor for hypoglycemia if you are taking Wegovy with other medications that cause low blood sugar.     What happens if I miss a dose? If you miss a dose, take it as soon as you remember as long as your next scheduled dose is more than 2 days away.  If your next scheduled dose is within 2 days, take that regularly scheduled dose and skip your missed dose. If you miss more than 2 weeks of doses, call your healthcare provider to talk about how to restart your Wegovy.     Medication Safety   What are things I should warn my doctor immediately about? Do not use Wegovy if you or a family member have ever had medullary thyroid cancer (MTC) or Multiple Endocrine Neoplasia syndrome type 2 (MEN 2).  Tell your doctor if you have or have had problems with your kidneys, pancreas, or liver. Talk to your doctor if you are pregnant, planning to become pregnant, or breastfeeding or if you notice any signs/symptoms of an allergic reaction (rash, hives, difficulty breathing, etc.). Stop using Wegovy and call your doctor immediately if you have severe pain in your stomach area that will not go away.    What are things that I should be cautious of? Be cautious of any side effect from this medication. Talk to your doctor if any new ones develop or aren't getting better.   What are some medications that can interact with this one? Taking Wegovy with other medications that may also lower your blood sugar such as insulin and glipizide/glimepiride/glyburide may increase the risk of hypoglycemia. Your doctor may  reduce the dose of these medications when you start Wegovy. Always tell your doctor or pharmacist immediately if you start taking any new medications, including over-the-counter medications, vitamins, and herbal supplements.      Medication Storage/Handling   How should I handle this medication? Keep this medication out of reach of pets/children and keep the pen capped    How does this medication need to be stored? Store in the refrigerator prior to first use. After first use, the pen can be kept at room temperature for up to 28 days. Do not freeze; protect from light.    How should I dispose of this medication? Place your used needles in an approved sharps container or heavy-duty household container with a tight lid.If your doctor decides to stop this medication, take to your local police station for proper disposal. Some pharmacies also have take-back bins for medication drop-off.      Resources/Support   How can I remind myself to take this medication? You can download reminder apps to help you manage your refills or set an alarm on your phone to remind you.    Is financial support available?  [facility] can provide co-pay cards if you have commercial insurance or patient assistance if you have Medicare or no insurance.    Which vaccines are recommended for me? Talk to your doctor about these vaccines: Flu, Coronavirus (COVID-19), Pneumococcal (pneumonia), Tdap, Hepatitis B, Zoster (shingles)          Adherence and Self-Administration  Adherence related to the patient's specialty therapy was discussed with the patient. The Adherence segment of this outreach has been reviewed and updated.     Is there a concern with patient's ability to self administer the medication correctly and without issue?: No  Were any potential barriers to adherence identified during the initial assessment or patient education?: No  Are there any concerns regarding the patient's understanding of the importance of medication adherence?:  "No  Methods for Supporting Patient Adherence and/or Self-Administration: None    Open Medication Therapy Problems  No medication therapy recommendations to display    Goals of Therapy  Goals related to the patient's specialty therapy were discussed with the patient. The Patient Goals segment of this outreach has been reviewed and updated.   Goals Addressed Today        Specialty Pharmacy General Goal      Achieve and Maintain 5% Weight Loss from Starting Body Weight After 12 Weeks of Therapy     Starting Values:   Estimated body mass index is 48.54 kg/m² as calculated from the following:    Height as of 3/11/25: 182.9 cm (72.01\").    Weight as of 3/11/25: 162 kg (358 lb).    Goal Values at 12 Weeks & Maintenance: 153.9 kg                       Reassessment Plan & Follow-Up  1. Medication Therapy Changes: Begin Wegovy 0.25mg SC weekly, increase dose monthly  2. Related Plans, Therapy Recommendations, or Therapy Problems to Be Addressed: Follow to up-titrate dose based on  recommendations and patient tolerability  3. Pharmacist to perform regular assessments no more than (6) months from the previous assessment.  4. Care Coordinator to set up future refill outreaches, coordinate prescription delivery, and escalate clinical questions to pharmacist.  5. Welcome information and patient satisfaction survey to be sent by specialty pharmacy team with patient's initial fill.    Attestation  Therapeutic appropriateness: Appropriate   I attest the patient was actively involved in and has agreed to the above plan of care. If the prescribed therapy is at any point deemed not appropriate based on the current or future assessments, a consultation will be initiated with the patient's specialty care provider to determine the best course of action. The revised plan of therapy will be documented along with any required assessments and/or additional patient education provided.     Johana Dumas, PharmD, BCPS  Clinical " Specialty Pharmacist, Cardiology  4/1/2025  11:51 EDT

## 2025-04-30 ENCOUNTER — SPECIALTY PHARMACY (OUTPATIENT)
Dept: CARDIOLOGY | Facility: CLINIC | Age: 80
End: 2025-04-30
Payer: MEDICARE

## 2025-04-30 NOTE — PROGRESS NOTES
Specialty Pharmacy Patient Management Program  Cardiology Specialty Pharmacy Refill Outreach      Bo is a 79 y.o. male contacted today regarding refills of his medication(s).    Specialty medication(s) and dose(s) confirmed: Wegovy  Other medications being refilled: n/a    Refill Questions      Flowsheet Row Most Recent Value   Changes to allergies? No   Changes to medications? No   New conditions or infections since last clinic visit No   Unplanned office visit, urgent care, ED, or hospital admission in the last 4 weeks  No   How does patient/caregiver feel medication is working? Good   Financial problems or insurance changes  No   Since the previous refill, were any specialty medication doses or scheduled injections missed or delayed?  No   Does this patient require a clinical escalation to a pharmacist? No            Delivery Questions      Flowsheet Row Most Recent Value   Delivery method UPS   Delivery address verified with patient/caregiver? Yes   Delivery address Home   Number of medications in delivery 1   Medication(s) being filled and delivered Semaglutide-Weight Management (Wegovy)   Doses left of specialty medications 0   Copay verified? Yes   Copay amount $499   Copay form of payment Credit/debit on file   Delivery Date Selection 05/01/25   Signature Required No   Do you consent to receive electronic handouts?  No                   Follow-up: 21 days     Facundo Gonzalez CPhT-Adv  Pharmacy Care Coordinator  4/30/2025  10:55 EDT

## 2025-05-22 ENCOUNTER — SPECIALTY PHARMACY (OUTPATIENT)
Dept: CARDIOLOGY | Facility: CLINIC | Age: 80
End: 2025-05-22
Payer: MEDICARE

## 2025-05-22 NOTE — PROGRESS NOTES
Specialty Pharmacy Patient Management Program  Cardiology Specialty Pharmacy Refill Outreach      Bo is a 79 y.o. male contacted today regarding refills of his medication(s).    Specialty medication(s) and dose(s) confirmed: Wegovy  Other medications being refilled: n/a    Refill Questions      Flowsheet Row Most Recent Value   Changes to allergies? No   Changes to medications? No   New conditions or infections since last clinic visit No   Unplanned office visit, urgent care, ED, or hospital admission in the last 4 weeks  No   How does patient/caregiver feel medication is working? Good   Financial problems or insurance changes  No   Since the previous refill, were any specialty medication doses or scheduled injections missed or delayed?  No   Does this patient require a clinical escalation to a pharmacist? No            Delivery Questions      Flowsheet Row Most Recent Value   Delivery method UPS   Delivery address verified with patient/caregiver? Yes   Delivery address Home   Number of medications in delivery 1   Medication(s) being filled and delivered Semaglutide-Weight Management (Wegovy)   Doses left of specialty medications 0   Copay verified? Yes   Copay amount $499   Copay form of payment Credit/debit on file   Delivery Date Selection 05/23/25   Signature Required No   Do you consent to receive electronic handouts?  No                   Follow-up: 21 days     Facundo Gonzalez CPhT-Adv  Pharmacy Care Coordinator  5/22/2025  11:47 EDT

## 2025-05-29 DIAGNOSIS — I48.19 PERSISTENT ATRIAL FIBRILLATION: ICD-10-CM

## 2025-05-29 RX ORDER — AMIODARONE HYDROCHLORIDE 200 MG/1
200 TABLET ORAL DAILY
Qty: 30 TABLET | Refills: 6 | Status: SHIPPED | OUTPATIENT
Start: 2025-05-29

## 2025-06-17 ENCOUNTER — SPECIALTY PHARMACY (OUTPATIENT)
Facility: HOSPITAL | Age: 80
End: 2025-06-17
Payer: MEDICARE

## 2025-06-17 NOTE — PROGRESS NOTES
Specialty Pharmacy Patient Management Program  Cardiology Specialty Pharmacy Refill Outreach      Bo is a 79 y.o. male contacted today regarding refills of his medication(s).    Specialty medication(s) and dose(s) confirmed: Wegovy 1.7 MG  Other medications being refilled: N/A    Refill Questions      Flowsheet Row Most Recent Value   Changes to allergies? No   Changes to medications? No   New conditions or infections since last clinic visit No   Unplanned office visit, urgent care, ED, or hospital admission in the last 4 weeks  No   How does patient/caregiver feel medication is working? Very good   Financial problems or insurance changes  No   Since the previous refill, were any specialty medication doses or scheduled injections missed or delayed?  No   Does this patient require a clinical escalation to a pharmacist? No            Delivery Questions      Flowsheet Row Most Recent Value   Delivery method UPS   Delivery address verified with patient/caregiver? Yes   Delivery address Home   Number of medications in delivery 1   Medication(s) being filled and delivered Semaglutide-Weight Management (Wegovy)   Doses left of specialty medications 0   Copay verified? Yes   Copay amount $499.00 Copay card used   Copay form of payment Credit/debit on file   Delivery Date Selection 06/18/25   Signature Required No   Do you consent to receive electronic handouts?  No                   Follow-up: 21 days     Madai Delgado CPhT  Pharmacy Care Coordinator  6/17/2025  11:10 EDT

## 2025-06-18 NOTE — PROGRESS NOTES
Cardiology Outpatient Visit      Identification: Bo Perez is a 79 y.o. male who resides in [place], [place]    Reason for visit:  Loss of Consciousness (Syncope )      Subjective      Patient is a 79-year-old gentleman who returns today for follow-up at the request of his primary care provider.  We were asked to see the patient sooner than expected for syncope.  When I questioned the patient regarding this he tells me he has not passed out.  He tells me he always feels bad but did not think he felt any worse than normal.  He has been weak and having headaches.  He denies any chest pain.  According to his wife his primary care provider has been very concerned about his kidney function on recent blood work.  I was able to access his recent lab results and his creatinine was elevated at 1.69 but more concerning was his sodium was decreased at 122..  He was able to ambulate to our exam room using a walker but he was extremely weak and slow.  His blood pressures were extremely decreased 70s over 40s at best.  He is on multiple diuretics.  At his last visit he was supposed to decrease his metolazone to use as needed but he has still been taking it every day.  He does follow EP for his persistent A-fib, and frequent PVCs.  He is maintained on amiodarone therapy.  He is chronically anticoagulated with Eliquis and tolerating without any reports of active or overt bleeding.    Review of Systems   Constitutional: Positive for malaise/fatigue.   Eyes:  Negative for vision loss in left eye and vision loss in right eye.   Cardiovascular:  Negative for chest pain, dyspnea on exertion, near-syncope, orthopnea, palpitations, paroxysmal nocturnal dyspnea and syncope.   Musculoskeletal:  Negative for myalgias.   Neurological:  Positive for headaches and weakness. Negative for brief paralysis, excessive daytime sleepiness, focal weakness, numbness and paresthesias.   All other systems reviewed and are  "negative.      Allergies   Allergen Reactions    Amoxicillin Other (See Comments)     Pt states \"Made me very sick\"         Current Outpatient Medications   Medication Instructions    acetaminophen (TYLENOL) 650 mg, 3 times daily    amiodarone (PACERONE) 200 mg, Oral, Daily    apixaban (ELIQUIS) 5 mg, Oral, Every 12 Hours Scheduled    ARIPiprazole (ABILIFY) 5 mg, Daily    atorvastatin (LIPITOR) 40 mg, Oral, Daily    bumetanide (BUMEX) 2 mg, Oral, Daily    clobetasol (TEMOVATE) 0.05 % ointment 1 Application, As Needed    docusate sodium (COLACE) 50 mg, Daily    empagliflozin (JARDIANCE) 10 mg, Oral, Daily    escitalopram (LEXAPRO) 10 mg, Every Morning    fexofenadine (ALLEGRA ALLERGY) 60 mg, As Needed    gabapentin (NEURONTIN) 800 mg, 3 Times Daily    ipratropium (ATROVENT) 0.06 % nasal spray USE 2 SPRAYS IN EACH NOSTRIL EVERY DAY 30 MINUTES BEFORE DINNER    ketoconazole (NIZORAL) 2 % shampoo Weekly    metoprolol succinate XL (TOPROL-XL) 25 mg, Oral, Daily    Misc Natural Products (OSTEO BI-FLEX TRIPLE STRENGTH PO) 1 tablet, 2 Times Daily    Multiple Vitamins-Minerals (PRESERVISION AREDS 2 PO) 1 tablet, 2 Times Daily    nitroglycerin (NITROSTAT) 0.4 mg, Every 5 Minutes PRN    oxyCODONE-acetaminophen (PERCOCET)  MG per tablet 1 tablet, Oral, Every 4 Hours PRN    potassium chloride (KLOR-CON M20) 20 MEQ CR tablet 20 mEq, Oral, Daily    sacubitril-valsartan (ENTRESTO) 24-26 MG tablet 1 tablet, Oral, Every 12 Hours Scheduled    trospium 60 mg, Daily    [START ON 6/27/2025] Wegovy 1.7 mg, Subcutaneous, Weekly    [START ON 7/24/2025] Wegovy 2.4 mg, Subcutaneous, Weekly         Objective     BP (S) (!) 78/46 (BP Location: Left arm, Patient Position: Sitting, Cuff Size: Adult)   Pulse 76   Ht 182.9 cm (72\")   Wt (!) 164 kg (360 lb 12.8 oz)   SpO2 95%   BMI 48.93 kg/m²       Constitutional:       Appearance: Healthy appearance. Well-developed.   Eyes:      General: Lids are normal. No scleral icterus.     " Conjunctiva/sclera: Conjunctivae normal.   HENT:      Head: Normocephalic and atraumatic.   Neck:      Thyroid: No thyromegaly.      Vascular: No carotid bruit or JVD.   Pulmonary:      Effort: Pulmonary effort is normal.      Breath sounds: Normal breath sounds. No wheezing. No rhonchi. No rales.   Cardiovascular:      Normal rate. Regular rhythm.      Murmurs: There is no murmur.      No gallop.  No rub.   Pulses:     Intact distal pulses.   Edema:     Peripheral edema absent.   Abdominal:      General: There is no distension.      Palpations: Abdomen is soft. There is no abdominal mass.   Musculoskeletal:      Cervical back: Normal range of motion. Skin:     General: Skin is warm and dry.      Findings: No rash.   Neurological:      General: No focal deficit present.      Mental Status: Alert and oriented to person, place, and time.      Gait: Gait is intact.   Psychiatric:         Attention and Perception: Attention normal.         Mood and Affect: Mood normal.         Behavior: Behavior normal.         Result Review  (reviewed with patient):            Lab Results   Component Value Date    GLUCOSE 103 (H) 02/18/2025    BUN 19 02/18/2025    CREATININE 1.25 02/18/2025     (L) 02/18/2025    K 4.3 02/18/2025    CL 90 (L) 02/18/2025    CALCIUM 9.7 02/18/2025    PROTEINTOT 7.1 02/18/2025    ALBUMIN 4.0 02/18/2025    ALT 12 02/18/2025    AST 14 02/18/2025    ALKPHOS 61 02/18/2025    BILITOT 0.7 02/18/2025    GLOB 3.1 02/18/2025    AGRATIO 1.3 02/18/2025    BCR 15.2 02/18/2025    ANIONGAP 11.0 02/18/2025    EGFR 58.9 (L) 02/18/2025     Lab Results   Component Value Date    WBC 6.12 12/30/2024    HGB 13.3 12/30/2024    HCT 39.1 12/30/2024    MCV 95.6 12/30/2024     (L) 12/30/2024     Lab Results   Component Value Date    CHOL 125 12/30/2024    TRIG 110 12/30/2024    HDL 38 (L) 12/30/2024    LDL 43 02/18/2025     Lab Results   Component Value Date    HGBA1C 5.00 12/30/2024             Assessment      Diagnoses and all orders for this visit:    1. Coronary artery disease involving native coronary artery of native heart without angina pectoris (Primary)  Overview:  Cardiac catheterization (12/30/2024): Mild to moderate 1-vessel CAD (RCA).  This lesion was not hemodynamically significant (RFR 0.95).  Minimal left coronary disease.  LVEF 35%.  Mildly elevated LV filling pressure (LVEDP 16 mmHg)    Assessment & Plan:  Defer aspirin due to concomitant use of Eliquis  No angina symptoms      2. Frequent PVCs  Overview:  Reportedly normal coronaries on cath 2020  Monitor summer 2024, data deficit: 27% PVC burden, 89 pauses longest 6.5 seconds-all pauses occurred during sleeping hours   Cardiac catheterization (12/30/2024): Mild to moderate 1-vessel CAD (RCA).  This lesion was not hemodynamically significant (RFR 0.95).  Minimal left coronary disease.  LVEF 35%.  Mildly elevated LV filling pressure (LVEDP 16 mmHg)    Assessment & Plan:  Continue metoprolol succinate 50 mg daily  Continue amiodarone 200 mg daily  Follow-up with Dr. Chamorro      3. Persistent atrial fibrillation  Overview:  Chads Vasc 4 (age >75, CHF, CAD)  Cardiac catheterization (12/30/2024): Mild to moderate 1-vessel CAD (RCA).  This lesion was not hemodynamically significant (RFR 0.95).  Minimal left coronary disease.  LVEF 35%.  Mildly elevated LV filling pressure (LVEDP 16 mmHg)    Assessment & Plan:  Maintain normal sinus rhythm on amiodarone  Continue Eliquis for stroke prophylaxis      4. Heart failure with improved ejection fraction (HFimpEF)  Overview:  Echo at [facility] (9/2020): LVEF 55-60%  Echo at [facility] (8/16/2024): LVEF 55-60%  Echo (8/31/2024): LVEF 30%  Echo (9/6/2024): LVEF 35%  Cardiac catheterization (12/30/2024): Mild to moderate 1-vessel CAD (RCA).  This lesion was not hemodynamically significant (RFR 0.95).  Minimal left coronary disease.  LVEF 35%.  Mildly elevated LV filling pressure (LVEDP 16 mmHg)  Echo  (2/12/2025): LVEF 53%.  Aortic valve calcification with no significant stenosis or regurgitation    Assessment & Plan:  Discontinue metolazone and spironolactone due to hypotension  Decrease Bumex to 1 mg daily  Continue metoprolol succinate 25 mg daily  Continue Jardiance 10 mg daily      5. Hyperlipidemia LDL goal <50  Overview:  High intensity statin recommended due to the presence of CAD  Normal LP(a), 2025    Assessment & Plan:   Continue Lipitor 40 mg daily      6. Other specified hypotension  Assessment & Plan:  Patient extremely hypotensive 70s over 40s.  Patient taken to the emergency room  Patient likely needs IV fluids  Would recommend discontinuing current diuretics      Other orders  -     Discontinue: bumetanide (BUMEX) 2 MG tablet; Take 0.5 tablets by mouth Daily.  Dispense: 90 tablet; Refill: 3  -     bumetanide (BUMEX) 1 MG tablet; Take 2 tablets by mouth Daily.          Plan   I had extreme difficulty checking his blood pressure.  His blood pressure at best was 70s over 40s in the office.  Review of blood work shows a decreased sodium level of 122.  I have arranged for him to be taken to the emergency room for further evaluation.  I would think he would need blood work, IV fluids.  I would hold his diuretics.  If requires admission to the hospital I can evaluate him in the hospital tomorrow.      Follow-up   Return in about 3 months (around 9/24/2025), or if symptoms worsen or fail to improve.      Rafaela Jones, APRN  6/24/2025

## 2025-06-24 ENCOUNTER — OFFICE VISIT (OUTPATIENT)
Dept: CARDIOLOGY | Facility: CLINIC | Age: 80
End: 2025-06-24
Payer: MEDICARE

## 2025-06-24 ENCOUNTER — HOSPITAL ENCOUNTER (INPATIENT)
Facility: HOSPITAL | Age: 80
LOS: 7 days | Discharge: HOME | End: 2025-07-02
Attending: EMERGENCY MEDICINE | Admitting: STUDENT IN AN ORGANIZED HEALTH CARE EDUCATION/TRAINING PROGRAM
Payer: MEDICARE

## 2025-06-24 ENCOUNTER — APPOINTMENT (OUTPATIENT)
Dept: GENERAL RADIOLOGY | Facility: HOSPITAL | Age: 80
End: 2025-06-24
Payer: MEDICARE

## 2025-06-24 VITALS
OXYGEN SATURATION: 95 % | WEIGHT: 315 LBS | HEIGHT: 72 IN | HEART RATE: 76 BPM | SYSTOLIC BLOOD PRESSURE: 78 MMHG | BODY MASS INDEX: 42.66 KG/M2 | DIASTOLIC BLOOD PRESSURE: 46 MMHG

## 2025-06-24 DIAGNOSIS — E86.0 ACUTE DEHYDRATION: ICD-10-CM

## 2025-06-24 DIAGNOSIS — R79.89 ELEVATED TROPONIN: ICD-10-CM

## 2025-06-24 DIAGNOSIS — I50.32 HEART FAILURE WITH IMPROVED EJECTION FRACTION (HFIMPEF): ICD-10-CM

## 2025-06-24 DIAGNOSIS — I48.19 PERSISTENT ATRIAL FIBRILLATION: ICD-10-CM

## 2025-06-24 DIAGNOSIS — I49.3 FREQUENT PVCS: ICD-10-CM

## 2025-06-24 DIAGNOSIS — I25.10 CORONARY ARTERY DISEASE INVOLVING NATIVE CORONARY ARTERY OF NATIVE HEART WITHOUT ANGINA PECTORIS: Primary | ICD-10-CM

## 2025-06-24 DIAGNOSIS — I95.89 OTHER SPECIFIED HYPOTENSION: ICD-10-CM

## 2025-06-24 DIAGNOSIS — K62.5 RECTAL BLEEDING: ICD-10-CM

## 2025-06-24 DIAGNOSIS — I95.9 HYPOTENSION, UNSPECIFIED HYPOTENSION TYPE: ICD-10-CM

## 2025-06-24 DIAGNOSIS — N17.9 ACUTE RENAL FAILURE, UNSPECIFIED ACUTE RENAL FAILURE TYPE: Primary | ICD-10-CM

## 2025-06-24 DIAGNOSIS — E87.1 ACUTE HYPONATREMIA: ICD-10-CM

## 2025-06-24 DIAGNOSIS — E78.5 HYPERLIPIDEMIA LDL GOAL <50: ICD-10-CM

## 2025-06-24 LAB
ALBUMIN SERPL-MCNC: 4.1 G/DL (ref 3.5–5.2)
ALBUMIN/GLOB SERPL: 1.4 G/DL
ALP SERPL-CCNC: 54 U/L (ref 39–117)
ALT SERPL W P-5'-P-CCNC: 21 U/L (ref 1–41)
ANION GAP SERPL CALCULATED.3IONS-SCNC: 13.4 MMOL/L (ref 5–15)
ANION GAP SERPL CALCULATED.3IONS-SCNC: 13.6 MMOL/L (ref 5–15)
AST SERPL-CCNC: 22 U/L (ref 1–40)
B PARAPERT DNA SPEC QL NAA+PROBE: NOT DETECTED
B PERT DNA SPEC QL NAA+PROBE: NOT DETECTED
BACTERIA UR QL AUTO: ABNORMAL /HPF
BASOPHILS # BLD AUTO: 0.03 10*3/MM3 (ref 0–0.2)
BASOPHILS NFR BLD AUTO: 0.3 % (ref 0–1.5)
BILIRUB SERPL-MCNC: 1 MG/DL (ref 0–1.2)
BILIRUB UR QL STRIP: NEGATIVE
BUN SERPL-MCNC: 58.8 MG/DL (ref 8–23)
BUN SERPL-MCNC: 62.7 MG/DL (ref 8–23)
BUN/CREAT SERPL: 24.9 (ref 7–25)
BUN/CREAT SERPL: 26.5 (ref 7–25)
C PNEUM DNA NPH QL NAA+NON-PROBE: NOT DETECTED
CALCIUM SPEC-SCNC: 8.9 MG/DL (ref 8.6–10.5)
CALCIUM SPEC-SCNC: 9.6 MG/DL (ref 8.6–10.5)
CHLORIDE SERPL-SCNC: 82 MMOL/L (ref 98–107)
CHLORIDE SERPL-SCNC: 85 MMOL/L (ref 98–107)
CLARITY UR: CLEAR
CO2 SERPL-SCNC: 26.6 MMOL/L (ref 22–29)
CO2 SERPL-SCNC: 28.4 MMOL/L (ref 22–29)
COLOR UR: YELLOW
CREAT SERPL-MCNC: 2.22 MG/DL (ref 0.76–1.27)
CREAT SERPL-MCNC: 2.52 MG/DL (ref 0.76–1.27)
DEPRECATED RDW RBC AUTO: 49.1 FL (ref 37–54)
EGFRCR SERPLBLD CKD-EPI 2021: 25.4 ML/MIN/1.73
EGFRCR SERPLBLD CKD-EPI 2021: 29.6 ML/MIN/1.73
EOSINOPHIL # BLD AUTO: 0.21 10*3/MM3 (ref 0–0.4)
EOSINOPHIL NFR BLD AUTO: 2.3 % (ref 0.3–6.2)
ERYTHROCYTE [DISTWIDTH] IN BLOOD BY AUTOMATED COUNT: 14.3 % (ref 12.3–15.4)
FLUAV SUBTYP SPEC NAA+PROBE: NOT DETECTED
FLUBV RNA ISLT QL NAA+PROBE: NOT DETECTED
GEN 5 1HR TROPONIN T REFLEX: 46 NG/L
GLOBULIN UR ELPH-MCNC: 3 GM/DL
GLUCOSE SERPL-MCNC: 104 MG/DL (ref 65–99)
GLUCOSE SERPL-MCNC: 97 MG/DL (ref 65–99)
GLUCOSE UR STRIP-MCNC: NEGATIVE MG/DL
HADV DNA SPEC NAA+PROBE: NOT DETECTED
HCOV 229E RNA SPEC QL NAA+PROBE: NOT DETECTED
HCOV HKU1 RNA SPEC QL NAA+PROBE: NOT DETECTED
HCOV NL63 RNA SPEC QL NAA+PROBE: NOT DETECTED
HCOV OC43 RNA SPEC QL NAA+PROBE: NOT DETECTED
HCT VFR BLD AUTO: 33.3 % (ref 37.5–51)
HGB BLD-MCNC: 11.8 G/DL (ref 13–17.7)
HGB UR QL STRIP.AUTO: NEGATIVE
HMPV RNA NPH QL NAA+NON-PROBE: NOT DETECTED
HOLD SPECIMEN: NORMAL
HPIV1 RNA ISLT QL NAA+PROBE: NOT DETECTED
HPIV2 RNA SPEC QL NAA+PROBE: NOT DETECTED
HPIV3 RNA NPH QL NAA+PROBE: NOT DETECTED
HPIV4 P GENE NPH QL NAA+PROBE: NOT DETECTED
HYALINE CASTS UR QL AUTO: ABNORMAL /LPF
IMM GRANULOCYTES # BLD AUTO: 0.05 10*3/MM3 (ref 0–0.05)
IMM GRANULOCYTES NFR BLD AUTO: 0.6 % (ref 0–0.5)
KETONES UR QL STRIP: NEGATIVE
LEUKOCYTE ESTERASE UR QL STRIP.AUTO: ABNORMAL
LYMPHOCYTES # BLD AUTO: 1.52 10*3/MM3 (ref 0.7–3.1)
LYMPHOCYTES NFR BLD AUTO: 16.9 % (ref 19.6–45.3)
M PNEUMO IGG SER IA-ACNC: NOT DETECTED
MAGNESIUM SERPL-MCNC: 2.3 MG/DL (ref 1.6–2.4)
MCH RBC QN AUTO: 33.6 PG (ref 26.6–33)
MCHC RBC AUTO-ENTMCNC: 35.4 G/DL (ref 31.5–35.7)
MCV RBC AUTO: 94.9 FL (ref 79–97)
MONOCYTES # BLD AUTO: 0.61 10*3/MM3 (ref 0.1–0.9)
MONOCYTES NFR BLD AUTO: 6.8 % (ref 5–12)
NEUTROPHILS NFR BLD AUTO: 6.59 10*3/MM3 (ref 1.7–7)
NEUTROPHILS NFR BLD AUTO: 73.1 % (ref 42.7–76)
NITRITE UR QL STRIP: NEGATIVE
NRBC BLD AUTO-RTO: 0 /100 WBC (ref 0–0.2)
PH UR STRIP.AUTO: 7.5 [PH] (ref 5–8)
PLATELET # BLD AUTO: 175 10*3/MM3 (ref 140–450)
PMV BLD AUTO: 9 FL (ref 6–12)
POTASSIUM SERPL-SCNC: 4 MMOL/L (ref 3.5–5.2)
POTASSIUM SERPL-SCNC: 4.1 MMOL/L (ref 3.5–5.2)
PROT SERPL-MCNC: 7.1 G/DL (ref 6–8.5)
PROT UR QL STRIP: NEGATIVE
RBC # BLD AUTO: 3.51 10*6/MM3 (ref 4.14–5.8)
RBC # UR STRIP: ABNORMAL /HPF
REF LAB TEST METHOD: ABNORMAL
RHINOVIRUS RNA SPEC NAA+PROBE: NOT DETECTED
RSV RNA NPH QL NAA+NON-PROBE: NOT DETECTED
SARS-COV-2 RNA RESP QL NAA+PROBE: NOT DETECTED
SODIUM SERPL-SCNC: 124 MMOL/L (ref 136–145)
SODIUM SERPL-SCNC: 125 MMOL/L (ref 136–145)
SP GR UR STRIP: 1.01 (ref 1–1.03)
SQUAMOUS #/AREA URNS HPF: ABNORMAL /HPF
TROPONIN T % DELTA: -27
TROPONIN T NUMERIC DELTA: -17 NG/L
TROPONIN T SERPL HS-MCNC: 39 NG/L
TROPONIN T SERPL HS-MCNC: 63 NG/L
UROBILINOGEN UR QL STRIP: ABNORMAL
WBC # UR STRIP: ABNORMAL /HPF
WBC NRBC COR # BLD AUTO: 9.01 10*3/MM3 (ref 3.4–10.8)
WHOLE BLOOD HOLD COAG: NORMAL
WHOLE BLOOD HOLD SPECIMEN: NORMAL

## 2025-06-24 PROCEDURE — 81001 URINALYSIS AUTO W/SCOPE: CPT | Performed by: EMERGENCY MEDICINE

## 2025-06-24 PROCEDURE — 87205 SMEAR GRAM STAIN: CPT | Performed by: NURSE PRACTITIONER

## 2025-06-24 PROCEDURE — G0378 HOSPITAL OBSERVATION PER HR: HCPCS

## 2025-06-24 PROCEDURE — 84484 ASSAY OF TROPONIN QUANT: CPT | Performed by: EMERGENCY MEDICINE

## 2025-06-24 PROCEDURE — 84300 ASSAY OF URINE SODIUM: CPT | Performed by: NURSE PRACTITIONER

## 2025-06-24 PROCEDURE — 83935 ASSAY OF URINE OSMOLALITY: CPT | Performed by: NURSE PRACTITIONER

## 2025-06-24 PROCEDURE — 83930 ASSAY OF BLOOD OSMOLALITY: CPT | Performed by: NURSE PRACTITIONER

## 2025-06-24 PROCEDURE — 83735 ASSAY OF MAGNESIUM: CPT | Performed by: EMERGENCY MEDICINE

## 2025-06-24 PROCEDURE — 25810000003 SODIUM CHLORIDE 0.9 % SOLUTION: Performed by: EMERGENCY MEDICINE

## 2025-06-24 PROCEDURE — 93005 ELECTROCARDIOGRAM TRACING: CPT | Performed by: NURSE PRACTITIONER

## 2025-06-24 PROCEDURE — 25810000003 SODIUM CHLORIDE 0.9 % SOLUTION: Performed by: NURSE PRACTITIONER

## 2025-06-24 PROCEDURE — 0202U NFCT DS 22 TRGT SARS-COV-2: CPT | Performed by: EMERGENCY MEDICINE

## 2025-06-24 PROCEDURE — 99214 OFFICE O/P EST MOD 30 MIN: CPT | Performed by: NURSE PRACTITIONER

## 2025-06-24 PROCEDURE — 99291 CRITICAL CARE FIRST HOUR: CPT

## 2025-06-24 PROCEDURE — 80053 COMPREHEN METABOLIC PANEL: CPT | Performed by: EMERGENCY MEDICINE

## 2025-06-24 PROCEDURE — 84484 ASSAY OF TROPONIN QUANT: CPT | Performed by: NURSE PRACTITIONER

## 2025-06-24 PROCEDURE — 99222 1ST HOSP IP/OBS MODERATE 55: CPT | Performed by: NURSE PRACTITIONER

## 2025-06-24 PROCEDURE — 85025 COMPLETE CBC W/AUTO DIFF WBC: CPT | Performed by: EMERGENCY MEDICINE

## 2025-06-24 PROCEDURE — 82570 ASSAY OF URINE CREATININE: CPT | Performed by: NURSE PRACTITIONER

## 2025-06-24 PROCEDURE — 71045 X-RAY EXAM CHEST 1 VIEW: CPT

## 2025-06-24 PROCEDURE — 36415 COLL VENOUS BLD VENIPUNCTURE: CPT

## 2025-06-24 PROCEDURE — 93005 ELECTROCARDIOGRAM TRACING: CPT | Performed by: EMERGENCY MEDICINE

## 2025-06-24 RX ORDER — BUMETANIDE 1 MG/1
2 TABLET ORAL DAILY
Status: ON HOLD
Start: 2025-06-24

## 2025-06-24 RX ORDER — SODIUM CHLORIDE 9 MG/ML
75 INJECTION, SOLUTION INTRAVENOUS CONTINUOUS
Status: DISCONTINUED | OUTPATIENT
Start: 2025-06-24 | End: 2025-06-26

## 2025-06-24 RX ORDER — SODIUM CHLORIDE 0.9 % (FLUSH) 0.9 %
10 SYRINGE (ML) INJECTION AS NEEDED
Status: DISCONTINUED | OUTPATIENT
Start: 2025-06-24 | End: 2025-07-02 | Stop reason: HOSPADM

## 2025-06-24 RX ORDER — FEXOFENADINE HCL 60 MG/1
60 TABLET, FILM COATED ORAL AS NEEDED
Status: ON HOLD | COMMUNITY

## 2025-06-24 RX ORDER — BUMETANIDE 2 MG/1
1 TABLET ORAL DAILY
Qty: 90 TABLET | Refills: 3 | Status: SHIPPED | OUTPATIENT
Start: 2025-06-24 | End: 2025-06-24

## 2025-06-24 RX ADMIN — SODIUM CHLORIDE 75 ML/HR: 9 INJECTION, SOLUTION INTRAVENOUS at 20:05

## 2025-06-24 RX ADMIN — SODIUM CHLORIDE 1000 ML: 9 INJECTION, SOLUTION INTRAVENOUS at 15:09

## 2025-06-24 NOTE — ED PROVIDER NOTES
Subjective   History of Present Illness  Differential includes dehydration, renal insufficiency, electrolyte abnormality, sepsis, other unspecified etiology.    No infectious etiology was identified on workup.  Viral respiratory panel is negative.  Urine shows large leuk esterase with too number to count white cells but no bacteria.  She denies any 79-year-old male with a known history of CHF who presents for evaluation of low blood pressure and low sodium level.  The patient actually had a routine follow-up visit with his cardiologist today.  He reports that he always feels fatigued in the day does not really feel different than his normal baseline.  When he was evaluated in the cardiology clinic he was identified to have a systolic blood pressure of 75.  Previous labs were reviewed at that time and on 6/11/2025 had a sodium of 122.  He has a known underlying history of nonobstructive coronary artery disease and history of frequent PVCs he typically takes a diuretic.  He has recently had some his diuretics discontinued because of an outpatient creatinine of 1.69.  Patient denies blood in the stool.  He denies fever, bodies, or chills.  No sick contacts.  No chest pain.  No abdominal pain.  He has chronic lower extremity edema which he reports is unchanged relative to baseline.  He exhibits normal mentation answers questions appropriate.  No other acute complaints.  His initial blood pressure upon triage evaluation in the ER was 80 systolic.      Review of Systems   Constitutional:  Positive for activity change and fatigue. Negative for chills and fever.   HENT:  Negative for congestion, ear pain, postnasal drip, sinus pressure and sore throat.    Eyes:  Negative for pain, redness and visual disturbance.   Respiratory:  Negative for cough, chest tightness and shortness of breath.    Cardiovascular:  Negative for chest pain, palpitations and leg swelling.   Gastrointestinal:  Negative for abdominal pain, anal  "bleeding, blood in stool, diarrhea, nausea and vomiting.   Endocrine: Negative for polydipsia and polyuria.   Genitourinary:  Negative for difficulty urinating, dysuria, frequency and urgency.   Musculoskeletal:  Negative for arthralgias, back pain and neck pain.   Skin:  Negative for pallor and rash.   Allergic/Immunologic: Negative for environmental allergies and immunocompromised state.   Neurological:  Negative for dizziness, weakness and headaches.   Hematological:  Negative for adenopathy.   Psychiatric/Behavioral:  Negative for confusion, self-injury and suicidal ideas. The patient is not nervous/anxious.    All other systems reviewed and are negative.      Past Medical History:   Diagnosis Date    A-fib     Arrhythmia     Arthritis     Hard to intubate     HTN (hypertension) 06/22/2023    Target blood pressure <130/80 mmHg      Hypertension     Sleep apnea     NOT USING CPAP       Allergies   Allergen Reactions    Amoxicillin Other (See Comments)     Pt states \"Made me very sick\"       Past Surgical History:   Procedure Laterality Date    BLADDER SURGERY  04/14/2016    STIMULATOR IMPLANTED, NEW ELECTRODE 08/2022    CARDIAC ABLATION  2013    AFIB    CARDIAC CATHETERIZATION N/A 9/5/2024    Procedure: CASE ABORT;  Surgeon: Yasir Canales IV, MD;  Location:  [facility];  Service: Cardiovascular;  Laterality: N/A;    CARDIAC CATHETERIZATION N/A 12/30/2024    Procedure: Left Heart Cath;  Surgeon: Yasir Canales IV, MD;  Location:  [facility];  Service: Cardiovascular;  Laterality: N/A;    CARDIAC CATHETERIZATION  12/30/2024    Procedure: Functional Flow [place];  Surgeon: Yasir Canales IV, MD;  Location:  [facility] INVASIVE LOCATION;  Service: Cardiovascular;;    CARPAL TUNNEL RELEASE Right 2002    CHOLECYSTECTOMY  2011    COLONOSCOPY      DENTAL PROCEDURE  1962    FINGER SURGERY Left 2000    FOREFINGER BONE TRIMMED    HEMORRHOIDECTOMY  1973    " KNEE ARTHROSCOPY Left 2008    MOUTH SURGERY  2008    FOR SLEEP APNEA    SPINAL FUSION  2007    L3 LUMBAR SPINAL STENOSIS    TONSILLECTOMY      TOTAL KNEE ARTHROPLASTY Left 2009    TOTAL KNEE ARTHROPLASTY Right 2023    Procedure: TOTAL KNEE ARTHROPLASTY - RIGHT;  Surgeon: Leland Elizalde MD;  [facility];  Service: Orthopedics;  Laterality: Right;       Family History   Problem Relation Age of Onset    Asthma Mother     Alcohol abuse Father     Heart disease Father        Social History     Socioeconomic History    Marital status:    Tobacco Use    Smoking status: Former     Current packs/day: 0.00     Average packs/day: 3.0 packs/day for 24.0 years (72.0 ttl pk-yrs)     Types: Cigarettes     Start date: 1960     Quit date:      Years since quittin.5     Passive exposure: Never    Smokeless tobacco: Never    Tobacco comments:     QUIT 40 YRS AGO PER PT   Vaping Use    Vaping status: Never Used    Passive vaping exposure: Yes   Substance and Sexual Activity    Alcohol use: Not Currently    Drug use: Never    Sexual activity: Not Currently     Partners: Female           Objective   Physical Exam  Vitals and nursing note reviewed.   Constitutional:       General: He is not in acute distress.     Appearance: Normal appearance. He is well-developed. He is not toxic-appearing or diaphoretic.   HENT:      Head: Normocephalic and atraumatic.      Right Ear: External ear normal.      Left Ear: External ear normal.      Nose: Nose normal.   Eyes:      General: Lids are normal.      Pupils: Pupils are equal, round, and reactive to light.   Neck:      Trachea: No tracheal deviation.   Cardiovascular:      Rate and Rhythm: Normal rate and regular rhythm.      Pulses: No decreased pulses.      Heart sounds: Normal heart sounds. No murmur heard.     No friction rub. No gallop.   Pulmonary:      Effort: Pulmonary effort is normal. No respiratory distress.      Breath sounds: Normal breath  sounds. No decreased breath sounds, wheezing, rhonchi or rales.   Abdominal:      General: Bowel sounds are normal.      Palpations: Abdomen is soft.      Tenderness: There is no abdominal tenderness. There is no guarding or rebound.   Musculoskeletal:         General: No deformity. Normal range of motion.      Cervical back: Normal range of motion and neck supple.      Right lower leg: 3+ Pitting Edema present.      Left lower leg: 3+ Pitting Edema present.   Lymphadenopathy:      Cervical: No cervical adenopathy.   Skin:     General: Skin is warm and dry.      Findings: No rash.   Neurological:      Mental Status: He is alert and oriented to person, place, and time.      Cranial Nerves: No cranial nerve deficit.      Sensory: No sensory deficit.   Psychiatric:         Speech: Speech normal.         Behavior: Behavior normal.         Thought Content: Thought content normal.         Judgment: Judgment normal.         Critical Care    Performed by: Rae Rangel MD  Authorized by: Rae Rangel MD    Critical care provider statement:     Critical care time (minutes):  45    Critical care time was exclusive of:  Separately billable procedures and treating other patients    Critical care was necessary to treat or prevent imminent or life-threatening deterioration of the following conditions:  Dehydration    Critical care was time spent personally by me on the following activities:  Development of treatment plan with patient or surrogate, discussions with consultants, evaluation of patient's response to treatment, blood draw for specimens, examination of patient, obtaining history from patient or surrogate, ordering and performing treatments and interventions, ordering and review of laboratory studies, ordering and review of radiographic studies, pulse oximetry, re-evaluation of patient's condition and review of old charts    I assumed direction of critical care for this patient from another provider in  my specialty: no      Care discussed with: admitting provider               ED Course  ED Course as of 06/24/25 2210   Taya UrenaJun 24, 2025   1614 EKG performed 6/24/2025 interpreted by me shows A-fib, left bundle branch block, appropriate discordance, significant motion artifact, no acute ischemic changes. [NS]   1803 The patient has a history of CHF and was recently receiving multiple diuretics.  At this time they have all been discontinued.  The patient had recent labs checked by primary care physician which showed low sodium and elevated creatinine and therefore the patient was referred to cardiology.  The patient was sent here to the ER from the cardiology clinic due to low blood pressure with systolics in the 70s, hyponatremia.  The patient was given IV fluids here and the systolic improved into the 90s.  The patient actually states that he does not feel any different than his baseline.  He does not have fever or infectious symptoms.  Labs show worsening kidney function, hyponatremia, and elevated troponin most likely a product of worsened kidney function.  I feel this is dehydration secondary to overdiuresis.  I would like to admit for slow fluid resuscitation given history of fluid overload. [NS]      ED Course User Index  [NS] Rae Rangel MD                                                       Medical Decision Making  Differential includes sepsis, dehydration, renal sufficiency, electrolyte abnormality, other unspecified etiology.    Viral respiratory panel is negative.    Urine shows large leuk esterase with too numerous to count white cells but no bacteria.  The patient denies any urinary symptoms.    Labs show elevated creatinine consistent with acute renal insufficiency.  IV fluids have been initiated for hydration.  The patient's systolic blood pressure was in the 70s in the cardiology office and improved into the 90s and eventually to greater than 100 after IV fluid resuscitation.    The  patient's mentation remained well throughout the ER course.    Chest x-ray interpreted by myself shows no acute abnormalities.    I discussed the patient with the hospitalist, Dr. Echevarria, who will consult for admission.    Problems Addressed:  Acute dehydration: complicated acute illness or injury with systemic symptoms that poses a threat to life or bodily functions  Acute hyponatremia: complicated acute illness or injury with systemic symptoms  Acute renal failure, unspecified acute renal failure type: complicated acute illness or injury with systemic symptoms  Elevated troponin: complicated acute illness or injury with systemic symptoms  Hypotension, unspecified hypotension type: complicated acute illness or injury with systemic symptoms that poses a threat to life or bodily functions    Amount and/or Complexity of Data Reviewed  External Data Reviewed: labs, radiology and ECG.  Labs: ordered. Decision-making details documented in ED Course.  Radiology: ordered and independent interpretation performed. Decision-making details documented in ED Course.  ECG/medicine tests: ordered. Decision-making details documented in ED Course.    Risk  Prescription drug management.  Decision regarding hospitalization.        Final diagnoses:   Acute renal failure, unspecified acute renal failure type   Acute dehydration   Acute hyponatremia   Hypotension, unspecified hypotension type   Elevated troponin       ED Disposition  ED Disposition       ED Disposition   Decision to Admit    Condition   --    Comment   Level of Care: Telemetry [5]   Diagnosis: GÓMEZ (acute kidney injury) [[id number]]   Is patient appropriate for Inpatient Observation Unit?: No [0]                 No follow-up provider specified.       Medication List      No changes were made to your prescriptions during this visit.            Rae Rangel MD  06/24/25 2013

## 2025-06-24 NOTE — H&P
[facility] Medicine Services  HISTORY AND PHYSICAL    Patient Name: Bo Perez  : 1945  MRN: [id number]  Primary Care Physician: Mario Rowland MD  Date of admission: 2025    Subjective   Subjective     Chief Complaint:  Hypotension     HPI:  Bo Perez is a 79 y.o. male with PMH significant for A-fib on Eliquis, CAD, HFimpEF, dyslipidemia, who presents to the ED with complaint of hypotension.    History of Present Illness  Reason for Admit: Low blood pressure.    The patient presents to the emergency department for evaluation of low blood pressure. He was referred to the emergency department due to hypotension, which was noted during a recent visit to his cardiologist. He reports no significant issues with his blood pressure at home, which typically measures in the high 90s. However, he observed a notable decrease in his blood pressure today, although he does not experience any associated symptoms such as weakness, lightheadedness, dizziness, or chest pain. He also reports no instances of orthostatic hypotension or leg soreness. He does report persistent leg swelling, which he considers normal for him, and no exacerbation of his usual shortness of breath. He is currently on diuretic therapy, with the most recent dose administered this morning.        Review of Systems   Constitutional:  Positive for activity change. Negative for appetite change, chills, diaphoresis, fatigue, fever and unexpected weight change.   HENT: Negative.  Negative for congestion, sore throat and trouble swallowing.    Eyes: Negative.  Negative for visual disturbance.   Respiratory:  Negative for cough, chest tightness, shortness of breath and wheezing.    Cardiovascular:  Positive for leg swelling. Negative for chest pain and palpitations.   Gastrointestinal: Negative.  Negative for abdominal distention, constipation, diarrhea, nausea and vomiting.   Endocrine: Negative.  Negative for  polydipsia and polyphagia.   Genitourinary: Negative.  Negative for decreased urine volume, difficulty urinating, dysuria, frequency and urgency.   Musculoskeletal:  Positive for arthralgias, back pain and gait problem. Negative for myalgias and neck pain.   Skin: Negative.  Negative for color change, pallor, rash and wound.   Allergic/Immunologic: Negative.  Negative for immunocompromised state.   Neurological:  Positive for weakness. Negative for dizziness, syncope, facial asymmetry, speech difficulty, light-headedness, numbness and headaches.   Hematological: Negative.  Does not bruise/bleed easily.   Psychiatric/Behavioral: Negative.  Negative for confusion. The patient is not nervous/anxious.         Personal History     Past Medical History:   Diagnosis Date    A-fib     Arrhythmia     Arthritis     Hard to intubate     HTN (hypertension) 06/22/2023    Target blood pressure <130/80 mmHg      Hypertension     Sleep apnea     NOT USING CPAP       Past Surgical History:   Procedure Laterality Date    BLADDER SURGERY  04/14/2016    STIMULATOR IMPLANTED, NEW ELECTRODE 08/2022    CARDIAC ABLATION  2013    AFIB    CARDIAC CATHETERIZATION N/A 9/5/2024    Procedure: CASE ABORT;  Surgeon: Yasir Canales IV, MD;  Location:  [facility];  Service: Cardiovascular;  Laterality: N/A;    CARDIAC CATHETERIZATION N/A 12/30/2024    Procedure: Left Heart Cath;  Surgeon: Yasir Canales IV, MD;  Location:  [facility];  Service: Cardiovascular;  Laterality: N/A;    CARDIAC CATHETERIZATION  12/30/2024    Procedure: Functional Flow Energy;  Surgeon: Yasir Canales IV, MD;  Location:  [facility];  Service: Cardiovascular;;    CARPAL TUNNEL RELEASE Right 2002    CHOLECYSTECTOMY  2011    COLONOSCOPY      DENTAL PROCEDURE  1962    FINGER SURGERY Left 2000    FOREFINGER BONE TRIMMED    HEMORRHOIDECTOMY  1973    KNEE ARTHROSCOPY Left 2008    MOUTH SURGERY  " 2008    FOR SLEEP APNEA    SPINAL FUSION  2007    L3 LUMBAR SPINAL STENOSIS    TONSILLECTOMY  1954    TOTAL KNEE ARTHROPLASTY Left 2009    TOTAL KNEE ARTHROPLASTY Right 6/22/2023    Procedure: TOTAL KNEE ARTHROPLASTY - RIGHT;  Surgeon: Leland Elizalde MD;  Location: [facility];  Service: Orthopedics;  Laterality: Right;       Family History:  family history includes Alcohol abuse in his father; Asthma in his mother; Heart disease in his father.     Social History:  reports that he quit smoking about 41 years ago. His smoking use included cigarettes. He started smoking about 65 years ago. He has a 72 pack-year smoking history. He has never been exposed to tobacco smoke. He has never used smokeless tobacco. He reports that he does not currently use alcohol. He reports that he does not use drugs.  Social History     Social History Narrative    Not on file       Medications:  ARIPiprazole, Misc Natural Products, Multiple Vitamins-Minerals, Semaglutide-Weight Management, acetaminophen, amiodarone, apixaban, atorvastatin, bumetanide, clobetasol, docusate sodium, empagliflozin, escitalopram, fexofenadine, gabapentin, ipratropium, ketoconazole, metoprolol succinate XL, nitroglycerin, oxyCODONE-acetaminophen, potassium chloride, sacubitril-valsartan, and trospium    Allergies   Allergen Reactions    Amoxicillin Other (See Comments)     Pt states \"Made me very sick\"       Objective   Objective     Vital Signs:   Temp:  [98.4 °F (36.9 °C)] 98.4 °F (36.9 °C)  Heart Rate:  [55-76] 65  Resp:  [18] 18  BP: ()/(43-69) 102/53    Physical Exam   Constitutional: Awake, alert, no acute distress   Eyes: PERRLA, sclerae anicteric, no conjunctival injection  HENT: NCAT, mucous membranes moist  Neck: Supple, no thyromegaly, no lymphadenopathy, trachea midline  Respiratory: Clear, decreased lower lobes to auscultation bilaterally, nonlabored respirations   Cardiovascular: RRR, no murmurs, rubs, or gallops, palpable pedal pulses " bilaterally  Gastrointestinal: Positive bowel sounds, soft, nontender, nondistended  Musculoskeletal: moderate bilateral lower extrimity edema, no clubbing or cyanosis to extremities  Psychiatric: Appropriate affect, cooperative  Neurologic: Oriented x 3, strength symmetric in all extremities, Cranial Nerves grossly intact to confrontation, speech clear  Skin: No rashes    Physical Exam         Result Review:  I have personally reviewed the results from the time of this admission to 6/24/2025 20:03 EDT and agree with these findings:  [x]  Laboratory list / accordion  [x]  Microbiology  [x]  Radiology  [x]  EKG/Telemetry   []  Cardiology/Vascular   []  Pathology  [x]  Old records  []  Other:  Most notable findings include:     Results         LAB RESULTS:      Lab 06/24/25  1449   WBC 9.01   HEMOGLOBIN 11.8*   HEMATOCRIT 33.3*   PLATELETS 175   NEUTROS ABS 6.59   IMMATURE GRANS (ABS) 0.05   LYMPHS ABS 1.52   MONOS ABS 0.61   EOS ABS 0.21   MCV 94.9         Lab 06/24/25  1449   SODIUM 124*   POTASSIUM 4.0   CHLORIDE 82*   CO2 28.4   ANION GAP 13.6   BUN 62.7*   CREATININE 2.52*   EGFR 25.4*   GLUCOSE 104*   CALCIUM 9.6   MAGNESIUM 2.3         Lab 06/24/25  1449   TOTAL PROTEIN 7.1   ALBUMIN 4.1   GLOBULIN 3.0   ALT (SGPT) 21   AST (SGOT) 22   BILIRUBIN 1.0   ALK PHOS 54         Lab 06/24/25  1557 06/24/25  1449   HSTROP T 46* 63*                 Brief Urine Lab Results  (Last result in the past 365 days)        Color   Clarity   Blood   Leuk Est   Nitrite   Protein   CREAT   Urine HCG        06/24/25 1634 Yellow   Clear   Negative   Large (3+)   Negative   Negative                 Microbiology Results (last 10 days)       Procedure Component Value - Date/Time    COVID PRE-OP / PRE-PROCEDURE SCREENING ORDER (NO ISOLATION) - Swab, Nasopharynx [[id number]]  (Normal) Collected: 06/24/25 1510    Lab Status: Final result Specimen: Swab from Nasopharynx Updated: 06/24/25 1621    Narrative:      The following orders were  created for panel order COVID PRE-OP / PRE-PROCEDURE SCREENING ORDER (NO ISOLATION) - Swab, Nasopharynx.  Procedure                               Abnormality         Status                     ---------                               -----------         ------                     Respiratory Panel PCR w/...[[id number]]  Normal              Final result                 Please view results for these tests on the individual orders.    Respiratory Panel PCR w/COVID-19(SARS-CoV-2) [facility] In-House, NP Swab in UTM/VTM, 2 HR TAT - Swab, Nasopharynx [[id number]]  (Normal) Collected: 06/24/25 1510    Lab Status: Final result Specimen: Swab from Nasopharynx Updated: 06/24/25 1621     ADENOVIRUS, PCR Not Detected     Coronavirus 229E Not Detected     Coronavirus HKU1 Not Detected     Coronavirus NL63 Not Detected     Coronavirus OC43 Not Detected     COVID19 Not Detected     Human Metapneumovirus Not Detected     Human Rhinovirus/Enterovirus Not Detected     Influenza A PCR Not Detected     Influenza B PCR Not Detected     Parainfluenza Virus 1 Not Detected     Parainfluenza Virus 2 Not Detected     Parainfluenza Virus 3 Not Detected     Parainfluenza Virus 4 Not Detected     RSV, PCR Not Detected     Bordetella pertussis pcr Not Detected     Bordetella parapertussis PCR Not Detected     Chlamydophila pneumoniae PCR Not Detected     Mycoplasma pneumo by PCR Not Detected    Narrative:      In the setting of a positive respiratory panel with a viral infection PLUS a negative procalcitonin without other underlying concern for bacterial infection, consider observing off antibiotics or discontinuation of antibiotics and continue supportive care. If the respiratory panel is positive for atypical bacterial infection (Bordetella pertussis, Chlamydophila pneumoniae, or Mycoplasma pneumoniae), consider antibiotic de-escalation to target atypical bacterial infection.            XR Chest 1 View  Result Date: 6/24/2025  XR  CHEST 1 VW Date of Exam: 6/24/2025 3:04 PM EDT Indication: Weak/Dizzy/AMS triage protocol Comparison: [facility] chest x-ray 11/15/2024 Findings: The patient's chin partially obscures evaluation of the upper chest. Lungs are adequately expanded and appear clear. Cardiomediastinal contours are stable.     Impression: Impression: No acute cardiopulmonary abnormality is identified. Electronically Signed: Radha Lawrence MD  6/24/2025 3:44 PM EDT  Workstation ID: [id number]      Results for orders placed during the hospital encounter of 02/12/25    Adult Transthoracic Echo Limited W/ Cont if Necessary Per Protocol    Interpretation Summary    Left ventricular systolic function is normal. Estimated left ventricular EF = 52%    Left ventricular diastolic function is consistent with (grade I) impaired relaxation.    There is moderate calcification of the aortic valve.  There is no significant aortic valve stenosis or regurgitation present.      Assessment & Plan   Assessment & Plan       GÓMEZ (acute kidney injury)    Chronic anticoagulation    Persistent atrial fibrillation    Hyperlipidemia LDL goal <50    Heart failure with improved ejection fraction (HFimpEF)    Coronary artery disease involving native coronary artery of native heart without angina pectoris    Hypotension    Acute hyponatremia    Dehydration    Elevated troponin      Assessment & Plan    1. Acute kidney injury (GÓMEZ).  - Likely secondary to dehydration  - Urine studies will be conducted  - Gentle IV hydration will be administered overnight  - BMP will be done in the morning    2. Hyponatremia.  - Likely secondary to dehydration and excessive diuretic use  - Serial BMPs will be conducted to monitor sodium levels  - Gentle IV fluid will be administered overnight to slowly increase sodium levels    3. Hypotension.  - Blood pressure remains low  - Diuretics and antihypertensives will be held  - Improved in the ED with an IV fluid bolus  - Outpatient follow-up with  cardiology is advised  -reports last dose of diuretic was this am    4. Elevated troponin   - denies chest pain   - trending down,continue to trend   - Trend EKG     5. Persistent atrial fibrillation.  - Currently rate controlled and on Eliquis  - Amiodarone will be continued    6. Heart failure with improved ejection fraction (EF).  - Jardiance and diuretics will be held secondary to GÓMEZ and hypotension  - Daily weight monitoring is advised    7. Coronary artery disease (CAD).  - Metoprolol will be held secondary to hypotension and may be reintroduced as appropriate    8. Obesity  -  on Wegovy         DVT prophylaxis:  On Eliquis    CODE STATUS:    Code Status (Patient has no pulse and is not breathing): CPR (Attempt to Resuscitate)  Medical Interventions (Patient has pulse or is breathing): Full Support  Level Of Support Discussed With: Patient      Expected Discharge TBD  Expected discharge date/ time has not been documented.    Signature: Electronically signed by RAKEL Cason, 06/24/25, 8:03 PM EDT.      Patient or patient representative verbalized consent for the use of Ambient Listening during the visit for chart documentation.      Attending   Admission Attestation       I have performed an independent face-to-face diagnostic evaluation including performing an independent physical examination.  I approve of the documented plan of care above that was reviewed and developed with the advanced practice clinician (APC) and take responsibility for that plan along with its associated risks.  I have updated the HPI as appropriate.    Brief HPI    Mr. Perez is a 79-year-old with history of CAD, persistent A-fib, HFrEF, hyperlipidemia who presents with hypotension and acute kidney injury.  Mr. Rasmussen takes several diuretics including Lasix, metolazone and Entresto.  He has chronic lower extremity edema which has not changed recently.  He says he has not felt ill and denies fever, chills, cough, nausea  vomiting, loose stool or dysuria, although he does endorse generalized weakness.  Patient seen in cardiology clinic today and noted to be hypotensive and referred to the ED.    Attending Physical Exam:  Temp:  [98.4 °F (36.9 °C)] 98.4 °F (36.9 °C)  Heart Rate:  [55-76] 64  Resp:  [18] 18  BP: ()/(43-69) 101/49    Nontoxic appearing in bed  Mucous membranes moist  Heart RRR  Slightly diminished breath sounds bilaterally  Abdomen soft, nondistended, obese  1+ lower extremity edema bilaterally  Awake, speech clear  Normal affect    Result Review:  I have personally reviewed the results from the time of this admission to 6/24/2025 22:29 EDT and agree with these findings:  [x]  Laboratory list / accordion  []  Microbiology  [x]  Radiology  [x]  EKG/Telemetry   [x]  Cardiology/Vascular   []  Pathology  [x]  Old records  []  Other:  Most notable findings include:     Assessment and Plan:    Hypotension  Acute kidney injury  Hypovolemic hyponatremia  - Hold home Entresto diuretics  - Gentle IV fluids overnight  - Serial sodium checks  - BMP a.m.    HFrEF  - most recent Echo with improved EF (55%)  - chronic LE edema continues despite intravascular volume depletion    Persistent afib  - amiodarone, eliquis  - likely resume metoprolol am     See assessment and plan documented by APC above and updated/edited by me as appropriate.    Avila Echevarria MD  06/24/25

## 2025-06-24 NOTE — ASSESSMENT & PLAN NOTE
Discontinue metolazone and spironolactone due to hypotension  Decrease Bumex to 1 mg daily  Continue metoprolol succinate 25 mg daily  Continue Jardiance 10 mg daily

## 2025-06-24 NOTE — ASSESSMENT & PLAN NOTE
Patient extremely hypotensive 70s over 40s.  Patient taken to the emergency room  Patient likely needs IV fluids  Would recommend discontinuing current diuretics

## 2025-06-24 NOTE — ASSESSMENT & PLAN NOTE
Recent blood work on 6/11 showed sodium of 122  Patient taken to the emergency room for further evaluation

## 2025-06-24 NOTE — ED NOTES
Bo Perez    Nursing Report ED to Floor:  Mental status: a&ox4  Ambulatory status: assist x2  Oxygen Therapy:  room air  Cardiac Rhythm: NSR  Admitted from: home /ed  Safety Concerns:  fall risk  Precautions: n/a  Social Issues: n/a  ED Room #:  6    ED Nurse Phone Extension - [phone] or may call [phone].      HPI:   Chief Complaint   Patient presents with    Hypotension       Past Medical History:  Past Medical History:   Diagnosis Date    A-fib     Arrhythmia     Arthritis     Hard to intubate     HTN (hypertension) 06/22/2023    Target blood pressure <130/80 mmHg      Hypertension     Sleep apnea     NOT USING CPAP        Past Surgical History:  Past Surgical History:   Procedure Laterality Date    BLADDER SURGERY  04/14/2016    STIMULATOR IMPLANTED, NEW ELECTRODE 08/2022    CARDIAC ABLATION  2013    AFIB    CARDIAC CATHETERIZATION N/A 9/5/2024    Procedure: CASE ABORT;  Surgeon: Yasir Canales IV, MD;  Location:  [facility];  Service: Cardiovascular;  Laterality: N/A;    CARDIAC CATHETERIZATION N/A 12/30/2024    Procedure: Left Heart Cath;  Surgeon: Yasir Canales IV, MD;  Location:  [facility];  Service: Cardiovascular;  Laterality: N/A;    CARDIAC CATHETERIZATION  12/30/2024    Procedure: Functional Flow [place];  Surgeon: Yasir Canales IV, MD;  Location:  [facility];  Service: Cardiovascular;;    CARPAL TUNNEL RELEASE Right 2002    CHOLECYSTECTOMY  2011    COLONOSCOPY      DENTAL PROCEDURE  1962    FINGER SURGERY Left 2000    FOREFINGER BONE TRIMMED    HEMORRHOIDECTOMY  1973    KNEE ARTHROSCOPY Left 2008    MOUTH SURGERY  2008    FOR SLEEP APNEA    SPINAL FUSION  2007    L3 LUMBAR SPINAL STENOSIS    TONSILLECTOMY  1954    TOTAL KNEE ARTHROPLASTY Left 2009    TOTAL KNEE ARTHROPLASTY Right 6/22/2023    Procedure: TOTAL KNEE ARTHROPLASTY - RIGHT;  Surgeon: Leland Elizalde MD;  Location:  [facility];  Service: Orthopedics;   Laterality: Right;        Admitting Doctor:   No admitting provider for patient encounter.    Consulting Provider(s):  Consults       No orders found from 5/26/2025 to 6/25/2025.             Admitting Diagnosis:   The primary encounter diagnosis was Acute renal failure, unspecified acute renal failure type. Diagnoses of Acute dehydration, Acute hyponatremia, Hypotension, unspecified hypotension type, and Elevated troponin were also pertinent to this visit.    Most Recent Vitals:   Vitals:    06/24/25 1800 06/24/25 1810 06/24/25 1830 06/24/25 1845   BP: 97/59 94/56     BP Location:       Patient Position:       Pulse: 65 66 75 64   Resp:       Temp:       TempSrc:       SpO2: 91% 97% 96%    Weight:       Height:           Active LDAs/IV Access:   Lines, Drains & Airways       Active LDAs       Name Placement date Placement time Site Days    Peripheral IV 06/24/25 1501 20 G Left Antecubital 06/24/25  1501  Antecubital  less than 1                    Labs (abnormal labs have a star):   Labs Reviewed   COMPREHENSIVE METABOLIC PANEL - Abnormal; Notable for the following components:       Result Value    Glucose 104 (*)     BUN 62.7 (*)     Creatinine 2.52 (*)     Sodium 124 (*)     Chloride 82 (*)     eGFR 25.4 (*)     All other components within normal limits    Narrative:     GFR Categories in Chronic Kidney Disease (CKD)              GFR Category          GFR (mL/min/1.73)    Interpretation  G1                    90 or greater        Normal or high (1)  G2                    60-89                Mild decrease (1)  G3a                   45-59                Mild to moderate decrease  G3b                   30-44                Moderate to severe decrease  G4                    15-29                Severe decrease  G5                    14 or less           Kidney failure    (1)In the absence of evidence of kidney disease, neither GFR category G1 or G2 fulfill the criteria for CKD.    eGFR calculation 2021 CKD-EPI  creatinine equation, which does not include race as a factor   TROPONIN - Abnormal; Notable for the following components:    HS Troponin T 63 (*)     All other components within normal limits    Narrative:     High Sensitive Troponin T Reference Range:  <14.0 ng/L- Negative Female for AMI  <22.0 ng/L- Negative Male for AMI  >=14 - Abnormal Female indicating possible myocardial injury.  >=22 - Abnormal Male indicating possible myocardial injury.   Clinicians would have to utilize clinical acumen, EKG, Troponin, and serial changes to determine if it is an Acute Myocardial Infarction or myocardial injury due to an underlying chronic condition.        URINALYSIS W/ MICROSCOPIC IF INDICATED (NO CULTURE) - Abnormal; Notable for the following components:    Leuk Esterase, UA Large (3+) (*)     All other components within normal limits   CBC WITH AUTO DIFFERENTIAL - Abnormal; Notable for the following components:    RBC 3.51 (*)     Hemoglobin 11.8 (*)     Hematocrit 33.3 (*)     MCH 33.6 (*)     Lymphocyte % 16.9 (*)     Immature Grans % 0.6 (*)     All other components within normal limits   HIGH SENSITIVITIY TROPONIN T 1HR - Abnormal; Notable for the following components:    HS Troponin T 46 (*)     All other components within normal limits    Narrative:     High Sensitive Troponin T Reference Range:  <14.0 ng/L- Negative Female for AMI  <22.0 ng/L- Negative Male for AMI  >=14 - Abnormal Female indicating possible myocardial injury.  >=22 - Abnormal Male indicating possible myocardial injury.   Clinicians would have to utilize clinical acumen, EKG, Troponin, and serial changes to determine if it is an Acute Myocardial Infarction or myocardial injury due to an underlying chronic condition.        URINALYSIS, MICROSCOPIC ONLY - Abnormal; Notable for the following components:    WBC, UA Too Numerous to Count (*)     All other components within normal limits   RESPIRATORY PANEL PCR W/ COVID-19 (SARS-COV-2), NP SWAB IN  UTM/VTP, 2 HR TAT - Normal    Narrative:     In the setting of a positive respiratory panel with a viral infection PLUS a negative procalcitonin without other underlying concern for bacterial infection, consider observing off antibiotics or discontinuation of antibiotics and continue supportive care. If the respiratory panel is positive for atypical bacterial infection (Bordetella pertussis, Chlamydophila pneumoniae, or Mycoplasma pneumoniae), consider antibiotic de-escalation to target atypical bacterial infection.   MAGNESIUM - Normal   COVID PRE-OP / PRE-PROCEDURE SCREENING ORDER (NO ISOLATION)    Narrative:     The following orders were created for panel order COVID PRE-OP / PRE-PROCEDURE SCREENING ORDER (NO ISOLATION) - Swab, Nasopharynx.  Procedure                               Abnormality         Status                     ---------                               -----------         ------                     Respiratory Panel PCR w/...[[id number]]  Normal              Final result                 Please view results for these tests on the individual orders.   RAINBOW DRAW    Narrative:     The following orders were created for panel order [place] Draw.  Procedure                               Abnormality         Status                     ---------                               -----------         ------                     Green Top (Gel)[[id number]]                                  Final result               Lavender Top[[id number]]                                     Final result               Gold Top - SST[[id number]]                                   Final result               Green Top[[id number]]                                         Final result               Light Blue Top[[id number]]                                   Final result                 Please view results for these tests on the individual orders.   CBC AND DIFFERENTIAL    Narrative:     The following orders were created for panel order CBC &  Differential.  Procedure                               Abnormality         Status                     ---------                               -----------         ------                     CBC Auto Differential[[id number]]        Abnormal            Final result                 Please view results for these tests on the individual orders.   GREEN TOP   LAVENDER TOP   GOLD TOP - SST   GRAY TOP   LIGHT BLUE TOP       Meds Given in ED:   Medications   sodium chloride 0.9 % flush 10 mL (has no administration in time range)   sodium chloride 0.9 % bolus 1,000 mL (0 mL Intravenous Stopped 6/24/25 1758)           Last NIH score:                                                          Dysphagia screening results:  Patient Factors Component (Dysphagia:Stroke or Rule-out)  Best Eye Response: 4-->(E4) spontaneous (06/24/25 1556)  Best Motor Response: 6-->(M6) obeys commands (06/24/25 1556)  Best Verbal Response: 5-->(V5) oriented (06/24/25 1556)  [place] Coma Scale Score: 15 (06/24/25 1556)     [place] Coma Scale:  No data recorded     CIWA:        Restraint Type:            Isolation Status:  No active isolations

## 2025-06-25 LAB
ANION GAP SERPL CALCULATED.3IONS-SCNC: 11 MMOL/L (ref 5–15)
ANION GAP SERPL CALCULATED.3IONS-SCNC: 11.2 MMOL/L (ref 5–15)
ANION GAP SERPL CALCULATED.3IONS-SCNC: 12 MMOL/L (ref 5–15)
ANION GAP SERPL CALCULATED.3IONS-SCNC: 12.4 MMOL/L (ref 5–15)
ANION GAP SERPL CALCULATED.3IONS-SCNC: 13 MMOL/L (ref 5–15)
ANION GAP SERPL CALCULATED.3IONS-SCNC: 13 MMOL/L (ref 5–15)
ANION GAP SERPL CALCULATED.3IONS-SCNC: 14 MMOL/L (ref 5–15)
BASOPHILS # BLD AUTO: 0.03 10*3/MM3 (ref 0–0.2)
BASOPHILS NFR BLD AUTO: 0.3 % (ref 0–1.5)
BUN SERPL-MCNC: 52.5 MG/DL (ref 8–23)
BUN SERPL-MCNC: 57.7 MG/DL (ref 8–23)
BUN SERPL-MCNC: 58.1 MG/DL (ref 8–23)
BUN SERPL-MCNC: 58.4 MG/DL (ref 8–23)
BUN SERPL-MCNC: 60 MG/DL (ref 8–23)
BUN SERPL-MCNC: 61 MG/DL (ref 8–23)
BUN SERPL-MCNC: 62.3 MG/DL (ref 8–23)
BUN/CREAT SERPL: 27.7 (ref 7–25)
BUN/CREAT SERPL: 28.4 (ref 7–25)
BUN/CREAT SERPL: 30.5 (ref 7–25)
BUN/CREAT SERPL: 33 (ref 7–25)
BUN/CREAT SERPL: 34 (ref 7–25)
BUN/CREAT SERPL: 34.6 (ref 7–25)
BUN/CREAT SERPL: 35.9 (ref 7–25)
CALCIUM SPEC-SCNC: 8.5 MG/DL (ref 8.6–10.5)
CALCIUM SPEC-SCNC: 8.5 MG/DL (ref 8.6–10.5)
CALCIUM SPEC-SCNC: 8.6 MG/DL (ref 8.6–10.5)
CALCIUM SPEC-SCNC: 8.8 MG/DL (ref 8.6–10.5)
CALCIUM SPEC-SCNC: 8.9 MG/DL (ref 8.6–10.5)
CHLORIDE SERPL-SCNC: 85 MMOL/L (ref 98–107)
CHLORIDE SERPL-SCNC: 86 MMOL/L (ref 98–107)
CHLORIDE SERPL-SCNC: 87 MMOL/L (ref 98–107)
CHLORIDE SERPL-SCNC: 88 MMOL/L (ref 98–107)
CHLORIDE SERPL-SCNC: 89 MMOL/L (ref 98–107)
CO2 SERPL-SCNC: 20 MMOL/L (ref 22–29)
CO2 SERPL-SCNC: 25.6 MMOL/L (ref 22–29)
CO2 SERPL-SCNC: 27 MMOL/L (ref 22–29)
CO2 SERPL-SCNC: 27.8 MMOL/L (ref 22–29)
CO2 SERPL-SCNC: 28 MMOL/L (ref 22–29)
CO2 SERPL-SCNC: 29 MMOL/L (ref 22–29)
CO2 SERPL-SCNC: 29 MMOL/L (ref 22–29)
CREAT SERPL-MCNC: 1.68 MG/DL (ref 0.76–1.27)
CREAT SERPL-MCNC: 1.7 MG/DL (ref 0.76–1.27)
CREAT SERPL-MCNC: 1.72 MG/DL (ref 0.76–1.27)
CREAT SERPL-MCNC: 1.85 MG/DL (ref 0.76–1.27)
CREAT SERPL-MCNC: 1.89 MG/DL (ref 0.76–1.27)
CREAT SERPL-MCNC: 1.97 MG/DL (ref 0.76–1.27)
CREAT SERPL-MCNC: 2.08 MG/DL (ref 0.76–1.27)
CREAT UR-MCNC: 41.5 MG/DL
DEPRECATED RDW RBC AUTO: 50 FL (ref 37–54)
EGFRCR SERPLBLD CKD-EPI 2021: 32 ML/MIN/1.73
EGFRCR SERPLBLD CKD-EPI 2021: 34.1 ML/MIN/1.73
EGFRCR SERPLBLD CKD-EPI 2021: 35.9 ML/MIN/1.73
EGFRCR SERPLBLD CKD-EPI 2021: 36.8 ML/MIN/1.73
EGFRCR SERPLBLD CKD-EPI 2021: 40.2 ML/MIN/1.73
EGFRCR SERPLBLD CKD-EPI 2021: 40.8 ML/MIN/1.73
EGFRCR SERPLBLD CKD-EPI 2021: 41.3 ML/MIN/1.73
EOSINOPHIL # BLD AUTO: 0.21 10*3/MM3 (ref 0–0.4)
EOSINOPHIL NFR BLD AUTO: 2.4 % (ref 0.3–6.2)
EOSINOPHIL SPEC QL MICRO: 2 % EOS/100 CELLS (ref 0–0)
ERYTHROCYTE [DISTWIDTH] IN BLOOD BY AUTOMATED COUNT: 14.2 % (ref 12.3–15.4)
GLUCOSE SERPL-MCNC: 105 MG/DL (ref 65–99)
GLUCOSE SERPL-MCNC: 113 MG/DL (ref 65–99)
GLUCOSE SERPL-MCNC: 114 MG/DL (ref 65–99)
GLUCOSE SERPL-MCNC: 118 MG/DL (ref 65–99)
GLUCOSE SERPL-MCNC: 139 MG/DL (ref 65–99)
GLUCOSE SERPL-MCNC: 99 MG/DL (ref 65–99)
GLUCOSE SERPL-MCNC: 99 MG/DL (ref 65–99)
HCT VFR BLD AUTO: 28.3 % (ref 37.5–51)
HCT VFR BLD AUTO: 31.5 % (ref 37.5–51)
HGB BLD-MCNC: 10.2 G/DL (ref 13–17.7)
HGB BLD-MCNC: 10.9 G/DL (ref 13–17.7)
IMM GRANULOCYTES # BLD AUTO: 0.04 10*3/MM3 (ref 0–0.05)
IMM GRANULOCYTES NFR BLD AUTO: 0.5 % (ref 0–0.5)
LYMPHOCYTES # BLD AUTO: 1.57 10*3/MM3 (ref 0.7–3.1)
LYMPHOCYTES NFR BLD AUTO: 17.8 % (ref 19.6–45.3)
MAGNESIUM SERPL-MCNC: 2.2 MG/DL (ref 1.6–2.4)
MCH RBC QN AUTO: 33.4 PG (ref 26.6–33)
MCHC RBC AUTO-ENTMCNC: 34.6 G/DL (ref 31.5–35.7)
MCV RBC AUTO: 96.6 FL (ref 79–97)
MONOCYTES # BLD AUTO: 0.4 10*3/MM3 (ref 0.1–0.9)
MONOCYTES NFR BLD AUTO: 4.5 % (ref 5–12)
NEUTROPHILS NFR BLD AUTO: 6.59 10*3/MM3 (ref 1.7–7)
NEUTROPHILS NFR BLD AUTO: 74.5 % (ref 42.7–76)
NRBC BLD AUTO-RTO: 0 /100 WBC (ref 0–0.2)
OSMOLALITY SERPL: 287 MOSM/KG (ref 275–295)
OSMOLALITY UR: 241 MOSM/KG (ref 300–1100)
PLATELET # BLD AUTO: 162 10*3/MM3 (ref 140–450)
PMV BLD AUTO: 9.7 FL (ref 6–12)
POTASSIUM SERPL-SCNC: 3.5 MMOL/L (ref 3.5–5.2)
POTASSIUM SERPL-SCNC: 3.6 MMOL/L (ref 3.5–5.2)
POTASSIUM SERPL-SCNC: 3.7 MMOL/L (ref 3.5–5.2)
POTASSIUM SERPL-SCNC: 3.8 MMOL/L (ref 3.5–5.2)
POTASSIUM SERPL-SCNC: 3.8 MMOL/L (ref 3.5–5.2)
POTASSIUM SERPL-SCNC: 4 MMOL/L (ref 3.5–5.2)
POTASSIUM SERPL-SCNC: 4.1 MMOL/L (ref 3.5–5.2)
QT INTERVAL: 488 MS
QTC INTERVAL: 488 MS
RBC # BLD AUTO: 3.26 10*6/MM3 (ref 4.14–5.8)
SODIUM SERPL-SCNC: 122 MMOL/L (ref 136–145)
SODIUM SERPL-SCNC: 125 MMOL/L (ref 136–145)
SODIUM SERPL-SCNC: 126 MMOL/L (ref 136–145)
SODIUM SERPL-SCNC: 126 MMOL/L (ref 136–145)
SODIUM SERPL-SCNC: 127 MMOL/L (ref 136–145)
SODIUM UR-SCNC: 28 MMOL/L
TROPONIN T SERPL HS-MCNC: 48 NG/L
WBC NRBC COR # BLD AUTO: 8.84 10*3/MM3 (ref 3.4–10.8)

## 2025-06-25 PROCEDURE — 85025 COMPLETE CBC W/AUTO DIFF WBC: CPT | Performed by: NURSE PRACTITIONER

## 2025-06-25 PROCEDURE — 97162 PT EVAL MOD COMPLEX 30 MIN: CPT

## 2025-06-25 PROCEDURE — 80048 BASIC METABOLIC PNL TOTAL CA: CPT | Performed by: NURSE PRACTITIONER

## 2025-06-25 PROCEDURE — 97165 OT EVAL LOW COMPLEX 30 MIN: CPT

## 2025-06-25 PROCEDURE — 85014 HEMATOCRIT: CPT | Performed by: NURSE PRACTITIONER

## 2025-06-25 PROCEDURE — 93005 ELECTROCARDIOGRAM TRACING: CPT | Performed by: NURSE PRACTITIONER

## 2025-06-25 PROCEDURE — 80048 BASIC METABOLIC PNL TOTAL CA: CPT | Performed by: INTERNAL MEDICINE

## 2025-06-25 PROCEDURE — 84484 ASSAY OF TROPONIN QUANT: CPT | Performed by: NURSE PRACTITIONER

## 2025-06-25 PROCEDURE — 99232 SBSQ HOSP IP/OBS MODERATE 35: CPT | Performed by: NURSE PRACTITIONER

## 2025-06-25 PROCEDURE — 99232 SBSQ HOSP IP/OBS MODERATE 35: CPT | Performed by: INTERNAL MEDICINE

## 2025-06-25 PROCEDURE — 83735 ASSAY OF MAGNESIUM: CPT | Performed by: NURSE PRACTITIONER

## 2025-06-25 PROCEDURE — 85018 HEMOGLOBIN: CPT | Performed by: NURSE PRACTITIONER

## 2025-06-25 PROCEDURE — 93010 ELECTROCARDIOGRAM REPORT: CPT | Performed by: STUDENT IN AN ORGANIZED HEALTH CARE EDUCATION/TRAINING PROGRAM

## 2025-06-25 RX ORDER — ATORVASTATIN CALCIUM 40 MG/1
40 TABLET, FILM COATED ORAL DAILY
Status: DISCONTINUED | OUTPATIENT
Start: 2025-06-25 | End: 2025-07-02 | Stop reason: HOSPADM

## 2025-06-25 RX ORDER — AMIODARONE HYDROCHLORIDE 200 MG/1
200 TABLET ORAL DAILY
Status: DISCONTINUED | OUTPATIENT
Start: 2025-06-25 | End: 2025-07-02 | Stop reason: HOSPADM

## 2025-06-25 RX ORDER — BISACODYL 5 MG/1
5 TABLET, DELAYED RELEASE ORAL DAILY PRN
Status: DISCONTINUED | OUTPATIENT
Start: 2025-06-25 | End: 2025-07-02 | Stop reason: HOSPADM

## 2025-06-25 RX ORDER — ARIPIPRAZOLE 10 MG/1
5 TABLET ORAL DAILY
Status: DISCONTINUED | OUTPATIENT
Start: 2025-06-25 | End: 2025-07-02 | Stop reason: HOSPADM

## 2025-06-25 RX ORDER — SODIUM CHLORIDE 0.9 % (FLUSH) 0.9 %
10 SYRINGE (ML) INJECTION AS NEEDED
Status: DISCONTINUED | OUTPATIENT
Start: 2025-06-25 | End: 2025-07-02 | Stop reason: HOSPADM

## 2025-06-25 RX ORDER — POLYETHYLENE GLYCOL 3350 17 G/17G
17 POWDER, FOR SOLUTION ORAL DAILY PRN
Status: DISCONTINUED | OUTPATIENT
Start: 2025-06-25 | End: 2025-07-02 | Stop reason: HOSPADM

## 2025-06-25 RX ORDER — SODIUM CHLORIDE 0.9 % (FLUSH) 0.9 %
10 SYRINGE (ML) INJECTION EVERY 12 HOURS SCHEDULED
Status: DISCONTINUED | OUTPATIENT
Start: 2025-06-25 | End: 2025-07-02 | Stop reason: HOSPADM

## 2025-06-25 RX ORDER — METOPROLOL SUCCINATE 25 MG/1
25 TABLET, EXTENDED RELEASE ORAL DAILY
Status: DISCONTINUED | OUTPATIENT
Start: 2025-06-25 | End: 2025-06-27

## 2025-06-25 RX ORDER — ACETAMINOPHEN 325 MG/1
650 TABLET ORAL EVERY 8 HOURS
Status: DISCONTINUED | OUTPATIENT
Start: 2025-06-25 | End: 2025-07-02 | Stop reason: HOSPADM

## 2025-06-25 RX ORDER — BISACODYL 10 MG
10 SUPPOSITORY, RECTAL RECTAL DAILY PRN
Status: DISCONTINUED | OUTPATIENT
Start: 2025-06-25 | End: 2025-07-02 | Stop reason: HOSPADM

## 2025-06-25 RX ORDER — BUMETANIDE 1 MG/1
1 TABLET ORAL DAILY
Status: DISCONTINUED | OUTPATIENT
Start: 2025-06-25 | End: 2025-06-26

## 2025-06-25 RX ORDER — SODIUM CHLORIDE 9 MG/ML
40 INJECTION, SOLUTION INTRAVENOUS AS NEEDED
Status: DISCONTINUED | OUTPATIENT
Start: 2025-06-25 | End: 2025-07-02 | Stop reason: HOSPADM

## 2025-06-25 RX ORDER — IPRATROPIUM BROMIDE 42 UG/1
2 SPRAY, METERED NASAL EVERY EVENING
Status: DISCONTINUED | OUTPATIENT
Start: 2025-06-25 | End: 2025-07-02 | Stop reason: HOSPADM

## 2025-06-25 RX ORDER — AMOXICILLIN 250 MG
2 CAPSULE ORAL 2 TIMES DAILY PRN
Status: DISCONTINUED | OUTPATIENT
Start: 2025-06-25 | End: 2025-07-02 | Stop reason: HOSPADM

## 2025-06-25 RX ADMIN — ATORVASTATIN CALCIUM 40 MG: 40 TABLET, FILM COATED ORAL at 09:06

## 2025-06-25 RX ADMIN — ACETAMINOPHEN 650 MG: 325 TABLET ORAL at 01:40

## 2025-06-25 RX ADMIN — Medication 10 ML: at 01:40

## 2025-06-25 RX ADMIN — APIXABAN 5 MG: 5 TABLET, FILM COATED ORAL at 09:07

## 2025-06-25 RX ADMIN — EMPAGLIFLOZIN 10 MG: 10 TABLET, FILM COATED ORAL at 09:06

## 2025-06-25 RX ADMIN — Medication 10 ML: at 09:07

## 2025-06-25 RX ADMIN — AMIODARONE HYDROCHLORIDE 200 MG: 200 TABLET ORAL at 09:06

## 2025-06-25 RX ADMIN — ARIPIPRAZOLE 5 MG: 10 TABLET ORAL at 09:06

## 2025-06-25 RX ADMIN — APIXABAN 5 MG: 5 TABLET, FILM COATED ORAL at 01:40

## 2025-06-25 RX ADMIN — ACETAMINOPHEN 650 MG: 325 TABLET ORAL at 09:06

## 2025-06-25 RX ADMIN — ACETAMINOPHEN 650 MG: 325 TABLET ORAL at 18:11

## 2025-06-25 RX ADMIN — IPRATROPIUM BROMIDE 2 SPRAY: 42 SPRAY, METERED NASAL at 18:11

## 2025-06-25 NOTE — PLAN OF CARE
Goal Outcome Evaluation:  Plan of Care Reviewed With: patient        Progress: no change  Outcome Evaluation: OT eval complete. Pt presents below baseline with limited endurance, strength, trunk control, and minimal balance deficits. No dizziness reported, but orthostatics taken. Supine: 81/43, sitting EOB: 87/45, and standin/60. Nsg notified and aware. Min A for bed mobility. Recommend IPOT POC and IRF at D/C, but will continue to monitor progress.    Anticipated Discharge Disposition (OT): inpatient rehabilitation facility

## 2025-06-25 NOTE — THERAPY EVALUATION
Patient Name: Bo Perez  : 1945    MRN: [id number]                              Today's Date: 2025       Admit Date: 2025    Visit Dx:     ICD-10-CM ICD-9-CM   1. Acute renal failure, unspecified acute renal failure type  N17.9 584.9   2. Acute dehydration  E86.0 276.51   3. Acute hyponatremia  E87.1 276.1   4. Hypotension, unspecified hypotension type  I95.9 458.9   5. Elevated troponin  R79.89 790.6     Patient Active Problem List   Diagnosis    Chronic anticoagulation    Morbid obesity    Persistent atrial fibrillation    Frequent PVCs    Hyperlipidemia LDL goal <50    Wide-complex tachycardia    Heart failure with improved ejection fraction (HFimpEF)    Coronary artery disease involving native coronary artery of native heart without angina pectoris    Hypotension    Acute hyponatremia    GÓMEZ (acute kidney injury)    Dehydration    Elevated troponin level not due to acute coronary syndrome     Past Medical History:   Diagnosis Date    A-fib     Arrhythmia     Arthritis     Coronary artery disease     Hard to intubate     HTN (hypertension) 2023    Target blood pressure <130/80 mmHg      Hyperlipidemia     Hypertension     Sleep apnea     NOT USING CPAP     Past Surgical History:   Procedure Laterality Date    BLADDER SURGERY  2016    STIMULATOR IMPLANTED, NEW ELECTRODE 2022    CARDIAC ABLATION      AFIB    CARDIAC CATHETERIZATION N/A 2024    Procedure: CASE ABORT;  Surgeon: Yasir Canales IV, MD;  Location:  [facility];  Service: Cardiovascular;  Laterality: N/A;    CARDIAC CATHETERIZATION N/A 2024    Procedure: Left Heart Cath;  Surgeon: Yasir Canales IV, MD;  Location:  [facility];  Service: Cardiovascular;  Laterality: N/A;    CARDIAC CATHETERIZATION  2024    Procedure: Functional Flow [place];  Surgeon: Yasir Canales IV, MD;  Location:  [facility]VAS[facility];  Service:  Cardiovascular;;    CARPAL TUNNEL RELEASE Right 2002    CHOLECYSTECTOMY  2011    COLONOSCOPY      DENTAL PROCEDURE  1962    FINGER SURGERY Left 2000    FOREFINGER BONE TRIMMED    HEMORRHOIDECTOMY  1973    KNEE ARTHROSCOPY Left 2008    MOUTH SURGERY  2008    FOR SLEEP APNEA    SPINAL FUSION  2007    L3 LUMBAR SPINAL STENOSIS    TONSILLECTOMY  1954    TOTAL KNEE ARTHROPLASTY Left 2009    TOTAL KNEE ARTHROPLASTY Right 6/22/2023    Procedure: TOTAL KNEE ARTHROPLASTY - RIGHT;  Surgeon: Leland Elizalde MD;  Location: [facility];  Service: Orthopedics;  Laterality: Right;      General Information       Row Name 06/25/25 1112          OT Time and Intention    Document Type evaluation  -LR     Mode of Treatment occupational therapy  -LR       Row Name 06/25/25 1112          General Information    Patient Profile Reviewed yes  -LR     Prior Level of Function independent:;dressing;bathing;transfer;gait;min assist:;bed mobility;max assist:;home management;dependent:;driving  Pt has grab bars in bathroom, shower chair, and chair lift to get to basement. Pt also sleeps in the recliner d/t back pain. Uses rollator at baseline. 1 fall endorsed in the last 3-6 months  -LR     Existing Precautions/Restrictions fall;orthostatic hypotension;oxygen therapy device and L/min  -LR     Barriers to Rehab medically complex;previous functional deficit  -LR       Row Name 06/25/25 1112          Living Environment    Current Living Arrangements home  -LR     People in Home spouse  -LR       Row Name 06/25/25 1112          Home Main Entrance    Number of Stairs, Main Entrance two;other (see comments)  1-2  -LR     Stair Railings, Main Entrance none  -LR       Row Name 06/25/25 1112          Stairs Within Home, Primary    Number of Stairs, Within Home, Primary other (see comments)  stairs to basement. Chair lift  -LR       Row Name 06/25/25 1112          Cognition    Orientation Status (Cognition) oriented x 3  -LR       Row Name 06/25/25 1112           Safety Issues/Impairments Affecting Functional Mobility    Safety Issues Affecting Function (Mobility) safety precautions follow-through/compliance;awareness of need for assistance;safety precaution awareness;sequencing abilities;insight into deficits/self-awareness  -LR     Impairments Affecting Function (Mobility) balance;endurance/activity tolerance;strength;pain;postural/trunk control  -LR               User Key  (r) = Recorded By, (t) = Taken By, (c) = Cosigned By      Initials Name Provider Type    LR Michelle Reddy, OT Occupational Therapist                     Mobility/ADL's       Row Name 06/25/25 [facility]5          Bed Mobility    Bed Mobility supine-sit  -LR     Supine-Sit [place] (Bed Mobility) minimum assist (75% patient effort);1 person assist;verbal cues  -LR     Assistive Device (Bed Mobility) bed rails;head of bed elevated  -LR     Comment, (Bed Mobility) HHA to get to EOB  -LR       Row Name 06/25/25 [facility]5          Transfers    Transfers sit-stand transfer;bed-chair transfer  -LR     Comment, (Transfers) STSx2 d/t weak BLEs  -LR       Row Name 06/25/25 [facility]5          Bed-Chair Transfer    Bed-Chair [place] (Transfers) contact guard;verbal cues  -LR     Assistive Device (Bed-Chair Transfers) walker, front-wheeled  -LR       Row Name 06/25/25 [facility]5          Sit-Stand Transfer    Sit-Stand [place] (Transfers) minimum assist (75% patient effort);contact guard;1 person assist;other (see comments)  progressed to CGA  -LR     Assistive Device (Sit-Stand Transfers) walker, front-wheeled  -LR       Row Name 06/25/25 [facility]5          Functional Mobility    Functional Mobility- Ind. Level contact guard assist;verbal cues required  -LR     Functional Mobility- Device walker, front-wheeled  -LR     Functional Mobility-Distance (Feet) --  < distance  -LR     Patient was able to Ambulate yes  -LR       Row Name 06/25/25 [facility]5          Activities of Daily Living    BADL Assessment/Intervention lower  body dressing;grooming;toileting  -LR       Row Name 06/25/25 1115          Lower Body Dressing Assessment/Training    [place] Level (Lower Body Dressing) don;shoes/slippers;set up  -LR     Position (Lower Body Dressing) edge of bed sitting  -LR       Row Name 06/25/25 1115          Grooming Assessment/Training    [place] Level (Grooming) wash face, hands;set up  -LR     Position (Grooming) supported sitting  -LR       Row Name 06/25/25 1115          Toileting Assessment/Training    Comment, (Toileting) Pt able to manage urinal with set up A  -LR               User Key  (r) = Recorded By, (t) = Taken By, (c) = Cosigned By      Initials Name Provider Type    LR Michelle Reddy, OT Occupational Therapist                   Obj/Interventions       [facility] Name 06/25/25 1127          Sensory Assessment (Somatosensory)    Sensory Assessment (Somatosensory) UE sensation intact  -LR       [facility] Name 06/25/25 1127          Vision Assessment/Intervention    Visual Impairment/Limitations WFL;corrective lenses full-time  -LR       Row Name 06/25/25 1127          Range of Motion Comprehensive    General Range of Motion bilateral upper extremity ROM WFL  -LR       [facility] Name 06/25/25 1127          Strength Comprehensive (MMT)    General Manual Muscle Testing (MMT) Assessment upper extremity strength deficits identified  -LR       [facility] Name 06/25/25 1127          Upper Extremity (Manual Muscle Testing)    Comment, MMT: Upper Extremity BUE grossly 3+/5 MMT  -LR       [facility] Name 06/25/25 1127          Balance    Balance Assessment sitting static balance;sitting dynamic balance;standing static balance;standing dynamic balance  -LR     Static Sitting Balance standby assist  -LR     Dynamic Sitting Balance standby assist  -LR     Position, Sitting Balance unsupported;sitting edge of bed  -LR     Static Standing Balance contact guard;standby assist;other (see comments)  bouts of SBA  -LR     Dynamic Standing Balance contact  guard;verbal cues  -LR     Position/Device Used, Standing Balance supported;walker, front-wheeled  -LR     Balance Interventions sitting;standing;sit to stand;supported;dynamic;static;occupation based/functional task  -LR               User Key  (r) = Recorded By, (t) = Taken By, (c) = Cosigned By      Initials Name Provider Type    Michelle Art, OT Occupational Therapist                   Goals/Plan       Row Name 06/25/25 1136          Bed Mobility Goal 1 (OT)    Activity/Assistive Device (Bed Mobility Goal 1, OT) sit to supine/supine to sit  -LR     [place] Level/Cues Needed (Bed Mobility Goal 1, OT) contact guard required  -LR     Time Frame (Bed Mobility Goal 1, OT) long term goal (LTG);1 week  -LR     Progress/Outcomes (Bed Mobility Goal 1, OT) goal ongoing;new goal  -LR       Row Name 06/25/25 1136          Transfer Goal 1 (OT)    Activity/Assistive Device (Transfer Goal 1, OT) bed-to-chair/chair-to-bed;toilet;sit-to-stand/stand-to-sit  -LR     [place] Level/Cues Needed (Transfer Goal 1, OT) contact guard required  -LR     Time Frame (Transfer Goal 1, OT) long term goal (LTG);1 week  -LR     Progress/Outcome (Transfer Goal 1, OT) new goal;goal ongoing  -LR       Row Name 06/25/25 1136          Grooming Goal 1 (OT)    Activity/Device (Grooming Goal 1, OT) hair care;oral care;wash face, hands  -LR     [place] (Grooming Goal 1, OT) standby assist  -LR     Time Frame (Grooming Goal 1, OT) short term goal (STG);4 days  -LR     Strategies/Barriers (Grooming Goal 1, OT) sink side  -LR     Progress/Outcome (Grooming Goal 1, OT) new goal;goal ongoing  -LR       Row Name 06/25/25 1136          Therapy Assessment/Plan (OT)    Planned Therapy Interventions (OT) activity tolerance training;adaptive equipment training;BADL retraining;occupation/activity based interventions;strengthening exercise;transfer/mobility retraining;functional balance retraining;IADL retraining;passive  ROM/stretching;ROM/therapeutic exercise;patient/caregiver education/training  -LR               User Key  (r) = Recorded By, (t) = Taken By, (c) = Cosigned By      Initials Name Provider Type    LR Michelle Reddy, OT Occupational Therapist                   Clinical Impression       Row Name 25 1128          Pain Assessment    Pretreatment Pain Rating 0/10 - no pain  -LR     Posttreatment Pain Rating 0/10 - no pain  -LR       Row Name 25 1128          Plan of Care Review    Plan of Care Reviewed With patient  -LR     Progress no change  -LR     Outcome Evaluation OT eval complete. Pt presents below baseline with limited endurance, strength, trunk control, and minimal balance deficits. No dizziness reported, but orthostatics taken. Supine: 81/43, sitting EOB: 87/45, and standin/60. Nsg notified and aware. Min A for bed mobility. Recommend IPOT POC and IRF at D/C, but will continue to monitor progress.  -LR       Row Name 25 1128          Therapy Assessment/Plan (OT)    Patient/Family Therapy Goal Statement (OT) PLOF  -LR     Rehab Potential (OT) good  -LR     Criteria for Skilled Therapeutic Interventions Met (OT) yes;skilled treatment is necessary  -LR     Therapy Frequency (OT) daily  -LR     Predicted Duration of Therapy Intervention (OT) 7 days  -LR       Row Name 25 1128          Therapy Plan Review/Discharge Plan (OT)    Anticipated Discharge Disposition (OT) inpatient rehabilitation facility  -LR       Row Name 25 1128          Vital Signs    Pre Systolic BP Rehab 81  -LR     Pre Treatment Diastolic BP 43  -LR     Intra Systolic BP Rehab 87  -LR     Intra Treatment Diastolic BP 45  -LR     Post Systolic BP Rehab 89  -LR     Post Treatment Diastolic BP 60  -LR     Pre SpO2 (%) 95  -LR     O2 Delivery Pre Treatment nasal cannula  -LR     O2 Delivery Intra Treatment nasal cannula  -LR     O2 Delivery Post Treatment nasal cannula  -LR     Pre Patient Position Supine  -LR      Intra Patient Position Standing  -LR     Post Patient Position Sitting  -LR       Row Name 06/25/25 1128          Positioning and Restraints    Pre-Treatment Position in bed  -LR     Post Treatment Position chair  -LR     In Chair notified nsg;reclined;call light within reach;encouraged to call for assist;exit alarm on;waffle cushion;legs elevated  -LR               User Key  (r) = Recorded By, (t) = Taken By, (c) = Cosigned By      Initials Name Provider Type    LR Michelle Reddy, OT Occupational Therapist                   Outcome Measures       Row Name 06/25/25 1136          How much help from another is currently needed...    Putting on and taking off regular lower body clothing? 3  -LR     Bathing (including washing, rinsing, and drying) 2  -LR     Toileting (which includes using toilet bed pan or urinal) 3  -LR     Putting on and taking off regular upper body clothing 3  -LR     Taking care of personal grooming (such as brushing teeth) 3  -LR     Eating meals 4  -LR     AM-PAC 6 Clicks Score (OT) 18  -LR       Row Name 06/25/25 0800 06/25/25 0200       How much help from another person do you currently need...    Turning from your back to your side while in flat bed without using bedrails? 4  -LL 4  -TR    Moving from lying on back to sitting on the side of a flat bed without bedrails? 4  -LL 4  -TR    Moving to and from a bed to a chair (including a wheelchair)? 3  -LL 3  -TR    Standing up from a chair using your arms (e.g., wheelchair, bedside chair)? 3  -LL 3  -TR    Climbing 3-5 steps with a railing? 2  -LL 3  -TR    To walk in hospital room? 3  -LL 3  -TR    AM-PAC 6 Clicks Score (PT) 19  -LL 20  -TR      Row Name 06/25/25 0056          How much help from another person do you currently need...    Turning from your back to your side while in flat bed without using bedrails? 4  -TR     Moving from lying on back to sitting on the side of a flat bed without bedrails? 4  -TR     Moving to and from a bed  to a chair (including a wheelchair)? 3  -TR     Standing up from a chair using your arms (e.g., wheelchair, bedside chair)? 3  -TR     Climbing 3-5 steps with a railing? 3  -TR     To walk in hospital room? 3  -TR     AM-PAC 6 Clicks Score (PT) 20  -TR       Row Name 06/25/25 1136          Functional Assessment    Outcome Measure Options AM-PAC 6 Clicks Daily Activity (OT)  -LR               User Key  (r) = Recorded By, (t) = Taken By, (c) = Cosigned By      Initials Name Provider Type    LR Michelle Reddy OT Occupational Therapist    Sandrita Thompson RN Registered Nurse    Shelly Post RN Registered Nurse                    Occupational Therapy Education       Title: PT OT SLP Therapies (In Progress)       Topic: Occupational Therapy (In Progress)       Point: ADL training (In Progress)       Learning Progress Summary            Patient Acceptance, E, NR by LR at 6/25/2025 1137                      Point: Home exercise program (In Progress)       Learning Progress Summary            Patient Acceptance, E, NR by LR at 6/25/2025 1137                      Point: Precautions (In Progress)       Learning Progress Summary            Patient Acceptance, E, NR by LR at 6/25/2025 1137                      Point: Body mechanics (In Progress)       Learning Progress Summary            Patient Acceptance, E, NR by LR at 6/25/2025 1137                                      User Key       Initials Effective Dates Name Provider Type Discipline    LR 10/09/24 -  Michelle Reddy OT Occupational Therapist OT                  OT Recommendation and Plan  Planned Therapy Interventions (OT): activity tolerance training, adaptive equipment training, BADL retraining, occupation/activity based interventions, strengthening exercise, transfer/mobility retraining, functional balance retraining, IADL retraining, passive ROM/stretching, ROM/therapeutic exercise, patient/caregiver education/training  Therapy Frequency (OT):  daily  Plan of Care Review  Plan of Care Reviewed With: patient  Progress: no change  Outcome Evaluation: OT eval complete. Pt presents below baseline with limited endurance, strength, trunk control, and minimal balance deficits. No dizziness reported, but orthostatics taken. Supine: 81/43, sitting EOB: 87/45, and standin/60. Nsg notified and aware. Min A for bed mobility. Recommend IPOT POC and IRF at D/C, but will continue to monitor progress.     Time Calculation:   Evaluation Complexity (OT)  Review Occupational Profile/Medical/Therapy History Complexity: brief/low complexity  Assessment, Occupational Performance/Identification of Deficit Complexity: 1-3 performance deficits  Clinical Decision Making Complexity (OT): problem focused assessment/low complexity  Overall Complexity of Evaluation (OT): low complexity     Time Calculation- OT       Row Name 25 1137             Time Calculation- OT    OT Start Time 0935  -LR      OT Received On 25  -LR      OT Goal Re-Cert Due Date 25  -LR         Untimed Charges    OT Eval/Re-eval Minutes 46  -LR         Total Minutes    Untimed Charges Total Minutes 46  -LR       Total Minutes 46  -LR                User Key  (r) = Recorded By, (t) = Taken By, (c) = Cosigned By      Initials Name Provider Type    LR Michelle Reddy OT Occupational Therapist                  Therapy Charges for Today       Code Description Service Date Service Provider Modifiers Qty    [id number] HC OT EVAL LOW COMPLEXITY 4 2025 Michelle Reddy OT GO 1                 Michelle Reddy OT  2025

## 2025-06-25 NOTE — PLAN OF CARE
Problem: Adult Inpatient Plan of Care  Goal: Plan of Care Review  Outcome: Progressing  Goal: Patient-Specific Goal (Individualized)  Outcome: Progressing  Goal: Absence of Hospital-Acquired Illness or Injury  Outcome: Progressing  Intervention: Identify and Manage Fall Risk  Recent Flowsheet Documentation  Taken 6/25/2025 1800 by Sandrita Sharpe RN  Safety Promotion/Fall Prevention:   assistive device/personal items within reach   clutter free environment maintained   fall prevention program maintained   nonskid shoes/slippers when out of bed   safety round/check completed  Taken 6/25/2025 1600 by Sandrita Sharpe RN  Safety Promotion/Fall Prevention:   assistive device/personal items within reach   clutter free environment maintained   fall prevention program maintained   nonskid shoes/slippers when out of bed   safety round/check completed  Taken 6/25/2025 1400 by Sandrita Sharpe RN  Safety Promotion/Fall Prevention:   assistive device/personal items within reach   clutter free environment maintained   fall prevention program maintained   nonskid shoes/slippers when out of bed   safety round/check completed  Taken 6/25/2025 1200 by Sandrita Sharpe RN  Safety Promotion/Fall Prevention:   assistive device/personal items within reach   clutter free environment maintained   fall prevention program maintained   nonskid shoes/slippers when out of bed   safety round/check completed  Taken 6/25/2025 1000 by Sandrita Sharpe RN  Safety Promotion/Fall Prevention:   assistive device/personal items within reach   clutter free environment maintained   fall prevention program maintained   nonskid shoes/slippers when out of bed   safety round/check completed  Taken 6/25/2025 0800 by Sandrita Sharpe RN  Safety Promotion/Fall Prevention:   safety round/check completed   assistive device/personal items within reach   clutter free environment maintained   fall prevention program maintained   nonskid shoes/slippers  when out of bed  Intervention: Prevent Skin Injury  Recent Flowsheet Documentation  Taken 6/25/2025 1800 by Sandrita Sharpe RN  Body Position: position changed independently  Skin Protection:   incontinence pads utilized   transparent dressing maintained  Taken 6/25/2025 1600 by Sandrita Sharpe RN  Body Position: position changed independently  Skin Protection:   incontinence pads utilized   transparent dressing maintained  Taken 6/25/2025 1400 by Sandrita Sharpe RN  Body Position: legs elevated  Skin Protection:   incontinence pads utilized   transparent dressing maintained  Taken 6/25/2025 1200 by Sandrita Sharpe RN  Body Position: legs elevated  Skin Protection:   incontinence pads utilized   transparent dressing maintained  Taken 6/25/2025 1000 by Sandrita Sharpe RN  Body Position: position changed independently  Skin Protection:   incontinence pads utilized   transparent dressing maintained  Taken 6/25/2025 0800 by Sandrita Sharpe RN  Body Position: position changed independently  Skin Protection:   incontinence pads utilized   silicone foam dressing in place  Intervention: Prevent and Manage VTE (Venous Thromboembolism) Risk  Recent Flowsheet Documentation  Taken 6/25/2025 0800 by Sandrita Sharpe RN  VTE Prevention/Management: (see MAR) other (see comments)  Intervention: Prevent Infection  Recent Flowsheet Documentation  Taken 6/25/2025 1800 by Sandrita Sharpe RN  Infection Prevention:   hand hygiene promoted   personal protective equipment utilized   rest/sleep promoted   single patient room provided   equipment surfaces disinfected  Taken 6/25/2025 1600 by Sandrita Sharpe RN  Infection Prevention:   hand hygiene promoted   personal protective equipment utilized   rest/sleep promoted   single patient room provided   equipment surfaces disinfected  Taken 6/25/2025 1400 by Sandrita Sharpe RN  Infection Prevention:   hand hygiene promoted   personal protective equipment utilized    rest/sleep promoted   single patient room provided   equipment surfaces disinfected  Taken 6/25/2025 1200 by Sandrita Sharpe RN  Infection Prevention:   hand hygiene promoted   personal protective equipment utilized   rest/sleep promoted   single patient room provided   equipment surfaces disinfected  Taken 6/25/2025 1000 by Sandrita Sharpe RN  Infection Prevention:   hand hygiene promoted   personal protective equipment utilized   rest/sleep promoted   single patient room provided   equipment surfaces disinfected  Taken 6/25/2025 0800 by Sandrita Sharpe RN  Infection Prevention:   equipment surfaces disinfected   personal protective equipment utilized   hand hygiene promoted   rest/sleep promoted   single patient room provided  Goal: Optimal Comfort and Wellbeing  Outcome: Progressing  Intervention: Provide Person-Centered Care  Recent Flowsheet Documentation  Taken 6/25/2025 0800 by Sandrita Sharpe RN  Trust Relationship/Rapport:   care explained   choices provided   questions encouraged   thoughts/feelings acknowledged  Goal: Readiness for Transition of Care  Outcome: Progressing     Problem: Comorbidity Management  Goal: Maintenance of Heart Failure Symptom Control  Outcome: Progressing  Intervention: Maintain Heart Failure Management  Recent Flowsheet Documentation  Taken 6/25/2025 0800 by Sandrita Sharpe RN  Medication Review/Management: medications reviewed     Problem: Fall Injury Risk  Goal: Absence of Fall and Fall-Related Injury  Outcome: Progressing  Intervention: Identify and Manage Contributors  Recent Flowsheet Documentation  Taken 6/25/2025 0800 by Sandrita Sharpe RN  Medication Review/Management: medications reviewed  Intervention: Promote Injury-Free Environment  Recent Flowsheet Documentation  Taken 6/25/2025 1800 by Sandrita Sharpe RN  Safety Promotion/Fall Prevention:   assistive device/personal items within reach   clutter free environment maintained   fall prevention  program maintained   nonskid shoes/slippers when out of bed   safety round/check completed  Taken 6/25/2025 1600 by Sandrita Sharpe RN  Safety Promotion/Fall Prevention:   assistive device/personal items within reach   clutter free environment maintained   fall prevention program maintained   nonskid shoes/slippers when out of bed   safety round/check completed  Taken 6/25/2025 1400 by Sandrita Sharpe, RN  Safety Promotion/Fall Prevention:   assistive device/personal items within reach   clutter free environment maintained   fall prevention program maintained   nonskid shoes/slippers when out of bed   safety round/check completed  Taken 6/25/2025 1200 by Sandrita Sharpe, RN  Safety Promotion/Fall Prevention:   assistive device/personal items within reach   clutter free environment maintained   fall prevention program maintained   nonskid shoes/slippers when out of bed   safety round/check completed  Taken 6/25/2025 1000 by Sandrita Sharpe, RN  Safety Promotion/Fall Prevention:   assistive device/personal items within reach   clutter free environment maintained   fall prevention program maintained   nonskid shoes/slippers when out of bed   safety round/check completed  Taken 6/25/2025 0800 by Sandrita Sharpe, RN  Safety Promotion/Fall Prevention:   safety round/check completed   assistive device/personal items within reach   clutter free environment maintained   fall prevention program maintained   nonskid shoes/slippers when out of bed   Goal Outcome Evaluation:

## 2025-06-25 NOTE — PLAN OF CARE
Problem: Adult Inpatient Plan of Care  Goal: Plan of Care Review  Outcome: Progressing  Flowsheets (Taken 6/25/2025 0245)  Plan of Care Reviewed With: patient  Goal: Patient-Specific Goal (Individualized)  Outcome: Progressing  Goal: Absence of Hospital-Acquired Illness or Injury  Outcome: Progressing  Goal: Optimal Comfort and Wellbeing  Outcome: Progressing  Goal: Readiness for Transition of Care  Outcome: Progressing  Intervention: Mutually Develop Transition Plan  Recent Flowsheet Documentation  Taken 6/25/2025 0100 by Shelly Ramirez, RN  Transportation Anticipated: family or friend will provide  Patient/Family Anticipated Services at Transition: none  Patient/Family Anticipates Transition to: home with family  Taken 6/25/2025 0058 by Shelly Ramirez, RN  Equipment Currently Used at Home:   walker, rolling   power chair,(recliner lift)   cpap   Goal Outcome Evaluation:  Plan of Care Reviewed With: patient

## 2025-06-25 NOTE — THERAPY EVALUATION
Patient Name: Bo Perez  : 1945    MRN: [id number]                              Today's Date: 2025       Admit Date: 2025    Visit Dx:     ICD-10-CM ICD-9-CM   1. Acute renal failure, unspecified acute renal failure type  N17.9 584.9   2. Acute dehydration  E86.0 276.51   3. Acute hyponatremia  E87.1 276.1   4. Hypotension, unspecified hypotension type  I95.9 458.9   5. Elevated troponin  R79.89 790.6     Patient Active Problem List   Diagnosis    Chronic anticoagulation    Morbid obesity    Persistent atrial fibrillation    Frequent PVCs    Hyperlipidemia LDL goal <50    Wide-complex tachycardia    Heart failure with improved ejection fraction (HFimpEF)    Coronary artery disease involving native coronary artery of native heart without angina pectoris    Hypotension    Acute hyponatremia    GÓMEZ (acute kidney injury)    Dehydration    Elevated troponin level not due to acute coronary syndrome     Past Medical History:   Diagnosis Date    A-fib     Arrhythmia     Arthritis     Coronary artery disease     Hard to intubate     HTN (hypertension) 2023    Target blood pressure <130/80 mmHg      Hyperlipidemia     Hypertension     Sleep apnea     NOT USING CPAP     Past Surgical History:   Procedure Laterality Date    BLADDER SURGERY  2016    STIMULATOR IMPLANTED, NEW ELECTRODE 2022    CARDIAC ABLATION      AFIB    CARDIAC CATHETERIZATION N/A 2024    Procedure: CASE ABORT;  Surgeon: Yasir Canales IV, MD;  Location:  [facility];  Service: Cardiovascular;  Laterality: N/A;    CARDIAC CATHETERIZATION N/A 2024    Procedure: Left Heart Cath;  Surgeon: Yasir Canales IV, MD;  Location:  [facility];  Service: Cardiovascular;  Laterality: N/A;    CARDIAC CATHETERIZATION  2024    Procedure: Functional Flow [place];  Surgeon: Yasir Canales IV, MD;  Location:  [facility];  Service:  Cardiovascular;;    CARPAL TUNNEL RELEASE Right 2002    CHOLECYSTECTOMY  2011    COLONOSCOPY      DENTAL PROCEDURE  1962    FINGER SURGERY Left 2000    FOREFINGER BONE TRIMMED    HEMORRHOIDECTOMY  1973    KNEE ARTHROSCOPY Left 2008    MOUTH SURGERY  2008    FOR SLEEP APNEA    SPINAL FUSION  2007    L3 LUMBAR SPINAL STENOSIS    TONSILLECTOMY  1954    TOTAL KNEE ARTHROPLASTY Left 2009    TOTAL KNEE ARTHROPLASTY Right 6/22/2023    Procedure: TOTAL KNEE ARTHROPLASTY - RIGHT;  Surgeon: Leland Elizalde MD;  [facility];  Service: Orthopedics;  Laterality: Right;      General Information       Row Name 06/25/25 1531          Physical Therapy Time and Intention    Document Type evaluation  -MB     Mode of Treatment physical therapy  -MB       Row Name 06/25/25 1531          General Information    Patient Profile Reviewed yes  Patient agreeable to PT initial evaluation.  -MB     Prior Level of Function independent:;all household mobility;gait;transfer;bed mobility;ADL's  Patient reports he uses a rollator at baseline. Patient does not drive.  -MB     Existing Precautions/Restrictions fall;orthostatic hypotension;oxygen therapy device and L/min  -MB     Barriers to Rehab medically complex;previous functional deficit;physical barrier  -MB       Row Name 06/25/25 1531          Living Environment    Current Living Arrangements home  -MB     People in Home spouse  -MB       Row Name 06/25/25 1531          Home Main Entrance    Number of Stairs, Main Entrance two  1-2  -MB     Stair Railings, Main Entrance none  -MB       Row Name 06/25/25 1531          Stairs Within Home, Primary    Number of Stairs, Within Home, Primary none  Patient has a stair lift for access to basement where his primary bed/bath are located.  -MB       Row Name 06/25/25 1531          Cognition    Orientation Status (Cognition) oriented x 4  -MB       Row Name 06/25/25 1531          Safety Issues/Impairments Affecting Functional Mobility    Safety  Issues Affecting Function (Mobility) awareness of need for assistance;insight into deficits/self-awareness  -MB     Impairments Affecting Function (Mobility) balance;endurance/activity tolerance;pain;postural/trunk control;sensation/sensory awareness;shortness of breath  -MB               User Key  (r) = Recorded By, (t) = Taken By, (c) = Cosigned By      Initials Name Provider Type    Bhavya Juarez, PT Physical Therapist                   Mobility       Row Name 06/25/25 1537          Bed Mobility    Bed Mobility supine-sit  -MB     Supine-Sit [place] (Bed Mobility) minimum assist (75% patient effort);1 person assist;verbal cues  -MB     Assistive Device (Bed Mobility) bed rails;head of bed elevated;other (see comments)  HHA  -MB     Comment, (Bed Mobility) Patient requiring verbal cues for initiation of activity. Patient requiring assist with trunk elevation.  -MB       Row Name 06/25/25 1537          Bed-Chair Transfer    Bed-Chair [place] (Transfers) contact guard;1 person assist;verbal cues  -MB     Assistive Device (Bed-Chair Transfers) walker, front-wheeled  -MB     Comment, (Bed-Chair Transfer) Patient moved from bed to chair (transferring toward left).  -MB       Row Name 06/25/25 1537          Sit-Stand Transfer    Sit-Stand [place] (Transfers) contact guard;minimum assist (75% patient effort);1 person assist;verbal cues  -MB     Assistive Device (Sit-Stand Transfers) walker, front-wheeled  -MB     Comment, (Sit-Stand Transfer) Patient moved from sit to/from stand x 2 reps. Verbal cues required for hand placement during transfers.  -MB       Row Name 06/25/25 1537          Gait/Stairs (Locomotion)    Patient was able to Ambulate no, other medical factors prevent ambulation  -MB     Reason Patient was unable to Ambulate Hypotension  -MB               User Key  (r) = Recorded By, (t) = Taken By, (c) = Cosigned By      Initials Name Provider Type    Bhavya Juarez, PT Physical Therapist                    Obj/Interventions       Row Name 06/25/25 1539          Range of Motion Comprehensive    General Range of Motion bilateral lower extremity ROM WFL  -MB       Row Name 06/25/25 1538          Strength Comprehensive (MMT)    Comment, General Manual Muscle Testing (MMT) Assessment Bilateral LE strength WFL.  -MB       Row Name 06/25/25 1539          Balance    Balance Assessment sitting static balance;sitting dynamic balance;standing static balance;standing dynamic balance  -MB     Static Sitting Balance standby assist  -MB     Dynamic Sitting Balance standby assist  -MB     Position, Sitting Balance unsupported;sitting edge of bed  -MB     Static Standing Balance contact guard  -MB     Dynamic Standing Balance contact guard  -MB     Position/Device Used, Standing Balance supported;walker, rolling  -MB       Row Name 06/25/25 153          Sensory Assessment (Somatosensory)    Sensory Assessment (Somatosensory) other (see comments)  Patient reports tingling in bilateral toes.  -MB               User Key  (r) = Recorded By, (t) = Taken By, (c) = Cosigned By      Initials Name Provider Type    Bhavya Juarez, PT Physical Therapist                   Goals/Plan       Row Name 06/25/25 1543          Transfer Goal 1 (PT)    Activity/Assistive Device (Transfer Goal 1, PT) sit-to-stand/stand-to-sit;walker, rolling;bed-to-chair/chair-to-bed  -MB     [place] Level/Cues Needed (Transfer Goal 1, PT) modified independence  -MB     Time Frame (Transfer Goal 1, PT) short term goal (STG);1 week  -MB       Row Name 06/25/25 1547          Gait Training Goal 1 (PT)    Activity/Assistive Device (Gait Training Goal 1, PT) gait (walking locomotion);walker, rolling  -MB     [place] Level (Gait Training Goal 1, PT) modified independence  -MB     Distance (Gait Training Goal 1, PT) 150 feet  -MB     Time Frame (Gait Training Goal 1, PT) long term goal (LTG);2 weeks  -MB       Row Name 06/25/25 1549          Stairs  Goal 1 (PT)    Activity/Assistive Device (Stairs Goal 1, PT) ascending stairs;descending stairs  no rails  -MB     [place] Level/Cues Needed (Stairs Goal 1, PT) contact guard required  -MB     Number of Stairs (Stairs Goal 1, PT) 2 steps  -MB     Time Frame (Stairs Goal 1, PT) long term goal (LTG);2 weeks  -MB       Row Name 06/25/25 1541          Patient Education Goal (PT)    Activity (Patient Education Goal, PT) Patient will be independent with HEP and discharge recommendations for ease of transition to next level of care.  -MB     [place]/Cues/Accuracy (Memory Goal 2, PT) independent  -MB     Time Frame (Patient Education Goal, PT) long term goal (LTG);2 weeks  -MB       Row Name 06/25/25 8688          Therapy Assessment/Plan (PT)    Planned Therapy Interventions (PT) balance training;bed mobility training;gait training;home exercise program;neuromuscular re-education;patient/family education;postural re-education;stair training;strengthening;transfer training;other (see comments)  Functional endurance training  -MB               User Key  (r) = Recorded By, (t) = Taken By, (c) = Cosigned By      Initials Name Provider Type    Bhavya Juarez, PT Physical Therapist                   Clinical Impression       Row Name 06/25/25 5491          Pain    Pretreatment Pain Rating 0/10 - no pain  -MB     Posttreatment Pain Rating 0/10 - no pain  -MB       Row Name 06/25/25 2657          Plan of Care Review    Plan of Care Reviewed With patient  -MB     Outcome Evaluation Patient participated in PT initial evaluation. Patient presents with impaired functional mobility, independence, balance, endurance, and LE sensation secondary to medical diagnoses. Evaluation overall limited by hypotension with ambulation deferred this date. RN aware. Patient would benefit from skilled PT services to address noted deficits. Recommend patient discharge to acute rehab when medically appropriate for discharge as patient is not  currently at baseline level of function and is unable to ambulate at this time.  -MB       Row Name 06/25/25 1540          Therapy Assessment/Plan (PT)    Patient/Family Therapy Goals Statement (PT) To return home.  -MB     Rehab Potential (PT) good  -MB     Criteria for Skilled Interventions Met (PT) yes;meets criteria;skilled treatment is necessary  -MB     Therapy Frequency (PT) daily  -MB     Predicted Duration of Therapy Intervention (PT) 2 weeks  -MB       Row Name 06/25/25 1540          Vital Signs    Pre Systolic BP Rehab 81  -MB     Pre Treatment Diastolic BP 43  -MB     Intra Systolic BP Rehab 87  -MB     Intra Treatment Diastolic BP 45  -MB     Post Systolic BP Rehab 89  -MB     Post Treatment Diastolic BP 60  -MB     Pretreatment Heart Rate (beats/min) 58  -MB     Intratreatment Heart Rate (beats/min) 69  -MB     Posttreatment Heart Rate (beats/min) 61  -MB     Post SpO2 (%) 100  -MB     O2 Delivery Post Treatment nasal cannula  -MB     Pre Patient Position Supine  -MB     Intra Patient Position Sitting  -MB     Post Patient Position Sitting  -MB       Row Name 06/25/25 1540          Positioning and Restraints    Pre-Treatment Position in bed  -MB     Post Treatment Position chair  -MB     In Chair notified nsg;reclined;sitting;call light within reach;encouraged to call for assist;exit alarm on;with family/caregiver;waffle cushion;legs elevated  -MB               User Key  (r) = Recorded By, (t) = Taken By, (c) = Cosigned By      Initials Name Provider Type    Bhavya Juarez, PT Physical Therapist                   Outcome Measures       Row Name 06/25/25 1546 06/25/25 0800       How much help from another person do you currently need...    Turning from your back to your side while in flat bed without using bedrails? 3  -MB 4  -LL    Moving from lying on back to sitting on the side of a flat bed without bedrails? 3  -MB 4  -LL    Moving to and from a bed to a chair (including a wheelchair)? 3  -MB 3   -LL    Standing up from a chair using your arms (e.g., wheelchair, bedside chair)? 3  -MB 3  -LL    Climbing 3-5 steps with a railing? 2  -MB 2  -LL    To walk in hospital room? 3  -MB 3  -LL    AM-PAC 6 Clicks Score (PT) 17  -MB 19  -LL    Highest Level of Mobility Goal Stand (1 or More Minutes)-5  -MB Walk 10 Steps or More-6  -LL      Row Name 06/25/25 1546 06/25/25 1136       Functional Assessment    Outcome Measure Options AM-PAC 6 Clicks Basic Mobility (PT)  -MB AM-PAC 6 Clicks Daily Activity (OT)  -LR              User Key  (r) = Recorded By, (t) = Taken By, (c) = Cosigned By      Initials Name Provider Type    LR Michelle Reddy, OT Occupational Therapist    Sandrita Thompson RN Registered Nurse    Bhavya Juarez, PT Physical Therapist                                 Physical Therapy Education       Title: PT OT SLP Therapies (In Progress)       Topic: Physical Therapy (In Progress)       Point: Mobility training (Done)       Learning Progress Summary            Patient Acceptance, E, VU by MB at 6/25/2025 1546                      Point: Home exercise program (Not Started)       Learner Progress:  Not documented in this visit.              Point: Body mechanics (Done)       Learning Progress Summary            Patient Acceptance, E, VU by MB at 6/25/2025 1546                      Point: Precautions (Done)       Learning Progress Summary            Patient Acceptance, E, VU by MB at 6/25/2025 1546                                      User Key       Initials Effective Dates Name Provider Type Discipline    MB 05/30/25 -  Bhavya Taylor, PT Physical Therapist PT                  PT Recommendation and Plan  Planned Therapy Interventions (PT): balance training, bed mobility training, gait training, home exercise program, neuromuscular re-education, patient/family education, postural re-education, stair training, strengthening, transfer training, other (see comments) (Functional endurance training)  Outcome  Evaluation: Patient participated in PT initial evaluation. Patient presents with impaired functional mobility, independence, balance, endurance, and LE sensation secondary to medical diagnoses. Evaluation overall limited by hypotension with ambulation deferred this date. RN aware. Patient would benefit from skilled PT services to address noted deficits. Recommend patient discharge to acute rehab when medically appropriate for discharge as patient is not currently at baseline level of function and is unable to ambulate at this time.     Time Calculation:   PT Evaluation Complexity  History, PT Evaluation Complexity: 3 or more personal factors and/or comorbidities  Examination of Body Systems (PT Eval Complexity): total of 3 or more elements  Clinical Presentation (PT Evaluation Complexity): evolving  Clinical Decision Making (PT Evaluation Complexity): moderate complexity  Overall Complexity (PT Evaluation Complexity): moderate complexity     PT Charges       Row Name 06/25/25 1547             Time Calculation    Start Time 0939  -MB      PT Received On 06/25/25  -MB      PT Goal Re-Cert Due Date 07/05/25  -MB         Untimed Charges    PT Eval/Re-eval Minutes 54  -MB         Total Minutes    Untimed Charges Total Minutes 54  -MB       Total Minutes 54  -MB                User Key  (r) = Recorded By, (t) = Taken By, (c) = Cosigned By      Initials Name Provider Type    Bhavya Juarez, PT Physical Therapist                  Therapy Charges for Today       Code Description Service Date Service Provider Modifiers Qty    29990558877 HC PT EVAL MOD COMPLEXITY 4 6/25/2025 Bhavya Taylor, PT GP 1            PT G-Codes  Outcome Measure Options: AM-PAC 6 Clicks Basic Mobility (PT)  AM-PAC 6 Clicks Score (PT): 17  AM-PAC 6 Clicks Score (OT): 18  PT Discharge Summary  Anticipated Discharge Disposition (PT): inpatient rehabilitation facility    Bhavya Taylor PT  6/25/2025

## 2025-06-25 NOTE — CASE MANAGEMENT/SOCIAL WORK
Discharge Planning Assessment  [facility]     Patient Name: Bo Perez  MRN: [id number]  Today's Date: 6/25/2025    Admit Date: 6/24/2025    Plan: Home   Discharge Needs Assessment       Row Name 06/25/25 1245       Living Environment    People in Home spouse    Name(s) of People in Home Kristen Perez    Current Living Arrangements home    Potentially Unsafe Housing Conditions none    Primary Care Provided by self    Provides Primary Care For no one    Family Caregiver if Needed spouse    Family Caregiver Names Kristen Perez    Quality of Family Relationships unable to assess    Able to Return to Prior Arrangements yes       Resource/Environmental Concerns    Resource/Environmental Concerns none    Transportation Concerns none       Transition Planning    Patient/Family Anticipates Transition to home    Patient/Family Anticipated Services at Transition none    Transportation Anticipated family or friend will provide       Discharge Needs Assessment    Readmission Within the Last 30 Days no previous admission in last 30 days    Equipment Currently Used at Home lift device;rollator;shower chair;other (see comments)  CPAP but does not use    Concerns to be Addressed denies needs/concerns at this time;discharge planning    Anticipated Changes Related to Illness none    Equipment Needed After Discharge none    Discharge Coordination/Progress Home                   Discharge Plan       Row Name 06/25/25 1247       Plan    Plan Home    Patient/Family in Agreement with Plan yes    Plan Comments Spoke with patient at bedside regarding discharge planning.  Patient has used HH in the past but does not remember what agency it was.  Patient reports having a Rollator, Built in Shower Chair, Chair Lift and CPAP but does not use it.  Patient indicates that he has prescription coverage and medications are affordable.  Patient lives with his spouse in a mutlievel house with bedroom upstairs and chair lift to access, two step to  enter home.  Patient reports being independent at home and denies needs.  CM following.  Patient plan is to discharge home via car with family to transport.    Final Discharge Disposition Code 01 - home or self-care                  Selected Continued Care - Episodes Includes continued care and service providers with selected services from the active episodes listed below             Demographic Summary       Row Name 06/25/25 1249       General Information    Admission Type observation    Arrived From home    Referral Source admission list    Reason for Consult discharge planning    Preferred Language English    General Information Comments Mario Rowland MD       Contact Information    Permission Granted to Share Info With     Contact Information Comments Kristen Perez, spouse  [phone]  Bo Perez, son  [phone]                   Functional Status       Row Name 06/25/25 1244       Functional Status    Usual Activity Tolerance good    Current Activity Tolerance good       Functional Status, IADL    Medications independent    Meal Preparation independent    Housekeeping independent    Laundry independent    Shopping independent       Employment/    Employment/ Comments [employer] Replacement                   Psychosocial    No documentation.                  Abuse/Neglect    No documentation.                  Legal    No documentation.                  Substance Abuse    No documentation.                  Patient Forms    No documentation.                     Jennifer Rowland, RN

## 2025-06-25 NOTE — PROGRESS NOTES
Cardiology Progress Note      Reason for visit:    Chronic CHF with improved EF  Hypotension  Coronary artery disease  Persistent atrial fibrillation    IDENTIFICATION: Patient is a 79-year-old gentleman who resides in [facility] Hospital Problems    Diagnosis  POA    **GÓMEZ (acute kidney injury) [N17.9]  Yes    Hypotension [I95.9]  Yes     Priority: High    Coronary artery disease involving native coronary artery of native heart without angina pectoris [I25.10]  Yes     Priority: High     Cardiac catheterization (12/30/2024): Mild to moderate 1-vessel CAD (RCA).  This lesion was not hemodynamically significant (RFR 0.95).  Minimal left coronary disease.  LVEF 35%.  Mildly elevated LV filling pressure (LVEDP 16 mmHg)      Heart failure with improved ejection fraction (HFimpEF) [I50.32]  Yes     Priority: High     Echo at [facility] (9/2020): LVEF 55-60%  Echo at [facility] (8/16/2024): LVEF 55-60%  Echo (8/31/2024): LVEF 30%  Echo (9/6/2024): LVEF 35%  Cardiac catheterization (12/30/2024): Mild to moderate 1-vessel CAD (RCA).  This lesion was not hemodynamically significant (RFR 0.95).  Minimal left coronary disease.  LVEF 35%.  Mildly elevated LV filling pressure (LVEDP 16 mmHg)  Echo (2/12/2025): LVEF 53%.  Aortic valve calcification with no significant stenosis or regurgitation      Persistent atrial fibrillation [I48.19]  Yes     Priority: Medium     Chads Vasc 4 (age >75, CHF, CAD)  Cardiac catheterization (12/30/2024): Mild to moderate 1-vessel CAD (RCA).  This lesion was not hemodynamically significant (RFR 0.95).  Minimal left coronary disease.  LVEF 35%.  Mildly elevated LV filling pressure (LVEDP 16 mmHg)      Hyperlipidemia LDL goal <50 [E78.5]  Yes     Priority: Low     High intensity statin recommended due to the presence of CAD  Normal LP(a), 2025      Dehydration [E86.0]  Yes    Acute hyponatremia [E87.1]  Yes    Elevated troponin [R79.89]  Yes    Chronic anticoagulation  [Z79.01]  Not Applicable            I saw the patient in the office yesterday.  He was severely hypotensive.  Lab work from earlier in the month was reviewed that showed a sodium level of 122.  At best his blood pressure was 70s over 40s in the office.  The patient was symptomatic with weakness but was breathing easy on room air and denied any chest pain.  I had our staff take him to the emergency room.  On arrival to the emergency room his creatinine was elevated 2.52 and sodium 124.  His GFR was only 25.  Troponins were drawn and elevated but flat at 63, 46 and 39.  EKG showed rate controlled atrial fibrillation but no acute ischemia.  He was felt to be severely dehydrated and treated with IV fluids.  We are being asked to consult regarding heart failure medication adjustments.  All diuretics, Entresto and metoprolol succinate have been held.  His blood pressures are somewhat improved at 97/43.  He is currently sitting up in the chair without complaints.  His wife is at bedside.  He is breathing easy on room air.  He denies any chest pain.           Vital Sign Min/Max for last 24 hours  Temp  Min: 98.4 °F (36.9 °C)  Max: 98.9 °F (37.2 °C)   BP  Min: 78/46  Max: 111/46   Pulse  Min: 51  Max: 76   Resp  Min: 16  Max: 18   SpO2  Min: 83 %  Max: 100 %   Flow (L/min) (Oxygen Therapy)  Min: 2  Max: 2      Intake/Output Summary (Last 24 hours) at 2025 1008  Last data filed at 2025 0900  Gross per 24 hour   Intake 1480 ml   Output 600 ml   Net 880 ml           Physical Exam  Constitutional:       General: He is awake.   Cardiovascular:      Rate and Rhythm: Normal rate. Rhythm irregularly irregular.      Heart sounds: Murmur heard.   Pulmonary:      Effort: Pulmonary effort is normal.      Breath sounds: Normal breath sounds.   Musculoskeletal:      Right lower le+ Pitting Edema present.      Left lower le+ Pitting Edema present.   Skin:     General: Skin is warm and dry.   Neurological:      Mental  Status: He is alert and oriented to person, place, and time.   Psychiatric:         Behavior: Behavior is cooperative.         Tele: Rate controlled atrial fibrillation    Results Review (reviewed the patient's recent labs in the electronic medical record):     EKG (6/25/2025): Rate controlled atrial fibrillation at 60 bpm, left bundle branch block    CXR (6/24/2025): No acute cardiopulmonary abnormality    Echo (2/17/2025): LVEF 52%, grade 1 diastolic dysfunction.  Calcification aortic valve without significant stenosis or regurgitation    Results from last 7 days   Lab Units 06/25/25  0745 06/25/25  0359 06/25/25  0031 06/24/25 1959 06/24/25  1449   SODIUM mmol/L 126* 127*  126* 125* 125* 124*   POTASSIUM mmol/L 3.8 3.6  3.5 3.8 4.1 4.0   CHLORIDE mmol/L 86* 85*  86* 86* 85* 82*   BUN mg/dL 61.0* 60.0*  62.3* 57.7* 58.8* 62.7*   CREATININE mg/dL 1.70* 1.97*  1.89* 2.08* 2.22* 2.52*   MAGNESIUM mg/dL  --  2.2  --   --  2.3       Results from last 7 days   Lab Units 06/25/25  0359 06/24/25 1959 06/24/25  1557   HSTROP T ng/L 48* 39* 46*       Results from last 7 days   Lab Units 06/25/25  0359 06/24/25  1449   WBC 10*3/mm3 8.84 9.01   HEMOGLOBIN g/dL 10.9* 11.8*   HEMATOCRIT % 31.5* 33.3*   PLATELETS 10*3/mm3 162 175       Lab Results   Component Value Date    HGBA1C 5.00 12/30/2024       Lab Results   Component Value Date    CHOL 125 12/30/2024    TRIG 110 12/30/2024    HDL 38 (L) 12/30/2024    LDL 43 02/18/2025              Chronic heart failure with improved EF  Takes metoprolol succinate, Entresto and spironolactone at home but currently on hold due to hypotension  Currently euvolemic and compensated and actually dehydrated  Last echo 2/2025 LVEF 52% with grade 1 diastolic dysfunction.  Jardiance 10 mg daily      Hypotension from overdiuresis/dehydration  Sent from cardiology office to ED for severe hypotension  All heart failure medicines and diuretics on hold  Received IV fluids with improvement in  blood pressures.  Currently 97/43    Coronary artery disease  Cath 12/2024 mild to moderate one-vessel CAD not significant by RFR.  Currently no angina symptoms  Aspirin deferred due to concomitant use of Eliquis    Elevated troponin not due to ACS  Troponins elevated and flat, EKG without acute ischemia.  Scenario not consistent with ACS but consistent with demand ischemia from with acute renal failure and dehydration    Persistent atrial fibrillation  Amiodarone 200 mg daily  Eliquis 5 mg twice daily    Acute kidney injury  Creatinine was elevated 2.52 on admission  Hold nephrotoxic medications and diuretics on hold  Creatinine trending down and now 1.7      Acute hyponatremia  Sodium 124 on admission  Current sodium 126  Diuretics on hold                   Continue to hold all diuretics and antihypertensives  Continue IV fluids at 75 mL an hour for the rest of today  Monitor closely for signs of acute CHF  Continue Eliquis for stroke prophylaxis and amiodarone for rhythm management  If BP improved tomorrow we will try to reintroduce beta-blocker  No need for ischemic evaluation.  Troponin elevated and flat and patient without anginal symptoms.  Recent cath in December had one-vessel nonobstructive disease.    Electronically signed by RAKEL Alcantar, 06/25/25, 10:08 AM EDT.

## 2025-06-25 NOTE — PROGRESS NOTES
[facility] Medicine Services  PROGRESS NOTE    Patient Name: Bo Perez  : 1945  MRN: [id number]    Date of Admission: 2025  Primary Care Physician: Mario Rowland MD    Subjective   Subjective     CC:  Hypertension    HPI:  Resting in bed no acute distress on tells me he feels okay.  Does not have any specific complaint.  No fever or chills.  No chest pain or palpitation or shortness of breath or headache.  No nausea vomiting or diarrhea.      Objective   Objective     Vital Signs:   Temp:  [98.4 °F (36.9 °C)-98.9 °F (37.2 °C)] 98.4 °F (36.9 °C)  Heart Rate:  [51-75] 55  Resp:  [16-18] 17  BP: ()/(41-69) 97/43  Flow (L/min) (Oxygen Therapy):  [2] 2     Physical Exam:  Constitutional: No acute distress, discomfort  HENT: NCAT, mucous membranes moist  Respiratory: Clear to auscultation bilaterally, respiratory effort normal   Cardiovascular: RRR, no murmurs, rubs, or gallops  Gastrointestinal: Abdomen is obese.  Positive bowel sounds, soft, nontender, nondistended  Musculoskeletal:  bilateral ankle edema, morbidly obese  Psychiatric: Appropriate affect, cooperative  Neurologic: Oriented x 3,  speech clear  Skin: No rashes     Results Reviewed:  LAB RESULTS:      Lab 25  0359 25  1557 25  1449   WBC 8.84  --   --  9.01   HEMOGLOBIN 10.9*  --   --  11.8*   HEMATOCRIT 31.5*  --   --  33.3*   PLATELETS 162  --   --  175   NEUTROS ABS 6.59  --   --  6.59   IMMATURE GRANS (ABS) 0.04  --   --  0.05   LYMPHS ABS 1.57  --   --  1.52   MONOS ABS 0.40  --   --  0.61   EOS ABS 0.21  --   --  0.21   MCV 96.6  --   --  94.9   HSTROP T 48* 39* 46* 63*         Lab 25  1317 25  0745 259 25  0031 2525  1449   SODIUM 127* 126* 127*  126* 125* 125* 124*   POTASSIUM 3.7 3.8 3.6  3.5 3.8 4.1 4.0   CHLORIDE 87* 86* 85*  86* 86* 85* 82*   CO2 28.0 29.0 29.0  27.0 27.8 26.6 28.4   ANION GAP 12.0 11.0  13.0  13.0 11.2 13.4 13.6   BUN 58.4* 61.0* 60.0*  62.3* 57.7* 58.8* 62.7*   CREATININE 1.72* 1.70* 1.97*  1.89* 2.08* 2.22* 2.52*   EGFR 40.2* 40.8* 34.1*  35.9* 32.0* 29.6* 25.4*   GLUCOSE 139* 105* 99  99 118* 97 104*   CALCIUM 8.5* 8.6 8.8  8.9 8.8 8.9 9.6   MAGNESIUM  --   --  2.2  --   --  2.3         Lab 06/24/25  1449   TOTAL PROTEIN 7.1   ALBUMIN 4.1   GLOBULIN 3.0   ALT (SGPT) 21   AST (SGOT) 22   BILIRUBIN 1.0   ALK PHOS 54         Lab 06/25/25  0359 06/24/25  1959 06/24/25  1557 06/24/25  1449   HSTROP T 48* 39* 46* 63*                 Brief Urine Lab Results  (Last result in the past 365 days)        Color   Clarity   Blood   Leuk Est   Nitrite   Protein   CREAT   Urine HCG        06/24/25 1634             41.5         06/24/25 1634 Yellow   Clear   Negative   Large (3+)   Negative   Negative                   Microbiology Results Abnormal       Procedure Component Value - Date/Time    Eosinophil Smear - Urine, Urine, Clean Catch [[id number]]  (Abnormal) Collected: 06/24/25 1634    Lab Status: Final result Specimen: Urine, Clean Catch Updated: 06/25/25 0537     Eosinophil Smear 2 % EOS/100 Cells             XR Chest 1 View  Result Date: 6/24/2025  XR CHEST 1 VW Date of Exam: 6/24/2025 3:04 PM EDT Indication: Weak/Dizzy/AMS triage protocol Comparison: [facility] chest x-ray 11/15/2024 Findings: The patient's chin partially obscures evaluation of the upper chest. Lungs are adequately expanded and appear clear. Cardiomediastinal contours are stable.     Impression: Impression: No acute cardiopulmonary abnormality is identified. Electronically Signed: Radha Lawrence MD  6/24/2025 3:44 PM EDT  Workstation ID: [id number]      Results for orders placed during the hospital encounter of 02/12/25    Adult Transthoracic Echo Limited W/ Cont if Necessary Per Protocol    Interpretation Summary    Left ventricular systolic function is normal. Estimated left ventricular EF = 52%    Left ventricular diastolic function is  consistent with (grade I) impaired relaxation.    There is moderate calcification of the aortic valve.  There is no significant aortic valve stenosis or regurgitation present.      Current medications:  Scheduled Meds:acetaminophen, 650 mg, Oral, Q8H  amiodarone, 200 mg, Oral, Daily  apixaban, 5 mg, Oral, Q12H  ARIPiprazole, 5 mg, Oral, Daily  atorvastatin, 40 mg, Oral, Daily  [Held by provider] bumetanide, 1 mg, Oral, Daily  empagliflozin, 10 mg, Oral, Daily  ipratropium, 2 spray, Each Nare, Q PM  [Held by provider] metoprolol succinate XL, 25 mg, Oral, Daily  sodium chloride, 10 mL, Intravenous, Q12H      Continuous Infusions:sodium chloride, 75 mL/hr, Last Rate: 75 mL/hr (06/24/25 2005)      PRN Meds:.  senna-docusate sodium **AND** polyethylene glycol **AND** bisacodyl **AND** bisacodyl    sodium chloride    sodium chloride    sodium chloride    Assessment & Plan   Assessment & Plan     Active Hospital Problems    Diagnosis  POA    **GÓMEZ (acute kidney injury) [N17.9]  Yes    Dehydration [E86.0]  Yes    Acute hyponatremia [E87.1]  Yes    Hypotension [I95.9]  Yes    Elevated troponin level not due to acute coronary syndrome [R79.89]  Yes    Coronary artery disease involving native coronary artery of native heart without angina pectoris [I25.10]  Yes    Heart failure with improved ejection fraction (HFimpEF) [I50.32]  Yes    Hyperlipidemia LDL goal <50 [E78.5]  Yes    Persistent atrial fibrillation [I48.19]  Yes      Resolved Hospital Problems   No resolved problems to display.        Brief Hospital Course to date:  Bo Perez is a 79 y.o. male   1. Acute kidney injury (GÓMEZ).  - Prerenal, creatinine improving with hydration.  -Will monitor     2. Hyponatremia.  - Likely secondary to dehydration and excessive diuretic use  - Hydration     3. Hypotension.  - Blood pressure remains low  - Secondary to diuretics and antihypertensives, all the medication  - Cardiology is following     4. Elevated troponin   - denies  chest pain      5. Persistent atrial fibrillation.  - Currently rate controlled and on Eliquis  - Amiodarone will be continued     6. Heart failure with improved ejection fraction (EF).     Coronary artery disease (CAD).    8. Obesity  -  on Wegovy     Expected Discharge Location and Transportation: Home  Expected Discharge in 2-3  Expected discharge date/ time has not been documented.     VTE Prophylaxis:  Pharmacologic & mechanical VTE prophylaxis orders are present.         AM-PAC 6 Clicks Score (PT): 19 (06/25/25 0800)    CODE STATUS:   Code Status and Medical Interventions: CPR (Attempt to Resuscitate); Full Support   Ordered at: 06/24/25 2003     Code Status (Patient has no pulse and is not breathing):    CPR (Attempt to Resuscitate)     Medical Interventions (Patient has pulse or is breathing):    Full Support     Level Of Support Discussed With:    Patient       Nico Campos MD  06/25/25

## 2025-06-25 NOTE — NURSING NOTE
Med rec could not be obtained at this time. Pt stated his wife would come in the AM with his med list.

## 2025-06-25 NOTE — PLAN OF CARE
Goal Outcome Evaluation:  Plan of Care Reviewed With: patient           Outcome Evaluation: Patient participated in PT initial evaluation. Patient presents with impaired functional mobility, independence, balance, endurance, and LE sensation secondary to medical diagnoses. Evaluation overall limited by hypotension with ambulation deferred this date. RN aware. Patient would benefit from skilled PT services to address noted deficits. Recommend patient discharge to acute rehab when medically appropriate for discharge as patient is not currently at baseline level of function and is unable to ambulate at this time.    Anticipated Discharge Disposition (PT): inpatient rehabilitation facility

## 2025-06-26 LAB
ABO GROUP BLD: NORMAL
ANION GAP SERPL CALCULATED.3IONS-SCNC: 11 MMOL/L (ref 5–15)
ANION GAP SERPL CALCULATED.3IONS-SCNC: 12 MMOL/L (ref 5–15)
ANION GAP SERPL CALCULATED.3IONS-SCNC: 14.6 MMOL/L (ref 5–15)
BASOPHILS # BLD AUTO: 0.03 10*3/MM3 (ref 0–0.2)
BASOPHILS NFR BLD AUTO: 0.3 % (ref 0–1.5)
BLD GP AB SCN SERPL QL: NEGATIVE
BUN SERPL-MCNC: 35.1 MG/DL (ref 8–23)
BUN SERPL-MCNC: 38.1 MG/DL (ref 8–23)
BUN SERPL-MCNC: 42.4 MG/DL (ref 8–23)
BUN SERPL-MCNC: 45.3 MG/DL (ref 8–23)
BUN SERPL-MCNC: 49.2 MG/DL (ref 8–23)
BUN/CREAT SERPL: 27 (ref 7–25)
BUN/CREAT SERPL: 29.1 (ref 7–25)
BUN/CREAT SERPL: 29.7 (ref 7–25)
BUN/CREAT SERPL: 30 (ref 7–25)
BUN/CREAT SERPL: 31 (ref 7–25)
CALCIUM SPEC-SCNC: 8.1 MG/DL (ref 8.6–10.5)
CALCIUM SPEC-SCNC: 8.3 MG/DL (ref 8.6–10.5)
CALCIUM SPEC-SCNC: 8.4 MG/DL (ref 8.6–10.5)
CALCIUM SPEC-SCNC: 8.4 MG/DL (ref 8.6–10.5)
CALCIUM SPEC-SCNC: 8.7 MG/DL (ref 8.6–10.5)
CHLORIDE SERPL-SCNC: 88 MMOL/L (ref 98–107)
CHLORIDE SERPL-SCNC: 90 MMOL/L (ref 98–107)
CHLORIDE SERPL-SCNC: 91 MMOL/L (ref 98–107)
CHLORIDE SERPL-SCNC: 92 MMOL/L (ref 98–107)
CHLORIDE SERPL-SCNC: 94 MMOL/L (ref 98–107)
CO2 SERPL-SCNC: 23.4 MMOL/L (ref 22–29)
CO2 SERPL-SCNC: 25 MMOL/L (ref 22–29)
CO2 SERPL-SCNC: 26 MMOL/L (ref 22–29)
CREAT SERPL-MCNC: 1.23 MG/DL (ref 0.76–1.27)
CREAT SERPL-MCNC: 1.3 MG/DL (ref 0.76–1.27)
CREAT SERPL-MCNC: 1.43 MG/DL (ref 0.76–1.27)
CREAT SERPL-MCNC: 1.51 MG/DL (ref 0.76–1.27)
CREAT SERPL-MCNC: 1.69 MG/DL (ref 0.76–1.27)
DEPRECATED RDW RBC AUTO: 47.6 FL (ref 37–54)
EGFRCR SERPLBLD CKD-EPI 2021: 41 ML/MIN/1.73
EGFRCR SERPLBLD CKD-EPI 2021: 47 ML/MIN/1.73
EGFRCR SERPLBLD CKD-EPI 2021: 50.2 ML/MIN/1.73
EGFRCR SERPLBLD CKD-EPI 2021: 56.2 ML/MIN/1.73
EGFRCR SERPLBLD CKD-EPI 2021: 60.1 ML/MIN/1.73
EOSINOPHIL # BLD AUTO: 0.21 10*3/MM3 (ref 0–0.4)
EOSINOPHIL NFR BLD AUTO: 2.4 % (ref 0.3–6.2)
ERYTHROCYTE [DISTWIDTH] IN BLOOD BY AUTOMATED COUNT: 14.1 % (ref 12.3–15.4)
GLUCOSE SERPL-MCNC: 107 MG/DL (ref 65–99)
GLUCOSE SERPL-MCNC: 91 MG/DL (ref 65–99)
GLUCOSE SERPL-MCNC: 96 MG/DL (ref 65–99)
GLUCOSE SERPL-MCNC: 96 MG/DL (ref 65–99)
GLUCOSE SERPL-MCNC: 97 MG/DL (ref 65–99)
HCT VFR BLD AUTO: 27.9 % (ref 37.5–51)
HCT VFR BLD AUTO: 28.3 % (ref 37.5–51)
HCT VFR BLD AUTO: 30.1 % (ref 37.5–51)
HEMOCCULT STL QL: POSITIVE
HGB BLD-MCNC: 10 G/DL (ref 13–17.7)
HGB BLD-MCNC: 10.1 G/DL (ref 13–17.7)
HGB BLD-MCNC: 10.2 G/DL (ref 13–17.7)
IMM GRANULOCYTES # BLD AUTO: 0.06 10*3/MM3 (ref 0–0.05)
IMM GRANULOCYTES NFR BLD AUTO: 0.7 % (ref 0–0.5)
LYMPHOCYTES # BLD AUTO: 1.63 10*3/MM3 (ref 0.7–3.1)
LYMPHOCYTES NFR BLD AUTO: 18.9 % (ref 19.6–45.3)
MCH RBC QN AUTO: 33.6 PG (ref 26.6–33)
MCHC RBC AUTO-ENTMCNC: 36 G/DL (ref 31.5–35.7)
MCV RBC AUTO: 93.1 FL (ref 79–97)
MONOCYTES # BLD AUTO: 0.66 10*3/MM3 (ref 0.1–0.9)
MONOCYTES NFR BLD AUTO: 7.6 % (ref 5–12)
NEUTROPHILS NFR BLD AUTO: 6.05 10*3/MM3 (ref 1.7–7)
NEUTROPHILS NFR BLD AUTO: 70.1 % (ref 42.7–76)
NRBC BLD AUTO-RTO: 0 /100 WBC (ref 0–0.2)
PLATELET # BLD AUTO: 151 10*3/MM3 (ref 140–450)
PMV BLD AUTO: 9.1 FL (ref 6–12)
POTASSIUM SERPL-SCNC: 3.7 MMOL/L (ref 3.5–5.2)
POTASSIUM SERPL-SCNC: 3.8 MMOL/L (ref 3.5–5.2)
POTASSIUM SERPL-SCNC: 3.9 MMOL/L (ref 3.5–5.2)
POTASSIUM SERPL-SCNC: 4 MMOL/L (ref 3.5–5.2)
POTASSIUM SERPL-SCNC: 4.1 MMOL/L (ref 3.5–5.2)
RBC # BLD AUTO: 3.04 10*6/MM3 (ref 4.14–5.8)
RH BLD: NEGATIVE
SODIUM SERPL-SCNC: 126 MMOL/L (ref 136–145)
SODIUM SERPL-SCNC: 128 MMOL/L (ref 136–145)
SODIUM SERPL-SCNC: 128 MMOL/L (ref 136–145)
SODIUM SERPL-SCNC: 129 MMOL/L (ref 136–145)
SODIUM SERPL-SCNC: 130 MMOL/L (ref 136–145)
T&S EXPIRATION DATE: NORMAL
WBC NRBC COR # BLD AUTO: 8.64 10*3/MM3 (ref 3.4–10.8)

## 2025-06-26 PROCEDURE — 85025 COMPLETE CBC W/AUTO DIFF WBC: CPT | Performed by: NURSE PRACTITIONER

## 2025-06-26 PROCEDURE — 85018 HEMOGLOBIN: CPT | Performed by: NURSE PRACTITIONER

## 2025-06-26 PROCEDURE — 85014 HEMATOCRIT: CPT | Performed by: NURSE PRACTITIONER

## 2025-06-26 PROCEDURE — 82272 OCCULT BLD FECES 1-3 TESTS: CPT | Performed by: NURSE PRACTITIONER

## 2025-06-26 PROCEDURE — 25810000003 SODIUM CHLORIDE 0.9 % SOLUTION: Performed by: INTERNAL MEDICINE

## 2025-06-26 PROCEDURE — 99232 SBSQ HOSP IP/OBS MODERATE 35: CPT | Performed by: NURSE PRACTITIONER

## 2025-06-26 PROCEDURE — 25810000003 SODIUM CHLORIDE 0.9 % SOLUTION: Performed by: NURSE PRACTITIONER

## 2025-06-26 PROCEDURE — 99232 SBSQ HOSP IP/OBS MODERATE 35: CPT | Performed by: INTERNAL MEDICINE

## 2025-06-26 PROCEDURE — 86901 BLOOD TYPING SEROLOGIC RH(D): CPT | Performed by: NURSE PRACTITIONER

## 2025-06-26 PROCEDURE — 86900 BLOOD TYPING SEROLOGIC ABO: CPT | Performed by: NURSE PRACTITIONER

## 2025-06-26 PROCEDURE — 80048 BASIC METABOLIC PNL TOTAL CA: CPT | Performed by: NURSE PRACTITIONER

## 2025-06-26 PROCEDURE — 86850 RBC ANTIBODY SCREEN: CPT | Performed by: NURSE PRACTITIONER

## 2025-06-26 PROCEDURE — 80048 BASIC METABOLIC PNL TOTAL CA: CPT | Performed by: INTERNAL MEDICINE

## 2025-06-26 RX ORDER — NITROGLYCERIN 0.4 MG/1
0.4 TABLET SUBLINGUAL
Status: DISCONTINUED | OUTPATIENT
Start: 2025-06-26 | End: 2025-07-02 | Stop reason: HOSPADM

## 2025-06-26 RX ORDER — OXYCODONE AND ACETAMINOPHEN 5; 325 MG/1; MG/1
1 TABLET ORAL EVERY 6 HOURS PRN
Refills: 0 | Status: DISPENSED | OUTPATIENT
Start: 2025-06-26 | End: 2025-07-01

## 2025-06-26 RX ORDER — PANTOPRAZOLE SODIUM 40 MG/10ML
40 INJECTION, POWDER, LYOPHILIZED, FOR SOLUTION INTRAVENOUS EVERY 12 HOURS SCHEDULED
Status: DISCONTINUED | OUTPATIENT
Start: 2025-06-26 | End: 2025-06-29

## 2025-06-26 RX ADMIN — PANTOPRAZOLE SODIUM 40 MG: 40 INJECTION, POWDER, FOR SOLUTION INTRAVENOUS at 21:39

## 2025-06-26 RX ADMIN — ACETAMINOPHEN 650 MG: 325 TABLET ORAL at 01:49

## 2025-06-26 RX ADMIN — PANTOPRAZOLE SODIUM 40 MG: 40 INJECTION, POWDER, FOR SOLUTION INTRAVENOUS at 06:03

## 2025-06-26 RX ADMIN — OXYCODONE HYDROCHLORIDE AND ACETAMINOPHEN 1 TABLET: 5; 325 TABLET ORAL at 17:12

## 2025-06-26 RX ADMIN — ATORVASTATIN CALCIUM 40 MG: 40 TABLET, FILM COATED ORAL at 09:44

## 2025-06-26 RX ADMIN — OXYCODONE HYDROCHLORIDE AND ACETAMINOPHEN 1 TABLET: 5; 325 TABLET ORAL at 23:24

## 2025-06-26 RX ADMIN — Medication 10 ML: at 21:39

## 2025-06-26 RX ADMIN — Medication 10 ML: at 09:47

## 2025-06-26 RX ADMIN — SODIUM CHLORIDE 75 ML/HR: 9 INJECTION, SOLUTION INTRAVENOUS at 04:16

## 2025-06-26 RX ADMIN — ACETAMINOPHEN 650 MG: 325 TABLET ORAL at 09:43

## 2025-06-26 RX ADMIN — ACETAMINOPHEN 650 MG: 325 TABLET ORAL at 18:27

## 2025-06-26 RX ADMIN — ARIPIPRAZOLE 5 MG: 10 TABLET ORAL at 09:44

## 2025-06-26 RX ADMIN — AMIODARONE HYDROCHLORIDE 200 MG: 200 TABLET ORAL at 09:44

## 2025-06-26 RX ADMIN — SODIUM CHLORIDE 1000 ML: 9 INJECTION, SOLUTION INTRAVENOUS at 09:48

## 2025-06-26 NOTE — CASE MANAGEMENT/SOCIAL WORK
Continued Stay Note  [facility]     Patient Name: Bo Perez  MRN: [id number]  Today's Date: 6/26/2025    Admit Date: 6/24/2025    Plan: Home   Discharge Plan       Row Name 06/26/25 1500       Plan    Plan Home    Patient/Family in Agreement with Plan yes    Plan Comments Mr. Perez is not medically ready for discharge. He is to have a GI consult. Plan remains going home at discharge. CM will continue to follow for medical readiness and will assist with any discharge needs as indicated.    Final Discharge Disposition Code 01 - home or self-care                   Discharge Codes    No documentation.                 Expected Discharge Date and Time       Expected Discharge Date Expected Discharge Time    Jun 27, 2025               Terri Schreiber RN

## 2025-06-26 NOTE — PLAN OF CARE
Goal Outcome Evaluation:  Plan of Care Reviewed With: patient, spouse        Progress: improving  Outcome Evaluation: Vital signs wnl. Oxygen level greater than 90% on room air. No complaints of shortness of pain. Pain medication given as needed. IV fluid bolus given earlier as ordered. Creatinine improving. GI consult will see in AM. No bloody stools or clots. Call bell within reach.

## 2025-06-26 NOTE — PROGRESS NOTES
Cardiology Progress Note      Reason for visit:    Chronic heart failure with improved EF  Hypotension  Coronary artery disease  Persistent atrial fibrillation    IDENTIFICATION: Patient is 79-year-old gentleman who resides in [facility] Hospital Problems    Diagnosis  POA    **GÓMEZ (acute kidney injury) [N17.9]  Yes     Priority: High    Dehydration [E86.0]  Yes     Priority: High    Acute hyponatremia [E87.1]  Yes     Priority: High    Hypotension [I95.9]  Yes     Priority: High    Elevated troponin level not due to acute coronary syndrome [R79.89]  Yes     Priority: High    Heart failure with improved ejection fraction (HFimpEF) [I50.32]  Yes     Priority: High     Echo at [facility] (9/2020): LVEF 55-60%  Echo at [facility] (8/16/2024): LVEF 55-60%  Echo (8/31/2024): LVEF 30%  Echo (9/6/2024): LVEF 35%  Cardiac catheterization (12/30/2024): Mild to moderate 1-vessel CAD (RCA).  This lesion was not hemodynamically significant (RFR 0.95).  Minimal left coronary disease.  LVEF 35%.  Mildly elevated LV filling pressure (LVEDP 16 mmHg)  Echo (2/12/2025): LVEF 53%.  Aortic valve calcification with no significant stenosis or regurgitation      Coronary artery disease involving native coronary artery of native heart without angina pectoris [I25.10]  Yes     Priority: Medium     Cardiac catheterization (12/30/2024): Mild to moderate 1-vessel CAD (RCA).  This lesion was not hemodynamically significant (RFR 0.95).  Minimal left coronary disease.  LVEF 35%.  Mildly elevated LV filling pressure (LVEDP 16 mmHg)      Persistent atrial fibrillation [I48.19]  Yes     Priority: Medium     Chads Vasc 4 (age >75, CHF, CAD)  Cardiac catheterization (12/30/2024): Mild to moderate 1-vessel CAD (RCA).  This lesion was not hemodynamically significant (RFR 0.95).  Minimal left coronary disease.  LVEF 35%.  Mildly elevated LV filling pressure (LVEDP 16 mmHg)      Hyperlipidemia LDL goal <50 [E78.5]  Yes      Priority: Low     High intensity statin recommended due to the presence of CAD  Normal LP(a),               1000 mL bolus of saline given earlier today.  Last evening the patient reported having bloody stools.  Eliquis has been placed on hold.  Current H&H low but stable at 10.2 and 28.3.  Fecal occult stool was positive.  He denies any chest pain or shortness of breath.  He is breathing easy on room air.  He is worried about getting worsening lower extremity edema off diuretics.           Vital Sign Min/Max for last 24 hours  Temp  Min: 98.1 °F (36.7 °C)  Max: 98.4 °F (36.9 °C)   BP  Min: 89/45  Max: 118/58   Pulse  Min: 55  Max: 71   Resp  Min: 17  Max: 18   SpO2  Min: 96 %  Max: 100 %   Flow (L/min) (Oxygen Therapy)  Min: 2  Max: 2      Intake/Output Summary (Last 24 hours) at 2025 0936  Last data filed at 2025 0910  Gross per 24 hour   Intake 847 ml   Output 2950 ml   Net -2103 ml           Physical Exam  Constitutional:       General: He is awake.   Cardiovascular:      Rate and Rhythm: Normal rate. Rhythm irregular.      Heart sounds: Murmur heard.   Pulmonary:      Effort: Pulmonary effort is normal.      Breath sounds: Normal breath sounds.   Musculoskeletal:      Right lower le+ Pitting Edema present.      Left lower le+ Pitting Edema present.   Skin:     General: Skin is warm and dry.   Neurological:      Mental Status: He is alert and oriented to person, place, and time.   Psychiatric:         Behavior: Behavior is cooperative.         Tele: Rate controlled atrial fibrillation    Results Review (reviewed the patient's recent labs in the electronic medical record):     EKG (2025): Atrial fibrillation with left bundle branch block    CXR (2025): No acute cardiopulmonary abnormality    Echo (2025): LVEF 52%.  Moderate calcification aortic valve without significant stenosis or regurgitation.  Grade 1 diastolic dysfunction    Results from last 7 days   Lab Units  06/26/25  0602 06/26/25  0016 06/25/25 2105 06/25/25  1558 06/25/25  1317 06/25/25  0745 06/25/25  0359 06/24/25 1959 06/24/25  1449   SODIUM mmol/L 126* 128* 127* 122* 127* 126* 127*  126*   < > 124*   POTASSIUM mmol/L 3.8 4.0 4.1 4.0 3.7 3.8 3.6  3.5   < > 4.0   CHLORIDE mmol/L 88* 90* 89* 88* 87* 86* 85*  86*   < > 82*   BUN mg/dL 45.3* 49.2* 52.5* 58.1* 58.4* 61.0* 60.0*  62.3*   < > 62.7*   CREATININE mg/dL 1.51* 1.69* 1.85* 1.68* 1.72* 1.70* 1.97*  1.89*   < > 2.52*   MAGNESIUM mg/dL  --   --   --   --   --   --  2.2  --  2.3    < > = values in this interval not displayed.       Results from last 7 days   Lab Units 06/25/25  0359 06/24/25 1959 06/24/25  1557   HSTROP T ng/L 48* 39* 46*       Results from last 7 days   Lab Units 06/26/25  0602 06/25/25 2105 06/25/25 0359 06/24/25  1449   WBC 10*3/mm3 8.64  --  8.84 9.01   HEMOGLOBIN g/dL 10.2* 10.2* 10.9* 11.8*   HEMATOCRIT % 28.3* 28.3* 31.5* 33.3*   PLATELETS 10*3/mm3 151  --  162 175       Lab Results   Component Value Date    HGBA1C 5.00 12/30/2024       Lab Results   Component Value Date    CHOL 125 12/30/2024    TRIG 110 12/30/2024    HDL 38 (L) 12/30/2024    LDL 43 02/18/2025                  Chronic heart failure with improved EF  Takes metoprolol succinate, Entresto and spironolactone at home but currently on hold due to hypotension  Currently euvolemic and compensated and actually dehydrated  Last echo 2/2025 LVEF 52% with grade 1 diastolic dysfunction.  Jardiance 10 mg daily        Hypotension from overdiuresis/dehydration  Sent from cardiology office to ED for severe hypotension  All heart failure medicines and diuretics on hold  Received IV fluids with improvement in blood pressures.  Received an additional 1000 mL normal saline 6/26 due to hypotension     Coronary artery disease  Cath 12/2024 mild to moderate one-vessel CAD not significant by RFR.  Currently no angina symptoms  Aspirin deferred due to concomitant use of Eliquis      Elevated troponin not due to ACS  Troponins elevated and flat, EKG without acute ischemia.  Scenario not consistent with ACS but consistent with demand ischemia from with acute renal failure and dehydration     Persistent atrial fibrillation  Amiodarone 200 mg daily  Eliquis 5 mg twice daily is on hold for reports of bloody stools and positive fecal occult  Has had intermittent rate controlled atrial fibrillation this admission  Currently rate controlled atrial fibrillation on 6/26/2025     Acute kidney injury  Creatinine was elevated 2.52 on admission  Hold nephrotoxic medications and diuretics on hold  Creatinine trending down and now 1.5        Acute hyponatremia  Sodium 124 on admission  Current sodium 126  Diuretics on hold  Receiving IV fluids       Possible GI bleed  Patient reported episode of bloody stools last evening.  H&H 10.2 and 28.3  Eliquis on hold  IV Protonix ordered         Continue to hold diuretics.  Would not recommend aggressive administration of IV fluids as patient high risk of decompensated heart failure  When blood pressure improves and stable can try to reinitiate heart failure medications.  Would start with metoprolol succinate.  May need GI consultation for possible GI bleed.  Unclear if actual GI bleed or hemorrhoids.  Eliquis currently on hold    Electronically signed by RAKEL Alcantar, 06/26/25, 9:36 AM EDT.

## 2025-06-26 NOTE — SIGNIFICANT NOTE
Pt w/ reported episode of bloody stools prior to shift change last night. H/H ordered and resulted as 10.2/28.3 (previously 10.9/31.5). Nightly dose of Eliquis held. Fecal occult blood ordered and collected this morning when pt had another episode of bloody stools (described as blood within stool and large clots). Fecal occult blood positive. Eliquis d/c for now. CBC, type/screen ordered. NPO for now. Protonix IV q 12 hours started. Defer GI consultation to rounding team this morning. Will monitor closely.

## 2025-06-26 NOTE — PROGRESS NOTES
[facility] Medicine Services  PROGRESS NOTE    Patient Name: Bo Perez  : 1945  MRN: [id number]    Date of Admission: 2025  Primary Care Physician: Mario Rowland MD    Subjective   Subjective     CC:  Hypertension    HPI:  Resting in bed no acute distress on tells me he feels okay.  Does not have any specific complaint except being hungry as he is kept NPO.  No fever or chills.  No chest pain or palpitation or shortness of breath or headache.  No nausea vomiting or diarrhea.      Objective   Objective     Vital Signs:   Temp:  [98.1 °F (36.7 °C)-98.5 °F (36.9 °C)] 98.5 °F (36.9 °C)  Heart Rate:  [58-71] 70  Resp:  [18] 18  BP: (103-125)/(48-59) 103/59     Physical Exam:  Constitutional: No acute distress,  HENT: NCAT, mucous membranes moist  Respiratory: Clear to auscultation bilaterally, respiratory effort normal   Cardiovascular: RRR, no murmurs, rubs, or gallops  Gastrointestinal: Abdomen is obese.  Positive bowel sounds, soft, nontender, distended  Musculoskeletal:  bilateral ankle edema, morbidly obese  Psychiatric: Appropriate affect, cooperative  Neurologic: Awake, alert, follows commands,  speech clear  Skin: No rashes     Results Reviewed:  LAB RESULTS:      Lab 25  1646 25  1404 25  0602 25  2105 25  0359 25  1959 25  1557 25  1449   WBC  --   --  8.64  --  8.84  --   --  9.01   HEMOGLOBIN 10.1* 10.0* 10.2* 10.2* 10.9*  --   --  11.8*   HEMATOCRIT 30.1* 27.9* 28.3* 28.3* 31.5*  --   --  33.3*   PLATELETS  --   --  151  --  162  --   --  175   NEUTROS ABS  --   --  6.05  --  6.59  --   --  6.59   IMMATURE GRANS (ABS)  --   --  0.06*  --  0.04  --   --  0.05   LYMPHS ABS  --   --  1.63  --  1.57  --   --  1.52   MONOS ABS  --   --  0.66  --  0.40  --   --  0.61   EOS ABS  --   --  0.21  --  0.21  --   --  0.21   MCV  --   --  93.1  --  96.6  --   --  94.9   HSTROP T  --   --   --   --  48* 39* 46* 63*          Lab 06/26/25  1646 06/26/25  1404 06/26/25  0926 06/26/25  0602 06/26/25  0016 06/25/25  0745 06/25/25  0359 06/24/25 1959 06/24/25  1449   SODIUM 129* 130* 128* 126* 128*   < > 127*  126*   < > 124*   POTASSIUM 4.1 3.9 3.7 3.8 4.0   < > 3.6  3.5   < > 4.0   CHLORIDE 92* 94* 91* 88* 90*   < > 85*  86*   < > 82*   CO2 26.0 25.0 26.0 26.0 23.4   < > 29.0  27.0   < > 28.4   ANION GAP 11.0 11.0 11.0 12.0 14.6   < > 13.0  13.0   < > 13.6   BUN 35.1* 38.1* 42.4* 45.3* 49.2*   < > 60.0*  62.3*   < > 62.7*   CREATININE 1.30* 1.23 1.43* 1.51* 1.69*   < > 1.97*  1.89*   < > 2.52*   EGFR 56.2* 60.1 50.2* 47.0* 41.0*   < > 34.1*  35.9*   < > 25.4*   GLUCOSE 96 97 91 96 107*   < > 99  99   < > 104*   CALCIUM 8.4* 8.3* 8.1* 8.4* 8.7   < > 8.8  8.9   < > 9.6   MAGNESIUM  --   --   --   --   --   --  2.2  --  2.3    < > = values in this interval not displayed.         Lab 06/24/25  1449   TOTAL PROTEIN 7.1   ALBUMIN 4.1   GLOBULIN 3.0   ALT (SGPT) 21   AST (SGOT) 22   BILIRUBIN 1.0   ALK PHOS 54         Lab 06/25/25  0359 06/24/25 1959 06/24/25  1557 06/24/25  1449   HSTROP T 48* 39* 46* 63*             Lab 06/26/25  0602   ABO TYPING O   RH TYPING Negative   ANTIBODY SCREEN Negative         Brief Urine Lab Results  (Last result in the past 365 days)        Color   Clarity   Blood   Leuk Est   Nitrite   Protein   CREAT   Urine HCG        06/24/25 1634             41.5         06/24/25 1634 Yellow   Clear   Negative   Large (3+)   Negative   Negative                   Microbiology Results Abnormal       Procedure Component Value - Date/Time    Eosinophil Smear - Urine, Urine, Clean Catch [[id number]]  (Abnormal) Collected: 06/24/25 1634    Lab Status: Final result Specimen: Urine, Clean Catch Updated: 06/25/25 0537     Eosinophil Smear 2 % EOS/100 Cells             No radiology results from the last 24 hrs      Results for orders placed during the hospital encounter of 02/12/25    Adult Transthoracic Echo Limited W/  Cont if Necessary Per Protocol    Interpretation Summary    Left ventricular systolic function is normal. Estimated left ventricular EF = 52%    Left ventricular diastolic function is consistent with (grade I) impaired relaxation.    There is moderate calcification of the aortic valve.  There is no significant aortic valve stenosis or regurgitation present.      Current medications:  Scheduled Meds:acetaminophen, 650 mg, Oral, Q8H  amiodarone, 200 mg, Oral, Daily  [Held by provider] apixaban, 5 mg, Oral, Q12H  ARIPiprazole, 5 mg, Oral, Daily  atorvastatin, 40 mg, Oral, Daily  ipratropium, 2 spray, Each Nare, Q PM  [Held by provider] metoprolol succinate XL, 25 mg, Oral, Daily  pantoprazole, 40 mg, Intravenous, Q12H  sodium chloride, 10 mL, Intravenous, Q12H      Continuous Infusions:     PRN Meds:.  senna-docusate sodium **AND** polyethylene glycol **AND** bisacodyl **AND** bisacodyl    nitroglycerin    oxyCODONE-acetaminophen    sodium chloride    sodium chloride    sodium chloride    Assessment & Plan   Assessment & Plan     Active Hospital Problems    Diagnosis  POA    **GÓMEZ (acute kidney injury) [N17.9]  Yes    Dehydration [E86.0]  Yes    Acute hyponatremia [E87.1]  Yes    Hypotension [I95.9]  Yes    Elevated troponin level not due to acute coronary syndrome [R79.89]  Yes    Coronary artery disease involving native coronary artery of native heart without angina pectoris [I25.10]  Yes    Heart failure with improved ejection fraction (HFimpEF) [I50.32]  Yes    Hyperlipidemia LDL goal <50 [E78.5]  Yes    Persistent atrial fibrillation [I48.19]  Yes      Resolved Hospital Problems   No resolved problems to display.        Brief Hospital Course to date:  Bo Perez is a 79 y.o. male     0.  Patient had an episode of bleeding per rectum last night.  Patient tells me that he does have occasional bleeding per rectum as he has hemorrhoids.  Will ask GI to see the patient.    1. Acute kidney injury (GÓMEZ).  - Prerenal,  creatinine improving with hydration.  -Will monitor     2. Hyponatremia.  -Reports excessive thirst and drinking.  - Urine osmolality is not very high and urine sodium is also relatively low.     3. Hypotension.  - Blood pressure remains low  - Secondary to diuretics and antihypertensives, all the medication  - Cardiology is following     4. Elevated troponin   - denies chest pain      5. Persistent atrial fibrillation.  - Currently rate controlled and on Eliquis  - Amiodarone will be continued     6. Heart failure with improved ejection fraction (EF).     Coronary artery disease (CAD).    8. Obesity  -  on Wegovy     Expected Discharge Location and Transportation: Home  Expected Discharge in 2-3  Expected Discharge Date: 6/27/2025; Expected Discharge Time:      VTE Prophylaxis:  Pharmacologic & mechanical VTE prophylaxis orders are present.         AM-PAC 6 Clicks Score (PT): 17 (06/26/25 0825)    CODE STATUS:   Code Status and Medical Interventions: CPR (Attempt to Resuscitate); Full Support   Ordered at: 06/24/25 2003     Code Status (Patient has no pulse and is not breathing):    CPR (Attempt to Resuscitate)     Medical Interventions (Patient has pulse or is breathing):    Full Support     Level Of Support Discussed With:    Patient       Nico Campos MD  06/26/25

## 2025-06-27 ENCOUNTER — TELEPHONE (OUTPATIENT)
Dept: CARDIOLOGY | Facility: CLINIC | Age: 80
End: 2025-06-27
Payer: MEDICARE

## 2025-06-27 LAB
ABO GROUP BLD: NORMAL
ANION GAP SERPL CALCULATED.3IONS-SCNC: 10 MMOL/L (ref 5–15)
BLD GP AB SCN SERPL QL: NEGATIVE
BUN SERPL-MCNC: 33 MG/DL (ref 8–23)
BUN/CREAT SERPL: 24.3 (ref 7–25)
CALCIUM SPEC-SCNC: 8.3 MG/DL (ref 8.6–10.5)
CHLORIDE SERPL-SCNC: 95 MMOL/L (ref 98–107)
CO2 SERPL-SCNC: 24 MMOL/L (ref 22–29)
CREAT SERPL-MCNC: 1.36 MG/DL (ref 0.76–1.27)
EGFRCR SERPLBLD CKD-EPI 2021: 53.3 ML/MIN/1.73
GLUCOSE SERPL-MCNC: 86 MG/DL (ref 65–99)
HCT VFR BLD AUTO: 29.1 % (ref 37.5–51)
HCT VFR BLD AUTO: 30.1 % (ref 37.5–51)
HGB BLD-MCNC: 10.4 G/DL (ref 13–17.7)
HGB BLD-MCNC: 10.5 G/DL (ref 13–17.7)
MAGNESIUM SERPL-MCNC: 2 MG/DL (ref 1.6–2.4)
PHOSPHATE SERPL-MCNC: 2.5 MG/DL (ref 2.5–4.5)
POTASSIUM SERPL-SCNC: 4.2 MMOL/L (ref 3.5–5.2)
RH BLD: NEGATIVE
SODIUM SERPL-SCNC: 129 MMOL/L (ref 136–145)
T&S EXPIRATION DATE: NORMAL

## 2025-06-27 PROCEDURE — 99222 1ST HOSP IP/OBS MODERATE 55: CPT | Performed by: PHYSICIAN ASSISTANT

## 2025-06-27 PROCEDURE — 83735 ASSAY OF MAGNESIUM: CPT | Performed by: INTERNAL MEDICINE

## 2025-06-27 PROCEDURE — 99232 SBSQ HOSP IP/OBS MODERATE 35: CPT | Performed by: INTERNAL MEDICINE

## 2025-06-27 PROCEDURE — 84100 ASSAY OF PHOSPHORUS: CPT | Performed by: INTERNAL MEDICINE

## 2025-06-27 PROCEDURE — 86900 BLOOD TYPING SEROLOGIC ABO: CPT | Performed by: INTERNAL MEDICINE

## 2025-06-27 PROCEDURE — 86901 BLOOD TYPING SEROLOGIC RH(D): CPT | Performed by: INTERNAL MEDICINE

## 2025-06-27 PROCEDURE — 80048 BASIC METABOLIC PNL TOTAL CA: CPT | Performed by: INTERNAL MEDICINE

## 2025-06-27 PROCEDURE — 85018 HEMOGLOBIN: CPT | Performed by: NURSE PRACTITIONER

## 2025-06-27 PROCEDURE — 99232 SBSQ HOSP IP/OBS MODERATE 35: CPT | Performed by: NURSE PRACTITIONER

## 2025-06-27 PROCEDURE — 85014 HEMATOCRIT: CPT | Performed by: INTERNAL MEDICINE

## 2025-06-27 PROCEDURE — 85018 HEMOGLOBIN: CPT | Performed by: INTERNAL MEDICINE

## 2025-06-27 PROCEDURE — 97535 SELF CARE MNGMENT TRAINING: CPT

## 2025-06-27 PROCEDURE — 85014 HEMATOCRIT: CPT | Performed by: NURSE PRACTITIONER

## 2025-06-27 PROCEDURE — 97530 THERAPEUTIC ACTIVITIES: CPT

## 2025-06-27 PROCEDURE — 86850 RBC ANTIBODY SCREEN: CPT | Performed by: INTERNAL MEDICINE

## 2025-06-27 RX ORDER — ALPRAZOLAM 0.25 MG
0.25 TABLET ORAL 2 TIMES DAILY PRN
Status: ACTIVE | OUTPATIENT
Start: 2025-06-27 | End: 2025-07-02

## 2025-06-27 RX ORDER — HYDROCORTISONE ACETATE 25 MG/1
25 SUPPOSITORY RECTAL 2 TIMES DAILY
Status: DISPENSED | OUTPATIENT
Start: 2025-06-27 | End: 2025-07-02

## 2025-06-27 RX ORDER — GABAPENTIN 300 MG/1
300 CAPSULE ORAL EVERY 12 HOURS SCHEDULED
Status: DISCONTINUED | OUTPATIENT
Start: 2025-06-27 | End: 2025-07-02 | Stop reason: HOSPADM

## 2025-06-27 RX ORDER — TEMAZEPAM 15 MG/1
15 CAPSULE ORAL NIGHTLY PRN
Status: DISPENSED | OUTPATIENT
Start: 2025-06-27 | End: 2025-07-02

## 2025-06-27 RX ORDER — METOPROLOL SUCCINATE 25 MG/1
12.5 TABLET, EXTENDED RELEASE ORAL DAILY
Status: DISCONTINUED | OUTPATIENT
Start: 2025-06-28 | End: 2025-07-02

## 2025-06-27 RX ORDER — METOPROLOL SUCCINATE 25 MG/1
12.5 TABLET, EXTENDED RELEASE ORAL DAILY
Qty: 30 TABLET | Refills: 0 | Status: SHIPPED | OUTPATIENT
Start: 2025-06-27 | End: 2025-07-02 | Stop reason: HOSPADM

## 2025-06-27 RX ORDER — HYDROCORTISONE ACETATE 25 MG/1
25 SUPPOSITORY RECTAL 2 TIMES DAILY
Qty: 12 SUPPOSITORY | Refills: 0 | Status: SHIPPED | OUTPATIENT
Start: 2025-06-27 | End: 2025-07-07

## 2025-06-27 RX ADMIN — Medication 10 ML: at 08:35

## 2025-06-27 RX ADMIN — ACETAMINOPHEN 650 MG: 325 TABLET ORAL at 10:23

## 2025-06-27 RX ADMIN — GABAPENTIN 300 MG: 300 CAPSULE ORAL at 08:48

## 2025-06-27 RX ADMIN — ACETAMINOPHEN 650 MG: 325 TABLET ORAL at 18:09

## 2025-06-27 RX ADMIN — OXYCODONE HYDROCHLORIDE AND ACETAMINOPHEN 1 TABLET: 5; 325 TABLET ORAL at 08:36

## 2025-06-27 RX ADMIN — ACETAMINOPHEN 650 MG: 325 TABLET ORAL at 01:17

## 2025-06-27 RX ADMIN — PANTOPRAZOLE SODIUM 40 MG: 40 INJECTION, POWDER, FOR SOLUTION INTRAVENOUS at 08:35

## 2025-06-27 RX ADMIN — Medication 5 MG: at 20:42

## 2025-06-27 RX ADMIN — GABAPENTIN 300 MG: 300 CAPSULE ORAL at 20:42

## 2025-06-27 RX ADMIN — ATORVASTATIN CALCIUM 40 MG: 40 TABLET, FILM COATED ORAL at 08:36

## 2025-06-27 RX ADMIN — PANTOPRAZOLE SODIUM 40 MG: 40 INJECTION, POWDER, FOR SOLUTION INTRAVENOUS at 20:43

## 2025-06-27 RX ADMIN — OXYCODONE HYDROCHLORIDE AND ACETAMINOPHEN 1 TABLET: 5; 325 TABLET ORAL at 16:35

## 2025-06-27 RX ADMIN — AVOBENZONE, HOMOSALATE, OCTISALATE, OCTOCRYLENE, AND OXYBENZONE 1 PACKET: 29.4; 147; 49; 25.4; 58.8 LOTION TOPICAL at 10:23

## 2025-06-27 RX ADMIN — Medication 10 ML: at 20:43

## 2025-06-27 RX ADMIN — AMIODARONE HYDROCHLORIDE 200 MG: 200 TABLET ORAL at 08:36

## 2025-06-27 RX ADMIN — ARIPIPRAZOLE 5 MG: 10 TABLET ORAL at 08:36

## 2025-06-27 NOTE — CASE MANAGEMENT/SOCIAL WORK
Continued Stay Note  [facility]     Patient Name: Bo Perez  MRN: [id number]  Today's Date: 6/27/2025    Admit Date: 6/24/2025    Plan: Home   Discharge Plan       Row Name 06/27/25 1504       Plan    Plan Home    Patient/Family in Agreement with Plan yes    Plan Comments Discussed in MDR. Therapy is recommending inpatient rehab. Mr. Perez DECLINED IRF. He was agreeable with home health for PT/OT. Mr. Perez kept dozing off to sleep. CM called and spoke with his wife via telephone to discuss home health agencies. She said he used [facility] in the past and they done a wonderful job and would like to use them again. A referral was called to Tessa with [facility] for PT/OT and was accepted. CM will continue to follow for medical readiness and will assist with any discharge needs as indicated.    Final Discharge Disposition Code 06 - home with home health care                   Discharge Codes    No documentation.                 Expected Discharge Date and Time       Expected Discharge Date Expected Discharge Time    Jun 27, 2025               Terri Schreiber RN

## 2025-06-27 NOTE — THERAPY TREATMENT NOTE
Patient Name: Bo Perez  : 1945    MRN: [id number]                              Today's Date: 2025       Admit Date: 2025    Visit Dx:     ICD-10-CM ICD-9-CM   1. Acute renal failure, unspecified acute renal failure type  N17.9 584.9   2. Acute dehydration  E86.0 276.51   3. Acute hyponatremia  E87.1 276.1   4. Hypotension, unspecified hypotension type  I95.9 458.9   5. Elevated troponin  R79.89 790.6   6. Rectal bleeding  K62.5 569.3     Patient Active Problem List   Diagnosis    Chronic anticoagulation    Morbid obesity    Persistent atrial fibrillation    Frequent PVCs    Hyperlipidemia LDL goal <50    Wide-complex tachycardia    Heart failure with improved ejection fraction (HFimpEF)    Coronary artery disease involving native coronary artery of native heart without angina pectoris    Hypotension    Acute hyponatremia    GÓMEZ (acute kidney injury)    Dehydration    Elevated troponin level not due to acute coronary syndrome     Past Medical History:   Diagnosis Date    A-fib     Arrhythmia     Arthritis     Coronary artery disease     Hard to intubate     HTN (hypertension) 2023    Target blood pressure <130/80 mmHg      Hyperlipidemia     Hypertension     Sleep apnea     NOT USING CPAP     Past Surgical History:   Procedure Laterality Date    BLADDER SURGERY  2016    STIMULATOR IMPLANTED, NEW ELECTRODE 2022    CARDIAC ABLATION      AFIB    CARDIAC CATHETERIZATION N/A 2024    Procedure: CASE ABORT;  Surgeon: Yasir Canales IV, MD;  Location:  [facility];  Service: Cardiovascular;  Laterality: N/A;    CARDIAC CATHETERIZATION N/A 2024    Procedure: Left Heart Cath;  Surgeon: Yasir Canales IV, MD;  Location:  [facility];  Service: Cardiovascular;  Laterality: N/A;    CARDIAC CATHETERIZATION  2024    Procedure: Functional Flow [place];  Surgeon: Yasir Canales IV, MD;  Location:  [facility];  Service: Cardiovascular;;    CARPAL TUNNEL RELEASE Right 2002    CHOLECYSTECTOMY  2011    COLONOSCOPY      DENTAL PROCEDURE  1962    FINGER SURGERY Left 2000    FOREFINGER BONE TRIMMED    HEMORRHOIDECTOMY  1973    KNEE ARTHROSCOPY Left 2008    MOUTH SURGERY  2008    FOR SLEEP APNEA    SPINAL FUSION  2007    L3 LUMBAR SPINAL STENOSIS    TONSILLECTOMY  1954    TOTAL KNEE ARTHROPLASTY Left 2009    TOTAL KNEE ARTHROPLASTY Right 6/22/2023    Procedure: TOTAL KNEE ARTHROPLASTY - RIGHT;  Surgeon: Leland Elizalde MD;  Location[facility];  Service: Orthopedics;  Laterality: Right;      General Information       Row Name 06/27/25 1046          OT Time and Intention    Document Type therapy note (daily note)  -AJ     Mode of Treatment occupational therapy  -       Row Name 06/27/25 1046          General Information    Patient Profile Reviewed yes  -AJ     Existing Precautions/Restrictions fall;other (see comments)  tremors-baseline per patient report  -AJ     Barriers to Rehab medically complex;previous functional deficit  -       Row Name 06/27/25 1046          Safety Issues/Impairments Affecting Functional Mobility    Safety Issues Affecting Function (Mobility) awareness of need for assistance;insight into deficits/self-awareness;safety precaution awareness;safety precautions follow-through/compliance;positioning of assistive device  -AJ     Impairments Affecting Function (Mobility) balance;endurance/activity tolerance;pain;sensation/sensory awareness;shortness of breath;strength;motor control  -AJ               User Key  (r) = Recorded By, (t) = Taken By, (c) = Cosigned By      Initials Name Provider Type    AJ Lizzie Cartagena, OT Occupational Therapist                     Mobility/ADL's       Row Name 06/27/25 1049          Bed Mobility    Comment, (Bed Mobility) UIC  -       Row Name 06/27/25 1049          Transfers    Transfers sit-stand transfer  -AJ       Row Name 06/27/25 1048           Sit-Stand Transfer    Sit-Stand [place] (Transfers) minimum assist (75% patient effort);1 person assist;verbal cues  -     Assistive Device (Sit-Stand Transfers) walker, front-wheeled  -     Comment, (Sit-Stand Transfer) STS x3, required minimal boost w/each stand. Cues for HP with walker use.  -[facility] Name 06/27/25 1049          Functional Mobility    Functional Mobility- Ind. Level contact guard assist;verbal cues required  -     Functional Mobility- Device walker, front-wheeled  -     Functional Mobility-Distance (Feet) --  <HH distance  -     Functional Mobility- Comment Forward flexed posture, tremulous-increased w/acvtivity.  -[facility] Name 06/27/25 1049          Activities of Daily Living    BADL Assessment/Intervention upper body dressing;grooming;lower body dressing;bathing  -[facility] Name 06/27/25 1049          Lower Body Dressing Assessment/Training    [place] Level (Lower Body Dressing) lower body dressing skills  -     Comment, (Lower Body Dressing) Pt stated his wife takes care of donning/doffing socks at baseline, has tried sock aid before but does not like it or use it. AE not adminstered.  -[facility] Name 06/27/25 1049          Grooming Assessment/Training    [place] Level (Grooming) oral care regimen;wash face, hands;minimum assist (75% patient effort)  -     Position (Grooming) supported standing;edge of bed sitting  -     Comment, (Grooming) Pt was able to stand sinkside for about 1-2 minutes at a time before requesting seated rest break, multiple STS performed from EOB during grooming tasks.  -[facility] Name 06/27/25 1049          Upper Body Dressing Assessment/Training    [place] Level (Upper Body Dressing) don;doff;front opening garment;minimum assist (75% patient effort)  -     Position (Upper Body Dressing) supported sitting  -     Comment, (Upper Body Dressing) Jackie for line management  -[facility] Name 06/27/25 1042           Bathing Assessment/Intervention    [place] Level (Bathing) bathing skills  -     Comment, (Bathing) Discuessed potential AE needs for bathing, however patient stated he utilizes a LH sponge and shower chair at home- no further AE needs.  -               User Key  (r) = Recorded By, (t) = Taken By, (c) = Cosigned By      Initials Name Provider Type    Lizzie Burrows OT Occupational Therapist                   Obj/Interventions       Row Name 06/27/25 1054          Sensory Assessment (Somatosensory)    Sensory Assessment (Somatosensory) UE sensation intact  -       Row Name 06/27/25 1054          Balance    Balance Assessment sitting static balance;sitting dynamic balance;sit to stand dynamic balance;standing static balance;standing dynamic balance  -     Static Sitting Balance standby assist  -     Dynamic Sitting Balance standby assist  -     Position, Sitting Balance unsupported;sitting edge of bed;sitting in chair  -     Static Standing Balance contact guard  -     Dynamic Standing Balance minimal assist;1-person assist;verbal cues  -     Position/Device Used, Standing Balance supported;walker, front-wheeled  -     Balance Interventions sitting;standing;sit to stand;supported;static;dynamic;occupation based/functional task  -               User Key  (r) = Recorded By, (t) = Taken By, (c) = Cosigned By      Initials Name Provider Type    Lizzie Burrows OT Occupational Therapist                   Goals/Plan    No documentation.                  Clinical Impression       Row Name 06/27/25 1054          Pain Assessment    Pretreatment Pain Rating 5/10  -     Posttreatment Pain Rating 5/10  -     Pain Location back;neck  -AJ     Pain Side/Orientation generalized;upper  -     Pain Management Interventions activity modification encouraged;nursing notified;premedicated for activity;positioning techniques utilized;exercise or physical activity utilized  -     Response to Pain  Interventions activity participation with tolerable pain  -       Row Name 06/27/25 1054          Plan of Care Review    Plan of Care Reviewed With patient  -     Progress declining  -     Outcome Evaluation Pt required slightly more assist with STS transfers this session and increased tremors with activity was noted. Pt presents with very poor standing/walking tolerance. Performed sinkside grooming in standing with x2 seated rest breaks on EOB given PRN. Pt fatigued with short distance ambulation and standing for grooming-further activity deferred. Pt would benefit from ongoing skilled OT service to progress to PLOF. Rec d/c to IRF, if patient continues to decline IRF- will need 24/7 assist at home and  OT/PT services to [id number] further weakness and ensure safe d/c home.  -       Row Name 06/27/25 1054          Therapy Plan Review/Discharge Plan (OT)    Anticipated Discharge Disposition (OT) inpatient rehabilitation facility  -       Row Name 06/27/25 1054          Vital Signs    Pre Systolic BP Rehab 117  -AJ     Pre Treatment Diastolic BP 67  -AJ     Post Systolic BP Rehab 112  -AJ     Post Treatment Diastolic BP 75  -AJ     Pretreatment Heart Rate (beats/min) 70  -AJ     Intratreatment Heart Rate (beats/min) 91  walk to sink  -AJ     Posttreatment Heart Rate (beats/min) 72  -AJ     Pre SpO2 (%) 96  -AJ     O2 Delivery Pre Treatment room air  -AJ     Post SpO2 (%) 95  -AJ     O2 Delivery Post Treatment room air  -AJ     Pre Patient Position Sitting  -AJ     Intra Patient Position Standing  -AJ     Post Patient Position Sitting  -       Row Name 06/27/25 1054          Positioning and Restraints    Pre-Treatment Position sitting in chair/recliner  -AJ     Post Treatment Position chair  -AJ     In Chair notified nsg;reclined;sitting;call light within reach;encouraged to call for assist;exit alarm on;legs elevated;waffle cushion  -               User Key  (r) = Recorded By, (t) = Taken By, (c) =  Cosigned By      Initials Name Provider Type    Lizzie Burrows OT Occupational Therapist                   Outcome Measures       Row Name 06/27/25 1059          How much help from another is currently needed...    Putting on and taking off regular lower body clothing? 1  -AJ     Bathing (including washing, rinsing, and drying) 2  -AJ     Toileting (which includes using toilet bed pan or urinal) 3  -AJ     Putting on and taking off regular upper body clothing 3  -AJ     Taking care of personal grooming (such as brushing teeth) 3  -AJ     Eating meals 4  -AJ     AM-PAC 6 Clicks Score (OT) 16  -AJ       Row Name 06/27/25 1059          Functional Assessment    Outcome Measure Options AM-PAC 6 Clicks Daily Activity (OT)  -               User Key  (r) = Recorded By, (t) = Taken By, (c) = Cosigned By      Initials Name Provider Type    Lizzie Burrows OT Occupational Therapist                    Occupational Therapy Education       Title: PT OT SLP Therapies (In Progress)       Topic: Occupational Therapy (Done)       Point: ADL training (Done)       Learning Progress Summary            Patient Acceptance, E, VU by GABRIEL at 6/27/2025 1059    Acceptance, E, NR by LR at 6/25/2025 1137                      Point: Home exercise program (Done)       Learning Progress Summary            Patient Acceptance, E, NR by LR at 6/25/2025 1137                      Point: Precautions (Done)       Learning Progress Summary            Patient Acceptance, E, VU by GABRIEL at 6/27/2025 1059    Acceptance, E, NR by LR at 6/25/2025 1137                      Point: Body mechanics (Done)       Learning Progress Summary            Patient Acceptance, E, VU by  at 6/27/2025 1059    Acceptance, E, NR by LR at 6/25/2025 1137                                      User Key       Initials Effective Dates Name Provider Type Discipline     10/09/24 -  Michelle Reddy OT Occupational Therapist OT    GABRIEL 08/26/24 -  Lizzie Cartagena OT  Occupational Therapist OT                  OT Recommendation and Plan     Plan of Care Review  Plan of Care Reviewed With: patient  Progress: declining  Outcome Evaluation: Pt required slightly more assist with STS transfers this session and increased tremors with activity was noted. Pt presents with very poor standing/walking tolerance. Performed sinkside grooming in standing with x2 seated rest breaks on EOB given PRN. Pt fatigued with short distance ambulation and standing for grooming-further activity deferred. Pt would benefit from ongoing skilled OT service to progress to PLOF. Rec d/c to IRF, if patient continues to decline IRF- will need 24/7 assist at home and  OT/PT services to [id number] further weakness and ensure safe d/c home.     Time Calculation:         Time Calculation- OT       Row Name 06/27/25 1100             Time Calculation- OT    OT Start Time 1008  -AJ      OT Received On 06/27/25  -AJ      OT Goal Re-Cert Due Date 07/05/25  -AJ         Timed Charges    99674 - OT Therapeutic Activity Minutes 8  -AJ      70038 - OT Self Care/Mgmt Minutes 30  -AJ         Total Minutes    Timed Charges Total Minutes 38  -AJ       Total Minutes 38  -AJ                User Key  (r) = Recorded By, (t) = Taken By, (c) = Cosigned By      Initials Name Provider Type    Lizzie Burrows OT Occupational Therapist                  Therapy Charges for Today       Code Description Service Date Service Provider Modifiers Qty    89287149293 HC OT THERAPEUTIC ACT EA 15 MIN 6/27/2025 Lizzie Cartagena OT GO 1    94675674141 HC OT SELF CARE/MGMT/TRAIN EA 15 MIN 6/27/2025 Lizzie Cartagena OT GO 2                 Lizzie Cartagena OT  6/27/2025

## 2025-06-27 NOTE — CONSULTS
[facility] [place] [facility] [place]    Referring Provider: Teofilo Albright MD     PCP: Mario Rowland MD    Reason for Consultation: Bright red blood per rectum     Chief complaint: Edema     History of present illness:    Bo Perez is a 79 y.o. male who is admitted with hypotension and hyponatremia.   He has history of chronic heart failure, CAD, persistent atrial fibrillation on Eliquis.     He had a bright red bloody bowel movement on the evening of 6/25.     Eliquis has since been held.   He denies any recurrence in bleeding.    He further denies any previous episodes of bleeding prior to admission.    He states he has one soft bowel movement daily.   He denies abdominal pain, rectal pain, straining, nausea nor vomiting.   Labs show chronic mild normocytic anemia, H&H of 10.5 and 29.       He denies NSAID use.  He is unsure of his last colonoscopy but thinks it was greater than 10 years ago.       Allergies:  Amoxicillin    Scheduled Meds:  acetaminophen, 650 mg, Oral, Q8H  amiodarone, 200 mg, Oral, Daily  [Held by provider] apixaban, 5 mg, Oral, Q12H  ARIPiprazole, 5 mg, Oral, Daily  atorvastatin, 40 mg, Oral, Daily  gabapentin, 300 mg, Oral, Q12H  ipratropium, 2 spray, Each Nare, Q PM  melatonin, 5 mg, Oral, Nightly  [Held by provider] metoprolol succinate XL, 25 mg, Oral, Daily  pantoprazole, 40 mg, Intravenous, Q12H  sodium chloride, 10 mL, Intravenous, Q12H         Infusions:       PRN Meds:    ALPRAZolam    senna-docusate sodium **AND** polyethylene glycol **AND** bisacodyl **AND** bisacodyl    nitroglycerin    oxyCODONE-acetaminophen    sodium chloride    sodium chloride    sodium chloride    temazepam    Home Meds:  Medications Prior to Admission   Medication Sig Dispense Refill Last Dose/Taking    acetaminophen (TYLENOL) 650 MG 8 hr tablet Take 1 tablet by mouth 3 times a day.       amiodarone (PACERONE) 200 MG tablet Take 1 tablet by mouth Daily. 30 tablet 6     apixaban (Eliquis) 5 MG  tablet tablet Take 1 tablet by mouth Every 12 (Twelve) Hours. 180 tablet 3     ARIPiprazole (ABILIFY) 5 MG tablet Take 1 tablet by mouth Daily.       atorvastatin (LIPITOR) 40 MG tablet Take 1 tablet by mouth Daily. 90 tablet 3     bumetanide (BUMEX) 1 MG tablet Take 2 tablets by mouth Daily.       clobetasol (TEMOVATE) 0.05 % ointment Apply 1 Application topically to the appropriate area as directed As Needed.       docusate sodium (COLACE) 50 MG capsule Take 1 capsule by mouth Daily.       empagliflozin (JARDIANCE) 10 MG tablet tablet Take 1 tablet by mouth Daily. 90 tablet 3     escitalopram (LEXAPRO) 10 MG tablet Take 1 tablet by mouth Every Morning.       fexofenadine (Allegra Allergy) 60 MG tablet Take 1 tablet by mouth As Needed.       ipratropium (ATROVENT) 0.06 % nasal spray USE 2 SPRAYS IN EACH NOSTRIL EVERY DAY 30 MINUTES BEFORE DINNER       ketoconazole (NIZORAL) 2 % shampoo 1 (One) Time Per Week.       metoprolol succinate XL (TOPROL-XL) 25 MG 24 hr tablet Take 1 tablet by mouth Daily. 90 tablet 3     Misc Natural Products (OSTEO BI-FLEX TRIPLE STRENGTH PO) Take 1 tablet by mouth 2 (Two) Times a Day.       Multiple Vitamins-Minerals (PRESERVISION AREDS 2 PO) Take 1 tablet by mouth 2 (Two) Times a Day.       nitroglycerin (NITROSTAT) 0.4 MG SL tablet Place 1 tablet under the tongue Every 5 (Five) Minutes As Needed for Chest Pain. Take no more than 3 doses in 15 minutes.       oxyCODONE-acetaminophen (PERCOCET)  MG per tablet Take 1 tablet by mouth Every 4 (Four) Hours As Needed for Moderate Pain.       potassium chloride (KLOR-CON M20) 20 MEQ CR tablet Take 1 tablet by mouth Daily. 90 tablet 3     sacubitril-valsartan (ENTRESTO) 24-26 MG tablet Take 1 tablet by mouth Every 12 (Twelve) Hours. 180 tablet 3     Semaglutide-Weight Management (Wegovy) 1.7 MG/0.75ML solution auto-injector Inject 0.75 mL under the skin into the appropriate area as directed 1 (One) Time Per Week for 28 days. 3 mL 0      [START ON 7/24/2025] Semaglutide-Weight Management (Wegovy) 2.4 MG/0.75ML solution auto-injector Inject 0.75 mL under the skin into the appropriate area as directed 1 (One) Time Per Week for 28 days. (Patient not taking: Reported on 6/24/2025) 3 mL 0     trospium 60 MG capsule sustained-release 24 hr capsule Take 1 capsule by mouth Daily.          ROS: Review of Systems   Constitutional:  Positive for fatigue.   Respiratory:  Positive for shortness of breath.    Cardiovascular:  Positive for leg swelling.   Gastrointestinal:  Positive for blood in stool. Negative for abdominal pain, nausea and vomiting.   Neurological:  Positive for weakness.       PAST MED HX:  Past Medical History:   Diagnosis Date    A-fib     Arrhythmia     Arthritis     Coronary artery disease     Hard to intubate     HTN (hypertension) 06/22/2023    Target blood pressure <130/80 mmHg      Hyperlipidemia     Hypertension     Sleep apnea     NOT USING CPAP       PAST SURG HX:  Past Surgical History:   Procedure Laterality Date    BLADDER SURGERY  04/14/2016    STIMULATOR IMPLANTED, NEW ELECTRODE 08/2022    CARDIAC ABLATION  2013    AFIB    CARDIAC CATHETERIZATION N/A 9/5/2024    Procedure: CASE ABORT;  Surgeon: Yasir Canales IV, MD;  Location:  [facility];  Service: Cardiovascular;  Laterality: N/A;    CARDIAC CATHETERIZATION N/A 12/30/2024    Procedure: Left Heart Cath;  Surgeon: Yasir Caanles IV, MD;  Location:  [facility];  Service: Cardiovascular;  Laterality: N/A;    CARDIAC CATHETERIZATION  12/30/2024    Procedure: Functional Flow [place];  Surgeon: Yasir Canales IV, MD;  Location:  [facility];  Service: Cardiovascular;;    CARPAL TUNNEL RELEASE Right 2002    CHOLECYSTECTOMY  2011    COLONOSCOPY      DENTAL PROCEDURE  1962    FINGER SURGERY Left 2000    FOREFINGER BONE TRIMMED    HEMORRHOIDECTOMY  1973    KNEE ARTHROSCOPY Left 2008    MOUTH SURGERY   "2008    FOR SLEEP APNEA    SPINAL FUSION      L3 LUMBAR SPINAL STENOSIS    TONSILLECTOMY      TOTAL KNEE ARTHROPLASTY Left 2009    TOTAL KNEE ARTHROPLASTY Right 2023    Procedure: TOTAL KNEE ARTHROPLASTY - RIGHT;  Surgeon: Leland Elizalde MD;  Location: [facility];  Service: Orthopedics;  Laterality: Right;       FAM HX:  Family History   Problem Relation Age of Onset    Asthma Mother     Alcohol abuse Father     Heart disease Father        SOC HX:  Social History     Socioeconomic History    Marital status:    Tobacco Use    Smoking status: Former     Current packs/day: 0.00     Average packs/day: 3.0 packs/day for 24.0 years (72.0 ttl pk-yrs)     Types: Cigarettes     Start date: 1960     Quit date:      Years since quittin.5     Passive exposure: Never    Smokeless tobacco: Never    Tobacco comments:     QUIT 40 YRS AGO PER PT   Vaping Use    Vaping status: Never Used    Passive vaping exposure: Yes   Substance and Sexual Activity    Alcohol use: Not Currently    Drug use: Never    Sexual activity: Not Currently     Partners: Female       PHYSICAL EXAM  /52 (BP Location: Right arm, Patient Position: Lying)   Pulse 71   Temp 98.6 °F (37 °C) (Axillary)   Resp 20   Ht 185.4 cm (73\")   Wt (!) 165 kg (363 lb 12.1 oz)   SpO2 94%   BMI 47.99 kg/m²   Wt Readings from Last 3 Encounters:   25 (!) 165 kg (363 lb 12.1 oz)   25 (!) 164 kg (360 lb 12.8 oz)   25 (!) 162 kg (358 lb)   ,body mass index is 47.99 kg/m².  Physical Exam  Constitutional:       General: He is not in acute distress.  Cardiovascular:      Rate and Rhythm: Normal rate. Rhythm irregular.   Pulmonary:      Effort: Pulmonary effort is normal. No respiratory distress.   Abdominal:      General: Bowel sounds are normal. There is no distension.      Palpations: Abdomen is soft.      Tenderness: There is abdominal tenderness. There is no guarding.   Genitourinary:     Comments: Patient " declined  Musculoskeletal:      Right lower leg: Edema present.      Left lower leg: Edema present.   Neurological:      Mental Status: He is alert and oriented to person, place, and time.   Psychiatric:         Behavior: Behavior normal.       Results Review:   I reviewed the patient's new clinical results.    Lab Results   Component Value Date    WBC 8.64 06/26/2025    HGB 10.5 (L) 06/27/2025    HGB 10.1 (L) 06/26/2025    HGB 10.0 (L) 06/26/2025    HCT 29.1 (L) 06/27/2025    MCV 93.1 06/26/2025     06/26/2025     Lab Results   Component Value Date    INR 1.07 08/30/2024    INR 1.17 (H) 06/09/2023     Lab Results   Component Value Date    GLUCOSE 86 06/27/2025    BUN 33.0 (H) 06/27/2025    CREATININE 1.36 (H) 06/27/2025    EGFRIFNONA 94 06/27/2016    BCR 24.3 06/27/2025     (L) 06/27/2025    K 4.2 06/27/2025    CO2 24.0 06/27/2025    CALCIUM 8.3 (L) 06/27/2025    ALBUMIN 4.1 06/24/2025    ALKPHOS 54 06/24/2025    BILITOT 1.0 06/24/2025    ALT 21 06/24/2025    AST 22 06/24/2025       ASSESSMENTS/PLANS    Bright red blood per rectum   Chronic normocytic anemia, stable   Hyponatremia  Chronic congestive heart failure, improved EF  Atrial fibrillation, on Eliquis     >> Recommend diagnostic, possible therapeutic colonoscopy tomorrow, 6/28/25.   Patient however adamantly declines this stating he wants to be discharged home today.   Discussed broad differential including colorectal malignancy, angiectasias, hemorrhoids, less likely diverticular given stability.    He is willing to consider outpatient evaluation.    Will place ADT referral.  >> Will give Anusol suppository BID for 5 days  >> Recommend fiber supplementation with psyllium     I discussed the patient's findings and my recommendations with patient and his hospitalist Dr. Albright.       JOSH Mulligan  06/27/25  08:47 EDT

## 2025-06-27 NOTE — PROGRESS NOTES
Cardiology Progress Note      Reason for visit:    Chronic heart failure with improved EF  Hypotension  Coronary artery disease  Persistent atrial fibrillation    IDENTIFICATION: Patient is 79-year-old gentleman who resides in [facility]    Diagnosis  POA    **GÓMEZ (acute kidney injury) [N17.9]  Yes     Priority: High    Dehydration [E86.0]  Yes     Priority: High    Acute hyponatremia [E87.1]  Yes     Priority: High    Hypotension [I95.9]  Yes     Priority: High    Elevated troponin level not due to acute coronary syndrome [R79.89]  Yes     Priority: High    Heart failure with improved ejection fraction (HFimpEF) [I50.32]  Yes     Priority: High     Echo at [facility] (9/2020): LVEF 55-60%  Echo at [facility] (8/16/2024): LVEF 55-60%  Echo (8/31/2024): LVEF 30%  Echo (9/6/2024): LVEF 35%  Cardiac catheterization (12/30/2024): Mild to moderate 1-vessel CAD (RCA).  This lesion was not hemodynamically significant (RFR 0.95).  Minimal left coronary disease.  LVEF 35%.  Mildly elevated LV filling pressure (LVEDP 16 mmHg)  Echo (2/12/2025): LVEF 53%.  Aortic valve calcification with no significant stenosis or regurgitation      Coronary artery disease involving native coronary artery of native heart without angina pectoris [I25.10]  Yes     Priority: Medium     Cardiac catheterization (12/30/2024): Mild to moderate 1-vessel CAD (RCA).  This lesion was not hemodynamically significant (RFR 0.95).  Minimal left coronary disease.  LVEF 35%.  Mildly elevated LV filling pressure (LVEDP 16 mmHg)      Persistent atrial fibrillation [I48.19]  Yes     Priority: Medium     Chads Vasc 4 (age >75, CHF, CAD)  Cardiac catheterization (12/30/2024): Mild to moderate 1-vessel CAD (RCA).  This lesion was not hemodynamically significant (RFR 0.95).  Minimal left coronary disease.  LVEF 35%.  Mildly elevated LV filling pressure (LVEDP 16 mmHg)      Hyperlipidemia LDL goal <50 [E78.5]  Yes      Priority: Low     High intensity statin recommended due to the presence of CAD  Normal LP(a),             Patient sitting up in the chair breathing easy on room air.  He wants to be discharged home.  He is currently in rate controlled atrial fibrillation.  His blood pressures are improved and he has been off IV fluids.             Vital Sign Min/Max for last 24 hours  Temp  Min: 98.1 °F (36.7 °C)  Max: 99.9 °F (37.7 °C)   BP  Min: 103/59  Max: 125/54   Pulse  Min: 56  Max: 85   Resp  Min: 18  Max: 20   SpO2  Min: 93 %  Max: 100 %   No data recorded      Intake/Output Summary (Last 24 hours) at 2025 0742  Last data filed at 2025 2300  Gross per 24 hour   Intake 367 ml   Output 2675 ml   Net -2308 ml           Physical Exam  Constitutional:       General: He is awake.   Cardiovascular:      Rate and Rhythm: Normal rate. Rhythm irregular.      Heart sounds: Murmur heard.   Pulmonary:      Effort: Pulmonary effort is normal.      Breath sounds: Normal breath sounds.   Musculoskeletal:      Right lower le+ Pitting Edema present.      Left lower le+ Pitting Edema present.   Skin:     General: Skin is warm and dry.   Neurological:      Mental Status: He is alert and oriented to person, place, and time.   Psychiatric:         Behavior: Behavior is cooperative.         Tele: Rate controlled atrial fibrillation    Results Review (reviewed the patient's recent labs in the electronic medical record):     EKG (2025): Atrial fibrillation with left bundle branch block    CXR (2025): No acute cardiopulmonary abnormality    Echo (2025): LVEF 52%.  Moderate calcification aortic valve without significant stenosis or regurgitation.  Grade 1 diastolic dysfunction    Results from last 7 days   Lab Units 25  0651 25  1646 25  1404 25  0926 25  0602 25  0016 25  2105 25  0745 25  0359 25  1959 25  1449   SODIUM mmol/L 129* 129* 130* 128*  126* 128* 127*   < > 127*  126*   < > 124*   POTASSIUM mmol/L 4.2 4.1 3.9 3.7 3.8 4.0 4.1   < > 3.6  3.5   < > 4.0   CHLORIDE mmol/L 95* 92* 94* 91* 88* 90* 89*   < > 85*  86*   < > 82*   BUN mg/dL 33.0* 35.1* 38.1* 42.4* 45.3* 49.2* 52.5*   < > 60.0*  62.3*   < > 62.7*   CREATININE mg/dL 1.36* 1.30* 1.23 1.43* 1.51* 1.69* 1.85*   < > 1.97*  1.89*   < > 2.52*   MAGNESIUM mg/dL 2.0  --   --   --   --   --   --   --  2.2  --  2.3    < > = values in this interval not displayed.       Results from last 7 days   Lab Units 06/25/25  0359 06/24/25  1959 06/24/25  1557   HSTROP T ng/L 48* 39* 46*       Results from last 7 days   Lab Units 06/27/25  0032 06/26/25  1646 06/26/25  1404 06/26/25  0602 06/25/25  2105 06/25/25  0359 06/24/25  1449   WBC 10*3/mm3  --   --   --  8.64  --  8.84 9.01   HEMOGLOBIN g/dL 10.5* 10.1* 10.0* 10.2*   < > 10.9* 11.8*   HEMATOCRIT % 29.1* 30.1* 27.9* 28.3*   < > 31.5* 33.3*   PLATELETS 10*3/mm3  --   --   --  151  --  162 175    < > = values in this interval not displayed.       Lab Results   Component Value Date    HGBA1C 5.00 12/30/2024       Lab Results   Component Value Date    CHOL 125 12/30/2024    TRIG 110 12/30/2024    HDL 38 (L) 12/30/2024    LDL 43 02/18/2025                  Chronic heart failure with improved EF  Takes metoprolol succinate, Entresto and spironolactone at home but currently on hold due to hypotension  Currently euvolemic and compensated and actually dehydrated  Last echo 2/2025 LVEF 52% with grade 1 diastolic dysfunction.  Jardiance 10 mg daily        Hypotension from overdiuresis/dehydration  Sent from cardiology office to ED for severe hypotension  All heart failure medicines and diuretics on hold  Received IV fluids with improvement in blood pressures.  Received an additional 1000 mL normal saline 6/26 due to hypotension  Current /52     Coronary artery disease  Cath 12/2024 mild to moderate one-vessel CAD not significant by RFR.  Currently no angina  symptoms  Aspirin deferred due to concomitant use of Eliquis     Elevated troponin not due to ACS  Troponins elevated and flat, EKG without acute ischemia.  Scenario not consistent with ACS but consistent with demand ischemia from with acute renal failure and dehydration     Persistent atrial fibrillation  Previously on Tikosyn but discontinued in the past due to prolonged QT  Amiodarone 200 mg daily  Eliquis 5 mg twice daily is on hold for reports of bloody stools and positive fecal occult  Has had intermittent rate controlled atrial fibrillation this admission  Currently rate controlled atrial fibrillation on 6/26/2025     Frequent PVCs  Follows EP  Controlled on amiodarone    Acute kidney injury  Creatinine was elevated 2.52 on admission  Hold nephrotoxic medications and diuretics on hold  Creatinine trending down and now 1.36        Acute hyponatremia  Sodium 124 on admission  Current sodium 129  Diuretics on hold  Has received IV fluids since admission       Possible GI bleed  Patient reported episode of bloody stools last evening.  H&H stable at 10.5 and 29.1  Eliquis on hold  IV Protonix ordered  [place] to be due to hemorrhoids.  Started on Anusol and H&H has remained stable.         Start metoprolol succinate at 12.5 mg daily  Stay off diuretics  Continue amiodarone for rhythm management  From cardiac standpoint okay for discharge home.  Can follow-up with me in the office next week.  Will recheck BMP and try to reinitiate heart failure medications if blood pressure will allow.   Will stay off Eliquis at discharge and I will check CBC when I see him on Monday or Tuesday and if H&H is stable will restart at that time.    Electronically signed by RAKEL Alcantar, 06/26/25, 9:36 AM EDT.

## 2025-06-27 NOTE — PLAN OF CARE
Goal Outcome Evaluation:  Plan of Care Reviewed With: patient        Progress: declining  Outcome Evaluation: Pt required slightly more assist with STS transfers this session and increased tremors with activity was noted. Pt presents with very poor standing/walking tolerance. Performed sinkside grooming in standing with x2 seated rest breaks on EOB given PRN. Pt fatigued with short distance ambulation and standing for grooming-further activity deferred. Pt would benefit from ongoing skilled OT service to progress to PLOF. Rec d/c to IRF, if patient continues to decline IRF- will need 24/7 assist at home and  OT/PT services to [id number] further weakness and ensure safe d/c home.    Anticipated Discharge Disposition (OT): inpatient rehabilitation facility

## 2025-06-27 NOTE — PLAN OF CARE
Goal Outcome Evaluation:  Patient has remained alert and oriented x 4 for my shift, is on room air, VSS, and is resting comfortably. Patient did ambulate outside of their room today without any distress. Patient has sat up to the  chair for the majority of my shift. All questions regarding care have been answered.

## 2025-06-27 NOTE — NURSING NOTE
[facility]  HEART FAILURE CLINIC  NURSE NAVIGATOR NOTE    Bo Perez  :  1945  DOS:  26    Active Hospital Problems    Diagnosis     **GÓMEZ (acute kidney injury)     Dehydration     Acute hyponatremia     Hypotension     Elevated troponin level not due to acute coronary syndrome     Coronary artery disease involving native coronary artery of native heart without angina pectoris     Heart failure with improved ejection fraction (HFimpEF)     Hyperlipidemia LDL goal <50     Persistent atrial fibrillation        Medical records have been reviewed.  Patient is a good candidate for the [facility].  Education provided.  Education time 15 mins.  Patient to be scheduled follow up 3 days post discharge.      Current Facility-Administered Medications:     acetaminophen (TYLENOL) tablet 650 mg, 650 mg, Oral, Q8H, Sushma Dowling APRN, 650 mg at 25 1023    ALPRAZolam (XANAX) tablet 0.25 mg, 0.25 mg, Oral, BID PRN, Teofilo Albright MD    amiodarone (PACERONE) tablet 200 mg, 200 mg, Oral, Daily, Sushma Dowling APRN, 200 mg at 25 0836    [Held by provider] apixaban (ELIQUIS) tablet 5 mg, 5 mg, Oral, Q12H, Rafaela Jones APRN    ARIPiprazole (ABILIFY) tablet 5 mg, 5 mg, Oral, Daily, Sushma Dowling APRN, 5 mg at 25 0836    atorvastatin (LIPITOR) tablet 40 mg, 40 mg, Oral, Daily, Sushma Dowling APRN, 40 mg at 25 0836    sennosides-docusate (PERICOLACE) 8.6-50 MG per tablet 2 tablet, 2 tablet, Oral, BID PRN **AND** polyethylene glycol (MIRALAX) packet 17 g, 17 g, Oral, Daily PRN **AND** bisacodyl (DULCOLAX) EC tablet 5 mg, 5 mg, Oral, Daily PRN **AND** bisacodyl (DULCOLAX) suppository 10 mg, 10 mg, Rectal, Daily PRN, Sushma Dowling APRN    gabapentin (NEURONTIN) capsule 300 mg, 300 mg, Oral, Q12H, Teofilo Albright MD, 300 mg at 25 0848    hydrocortisone (ANUSOL-HC) suppository 25 mg, 25 mg, Rectal, BID, Ross,  JOSH Holland    ipratropium (ATROVENT) nasal spray 0.06%, 2 spray, Each Nare, Q PM, Sushma Dowling APRN, 2 spray at 06/25/25 1811    melatonin tablet 5 mg, 5 mg, Oral, Nightly, Teofilo Albright MD    [Held by provider] metoprolol succinate XL (TOPROL-XL) 24 hr tablet 12.5 mg, 12.5 mg, Oral, Daily, Rafaela Jones APRN    nitroglycerin (NITROSTAT) SL tablet 0.4 mg, 0.4 mg, Sublingual, Q5 Min PRN, Yasir Canales IV, MD    oxyCODONE-acetaminophen (PERCOCET) 5-325 MG per tablet 1 tablet, 1 tablet, Oral, Q6H PRN, Nico Campos MD, 1 tablet at 06/27/25 0836    pantoprazole (PROTONIX) injection 40 mg, 40 mg, Intravenous, Q12H, Radha Leo APRN, 40 mg at 06/27/25 0835    Pharmacy Meds to Bed Consult, , Not Applicable, Daily, Teofilo Albright MD    Psyllium (METAMUCIL MULTIHEALTH FIBER) 51.7 % packet 1 packet, 1 packet, Oral, Daily, Amalia Hawkins PA, 1 packet at 06/27/25 1023    sodium chloride 0.9 % flush 10 mL, 10 mL, Intravenous, PRN, Yasir Canales IV, MD    sodium chloride 0.9 % flush 10 mL, 10 mL, Intravenous, Q12H, Sushma Dowling APRN, 10 mL at 06/27/25 0835    sodium chloride 0.9 % flush 10 mL, 10 mL, Intravenous, PRN, Sushma Dowling APRN, 10 mL at 06/26/25 2139    sodium chloride 0.9 % infusion 40 mL, 40 mL, Intravenous, PRBETZAIDA, Sushma Dowling APRN    temazepam (RESTORIL) capsule 15 mg, 15 mg, Oral, Nightly PRN, Teofilo Albright MD       Lab Results   Component Value Date    PROBNP 401.0 02/18/2025         Echo Results:  Results for orders placed during the hospital encounter of 02/12/25    Adult Transthoracic Echo Limited W/ Cont if Necessary Per Protocol    Interpretation Summary    Left ventricular systolic function is normal. Estimated left ventricular EF = 52%    Left ventricular diastolic function is consistent with (grade I) impaired relaxation.    There is moderate calcification of the aortic valve.  There is no significant aortic valve stenosis or  regurgitation present.       Heart Failure Education    []  Risk factors  []  Medications management and adherence  []  Low sodium diet  []  Fluid restriction:   []  Exercise/activity/cardiac rehab  []  Smoking cessation  [x]  Signs/symptoms  []  Daily weight management  []  Weight management  [x]  Importance of keeping follow up office visits  [x]  Role of [facility]  []  Other   []  Hyperkalemia  []  Other:    []  EF teaching    Unable to provide heart failure education today:  []  Patient/family refused   []  Not available  []  Not able to participate   []  Other:

## 2025-06-27 NOTE — DISCHARGE SUMMARY
[facility] Medicine Services  DISCHARGE SUMMARY    Patient Name: Bo Perez  : 1945  MRN: [id number]    Date of Admission: 2025  2:39 PM  Date of Discharge:  2025  Primary Care Physician: Mario Rowland MD    Consults       Date and Time Order Name Status Description    2025 11:03 AM Inpatient Gastroenterology Consult Completed     2025 10:37 PM Inpatient Cardiology Consult Completed             Hospital Course     Presenting Problem:     Active Hospital Problems    Diagnosis  POA    **GÓMEZ (acute kidney injury) [N17.9]  Yes    Dehydration [E86.0]  Yes    Acute hyponatremia [E87.1]  Yes    Hypotension [I95.9]  Yes    Elevated troponin level not due to acute coronary syndrome [R79.89]  Yes    Coronary artery disease involving native coronary artery of native heart without angina pectoris [I25.10]  Yes    Heart failure with improved ejection fraction (HFimpEF) [I50.32]  Yes    Hyperlipidemia LDL goal <50 [E78.5]  Yes    Persistent atrial fibrillation [I48.19]  Yes      Resolved Hospital Problems   No resolved problems to display.      ---------------final diagnoses-------------  GÓMEZ on ckd 3 (baseline cr ~1.2-1.3), improved  Hyponatremia, asymptomatic  Hypotension (iatrogenic)  Hx HFpEF  CAD  Persistent Afib  Chronic BLE edema  -amiodarone, toprol  -BP & renal function improved, overall sodium improved from admission as well ~129  -patient very pleasantly adamant about discharge home  -holding eliquis until rectal bleeding resolved  -holding bumex/diuretics/jardiance;entresto (due to hyponatremia and gómez) until see stability/improvement in renal function as outpatient  -to follow up w/ Summer Jones ([facility])  or  for follow up w/ labs  Rectal bleeding, mild  -hgb stable  -hold eliquis for now until rectal bleeding resolved a few days-->restart as outpatient if brbpr resolved and hgb stable  -GI consulted; patient declined colonoscopy and  "pleasantly adamant about discharge home. Possible this is hemorrhoids, anusol suppositories ordered x 5 days, psyllium; outpatient GI referral (placed in ADT) for future colonoscopy  Obesity  -on wegovy as outpatient; hold for now, restart as outpatient    Hospital Course:  Bo Perez is a 79 y.o. male admitted w/ hypotension, armani and hyponatremia likely due to cardiac meds/diuretics. These were held and diuretics held with improvement in renal function and sodium (cr 1.3 and sodium 129 day of discharge) w/ significant improvement in bp (no 110's) with no dyspnea, no dizziness. Patient very adamant about discharge home today. Gi saw for some scant brbpr. Patient declines colonoscopy, adamant about going home. Patient understands risks of discharge home (including continued bleeding, worsening renal function or volume overload and accepts these risks. Patient started on anusol suppositories, psyllium, outpatient referral put in adt by gi for future outpatient colonoscopy... hold eliquis for now and consider restart in few days if no further bleeding. Patient to follow up w/ Summer Jones ([facility]) in 3-4 days with labs at that time will revisit diuretics, other gdmt based on bp and labs and consider restarting eliquis at that time. Will also have patient f/u w/ pcp 1 week.      Discharge Follow Up Recommendations for outpatient labs/diagnostics:   salvador Alcantar ([facility]) 3-4 days w/ labs (to be set up by her)  Pcp 1 week  Future outpatient gi evaluation (referral placed by inpatient gi service in adt)    Day of Discharge     HPI:   No dyspena, no brbpr this morning, no chest pain. Very much wants to go home, \"I'm going home today\"    Review of Systems  No f/c    Vital Signs:   Temp:  [98.1 °F (36.7 °C)-99.9 °F (37.7 °C)] 98.6 °F (37 °C)  Heart Rate:  [56-85] 67  Resp:  [18-20] 20  BP: (103-125)/(51-78) 117/67      Physical Exam:  Alert, nontoxic, ox3  Ncat, oroph clear  Lungs clear   Abd obese, " nontender  BLE chronic venous stasis changes/edema    Pertinent  and/or Most Recent Results     LAB RESULTS:      Lab 06/27/25  0032 06/26/25  1646 06/26/25  1404 06/26/25  0602 06/25/25  2105 06/25/25  0359 06/24/25  1449   WBC  --   --   --  8.64  --  8.84 9.01   HEMOGLOBIN 10.5* 10.1* 10.0* 10.2* 10.2* 10.9* 11.8*   HEMATOCRIT 29.1* 30.1* 27.9* 28.3* 28.3* 31.5* 33.3*   PLATELETS  --   --   --  151  --  162 175   NEUTROS ABS  --   --   --  6.05  --  6.59 6.59   IMMATURE GRANS (ABS)  --   --   --  0.06*  --  0.04 0.05   LYMPHS ABS  --   --   --  1.63  --  1.57 1.52   MONOS ABS  --   --   --  0.66  --  0.40 0.61   EOS ABS  --   --   --  0.21  --  0.21 0.21   MCV  --   --   --  93.1  --  96.6 94.9         Lab 06/27/25  0651 06/26/25  1646 06/26/25  1404 06/26/25  0926 06/26/25  0602 06/25/25  0745 06/25/25  0359 06/24/25  1959 06/24/25  1449   SODIUM 129* 129* 130* 128* 126*   < > 127*  126*   < > 124*   POTASSIUM 4.2 4.1 3.9 3.7 3.8   < > 3.6  3.5   < > 4.0   CHLORIDE 95* 92* 94* 91* 88*   < > 85*  86*   < > 82*   CO2 24.0 26.0 25.0 26.0 26.0   < > 29.0  27.0   < > 28.4   ANION GAP 10.0 11.0 11.0 11.0 12.0   < > 13.0  13.0   < > 13.6   BUN 33.0* 35.1* 38.1* 42.4* 45.3*   < > 60.0*  62.3*   < > 62.7*   CREATININE 1.36* 1.30* 1.23 1.43* 1.51*   < > 1.97*  1.89*   < > 2.52*   EGFR 53.3* 56.2* 60.1 50.2* 47.0*   < > 34.1*  35.9*   < > 25.4*   GLUCOSE 86 96 97 91 96   < > 99  99   < > 104*   CALCIUM 8.3* 8.4* 8.3* 8.1* 8.4*   < > 8.8  8.9   < > 9.6   MAGNESIUM 2.0  --   --   --   --   --  2.2  --  2.3   PHOSPHORUS 2.5  --   --   --   --   --   --   --   --     < > = values in this interval not displayed.         Lab 06/24/25  1449   TOTAL PROTEIN 7.1   ALBUMIN 4.1   GLOBULIN 3.0   ALT (SGPT) 21   AST (SGOT) 22   BILIRUBIN 1.0   ALK PHOS 54         Lab 06/25/25  0359 06/24/25  1959 06/24/25  1557 06/24/25  1449   HSTROP T 48* 39* 46* 63*             Lab 06/26/25  0602   ABO TYPING O   RH TYPING Negative    ANTIBODY SCREEN Negative         Brief Urine Lab Results  (Last result in the past 365 days)        Color   Clarity   Blood   Leuk Est   Nitrite   Protein   CREAT   Urine HCG        06/24/25 1634             41.5         06/24/25 1634 Yellow   Clear   Negative   Large (3+)   Negative   Negative                 Microbiology Results (last 10 days)       Procedure Component Value - Date/Time    Eosinophil Smear - Urine, Urine, Clean Catch [[id number]]  (Abnormal) Collected: 06/24/25 1634    Lab Status: Final result Specimen: Urine, Clean Catch Updated: 06/25/25 0537     Eosinophil Smear 2 % EOS/100 Cells     COVID PRE-OP / PRE-PROCEDURE SCREENING ORDER (NO ISOLATION) - Swab, Nasopharynx [[id number]]  (Normal) Collected: 06/24/25 1510    Lab Status: Final result Specimen: Swab from Nasopharynx Updated: 06/24/25 1621    Narrative:      The following orders were created for panel order COVID PRE-OP / PRE-PROCEDURE SCREENING ORDER (NO ISOLATION) - Swab, Nasopharynx.  Procedure                               Abnormality         Status                     ---------                               -----------         ------                     Respiratory Panel PCR w/...[[id number]]  Normal              Final result                 Please view results for these tests on the individual orders.    Respiratory Panel PCR w/COVID-19(SARS-CoV-2) PATTI/MARVIN/JAY/PAD/COR/[facility]ISON In-House, NP Swab in UTM/VTM, 2 HR TAT - Swab, Nasopharynx [[id number]]  (Normal) Collected: 06/24/25 1510    Lab Status: Final result Specimen: Swab from Nasopharynx Updated: 06/24/25 1621     ADENOVIRUS, PCR Not Detected     Coronavirus 229E Not Detected     Coronavirus HKU1 Not Detected     Coronavirus NL63 Not Detected     Coronavirus OC43 Not Detected     COVID19 Not Detected     Human Metapneumovirus Not Detected     Human Rhinovirus/Enterovirus Not Detected     Influenza A PCR Not Detected     Influenza B PCR Not Detected     Parainfluenza Virus 1 Not Detected      Parainfluenza Virus 2 Not Detected     Parainfluenza Virus 3 Not Detected     Parainfluenza Virus 4 Not Detected     RSV, PCR Not Detected     Bordetella pertussis pcr Not Detected     Bordetella parapertussis PCR Not Detected     Chlamydophila pneumoniae PCR Not Detected     Mycoplasma pneumo by PCR Not Detected    Narrative:      In the setting of a positive respiratory panel with a viral infection PLUS a negative procalcitonin without other underlying concern for bacterial infection, consider observing off antibiotics or discontinuation of antibiotics and continue supportive care. If the respiratory panel is positive for atypical bacterial infection (Bordetella pertussis, Chlamydophila pneumoniae, or Mycoplasma pneumoniae), consider antibiotic de-escalation to target atypical bacterial infection.            XR Chest 1 View  Result Date: 6/24/2025  XR CHEST 1 VW Date of Exam: 6/24/2025 3:04 PM EDT Indication: Weak/Dizzy/AMS triage protocol Comparison: [facility] chest x-ray 11/15/2024 Findings: The patient's chin partially obscures evaluation of the upper chest. Lungs are adequately expanded and appear clear. Cardiomediastinal contours are stable.     Impression: No acute cardiopulmonary abnormality is identified. Electronically Signed: Radha Lawrence MD  6/24/2025 3:44 PM EDT  Workstation ID: [id number]              Results for orders placed during the hospital encounter of 02/12/25    Adult Transthoracic Echo Limited W/ Cont if Necessary Per Protocol    Interpretation Summary    Left ventricular systolic function is normal. Estimated left ventricular EF = 52%    Left ventricular diastolic function is consistent with (grade I) impaired relaxation.    There is moderate calcification of the aortic valve.  There is no significant aortic valve stenosis or regurgitation present.      Plan for Follow-up of Pending Labs/Results:   Pending Labs       Order Current Status    Hemoglobin & Hematocrit, Blood Collected (06/27/25 0832)     Type & Screen Collected (06/27/25 0832)          Discharge Details        Discharge Medications        PAUSE taking these medications        Instructions Start Date   apixaban 5 MG tablet tablet  Wait to take this until your doctor or other care provider tells you to start again.  Commonly known as: Eliquis   5 mg, Oral, Every 12 Hours Scheduled      bumetanide 1 MG tablet  Wait to take this until your doctor or other care provider tells you to start again.  Commonly known as: BUMEX   2 mg, Oral, Daily      empagliflozin 10 MG tablet tablet  Wait to take this until your doctor or other care provider tells you to start again.  Commonly known as: JARDIANCE   10 mg, Oral, Daily      potassium chloride 20 MEQ CR tablet  Wait to take this until your doctor or other care provider tells you to start again.  Commonly known as: KLOR-CON M20   20 mEq, Oral, Daily      sacubitril-valsartan 24-26 MG tablet  Wait to take this until your doctor or other care provider tells you to start again.  Commonly known as: ENTRESTO   1 tablet, Oral, Every 12 Hours Scheduled      Wegovy 1.7 MG/0.75ML solution auto-injector  Wait to take this until your doctor or other care provider tells you to start again.  Generic drug: Semaglutide-Weight Management   1.7 mg, Subcutaneous, Weekly      Wegovy 2.4 MG/0.75ML solution auto-injector  Wait to take this until your doctor or other care provider tells you to start again.  Generic drug: Semaglutide-Weight Management   2.4 mg, Subcutaneous, Weekly   Start Date: July 24, 2025            New Medications        Instructions Start Date   hydrocortisone 25 MG suppository  Commonly known as: ANUSOL-HC   25 mg, Rectal, 2 Times Daily      PHARMACY MEDS TO BED CONSULT   Not Applicable, Daily      Psyllium 51.7 % packet  Commonly known as: METAMUCIL MULTIHEALTH FIBER   Mix 1 packet in at least 8 ounces of cool liquid and take by mouth Daily.             Changes to Medications        Instructions Start Date  "  metoprolol succinate XL 25 MG 24 hr tablet  Commonly known as: TOPROL-XL  What changed: how much to take   Take 0.5 (one-half) tablet by mouth Daily.             Continue These Medications        Instructions Start Date   acetaminophen 650 MG 8 hr tablet  Commonly known as: TYLENOL   650 mg, 3 times daily      amiodarone 200 MG tablet  Commonly known as: PACERONE   200 mg, Oral, Daily      ARIPiprazole 5 MG tablet  Commonly known as: ABILIFY   5 mg, Daily      atorvastatin 40 MG tablet  Commonly known as: LIPITOR   40 mg, Oral, Daily      docusate sodium 50 MG capsule  Commonly known as: COLACE   50 mg, Daily      ipratropium 0.06 % nasal spray  Commonly known as: ATROVENT   USE 2 SPRAYS IN EACH NOSTRIL EVERY DAY 30 MINUTES BEFORE DINNER      nitroglycerin 0.4 MG SL tablet  Commonly known as: NITROSTAT   0.4 mg, Every 5 Minutes PRN      OSTEO BI-FLEX TRIPLE STRENGTH PO   1 tablet, 2 Times Daily      oxyCODONE-acetaminophen  MG per tablet  Commonly known as: PERCOCET   1 tablet, Oral, Every 4 Hours PRN      PRESERVISION AREDS 2 PO   1 tablet, 2 Times Daily      trospium 60 MG capsule sustained-release 24 hr capsule   60 mg, Daily             Stop These Medications      Allegra Allergy 60 MG tablet  Generic drug: fexofenadine     clobetasol 0.05 % ointment  Commonly known as: TEMOVATE     escitalopram 10 MG tablet  Commonly known as: LEXAPRO     ketoconazole 2 % shampoo  Commonly known as: NIZORAL              Allergies   Allergen Reactions    Amoxicillin Other (See Comments)     Pt states \"Made me very sick\"         Discharge Disposition:  Home or Self Care    Diet:  Hospital:  Diet Order   Procedures    Diet: Liquid; Clear Liquid; Fluid Consistency: Thin (IDDSI 0)            Activity:        CODE STATUS:    Code Status and Medical Interventions: CPR (Attempt to Resuscitate); Full Support   Ordered at: 06/24/25 2003     Code Status (Patient has no pulse and is not breathing):    CPR (Attempt to Resuscitate) "     Medical Interventions (Patient has pulse or is breathing):    Full Support     Level Of Support Discussed With:    Patient       Future Appointments   Date Time Provider Department Center   9/23/2025  2:00 PM Antwan Chamorro MD [facility] [place]   12/23/2025  1:45 PM Yasir Canales IV, MD [facility] [place]   4/14/2026  9:45 AM Yasir Canales IV, MD [facility] [place] [facility]       Additional Instructions for the Follow-ups that You Need to Schedule       Ambulatory Referral For Screening Colonoscopy   As directed      DIAGNOSTIC COLONOSCOPY for rectal bleeding    Order Comments: DIAGNOSTIC COLONOSCOPY for rectal bleeding         Discharge Follow-up with PCP   As directed       Currently Documented PCP:    Mario Rowland MD    PCP Phone Number:    [phone]     Follow Up Details: 1 week        Discharge Follow-up with Specialty: RAKEL Alcantar ([facility]) 3-4 days   As directed      Specialty: RAKEL Alcantar ([facility]) 3-4 days                      Teofilo Albright MD  06/27/25      Time Spent on Discharge:  I spent  35  minutes on this discharge activity which included: face-to-face encounter with the patient, reviewing the data in the system, coordination of the care with the nursing staff as well as consultants, documentation, and entering orders.

## 2025-06-27 NOTE — TELEPHONE ENCOUNTER
Name: Kristen Perez    Relationship: Emergency Contact    Incoming call to the Hub, requesting to  Cancel their HFU appointment on 6.30.25.     Per Hub workflow, further review is needed before the task can be completed.    Result of Call: Transferred to [facility] at the [facility]

## 2025-06-27 NOTE — PROGRESS NOTES
[facility] Medicine Services  PROGRESS NOTE    Patient Name: Bo Perez  : 1945  MRN: [id number]    Date of Admission: 2025  Primary Care Physician: Mario Rowland MD    Subjective   Subjective     CC:  Hypertension    HPI:  Feels ok, no pain, no dyspnea      Objective   Objective     Vital Signs:   Temp:  [98.1 °F (36.7 °C)-99.9 °F (37.7 °C)] 98.6 °F (37 °C)  Heart Rate:  [56-85] 67  Resp:  [18-20] 20  BP: (103-117)/(51-78) 117/67     Physical Exam:  Alert, nontoxic, ox3  Ncat, oroph clear  Lungs clear   Abd obese, nontender  BLE chronic venous stasis changes/edema/ mild erythema    Results Reviewed:  LAB RESULTS:      Lab 25  0032 25  1646 25  1404 25  0602 25  2105 25  0359 25  1959 25  1557 25  1449   WBC  --   --   --  8.64  --  8.84  --   --  9.01   HEMOGLOBIN 10.5* 10.1* 10.0* 10.2* 10.2* 10.9*  --   --  11.8*   HEMATOCRIT 29.1* 30.1* 27.9* 28.3* 28.3* 31.5*  --   --  33.3*   PLATELETS  --   --   --  151  --  162  --   --  175   NEUTROS ABS  --   --   --  6.05  --  6.59  --   --  6.59   IMMATURE GRANS (ABS)  --   --   --  0.06*  --  0.04  --   --  0.05   LYMPHS ABS  --   --   --  1.63  --  1.57  --   --  1.52   MONOS ABS  --   --   --  0.66  --  0.40  --   --  0.61   EOS ABS  --   --   --  0.21  --  0.21  --   --  0.21   MCV  --   --   --  93.1  --  96.6  --   --  94.9   HSTROP T  --   --   --   --   --  48* 39* 46* 63*         Lab 25  0651 25  1646 25  1404 25  0926 25  0602 25  0745 25  0359 259 25  1449   SODIUM 129* 129* 130* 128* 126*   < > 127*  126*   < > 124*   POTASSIUM 4.2 4.1 3.9 3.7 3.8   < > 3.6  3.5   < > 4.0   CHLORIDE 95* 92* 94* 91* 88*   < > 85*  86*   < > 82*   CO2 24.0 26.0 25.0 26.0 26.0   < > 29.0  27.0   < > 28.4   ANION GAP 10.0 11.0 11.0 11.0 12.0   < > 13.0  13.0   < > 13.6   BUN 33.0* 35.1* 38.1* 42.4* 45.3*   < >  60.0*  62.3*   < > 62.7*   CREATININE 1.36* 1.30* 1.23 1.43* 1.51*   < > 1.97*  1.89*   < > 2.52*   EGFR 53.3* 56.2* 60.1 50.2* 47.0*   < > 34.1*  35.9*   < > 25.4*   GLUCOSE 86 96 97 91 96   < > 99  99   < > 104*   CALCIUM 8.3* 8.4* 8.3* 8.1* 8.4*   < > 8.8  8.9   < > 9.6   MAGNESIUM 2.0  --   --   --   --   --  2.2  --  2.3   PHOSPHORUS 2.5  --   --   --   --   --   --   --   --     < > = values in this interval not displayed.         Lab 06/24/25  1449   TOTAL PROTEIN 7.1   ALBUMIN 4.1   GLOBULIN 3.0   ALT (SGPT) 21   AST (SGOT) 22   BILIRUBIN 1.0   ALK PHOS 54         Lab 06/25/25  0359 06/24/25  1959 06/24/25  1557 06/24/25  1449   HSTROP T 48* 39* 46* 63*             Lab 06/26/25  0602   ABO TYPING O   RH TYPING Negative   ANTIBODY SCREEN Negative         Brief Urine Lab Results  (Last result in the past 365 days)        Color   Clarity   Blood   Leuk Est   Nitrite   Protein   CREAT   Urine HCG        06/24/25 1634             41.5         06/24/25 1634 Yellow   Clear   Negative   Large (3+)   Negative   Negative                   Microbiology Results Abnormal       Procedure Component Value - Date/Time    Eosinophil Smear - Urine, Urine, Clean Catch [[id number]]  (Abnormal) Collected: 06/24/25 1634    Lab Status: Final result Specimen: Urine, Clean Catch Updated: 06/25/25 0537     Eosinophil Smear 2 % EOS/100 Cells             No radiology results from the last 24 hrs      Results for orders placed during the hospital encounter of 02/12/25    Adult Transthoracic Echo Limited W/ Cont if Necessary Per Protocol    Interpretation Summary    Left ventricular systolic function is normal. Estimated left ventricular EF = 52%    Left ventricular diastolic function is consistent with (grade I) impaired relaxation.    There is moderate calcification of the aortic valve.  There is no significant aortic valve stenosis or regurgitation present.      Current medications:  Scheduled Meds:acetaminophen, 650 mg, Oral,  Q8H  amiodarone, 200 mg, Oral, Daily  [Held by provider] apixaban, 5 mg, Oral, Q12H  ARIPiprazole, 5 mg, Oral, Daily  atorvastatin, 40 mg, Oral, Daily  gabapentin, 300 mg, Oral, Q12H  hydrocortisone, 25 mg, Rectal, BID  ipratropium, 2 spray, Each Nare, Q PM  melatonin, 5 mg, Oral, Nightly  [Held by provider] metoprolol succinate XL, 12.5 mg, Oral, Daily  pantoprazole, 40 mg, Intravenous, Q12H  Pharmacy Meds to Bed Consult, , Not Applicable, Daily  Psyllium, 1 packet, Oral, Daily  sodium chloride, 10 mL, Intravenous, Q12H      Continuous Infusions:     PRN Meds:.  ALPRAZolam    senna-docusate sodium **AND** polyethylene glycol **AND** bisacodyl **AND** bisacodyl    nitroglycerin    oxyCODONE-acetaminophen    sodium chloride    sodium chloride    sodium chloride    temazepam    Assessment & Plan   Assessment & Plan     Active Hospital Problems    Diagnosis  POA    **GÓMEZ (acute kidney injury) [N17.9]  Yes    Dehydration [E86.0]  Yes    Acute hyponatremia [E87.1]  Yes    Hypotension [I95.9]  Yes    Elevated troponin level not due to acute coronary syndrome [R79.89]  Yes    Coronary artery disease involving native coronary artery of native heart without angina pectoris [I25.10]  Yes    Heart failure with improved ejection fraction (HFimpEF) [I50.32]  Yes    Hyperlipidemia LDL goal <50 [E78.5]  Yes    Persistent atrial fibrillation [I48.19]  Yes      Resolved Hospital Problems   No resolved problems to display.        Brief Hospital Course to date:  Bo Perez is a 79 y.o. male admitted with hypotension, gómez, hyponatremia.       GÓMEZ on ckd 3 (baseline cr ~1.2-1.3), improved  Hyponatremia, asymptomatic  Hypotension (iatrogenic)  Hx HFpEF  CAD  Persistent Afib  Chronic BLE edema  -amiodarone, toprol  -BP & renal function improved, overall sodium improved from admission as well ~129  -patient very pleasantly adamant about discharge home  -holding eliquis until rectal bleeding resolved  -holding  bumex/diuretics/jardiance;entresto (due to hyponatremia and armani) until see stability/improvement in renal function as outpatient  -to follow up w/ Summer Jones ([facility]) 6/30 or 7/1 for follow up w/ labs  Rectal bleeding, mild  -hgb stable  -hold eliquis for now until rectal bleeding resolved a few days-->restart as outpatient if brbpr resolved and hgb stable  -GI consulted; patient declined colonoscopy and pleasantly adamant about discharge home. Possible this is hemorrhoids, anusol suppositories ordered x 5 days, psyllium; outpatient GI referral (placed in ADT) for future colonoscopy  Obesity  -on wegovy as outpatient; hold for now, restart as outpatient    -patient originally told us was discharging today, but now has changed his mind after wife arrived, so staying overnight. If labs stable this weekend still may d/c home    Am labs; cbc,bmp,mag    Expected Discharge Location and Transportation: Home  Expected Discharge in 2-3  Expected Discharge Date: 6/27/2025; Expected Discharge Time:      VTE Prophylaxis:  Pharmacologic & mechanical VTE prophylaxis orders are present.         AM-PAC 6 Clicks Score (PT): 17 (06/26/25 2010)    CODE STATUS:   Code Status and Medical Interventions: CPR (Attempt to Resuscitate); Full Support   Ordered at: 06/24/25 2003     Code Status (Patient has no pulse and is not breathing):    CPR (Attempt to Resuscitate)     Medical Interventions (Patient has pulse or is breathing):    Full Support     Level Of Support Discussed With:    Patient       Teofilo Albright MD  06/27/25

## 2025-06-28 LAB
ANION GAP SERPL CALCULATED.3IONS-SCNC: 10 MMOL/L (ref 5–15)
BUN SERPL-MCNC: 27.7 MG/DL (ref 8–23)
BUN/CREAT SERPL: 20.8 (ref 7–25)
CALCIUM SPEC-SCNC: 8.7 MG/DL (ref 8.6–10.5)
CHLORIDE SERPL-SCNC: 92 MMOL/L (ref 98–107)
CO2 SERPL-SCNC: 25 MMOL/L (ref 22–29)
CREAT SERPL-MCNC: 1.33 MG/DL (ref 0.76–1.27)
DEPRECATED RDW RBC AUTO: 48.3 FL (ref 37–54)
EGFRCR SERPLBLD CKD-EPI 2021: 54.7 ML/MIN/1.73
ERYTHROCYTE [DISTWIDTH] IN BLOOD BY AUTOMATED COUNT: 14 % (ref 12.3–15.4)
GLUCOSE SERPL-MCNC: 90 MG/DL (ref 65–99)
HCT VFR BLD AUTO: 28.3 % (ref 37.5–51)
HGB BLD-MCNC: 10.1 G/DL (ref 13–17.7)
MAGNESIUM SERPL-MCNC: 1.9 MG/DL (ref 1.6–2.4)
MCH RBC QN AUTO: 33.8 PG (ref 26.6–33)
MCHC RBC AUTO-ENTMCNC: 35.7 G/DL (ref 31.5–35.7)
MCV RBC AUTO: 94.6 FL (ref 79–97)
PLATELET # BLD AUTO: 134 10*3/MM3 (ref 140–450)
PMV BLD AUTO: 9.1 FL (ref 6–12)
POTASSIUM SERPL-SCNC: 3.8 MMOL/L (ref 3.5–5.2)
RBC # BLD AUTO: 2.99 10*6/MM3 (ref 4.14–5.8)
SODIUM SERPL-SCNC: 127 MMOL/L (ref 136–145)
WBC NRBC COR # BLD AUTO: 8.91 10*3/MM3 (ref 3.4–10.8)

## 2025-06-28 PROCEDURE — 83735 ASSAY OF MAGNESIUM: CPT | Performed by: INTERNAL MEDICINE

## 2025-06-28 PROCEDURE — 99232 SBSQ HOSP IP/OBS MODERATE 35: CPT | Performed by: INTERNAL MEDICINE

## 2025-06-28 PROCEDURE — 80048 BASIC METABOLIC PNL TOTAL CA: CPT | Performed by: INTERNAL MEDICINE

## 2025-06-28 PROCEDURE — 85027 COMPLETE CBC AUTOMATED: CPT | Performed by: INTERNAL MEDICINE

## 2025-06-28 RX ORDER — SODIUM CHLORIDE 1 G/1
1 TABLET ORAL 2 TIMES DAILY WITH MEALS
Status: DISCONTINUED | OUTPATIENT
Start: 2025-06-28 | End: 2025-06-30

## 2025-06-28 RX ADMIN — GABAPENTIN 300 MG: 300 CAPSULE ORAL at 09:03

## 2025-06-28 RX ADMIN — Medication 10 ML: at 21:44

## 2025-06-28 RX ADMIN — Medication 5 MG: at 21:44

## 2025-06-28 RX ADMIN — ACETAMINOPHEN 650 MG: 325 TABLET ORAL at 03:55

## 2025-06-28 RX ADMIN — ACETAMINOPHEN 650 MG: 325 TABLET ORAL at 09:03

## 2025-06-28 RX ADMIN — GABAPENTIN 300 MG: 300 CAPSULE ORAL at 21:44

## 2025-06-28 RX ADMIN — SODIUM CHLORIDE 1 G: 1 TABLET ORAL at 18:13

## 2025-06-28 RX ADMIN — OXYCODONE HYDROCHLORIDE AND ACETAMINOPHEN 1 TABLET: 5; 325 TABLET ORAL at 09:03

## 2025-06-28 RX ADMIN — ARIPIPRAZOLE 5 MG: 10 TABLET ORAL at 09:03

## 2025-06-28 RX ADMIN — SODIUM CHLORIDE 1 G: 1 TABLET ORAL at 11:16

## 2025-06-28 RX ADMIN — PANTOPRAZOLE SODIUM 40 MG: 40 INJECTION, POWDER, FOR SOLUTION INTRAVENOUS at 21:44

## 2025-06-28 RX ADMIN — PANTOPRAZOLE SODIUM 40 MG: 40 INJECTION, POWDER, FOR SOLUTION INTRAVENOUS at 09:04

## 2025-06-28 RX ADMIN — ATORVASTATIN CALCIUM 40 MG: 40 TABLET, FILM COATED ORAL at 09:03

## 2025-06-28 RX ADMIN — METOPROLOL SUCCINATE 12.5 MG: 25 TABLET, EXTENDED RELEASE ORAL at 09:03

## 2025-06-28 RX ADMIN — APIXABAN 5 MG: 5 TABLET, FILM COATED ORAL at 21:44

## 2025-06-28 RX ADMIN — OXYCODONE HYDROCHLORIDE AND ACETAMINOPHEN 1 TABLET: 5; 325 TABLET ORAL at 16:40

## 2025-06-28 RX ADMIN — AMIODARONE HYDROCHLORIDE 200 MG: 200 TABLET ORAL at 09:03

## 2025-06-28 RX ADMIN — Medication 10 ML: at 09:03

## 2025-06-28 RX ADMIN — ACETAMINOPHEN 650 MG: 325 TABLET ORAL at 18:13

## 2025-06-28 NOTE — PROGRESS NOTES
[facility] Medicine Services  PROGRESS NOTE    Patient Name: Bo Perez  : 1945  MRN: [id number]    Date of Admission: 2025  Primary Care Physician: Mario Rowland MD    Subjective   Subjective     CC:  Hypertension    HPI:  No bleeding rectum, has refused the suppositories  No chest pain, dizziness or dyspnea  No n/v/d      Objective   Objective     Vital Signs:   Temp:  [97.3 °F (36.3 °C)-98.3 °F (36.8 °C)] 98.3 °F (36.8 °C)  Heart Rate:  [40-77] 49  Resp:  [18-20] 18  BP: ()/(48-75) 102/69     Physical Exam:  Alert, nontoxic, ox3  Ncat, oroph clear  Irr irr, bradycardic  ctab   Abd obese, nontender  BLE chronic venous stasis changes/edema/ mild erythema    Results Reviewed:  LAB RESULTS:      Lab 25  0654 25  0832 25  0032 25  1646 25  1404 25  0602 25  2105 25  0359 25  1959 25  1557 25  1449   WBC 8.91  --   --   --   --  8.64  --  8.84  --   --  9.01   HEMOGLOBIN 10.1* 10.4* 10.5* 10.1* 10.0* 10.2*   < > 10.9*  --   --  11.8*   HEMATOCRIT 28.3* 30.1* 29.1* 30.1* 27.9* 28.3*   < > 31.5*  --   --  33.3*   PLATELETS 134*  --   --   --   --  151  --  162  --   --  175   NEUTROS ABS  --   --   --   --   --  6.05  --  6.59  --   --  6.59   IMMATURE GRANS (ABS)  --   --   --   --   --  0.06*  --  0.04  --   --  0.05   LYMPHS ABS  --   --   --   --   --  1.63  --  1.57  --   --  1.52   MONOS ABS  --   --   --   --   --  0.66  --  0.40  --   --  0.61   EOS ABS  --   --   --   --   --  0.21  --  0.21  --   --  0.21   MCV 94.6  --   --   --   --  93.1  --  96.6  --   --  94.9   HSTROP T  --   --   --   --   --   --   --  48* 39* 46* 63*    < > = values in this interval not displayed.         Lab 25  0654 25  0651 25  1646 25  1404 25  0926 25  0745 25  0359 25  1959 25  1449   SODIUM 127* 129* 129* 130* 128*   < > 127*  126*   < > 124*    POTASSIUM 3.8 4.2 4.1 3.9 3.7   < > 3.6  3.5   < > 4.0   CHLORIDE 92* 95* 92* 94* 91*   < > 85*  86*   < > 82*   CO2 25.0 24.0 26.0 25.0 26.0   < > 29.0  27.0   < > 28.4   ANION GAP 10.0 10.0 11.0 11.0 11.0   < > 13.0  13.0   < > 13.6   BUN 27.7* 33.0* 35.1* 38.1* 42.4*   < > 60.0*  62.3*   < > 62.7*   CREATININE 1.33* 1.36* 1.30* 1.23 1.43*   < > 1.97*  1.89*   < > 2.52*   EGFR 54.7* 53.3* 56.2* 60.1 50.2*   < > 34.1*  35.9*   < > 25.4*   GLUCOSE 90 86 96 97 91   < > 99  99   < > 104*   CALCIUM 8.7 8.3* 8.4* 8.3* 8.1*   < > 8.8  8.9   < > 9.6   MAGNESIUM 1.9 2.0  --   --   --   --  2.2  --  2.3   PHOSPHORUS  --  2.5  --   --   --   --   --   --   --     < > = values in this interval not displayed.         Lab 06/24/25  1449   TOTAL PROTEIN 7.1   ALBUMIN 4.1   GLOBULIN 3.0   ALT (SGPT) 21   AST (SGOT) 22   BILIRUBIN 1.0   ALK PHOS 54         Lab 06/25/25  0359 06/24/25  1959 06/24/25  1557 06/24/25  1449   HSTROP T 48* 39* 46* 63*             Lab 06/27/25  0832 06/26/25  0602   ABO TYPING O O   RH TYPING Negative Negative   ANTIBODY SCREEN Negative Negative         Brief Urine Lab Results  (Last result in the past 365 days)        Color   Clarity   Blood   Leuk Est   Nitrite   Protein   CREAT   Urine HCG        06/24/25 1634             41.5         06/24/25 1634 Yellow   Clear   Negative   Large (3+)   Negative   Negative                   Microbiology Results Abnormal       Procedure Component Value - Date/Time    Eosinophil Smear - Urine, Urine, Clean Catch [[id number]]  (Abnormal) Collected: 06/24/25 7094    Lab Status: Final result Specimen: Urine, Clean Catch Updated: 06/25/25 0537     Eosinophil Smear 2 % EOS/100 Cells             No radiology results from the last 24 hrs      Results for orders placed during the hospital encounter of 02/12/25    Adult Transthoracic Echo Limited W/ Cont if Necessary Per Protocol    Interpretation Summary    Left ventricular systolic function is normal. Estimated  left ventricular EF = 52%    Left ventricular diastolic function is consistent with (grade I) impaired relaxation.    There is moderate calcification of the aortic valve.  There is no significant aortic valve stenosis or regurgitation present.      Current medications:  Scheduled Meds:acetaminophen, 650 mg, Oral, Q8H  amiodarone, 200 mg, Oral, Daily  apixaban, 5 mg, Oral, Q12H  ARIPiprazole, 5 mg, Oral, Daily  atorvastatin, 40 mg, Oral, Daily  gabapentin, 300 mg, Oral, Q12H  hydrocortisone, 25 mg, Rectal, BID  ipratropium, 2 spray, Each Nare, Q PM  melatonin, 5 mg, Oral, Nightly  [Held by provider] metoprolol succinate XL, 12.5 mg, Oral, Daily  pantoprazole, 40 mg, Intravenous, Q12H  Pharmacy Meds to Bed Consult, , Not Applicable, Daily  Psyllium, 1 packet, Oral, Daily  sodium chloride, 10 mL, Intravenous, Q12H  sodium chloride, 1 g, Oral, BID With Meals      Continuous Infusions:     PRN Meds:.  ALPRAZolam    senna-docusate sodium **AND** polyethylene glycol **AND** bisacodyl **AND** bisacodyl    nitroglycerin    oxyCODONE-acetaminophen    sodium chloride    sodium chloride    sodium chloride    temazepam    Assessment & Plan   Assessment & Plan     Active Hospital Problems    Diagnosis  POA    **GÓMEZ (acute kidney injury) [N17.9]  Yes    Dehydration [E86.0]  Yes    Acute hyponatremia [E87.1]  Yes    Hypotension [I95.9]  Yes    Elevated troponin level not due to acute coronary syndrome [R79.89]  Yes    Coronary artery disease involving native coronary artery of native heart without angina pectoris [I25.10]  Yes    Heart failure with improved ejection fraction (HFimpEF) [I50.32]  Yes    Hyperlipidemia LDL goal <50 [E78.5]  Yes    Persistent atrial fibrillation [I48.19]  Yes      Resolved Hospital Problems   No resolved problems to display.        Brief Hospital Course to date:  Bo Perez is a 79 y.o. male admitted with hypotension, gómez, hyponatremia likely due to cardiac meds/diuretics. These were held and  diuretics held with improvement in renal function and sodium w/ significant improvement in bp (no 110's) with no dyspnea, no dizziness. . Gi saw for some scant brbpr on 6/27/25 andatient declined colonoscopy, patient was extremely adamant about going home. GI recommended anusol suppositories and made outpatient ADT referral for GI clinic for future outpatient colonoscopy & cardiology recommended holding diuretics/entresto w/ plans to restart Toprol at lower dose of 12.5mg daily. Patient was in process of being discharged home on6/27/25 (discharge summary completed & med rec completed) but then wife showed up and patient changed his mind wanting to stay for further observation so discharge was canceled.       GÓMEZ on ckd 3 (baseline cr ~1.2-1.3), improved  Hyponatremia, asymptomatic  -initial sodium 124, creatinine 2.52  -improved while holding diuretics/entresto/jardiance & gentle hydration  -6/28/25: sodium 127 today, creatinine stable 1.33 today; salt tabs 1 bid; 1.5 L fluid restrict; trend sodium & renal function    Hypotension (iatrogenic)  Hx HFpEF  CAD  Persistent Afib  Bradycardia  Chronic BLE edema  -BP & renal function improved, overall sodium improved from admission as well ~129  -patient very pleasantly adamant about discharge home  -initially held eliquis due to mild bleeding; now restarted eliquis  -held bumex/diuretics/jardiance;entresto (due to hyponatremia and gómez) until see stability/improvement in renal function as outpatient  -Cards follows (salvador Alcantar has seen this admission): initiated toprol at decreased dose of 12.5mg daily (down from 25mg) & continued amiodarone.   -6/28/25: Today hr consistently 40's. I will stop toprol & continue amiodarone w/ hold parameters; remain off bumex/diuretics/jardiance/entresto (due to gómez, hyponatremia, borderline hypotension)  -I will restart eliquis (no further rectal bleeding) and monitor for bleeding      Rectal bleeding, mild  -hgb stable  -initially  held eliquis   -GI consulted; patient declined colonoscopy. GI ordered anusol suppositories for likely hemorrhoids, psyillium, and outpatient GI referral placed in ADT for oupatient colonoscopy  -patient has refused the anusol suppositories  -no further bleeding last couple of days  -I will restart eliquis; if develops bleeding patient sates he will use the suppositories    Obesity  -on wegovy as outpatient; hold for now, restart as outpatient    -patient originally told us was discharging today, but now has changed his mind after wife arrived, so staying overnight. If labs stable this weekend still may d/c home    Am labs; cbc,bmp,mag (trend sodium, creatinine, hgb; monitor HR holding toprol, hold parameters for amiodarone)    Expected Discharge Location and Transportation: Home  Expected Discharge   Expected Discharge Date: 6/30/2025; Expected Discharge Time:      VTE Prophylaxis:  Pharmacologic & mechanical VTE prophylaxis orders are present.         AM-PAC 6 Clicks Score (PT): 18 (06/27/25 2040)    CODE STATUS:   Code Status and Medical Interventions: CPR (Attempt to Resuscitate); Full Support   Ordered at: 06/24/25 2003     Code Status (Patient has no pulse and is not breathing):    CPR (Attempt to Resuscitate)     Medical Interventions (Patient has pulse or is breathing):    Full Support     Level Of Support Discussed With:    Patient       Teofilo Albright MD  06/28/25

## 2025-06-29 LAB
ANION GAP SERPL CALCULATED.3IONS-SCNC: 11.1 MMOL/L (ref 5–15)
BUN SERPL-MCNC: 21.8 MG/DL (ref 8–23)
BUN/CREAT SERPL: 16.3 (ref 7–25)
CALCIUM SPEC-SCNC: 8.5 MG/DL (ref 8.6–10.5)
CHLORIDE SERPL-SCNC: 91 MMOL/L (ref 98–107)
CO2 SERPL-SCNC: 22.9 MMOL/L (ref 22–29)
CREAT SERPL-MCNC: 1.34 MG/DL (ref 0.76–1.27)
DEPRECATED RDW RBC AUTO: 48.4 FL (ref 37–54)
EGFRCR SERPLBLD CKD-EPI 2021: 54.2 ML/MIN/1.73
ERYTHROCYTE [DISTWIDTH] IN BLOOD BY AUTOMATED COUNT: 13.9 % (ref 12.3–15.4)
GLUCOSE SERPL-MCNC: 91 MG/DL (ref 65–99)
HCT VFR BLD AUTO: 28.8 % (ref 37.5–51)
HGB BLD-MCNC: 10.1 G/DL (ref 13–17.7)
MAGNESIUM SERPL-MCNC: 1.7 MG/DL (ref 1.6–2.4)
MCH RBC QN AUTO: 33.7 PG (ref 26.6–33)
MCHC RBC AUTO-ENTMCNC: 35.1 G/DL (ref 31.5–35.7)
MCV RBC AUTO: 96 FL (ref 79–97)
PLATELET # BLD AUTO: 118 10*3/MM3 (ref 140–450)
PMV BLD AUTO: 9.3 FL (ref 6–12)
POTASSIUM SERPL-SCNC: 3.7 MMOL/L (ref 3.5–5.2)
RBC # BLD AUTO: 3 10*6/MM3 (ref 4.14–5.8)
SODIUM SERPL-SCNC: 125 MMOL/L (ref 136–145)
SODIUM UR-SCNC: <20 MMOL/L
TSH SERPL DL<=0.05 MIU/L-ACNC: 0.81 UIU/ML (ref 0.27–4.2)
WBC NRBC COR # BLD AUTO: 7.36 10*3/MM3 (ref 3.4–10.8)

## 2025-06-29 PROCEDURE — 99232 SBSQ HOSP IP/OBS MODERATE 35: CPT | Performed by: INTERNAL MEDICINE

## 2025-06-29 PROCEDURE — 80048 BASIC METABOLIC PNL TOTAL CA: CPT | Performed by: INTERNAL MEDICINE

## 2025-06-29 PROCEDURE — 85027 COMPLETE CBC AUTOMATED: CPT | Performed by: INTERNAL MEDICINE

## 2025-06-29 PROCEDURE — 84300 ASSAY OF URINE SODIUM: CPT | Performed by: INTERNAL MEDICINE

## 2025-06-29 PROCEDURE — 83735 ASSAY OF MAGNESIUM: CPT | Performed by: INTERNAL MEDICINE

## 2025-06-29 PROCEDURE — 84443 ASSAY THYROID STIM HORMONE: CPT | Performed by: INTERNAL MEDICINE

## 2025-06-29 RX ORDER — SODIUM CHLORIDE 9 MG/ML
50 INJECTION, SOLUTION INTRAVENOUS CONTINUOUS
Status: DISCONTINUED | OUTPATIENT
Start: 2025-06-29 | End: 2025-06-29

## 2025-06-29 RX ORDER — PANTOPRAZOLE SODIUM 40 MG/1
40 TABLET, DELAYED RELEASE ORAL
Status: DISCONTINUED | OUTPATIENT
Start: 2025-06-29 | End: 2025-07-01

## 2025-06-29 RX ADMIN — ARIPIPRAZOLE 5 MG: 10 TABLET ORAL at 08:58

## 2025-06-29 RX ADMIN — SODIUM CHLORIDE 1 G: 1 TABLET ORAL at 18:14

## 2025-06-29 RX ADMIN — GABAPENTIN 300 MG: 300 CAPSULE ORAL at 08:59

## 2025-06-29 RX ADMIN — APIXABAN 5 MG: 5 TABLET, FILM COATED ORAL at 20:33

## 2025-06-29 RX ADMIN — PANTOPRAZOLE SODIUM 40 MG: 40 TABLET, DELAYED RELEASE ORAL at 18:14

## 2025-06-29 RX ADMIN — GABAPENTIN 300 MG: 300 CAPSULE ORAL at 20:33

## 2025-06-29 RX ADMIN — AVOBENZONE, HOMOSALATE, OCTISALATE, OCTOCRYLENE, AND OXYBENZONE 1 PACKET: 29.4; 147; 49; 25.4; 58.8 LOTION TOPICAL at 08:59

## 2025-06-29 RX ADMIN — PANTOPRAZOLE SODIUM 40 MG: 40 INJECTION, POWDER, FOR SOLUTION INTRAVENOUS at 08:58

## 2025-06-29 RX ADMIN — ATORVASTATIN CALCIUM 40 MG: 40 TABLET, FILM COATED ORAL at 08:58

## 2025-06-29 RX ADMIN — SODIUM CHLORIDE 1 G: 1 TABLET ORAL at 08:58

## 2025-06-29 RX ADMIN — Medication 10 ML: at 10:02

## 2025-06-29 RX ADMIN — ACETAMINOPHEN 650 MG: 325 TABLET ORAL at 18:14

## 2025-06-29 RX ADMIN — APIXABAN 5 MG: 5 TABLET, FILM COATED ORAL at 08:58

## 2025-06-29 RX ADMIN — ACETAMINOPHEN 650 MG: 325 TABLET ORAL at 10:02

## 2025-06-29 RX ADMIN — Medication 10 ML: at 20:33

## 2025-06-29 RX ADMIN — AMIODARONE HYDROCHLORIDE 200 MG: 200 TABLET ORAL at 08:58

## 2025-06-29 RX ADMIN — ACETAMINOPHEN 650 MG: 325 TABLET ORAL at 02:48

## 2025-06-29 RX ADMIN — Medication 5 MG: at 20:33

## 2025-06-29 NOTE — PROGRESS NOTES
[facility] Medicine Services  PROGRESS NOTE    Patient Name: Bo Perez  : 1945  MRN: [id number]    Date of Admission: 2025  Primary Care Physician: Mario Rowland MD    Subjective   Subjective     CC:  Hypertension    HPI:  No rectal bleeding  No dyspnea  Chronic BLE edema  No fever  No confusion  Tolerating po      Objective   Objective     Vital Signs:   Temp:  [97.8 °F (36.6 °C)-98.6 °F (37 °C)] 98.2 °F (36.8 °C)  Heart Rate:  [40-77] 55  Resp:  [17-18] 17  BP: (102-133)/(60-72) 125/60     Physical Exam:  Alert, nontoxic, ox3, sitting in chair, normal work of breathing  Ncat, oroph clear  Irr irr, bradycardic  ctab   Abd obese, nontender to palpation  BLE chronic edema 2+ w/ chronic changes/edema/ mild erythema (no warmth)    Results Reviewed:  LAB RESULTS:      Lab 25  1009 25  0654 25  0832 25  0032 25  1646 25  1404 25  0602 25  2105 25  0359 25  1959 25  1557 25  1449   WBC 7.36 8.91  --   --   --   --  8.64  --  8.84  --   --  9.01   HEMOGLOBIN 10.1* 10.1* 10.4* 10.5* 10.1*   < > 10.2*   < > 10.9*  --   --  11.8*   HEMATOCRIT 28.8* 28.3* 30.1* 29.1* 30.1*   < > 28.3*   < > 31.5*  --   --  33.3*   PLATELETS 118* 134*  --   --   --   --  151  --  162  --   --  175   NEUTROS ABS  --   --   --   --   --   --  6.05  --  6.59  --   --  6.59   IMMATURE GRANS (ABS)  --   --   --   --   --   --  0.06*  --  0.04  --   --  0.05   LYMPHS ABS  --   --   --   --   --   --  1.63  --  1.57  --   --  1.52   MONOS ABS  --   --   --   --   --   --  0.66  --  0.40  --   --  0.61   EOS ABS  --   --   --   --   --   --  0.21  --  0.21  --   --  0.21   MCV 96.0 94.6  --   --   --   --  93.1  --  96.6  --   --  94.9   HSTROP T  --   --   --   --   --   --   --   --  48* 39* 46* 63*    < > = values in this interval not displayed.         Lab 25  1009 25  0654 25  0651 25  9982  06/26/25  1404 06/25/25  0745 06/25/25  0359 06/24/25 1959 06/24/25  1449   SODIUM 125* 127* 129* 129* 130*   < > 127*  126*   < > 124*   POTASSIUM 3.7 3.8 4.2 4.1 3.9   < > 3.6  3.5   < > 4.0   CHLORIDE 91* 92* 95* 92* 94*   < > 85*  86*   < > 82*   CO2 22.9 25.0 24.0 26.0 25.0   < > 29.0  27.0   < > 28.4   ANION GAP 11.1 10.0 10.0 11.0 11.0   < > 13.0  13.0   < > 13.6   BUN 21.8 27.7* 33.0* 35.1* 38.1*   < > 60.0*  62.3*   < > 62.7*   CREATININE 1.34* 1.33* 1.36* 1.30* 1.23   < > 1.97*  1.89*   < > 2.52*   EGFR 54.2* 54.7* 53.3* 56.2* 60.1   < > 34.1*  35.9*   < > 25.4*   GLUCOSE 91 90 86 96 97   < > 99  99   < > 104*   CALCIUM 8.5* 8.7 8.3* 8.4* 8.3*   < > 8.8  8.9   < > 9.6   MAGNESIUM 1.7 1.9 2.0  --   --   --  2.2  --  2.3   PHOSPHORUS  --   --  2.5  --   --   --   --   --   --     < > = values in this interval not displayed.         Lab 06/24/25  1449   TOTAL PROTEIN 7.1   ALBUMIN 4.1   GLOBULIN 3.0   ALT (SGPT) 21   AST (SGOT) 22   BILIRUBIN 1.0   ALK PHOS 54         Lab 06/25/25  0359 06/24/25 1959 06/24/25  1557 06/24/25  1449   HSTROP T 48* 39* 46* 63*             Lab 06/27/25  0832 06/26/25  0602   ABO TYPING O O   RH TYPING Negative Negative   ANTIBODY SCREEN Negative Negative         Brief Urine Lab Results  (Last result in the past 365 days)        Color   Clarity   Blood   Leuk Est   Nitrite   Protein   CREAT   Urine HCG        06/24/25 1634             41.5         06/24/25 1634 Yellow   Clear   Negative   Large (3+)   Negative   Negative                   Microbiology Results Abnormal       Procedure Component Value - Date/Time    Eosinophil Smear - Urine, Urine, Clean Catch [[id number]]  (Abnormal) Collected: 06/24/25 1634    Lab Status: Final result Specimen: Urine, Clean Catch Updated: 06/25/25 0537     Eosinophil Smear 2 % EOS/100 Cells             No radiology results from the last 24 hrs      Results for orders placed during the hospital encounter of 02/12/25    Adult Transthoracic  Echo Limited W/ Cont if Necessary Per Protocol    Interpretation Summary    Left ventricular systolic function is normal. Estimated left ventricular EF = 52%    Left ventricular diastolic function is consistent with (grade I) impaired relaxation.    There is moderate calcification of the aortic valve.  There is no significant aortic valve stenosis or regurgitation present.      Current medications:  Scheduled Meds:acetaminophen, 650 mg, Oral, Q8H  amiodarone, 200 mg, Oral, Daily  apixaban, 5 mg, Oral, Q12H  ARIPiprazole, 5 mg, Oral, Daily  atorvastatin, 40 mg, Oral, Daily  gabapentin, 300 mg, Oral, Q12H  hydrocortisone, 25 mg, Rectal, BID  ipratropium, 2 spray, Each Nare, Q PM  melatonin, 5 mg, Oral, Nightly  [Held by provider] metoprolol succinate XL, 12.5 mg, Oral, Daily  pantoprazole, 40 mg, Intravenous, Q12H  Pharmacy Meds to Bed Consult, , Not Applicable, Daily  Psyllium, 1 packet, Oral, Daily  sodium chloride, 10 mL, Intravenous, Q12H  sodium chloride, 1 g, Oral, BID With Meals      Continuous Infusions:     PRN Meds:.  ALPRAZolam    senna-docusate sodium **AND** polyethylene glycol **AND** bisacodyl **AND** bisacodyl    nitroglycerin    oxyCODONE-acetaminophen    sodium chloride    sodium chloride    sodium chloride    temazepam    Assessment & Plan   Assessment & Plan     Active Hospital Problems    Diagnosis  POA    **GÓMEZ (acute kidney injury) [N17.9]  Yes    Dehydration [E86.0]  Yes    Acute hyponatremia [E87.1]  Yes    Hypotension [I95.9]  Yes    Elevated troponin level not due to acute coronary syndrome [R79.89]  Yes    Coronary artery disease involving native coronary artery of native heart without angina pectoris [I25.10]  Yes    Heart failure with improved ejection fraction (HFimpEF) [I50.32]  Yes    Hyperlipidemia LDL goal <50 [E78.5]  Yes    Persistent atrial fibrillation [I48.19]  Yes      Resolved Hospital Problems   No resolved problems to display.        Brief Hospital Course to date:  Bo JACOBSEN  Chris is a 79 y.o. male admitted with hypotension, armani, hyponatremia likely due to cardiac meds/diuretics. These were held and diuretics held with improvement in renal function and sodium w/ significant improvement in bp (no 110's) with no dyspnea, no dizziness. . Gi saw for some scant brbpr on 6/27/25 andatient declined colonoscopy, patient was extremely adamant about going home. GI recommended anusol suppositories and made outpatient ADT referral for GI clinic for future outpatient colonoscopy & cardiology recommended holding diuretics/entresto w/ plans to restart Toprol at lower dose of 12.5mg daily. Patient was in process of being discharged home on 6/27/25 (discharge summary completed & med rec completed) but then wife showed up and patient changed his mind wanting to stay for further observation so discharge was canceled. Developed some asymptomatic bradycardia for which toprol was d/c'd. Also had recurrence of hyponatremia    Hyponatremia, asymptomatic  ARMANI on ckd 3 (baseline cr ~1.2-1.3), improved  -initial sodium 124, creatinine 2.52  -improved while holding diuretics/entresto/jardiance & gentle hydration initially  -6/28/25: sodium was 127, cr 1.33; added salt tabs 1 bid; 1.5L fluid striction  -tsh WNL  -6/29/25: sodium 125, cr 1.34. has significant peripheral edema so will hold on giving iv fluids;; urine sodium. Continue 1.5L fluid restriction & salt tabs; recheck a.m. bmp, cxr. If no significant improvement in sodium level then will plan to discuss w/ Cards (salvador Alcantar has been following) w/ low threshold to consult nephrology    Hypotension (iatrogenic), resolved  Hx HFpEF  CAD  Persistent Afib  Bradycardia, asymptomatic  Chronic BLE edema  -BP & renal function improved, overall sodium improved from admission as well ~129  -patient very pleasantly adamant about discharge home  -initially held eliquis due to mild bleeding; now restarted eliquis  -held bumex/diuretics/jardiance;entresto (due to  hyponatremia and armani) until see stability/improvement in renal function as outpatient  -Cards follows (salvador Alcantar has seen this admission): initiated toprol at decreased dose of 12.5mg daily (down from 25mg) & continued amiodarone.   -6/28/25: heart rate consistently 40's after toprol 12.5mg; Toprol stopped  -6/29/25: heart rate improved 50's today, asymptomatic; continue amiodarone;; continue holding diuretics/jardiance/entresto for now (renal insufficiency, borderline hypotension). Back on eliquis (monitoring for recurrence of mild rectal bleeding as below)  -plan to discuss w/ cards again tomorrow Monday 6/30      Rectal bleeding, mild. resolved  -hgb stable  -initially held eliquis   -GI consulted; patient declined colonoscopy. GI ordered anusol suppositories for likely hemorrhoids, psyillium, and outpatient GI referral placed in ADT for oupatient colonoscopy  -patient has refused the anusol suppositories (states will use the suppositories if has recurrence of bleeding)  -no further bleeding last couple of days  -restarted eliquis on 6/28/25; no further rectal bleeding  -hgb stable    Obesity  -on wegovy as outpatient; hold for now, restart as outpatient          Am labs;CBC, BMP, mag (trend sodium),  f/u urine sodium; a.m. CXR   Issues to trend: hgb, heart rate (stopped toprol due to bradycardia)     Expected Discharge Location and Transportation: Home  Expected Discharge   Expected Discharge Date: 6/30/2025; Expected Discharge Time:      VTE Prophylaxis:  Pharmacologic & mechanical VTE prophylaxis orders are present.         AM-PAC 6 Clicks Score (PT): 18 (06/29/25 0800)    CODE STATUS:   Code Status and Medical Interventions: CPR (Attempt to Resuscitate); Full Support   Ordered at: 06/24/25 2003     Code Status (Patient has no pulse and is not breathing):    CPR (Attempt to Resuscitate)     Medical Interventions (Patient has pulse or is breathing):    Full Support     Level Of Support Discussed With:     Patient       Teofilo Albright MD  06/29/25

## 2025-06-30 ENCOUNTER — APPOINTMENT (OUTPATIENT)
Dept: CARDIOLOGY | Facility: HOSPITAL | Age: 80
End: 2025-06-30
Payer: MEDICARE

## 2025-06-30 ENCOUNTER — APPOINTMENT (OUTPATIENT)
Dept: GENERAL RADIOLOGY | Facility: HOSPITAL | Age: 80
End: 2025-06-30
Payer: MEDICARE

## 2025-06-30 LAB
ANION GAP SERPL CALCULATED.3IONS-SCNC: 9 MMOL/L (ref 5–15)
AORTIC DIMENSIONLESS INDEX: 0.65 (DI)
ASCENDING AORTA: 3.5 CM
AV MEAN PRESS GRAD SYS DOP V1V2: 5.5 MMHG
AV VMAX SYS DOP: 157.8 CM/SEC
BH CV ECHO MEAS - AO MAX PG: 10 MMHG
BH CV ECHO MEAS - AO ROOT DIAM: 3.7 CM
BH CV ECHO MEAS - AO V2 VTI: 32.4 CM
BH CV ECHO MEAS - AVA(I,D): 2.7 CM2
BH CV ECHO MEAS - EDV(CUBED): 157.5 ML
BH CV ECHO MEAS - EDV(MOD-SP2): 216 ML
BH CV ECHO MEAS - EDV(MOD-SP4): 202 ML
BH CV ECHO MEAS - EF(MOD-SP2): 53.7 %
BH CV ECHO MEAS - EF(MOD-SP4): 54.3 %
BH CV ECHO MEAS - ESV(CUBED): 42.9 ML
BH CV ECHO MEAS - ESV(MOD-SP2): 100 ML
BH CV ECHO MEAS - ESV(MOD-SP4): 92.3 ML
BH CV ECHO MEAS - FS: 35.2 %
BH CV ECHO MEAS - IVS/LVPW: 1 CM
BH CV ECHO MEAS - IVSD: 1.3 CM
BH CV ECHO MEAS - LA DIMENSION: 4.3 CM
BH CV ECHO MEAS - LAT PEAK E' VEL: 18.4 CM/SEC
BH CV ECHO MEAS - LV DIASTOLIC VOL/BSA (35-75): 73 CM2
BH CV ECHO MEAS - LV MASS(C)D: 295.6 GRAMS
BH CV ECHO MEAS - LV MAX PG: 3.8 MMHG
BH CV ECHO MEAS - LV MEAN PG: 2 MMHG
BH CV ECHO MEAS - LV SYSTOLIC VOL/BSA (12-30): 33.4 CM2
BH CV ECHO MEAS - LV V1 MAX: 97.8 CM/SEC
BH CV ECHO MEAS - LV V1 VTI: 21.2 CM
BH CV ECHO MEAS - LVIDD: 5.4 CM
BH CV ECHO MEAS - LVIDS: 3.5 CM
BH CV ECHO MEAS - LVOT AREA: 4.2 CM2
BH CV ECHO MEAS - LVOT DIAM: 2.3 CM
BH CV ECHO MEAS - LVPWD: 1.3 CM
BH CV ECHO MEAS - MED PEAK E' VEL: 11.2 CM/SEC
BH CV ECHO MEAS - MV A MAX VEL: 64.5 CM/SEC
BH CV ECHO MEAS - MV DEC SLOPE: 662.5 CM/SEC2
BH CV ECHO MEAS - MV DEC TIME: 0.14 SEC
BH CV ECHO MEAS - MV E MAX VEL: 128 CM/SEC
BH CV ECHO MEAS - MV E/A: 1.98
BH CV ECHO MEAS - MV MAX PG: 4 MMHG
BH CV ECHO MEAS - MV MEAN PG: 2 MMHG
BH CV ECHO MEAS - MV P1/2T: 43.9 MSEC
BH CV ECHO MEAS - MV V2 VTI: 24.2 CM
BH CV ECHO MEAS - MVA(P1/2T): 5 CM2
BH CV ECHO MEAS - MVA(VTI): 3.6 CM2
BH CV ECHO MEAS - PA ACC TIME: 0.07 SEC
BH CV ECHO MEAS - RAP SYSTOLE: 8 MMHG
BH CV ECHO MEAS - SV(LVOT): 87.9 ML
BH CV ECHO MEAS - SV(MOD-SP2): 116 ML
BH CV ECHO MEAS - SV(MOD-SP4): 109.7 ML
BH CV ECHO MEAS - SVI(LVOT): 31.8 ML/M2
BH CV ECHO MEAS - SVI(MOD-SP2): 41.9 ML/M2
BH CV ECHO MEAS - SVI(MOD-SP4): 39.7 ML/M2
BH CV ECHO MEAS - TAPSE (>1.6): 3.3 CM
BH CV ECHO MEASUREMENTS AVERAGE E/E' RATIO: 8.65
BH CV LOWER VASCULAR LEFT COMMON FEMORAL AUGMENT: NORMAL
BH CV LOWER VASCULAR LEFT COMMON FEMORAL COMPRESS: NORMAL
BH CV LOWER VASCULAR LEFT COMMON FEMORAL PHASIC: NORMAL
BH CV LOWER VASCULAR LEFT COMMON FEMORAL SPONT: NORMAL
BH CV LOWER VASCULAR LEFT DISTAL FEMORAL AUGMENT: NORMAL
BH CV LOWER VASCULAR LEFT DISTAL FEMORAL COMPRESS: NORMAL
BH CV LOWER VASCULAR LEFT DISTAL FEMORAL PHASIC: NORMAL
BH CV LOWER VASCULAR LEFT DISTAL FEMORAL SPONT: NORMAL
BH CV LOWER VASCULAR LEFT GASTRONEMIUS COMPRESS: NORMAL
BH CV LOWER VASCULAR LEFT GREATER SAPH AK COMPRESS: NORMAL
BH CV LOWER VASCULAR LEFT GREATER SAPH BK COMPRESS: NORMAL
BH CV LOWER VASCULAR LEFT LESSER SAPH COMPRESS: NORMAL
BH CV LOWER VASCULAR LEFT MID FEMORAL AUGMENT: NORMAL
BH CV LOWER VASCULAR LEFT MID FEMORAL COMPRESS: NORMAL
BH CV LOWER VASCULAR LEFT MID FEMORAL PHASIC: NORMAL
BH CV LOWER VASCULAR LEFT MID FEMORAL SPONT: NORMAL
BH CV LOWER VASCULAR LEFT PERONEAL COMPRESS: NORMAL
BH CV LOWER VASCULAR LEFT POPLITEAL AUGMENT: NORMAL
BH CV LOWER VASCULAR LEFT POPLITEAL COMPRESS: NORMAL
BH CV LOWER VASCULAR LEFT POPLITEAL PHASIC: NORMAL
BH CV LOWER VASCULAR LEFT POPLITEAL SPONT: NORMAL
BH CV LOWER VASCULAR LEFT POSTERIOR TIBIAL COMPRESS: NORMAL
BH CV LOWER VASCULAR LEFT PROFUNDA FEMORAL PHASIC: NORMAL
BH CV LOWER VASCULAR LEFT PROFUNDA FEMORAL SPONT: NORMAL
BH CV LOWER VASCULAR LEFT PROXIMAL FEMORAL AUGMENT: NORMAL
BH CV LOWER VASCULAR LEFT PROXIMAL FEMORAL COMPRESS: NORMAL
BH CV LOWER VASCULAR LEFT PROXIMAL FEMORAL PHASIC: NORMAL
BH CV LOWER VASCULAR LEFT PROXIMAL FEMORAL SPONT: NORMAL
BH CV LOWER VASCULAR LEFT SAPHENOFEMORAL JUNCTION AUGMENT: NORMAL
BH CV LOWER VASCULAR LEFT SAPHENOFEMORAL JUNCTION COMPRESS: NORMAL
BH CV LOWER VASCULAR LEFT SAPHENOFEMORAL JUNCTION SPONT: NORMAL
BH CV LOWER VASCULAR RIGHT COMMON FEMORAL AUGMENT: NORMAL
BH CV LOWER VASCULAR RIGHT COMMON FEMORAL COMPRESS: NORMAL
BH CV LOWER VASCULAR RIGHT COMMON FEMORAL PHASIC: NORMAL
BH CV LOWER VASCULAR RIGHT COMMON FEMORAL SPONT: NORMAL
BH CV LOWER VASCULAR RIGHT DISTAL FEMORAL COMPRESS: NORMAL
BH CV LOWER VASCULAR RIGHT DISTAL FEMORAL PHASIC: NORMAL
BH CV LOWER VASCULAR RIGHT DISTAL FEMORAL SPONT: NORMAL
BH CV LOWER VASCULAR RIGHT GASTRONEMIUS COMPRESS: NORMAL
BH CV LOWER VASCULAR RIGHT GREATER SAPH AK COMPRESS: NORMAL
BH CV LOWER VASCULAR RIGHT GREATER SAPH BK COMPRESS: NORMAL
BH CV LOWER VASCULAR RIGHT LESSER SAPH COMPRESS: NORMAL
BH CV LOWER VASCULAR RIGHT MID FEMORAL AUGMENT: NORMAL
BH CV LOWER VASCULAR RIGHT MID FEMORAL COMPRESS: NORMAL
BH CV LOWER VASCULAR RIGHT MID FEMORAL PHASIC: NORMAL
BH CV LOWER VASCULAR RIGHT MID FEMORAL SPONT: NORMAL
BH CV LOWER VASCULAR RIGHT PERONEAL COMPRESS: NORMAL
BH CV LOWER VASCULAR RIGHT POPLITEAL AUGMENT: NORMAL
BH CV LOWER VASCULAR RIGHT POPLITEAL COMPRESS: NORMAL
BH CV LOWER VASCULAR RIGHT POPLITEAL PHASIC: NORMAL
BH CV LOWER VASCULAR RIGHT POPLITEAL SPONT: NORMAL
BH CV LOWER VASCULAR RIGHT POSTERIOR TIBIAL COMPRESS: NORMAL
BH CV LOWER VASCULAR RIGHT PROFUNDA FEMORAL PHASIC: NORMAL
BH CV LOWER VASCULAR RIGHT PROFUNDA FEMORAL SPONT: NORMAL
BH CV LOWER VASCULAR RIGHT PROXIMAL FEMORAL AUGMENT: NORMAL
BH CV LOWER VASCULAR RIGHT PROXIMAL FEMORAL COMPRESS: NORMAL
BH CV LOWER VASCULAR RIGHT PROXIMAL FEMORAL PHASIC: NORMAL
BH CV LOWER VASCULAR RIGHT PROXIMAL FEMORAL SPONT: NORMAL
BH CV LOWER VASCULAR RIGHT SAPHENOFEMORAL JUNCTION AUGMENT: NORMAL
BH CV LOWER VASCULAR RIGHT SAPHENOFEMORAL JUNCTION COMPRESS: NORMAL
BH CV LOWER VASCULAR RIGHT SAPHENOFEMORAL JUNCTION PHASIC: NORMAL
BH CV LOWER VASCULAR RIGHT SAPHENOFEMORAL JUNCTION SPONT: NORMAL
BH CV XLRA - RV BASE: 4.6 CM
BH CV XLRA - RV LENGTH: 7.4 CM
BH CV XLRA - RV MID: 2.8 CM
BH CV XLRA - TDI S': 13.1 CM/SEC
BUN SERPL-MCNC: 21.8 MG/DL (ref 8–23)
BUN/CREAT SERPL: 17 (ref 7–25)
CALCIUM SPEC-SCNC: 8.3 MG/DL (ref 8.6–10.5)
CHLORIDE SERPL-SCNC: 89 MMOL/L (ref 98–107)
CO2 SERPL-SCNC: 25 MMOL/L (ref 22–29)
CREAT SERPL-MCNC: 1.28 MG/DL (ref 0.76–1.27)
DEPRECATED RDW RBC AUTO: 48 FL (ref 37–54)
EGFRCR SERPLBLD CKD-EPI 2021: 57.3 ML/MIN/1.73
ERYTHROCYTE [DISTWIDTH] IN BLOOD BY AUTOMATED COUNT: 13.9 % (ref 12.3–15.4)
GLUCOSE SERPL-MCNC: 96 MG/DL (ref 65–99)
HCT VFR BLD AUTO: 28.7 % (ref 37.5–51)
HGB BLD-MCNC: 10 G/DL (ref 13–17.7)
IVRT: 109 MS
LEFT ATRIUM VOLUME INDEX: 29.7 ML/M2
LV EF 2D ECHO EST: 55 %
LV EF BIPLANE MOD: 53.2 %
MAGNESIUM SERPL-MCNC: 1.7 MG/DL (ref 1.6–2.4)
MCH RBC QN AUTO: 33.2 PG (ref 26.6–33)
MCHC RBC AUTO-ENTMCNC: 34.8 G/DL (ref 31.5–35.7)
MCV RBC AUTO: 95.3 FL (ref 79–97)
OSMOLALITY SERPL: 273 MOSM/KG (ref 275–295)
OSMOLALITY UR: 213 MOSM/KG (ref 300–1100)
PLATELET # BLD AUTO: 132 10*3/MM3 (ref 140–450)
PMV BLD AUTO: 9.8 FL (ref 6–12)
POTASSIUM SERPL-SCNC: 3.5 MMOL/L (ref 3.5–5.2)
RBC # BLD AUTO: 3.01 10*6/MM3 (ref 4.14–5.8)
SODIUM SERPL-SCNC: 123 MMOL/L (ref 136–145)
SODIUM SERPL-SCNC: 127 MMOL/L (ref 136–145)
WBC NRBC COR # BLD AUTO: 8.84 10*3/MM3 (ref 3.4–10.8)

## 2025-06-30 PROCEDURE — 93306 TTE W/DOPPLER COMPLETE: CPT | Performed by: INTERNAL MEDICINE

## 2025-06-30 PROCEDURE — 71045 X-RAY EXAM CHEST 1 VIEW: CPT

## 2025-06-30 PROCEDURE — 93970 EXTREMITY STUDY: CPT | Performed by: INTERNAL MEDICINE

## 2025-06-30 PROCEDURE — 25810000003 SODIUM CHLORIDE 0.9 % SOLUTION: Performed by: INTERNAL MEDICINE

## 2025-06-30 PROCEDURE — 93005 ELECTROCARDIOGRAM TRACING: CPT | Performed by: NURSE PRACTITIONER

## 2025-06-30 PROCEDURE — 93306 TTE W/DOPPLER COMPLETE: CPT

## 2025-06-30 PROCEDURE — 85027 COMPLETE CBC AUTOMATED: CPT | Performed by: INTERNAL MEDICINE

## 2025-06-30 PROCEDURE — 25010000002 SULFUR HEXAFLUORIDE MICROSPH 60.7-25 MG RECONSTITUTED SUSPENSION: Performed by: NURSE PRACTITIONER

## 2025-06-30 PROCEDURE — 83735 ASSAY OF MAGNESIUM: CPT | Performed by: INTERNAL MEDICINE

## 2025-06-30 PROCEDURE — 99232 SBSQ HOSP IP/OBS MODERATE 35: CPT | Performed by: INTERNAL MEDICINE

## 2025-06-30 PROCEDURE — 83935 ASSAY OF URINE OSMOLALITY: CPT | Performed by: INTERNAL MEDICINE

## 2025-06-30 PROCEDURE — 93970 EXTREMITY STUDY: CPT

## 2025-06-30 PROCEDURE — 80048 BASIC METABOLIC PNL TOTAL CA: CPT | Performed by: INTERNAL MEDICINE

## 2025-06-30 PROCEDURE — 83930 ASSAY OF BLOOD OSMOLALITY: CPT | Performed by: INTERNAL MEDICINE

## 2025-06-30 PROCEDURE — 84295 ASSAY OF SERUM SODIUM: CPT | Performed by: INTERNAL MEDICINE

## 2025-06-30 PROCEDURE — 99232 SBSQ HOSP IP/OBS MODERATE 35: CPT | Performed by: NURSE PRACTITIONER

## 2025-06-30 RX ORDER — SODIUM CHLORIDE 9 MG/ML
75 INJECTION, SOLUTION INTRAVENOUS CONTINUOUS
Status: ACTIVE | OUTPATIENT
Start: 2025-06-30 | End: 2025-06-30

## 2025-06-30 RX ORDER — SODIUM CHLORIDE 1 G/1
2 TABLET ORAL 2 TIMES DAILY WITH MEALS
Status: DISCONTINUED | OUTPATIENT
Start: 2025-06-30 | End: 2025-07-02 | Stop reason: HOSPADM

## 2025-06-30 RX ORDER — SODIUM CHLORIDE 1 G/1
1 TABLET ORAL 2 TIMES DAILY WITH MEALS
Status: DISCONTINUED | OUTPATIENT
Start: 2025-06-30 | End: 2025-06-30

## 2025-06-30 RX ADMIN — APIXABAN 5 MG: 5 TABLET, FILM COATED ORAL at 21:19

## 2025-06-30 RX ADMIN — APIXABAN 5 MG: 5 TABLET, FILM COATED ORAL at 08:19

## 2025-06-30 RX ADMIN — Medication 10 ML: at 20:03

## 2025-06-30 RX ADMIN — SULFUR HEXAFLUORIDE 2 ML: KIT at 11:53

## 2025-06-30 RX ADMIN — ARIPIPRAZOLE 5 MG: 10 TABLET ORAL at 08:19

## 2025-06-30 RX ADMIN — Medication 5 MG: at 21:19

## 2025-06-30 RX ADMIN — EMPAGLIFLOZIN 10 MG: 10 TABLET, FILM COATED ORAL at 08:19

## 2025-06-30 RX ADMIN — ACETAMINOPHEN 650 MG: 325 TABLET ORAL at 17:15

## 2025-06-30 RX ADMIN — ACETAMINOPHEN 650 MG: 325 TABLET ORAL at 02:14

## 2025-06-30 RX ADMIN — PANTOPRAZOLE SODIUM 40 MG: 40 TABLET, DELAYED RELEASE ORAL at 08:19

## 2025-06-30 RX ADMIN — ACETAMINOPHEN 650 MG: 325 TABLET ORAL at 09:31

## 2025-06-30 RX ADMIN — AVOBENZONE, HOMOSALATE, OCTISALATE, OCTOCRYLENE, AND OXYBENZONE 1 PACKET: 29.4; 147; 49; 25.4; 58.8 LOTION TOPICAL at 08:18

## 2025-06-30 RX ADMIN — ATORVASTATIN CALCIUM 40 MG: 40 TABLET, FILM COATED ORAL at 08:18

## 2025-06-30 RX ADMIN — PANTOPRAZOLE SODIUM 40 MG: 40 TABLET, DELAYED RELEASE ORAL at 17:15

## 2025-06-30 RX ADMIN — SODIUM CHLORIDE 1 G: 1 TABLET ORAL at 08:19

## 2025-06-30 RX ADMIN — SODIUM CHLORIDE 75 ML/HR: 9 INJECTION, SOLUTION INTRAVENOUS at 08:22

## 2025-06-30 RX ADMIN — GABAPENTIN 300 MG: 300 CAPSULE ORAL at 21:19

## 2025-06-30 RX ADMIN — AMIODARONE HYDROCHLORIDE 200 MG: 200 TABLET ORAL at 08:19

## 2025-06-30 RX ADMIN — GABAPENTIN 300 MG: 300 CAPSULE ORAL at 08:19

## 2025-06-30 RX ADMIN — SODIUM CHLORIDE 2 G: 1 TABLET ORAL at 17:15

## 2025-06-30 RX ADMIN — Medication 10 ML: at 09:31

## 2025-06-30 NOTE — CONSULTS
[facility]  Renal Consult Note    Bo Perez  1945  [id number]    Date of Admit:  6/24/2025    Date of Consult: 6/30/2025    Requesting Provider: Dr. Albright    Evaluating Physician: Jhoan Berger MD    Chief Complaint: Hypotension    Reason for Consultation:  Hyponatremia    History of present illness:    Patient is a 79 y.o.  Yr old male with multiple underlying medical issues including A-fib and CHF.  Patient presented with hypotension which was noted during visit to cardiologist.  Per report patient was asymptomatic at that time.  Here blood pressure was in the low 100s.  Notably patient found to have an elevated creatinine at 2.5 with sodium down to 124 on RAAS suppression, SGLT2 inhibitor, and diuretics at home.  Patient was admitted for evaluation.  Diuretics, Entresto, and Jardiance held due to likely underlying dehydration...  Patient was given gentle hydration.  Sodium trended up to 129 with improving creatinine.  Patient was approaching discharge, however sodium trended back down to 123.  Discharge was held and nephrology was consulted to assist with hyponatremia management.  Patient denies any complaints today.  Reports lower extremity edema is stable at baseline with chronic lymphedema for >10 years.  Denies any chest pain or shortness of breath.  Denies history of low sodium in the past.  Feels okay otherwise      Past Medical History  Past Medical History:   Diagnosis Date    A-fib     Arrhythmia     Arthritis     Coronary artery disease     Hard to intubate     HTN (hypertension) 06/22/2023    Target blood pressure <130/80 mmHg      Hyperlipidemia     Hypertension     Sleep apnea     NOT USING CPAP       Past Surgical History:   Procedure Laterality Date    BLADDER SURGERY  04/14/2016    STIMULATOR IMPLANTED, NEW ELECTRODE 08/2022    CARDIAC ABLATION  2013    AFIB    CARDIAC CATHETERIZATION N/A 9/5/2024    Procedure: CASE ABORT;  Surgeon: Yasir Canales  "Aaron MCKEON IV, MD;  Location:  [facility];  Service: Cardiovascular;  Laterality: N/A;    CARDIAC CATHETERIZATION N/A 2024    Procedure: Left Heart Cath;  Surgeon: Yasir Canales IV, MD;  Location:  [facility];  Service: Cardiovascular;  Laterality: N/A;    CARDIAC CATHETERIZATION  2024    Procedure: Functional Flow [place];  Surgeon: Yasir Canales IV, MD;  Location:  [facility];  Service: Cardiovascular;;    CARPAL TUNNEL RELEASE Right 2002    CHOLECYSTECTOMY  2011    COLONOSCOPY      DENTAL PROCEDURE  1962    FINGER SURGERY Left     FOREFINGER BONE TRIMMED    HEMORRHOIDECTOMY  1973    KNEE ARTHROSCOPY Left 2008    MOUTH SURGERY  2008    FOR SLEEP APNEA    SPINAL FUSION  2007    L3 LUMBAR SPINAL STENOSIS    TONSILLECTOMY  195    TOTAL KNEE ARTHROPLASTY Left 2009    TOTAL KNEE ARTHROPLASTY Right 2023    Procedure: TOTAL KNEE ARTHROPLASTY - RIGHT;  Surgeon: Leland Elizalde MD;  Location:  [facility];  Service: Orthopedics;  Laterality: Right;       Allergies:  Allergies   Allergen Reactions    Amoxicillin Other (See Comments)     Pt states \"Made me very sick\"       Medication:   See electronic record    Soc Hx:   Social History     Socioeconomic History    Marital status:    Tobacco Use    Smoking status: Former     Current packs/day: 0.00     Average packs/day: 3.0 packs/day for 24.0 years (72.0 ttl pk-yrs)     Types: Cigarettes     Start date: 1960     Quit date:      Years since quittin.5     Passive exposure: Never    Smokeless tobacco: Never    Tobacco comments:     QUIT 40 YRS AGO PER PT   Vaping Use    Vaping status: Never Used    Passive vaping exposure: Yes   Substance and Sexual Activity    Alcohol use: Not Currently    Drug use: Never    Sexual activity: Not Currently     Partners: Female       Fam Hx:  No congenital renal disease      Review of Systems:  Full review of systems reviewed and " "are as above  In HPI or per admitting H&P,otherwise negative for acute complaints    Physical Exam:   Vital Signs   Blood pressure 137/59, pulse 59, temperature 97.6 °F (36.4 °C), temperature source Oral, resp. rate 18, height 185.4 cm (72.99\"), weight (!) 164 kg (361 lb 8.9 oz), SpO2 93%.  06/29 0701 - 06/30 0700  In: 1440 [P.O.:1440]  Out: 2175 [Urine:2175]    GENERAL: WD WF NAD  NEURO: Awake and alert, oriented. No focal deficit  PSYCHIATRIC: NMA. Cooperative with PE  EYE: PE, no icterus, no conjunctivitis  ENT: omm dry, dentition intact,  Hearing intact  NECK:  No JVD discernable,  Trachea midline  CV: + Edema, RRR  LUNGS:  Quiet,  Nonlabored resp.  Symmetrical expansion  ABDOMEN: Nondistended, soft nontender.  : No Galindo, no palp bladder  SKIN: Bilateral lower extremities with chronic skin changes    Laboratory Data  Results from last 7 days   Lab Units 06/30/25  0519 06/29/25  1009 06/28/25  0654   HEMOGLOBIN g/dL 10.0* 10.1* 10.1*   HEMATOCRIT % 28.7* 28.8* 28.3*     Results from last 7 days   Lab Units 06/30/25  0519 06/29/25  1009 06/28/25  0654 06/27/25  0651 06/24/25  1959 06/24/25  1449   SODIUM mmol/L 123* 125* 127* 129*   < > 124*   POTASSIUM mmol/L 3.5 3.7 3.8 4.2   < > 4.0   CHLORIDE mmol/L 89* 91* 92* 95*   < > 82*   CO2 mmol/L 25.0 22.9 25.0 24.0   < > 28.4   BUN mg/dL 21.8 21.8 27.7* 33.0*   < > 62.7*   CREATININE mg/dL 1.28* 1.34* 1.33* 1.36*   < > 2.52*   CALCIUM mg/dL 8.3* 8.5* 8.7 8.3*   < > 9.6   PHOSPHORUS mg/dL  --   --   --  2.5  --   --    MAGNESIUM mg/dL 1.7 1.7 1.9 2.0   < > 2.3   ALBUMIN g/dL  --   --   --   --   --  4.1    < > = values in this interval not displayed.     Results from last 7 days   Lab Units 06/24/25  1634   COLOR UA  Yellow   CLARITY UA  Clear   PH, URINE  7.5   SPECIFIC GRAVITY, URINE  1.008   GLUCOSE UA  Negative   KETONES UA  Negative   BILIRUBIN UA  Negative   PROTEIN UA  Negative   BLOOD UA  Negative   LEUKOCYTES UA  Large (3+)*   NITRITE UA  Negative "     Results from last 7 days   Lab Units 06/24/25  1449   ALK PHOS U/L 54   BILIRUBIN mg/dL 1.0   ALT (SGPT) U/L 21   AST (SGOT) U/L 22         Estimated Creatinine Clearance: 76.7 mL/min (A) (by C-G formula based on SCr of 1.28 mg/dL (H)).  Lab Results   Component Value Date    CREATININEUR 41.5 06/24/2025    NAUR <20 06/29/2025       A/P:          ARF: Appears to be resolving.  Creatinine down to 1.3.  Urine output has been nonoliguric with negative volume.  Creatinine initially elevated at 2.5 on admission with hypotension on multiple diuretics along with RAAS suppression and SGLT2 inhibitor.  All these medications were held with creatinine trending back down to baseline.    CKD: Creatinine in the past 1.1-1.3 range since 10/24.  May have coincided with addition of GDMT..    HTN: Blood pressure remains stable.  Entresto on hold due to ARF.  Monitor for now.    Hyponatremia: Patient denies history of low sodium in the past.  On initial workup serum osmolality normal range at 287 with urine osmolality of 241 and urine sodium of 28 despite serum sodium of 124.  Patient does report fluid intake with strict sodium avoidance.  Unsure if patient following fluid restriction here.  Patient has remained on a sodium restricted diet.  Urine sodium now <20.  Likely with free water overload with poor solute intake.  Patient has been on p.o. sodium chloride 1 g twice daily  - For now restrict fluids to 1200 mL a day  -No sodium restriction to diet  -Increase sodium chloride to 2 g twice daily for now  -Recheck U/P OSM  -Monitor serum sodium levels  -Add sodium chloride tablets if sodium remains low.    Anemia: Hemoglobin stable at goal >10    Volume: Patient with chronic lymphedema.  Cannot use lower extremity edema as gauge of volume overload.  Nonoliguric urine output.    -Continue holding diuretics for now.  - Continue strict I's and O's to assist with volume management    Thank you consulting us on Bo Perez who is of  high risk and complexity.  We will follow along closely    Jhoan Berger MD  6/30/2025  13:40 EDT

## 2025-06-30 NOTE — PROGRESS NOTES
Cardiology Progress Note      Reason for visit:    Chronic heart failure with improved EF  Hypotension  Coronary artery disease  Persistent atrial fibrillation    IDENTIFICATION: Patient is 79-year-old gentleman who resides in [facility]    Diagnosis  POA    **GÓMEZ (acute kidney injury) [N17.9]  Yes     Priority: High    Dehydration [E86.0]  Yes     Priority: High    Acute hyponatremia [E87.1]  Yes     Priority: High    Hypotension [I95.9]  Yes     Priority: High    Elevated troponin level not due to acute coronary syndrome [R79.89]  Yes     Priority: High    Heart failure with improved ejection fraction (HFimpEF) [I50.32]  Yes     Priority: High     Echo at [facility] (9/2020): LVEF 55-60%  Echo at [facility] (8/16/2024): LVEF 55-60%  Echo (8/31/2024): LVEF 30%  Echo (9/6/2024): LVEF 35%  Cardiac catheterization (12/30/2024): Mild to moderate 1-vessel CAD (RCA).  This lesion was not hemodynamically significant (RFR 0.95).  Minimal left coronary disease.  LVEF 35%.  Mildly elevated LV filling pressure (LVEDP 16 mmHg)  Echo (2/12/2025): LVEF 53%.  Aortic valve calcification with no significant stenosis or regurgitation      Coronary artery disease involving native coronary artery of native heart without angina pectoris [I25.10]  Yes     Priority: Medium     Cardiac catheterization (12/30/2024): Mild to moderate 1-vessel CAD (RCA).  This lesion was not hemodynamically significant (RFR 0.95).  Minimal left coronary disease.  LVEF 35%.  Mildly elevated LV filling pressure (LVEDP 16 mmHg)      Persistent atrial fibrillation [I48.19]  Yes     Priority: Medium     Chads Vasc 4 (age >75, CHF, CAD)  Cardiac catheterization (12/30/2024): Mild to moderate 1-vessel CAD (RCA).  This lesion was not hemodynamically significant (RFR 0.95).  Minimal left coronary disease.  LVEF 35%.  Mildly elevated LV filling pressure (LVEDP 16 mmHg)      Hyperlipidemia LDL goal <50 [E78.5]  Yes      Priority: Low     High intensity statin recommended due to the presence of CAD  Normal LP(a),               Plans were for patient to initially be discharged on Friday but after his wife arrived it was decided he should stay for GI evaluation.  GI consulted and recommended colonoscopy but patient declined.  He is being treated for possible hemorrhoids and was started on Anusol suppositories.  He has not had any further rectal bleeding and H&H has been stable.  His Eliquis was restarted.  When I last saw him he was started on lower dose metoprolol but over the weekend his heart rates dropped to 40s and this was discontinued. His sodium remains low and currently 123.  His creatinine has improved to 1.28.  He is not on any antihypertensives, heart failure medications/diuretics.  His current BP is 129/54.  We have been asked to revisit patient for recommendations regarding any heart failure medications to be initiated.  He is currently sitting up in the chair breathing easy on room air.  Denies any chest pain or shortness of breath.             Vital Sign Min/Max for last 24 hours  Temp  Min: 98 °F (36.7 °C)  Max: 98.8 °F (37.1 °C)   BP  Min: 111/51  Max: 132/66   Pulse  Min: 46  Max: 68   Resp  Min: 17  Max: 18   SpO2  Min: 93 %  Max: 98 %   No data recorded      Intake/Output Summary (Last 24 hours) at 2025 0723  Last data filed at 2025 2327  Gross per 24 hour   Intake 1440 ml   Output 2175 ml   Net -735 ml           Physical Exam  Constitutional:       General: He is awake.   Cardiovascular:      Rate and Rhythm: Normal rate. Rhythm irregular.      Heart sounds: Murmur heard.   Pulmonary:      Effort: Pulmonary effort is normal.      Breath sounds: Normal breath sounds.   Musculoskeletal:      Right lower le+ Pitting Edema present.      Left lower le+ Pitting Edema present.   Skin:     General: Skin is warm and dry.   Neurological:      Mental Status: He is alert and oriented to person, place,  and time.   Psychiatric:         Behavior: Behavior is cooperative.         Tele: Rate controlled atrial fibrillation    Results Review (reviewed the patient's recent labs in the electronic medical record):     EKG (6/25/2025): Atrial fibrillation with left bundle branch block    CXR (6/24/2025): No acute cardiopulmonary abnormality    Echo (2/17/2025): LVEF 52%.  Moderate calcification aortic valve without significant stenosis or regurgitation.  Grade 1 diastolic dysfunction    Results from last 7 days   Lab Units 06/30/25  0519 06/29/25  1009 06/28/25  0654 06/27/25  0651 06/26/25  1646 06/26/25  1404 06/26/25  0926   SODIUM mmol/L 123* 125* 127* 129* 129* 130* 128*   POTASSIUM mmol/L 3.5 3.7 3.8 4.2 4.1 3.9 3.7   CHLORIDE mmol/L 89* 91* 92* 95* 92* 94* 91*   BUN mg/dL 21.8 21.8 27.7* 33.0* 35.1* 38.1* 42.4*   CREATININE mg/dL 1.28* 1.34* 1.33* 1.36* 1.30* 1.23 1.43*   MAGNESIUM mg/dL 1.7 1.7 1.9 2.0  --   --   --        Results from last 7 days   Lab Units 06/25/25  0359 06/24/25  1959 06/24/25  1557   HSTROP T ng/L 48* 39* 46*       Results from last 7 days   Lab Units 06/30/25  0519 06/29/25  1009 06/28/25  0654   WBC 10*3/mm3 8.84 7.36 8.91   HEMOGLOBIN g/dL 10.0* 10.1* 10.1*   HEMATOCRIT % 28.7* 28.8* 28.3*   PLATELETS 10*3/mm3 132* 118* 134*       Lab Results   Component Value Date    HGBA1C 5.00 12/30/2024       Lab Results   Component Value Date    CHOL 125 12/30/2024    TRIG 110 12/30/2024    HDL 38 (L) 12/30/2024    LDL 43 02/18/2025                  Chronic heart failure with improved EF  Takes metoprolol succinate, Entresto and spironolactone at home but currently on hold due to hypotension  Currently euvolemic and compensated and actually dehydrated  Last echo 2/2025 LVEF 52% with grade 1 diastolic dysfunction.  Jardiance 10 mg daily        Hypotension from overdiuresis/dehydration  Sent from cardiology office to ED for severe hypotension  All heart failure medicines and diuretics on hold  Received IV  fluids with improvement in blood pressures.  Received an additional 1000 mL normal saline 6/26 due to hypotension  Current /54     Coronary artery disease  Cath 12/2024 mild to moderate one-vessel CAD not significant by RFR.  Currently no angina symptoms  Aspirin deferred due to concomitant use of Eliquis     Elevated troponin not due to ACS  Troponins elevated and flat, EKG without acute ischemia.  Scenario not consistent with ACS but consistent with demand ischemia from with acute renal failure and dehydration     Persistent atrial fibrillation  Previously on Tikosyn but discontinued in the past due to prolonged QT  Amiodarone 200 mg daily  Eliquis 5 mg twice daily was placed on hold due to bloody stools but now restarted at 5 mg twice daily  Has had intermittent rate controlled atrial fibrillation this admission  Currently rate controlled atrial fibrillation on 6/26/2025  Was started on metoprolol but discontinued due to bradycardia heart rates in the 40s     Frequent PVCs  Follows EP  Controlled on amiodarone    Acute kidney injury  Creatinine was elevated 2.52 on admission  Hold nephrotoxic medications and diuretics on hold  Creatinine trending down and now 1.28        Acute hyponatremia  Sodium 124 on admission  Current sodium 123  Diuretics on hold  Has received IV fluids since admission       Possible GI bleed  Patient reported episode of bloody stools last evening.  H&H stable at 10.5 and 29.1  Eliquis on hold  IV Protonix ordered  Patient declined colonoscopy.  Felt to be due to hemorrhoids.  Started on Anusol and H&H has remained stable.         Discussed with hospitalist and agree with administration of more IV fluids  Will hold beta-blocker due to causing bradycardia  Agree with nephrology consultation for hyponatremia.  Will continue to hold home dose of Entresto until nephrology evaluates.  Appreciate their opinions and recommendations  Obtain full echocardiogram as last several echoes have been  limited.  Would like to evaluate for elevated right heart pressures    RAKEL Vaughn

## 2025-06-30 NOTE — PROGRESS NOTES
[facility] Medicine Services  PROGRESS NOTE    Patient Name: Bo Perez  : 1945  MRN: [id number]    Date of Admission: 2025  Primary Care Physician: Mario Rowland MD    Subjective   Subjective     CC:  Hypertension    HPI:  No bleeding, no confusion, no dyspnea, making urine      Objective   Objective     Vital Signs:   Temp:  [97.6 °F (36.4 °C)-98.8 °F (37.1 °C)] 97.6 °F (36.4 °C)  Heart Rate:  [50-68] 59  Resp:  [17-18] 18  BP: (111-137)/(51-66) 137/59     Physical Exam:  Alert, nontoxic, ox3, sitting in chair, normal work of breathing; normal mentation  Psych: calm/appropriate  Ncat, oroph clear  Irr irr, regular rate  Ctab, normal work of breathing   Abd obese, nontender to palpation  BLE chronic edema 2+ w/ chronic changes/edema/ mild erythema (no warmth)    Results Reviewed:  LAB RESULTS:      Lab 25  0519 25  1009 25  0654 25  0832 25  0032 25  1404 25  0602 25  2105 25  0359 25  1959 25  1557 25  1449   WBC 8.84 7.36 8.91  --   --   --  8.64  --  8.84  --   --  9.01   HEMOGLOBIN 10.0* 10.1* 10.1* 10.4* 10.5*   < > 10.2*   < > 10.9*  --   --  11.8*   HEMATOCRIT 28.7* 28.8* 28.3* 30.1* 29.1*   < > 28.3*   < > 31.5*  --   --  33.3*   PLATELETS 132* 118* 134*  --   --   --  151  --  162  --   --  175   NEUTROS ABS  --   --   --   --   --   --  6.05  --  6.59  --   --  6.59   IMMATURE GRANS (ABS)  --   --   --   --   --   --  0.06*  --  0.04  --   --  0.05   LYMPHS ABS  --   --   --   --   --   --  1.63  --  1.57  --   --  1.52   MONOS ABS  --   --   --   --   --   --  0.66  --  0.40  --   --  0.61   EOS ABS  --   --   --   --   --   --  0.21  --  0.21  --   --  0.21   MCV 95.3 96.0 94.6  --   --   --  93.1  --  96.6  --   --  94.9   HSTROP T  --   --   --   --   --   --   --   --  48* 39* 46* 63*    < > = values in this interval not displayed.         Lab 25  0519 25  1009  06/28/25  0654 06/27/25  0651 06/26/25  1646 06/25/25  0745 06/25/25  0359   SODIUM 123* 125* 127* 129* 129*   < > 127*  126*   POTASSIUM 3.5 3.7 3.8 4.2 4.1   < > 3.6  3.5   CHLORIDE 89* 91* 92* 95* 92*   < > 85*  86*   CO2 25.0 22.9 25.0 24.0 26.0   < > 29.0  27.0   ANION GAP 9.0 11.1 10.0 10.0 11.0   < > 13.0  13.0   BUN 21.8 21.8 27.7* 33.0* 35.1*   < > 60.0*  62.3*   CREATININE 1.28* 1.34* 1.33* 1.36* 1.30*   < > 1.97*  1.89*   EGFR 57.3* 54.2* 54.7* 53.3* 56.2*   < > 34.1*  35.9*   GLUCOSE 96 91 90 86 96   < > 99  99   CALCIUM 8.3* 8.5* 8.7 8.3* 8.4*   < > 8.8  8.9   MAGNESIUM 1.7 1.7 1.9 2.0  --   --  2.2   PHOSPHORUS  --   --   --  2.5  --   --   --    TSH  --  0.814  --   --   --   --   --     < > = values in this interval not displayed.         Lab 06/24/25  1449   TOTAL PROTEIN 7.1   ALBUMIN 4.1   GLOBULIN 3.0   ALT (SGPT) 21   AST (SGOT) 22   BILIRUBIN 1.0   ALK PHOS 54         Lab 06/25/25  0359 06/24/25  1959 06/24/25  1557 06/24/25  1449   HSTROP T 48* 39* 46* 63*             Lab 06/27/25  0832 06/26/25  0602   ABO TYPING O O   RH TYPING Negative Negative   ANTIBODY SCREEN Negative Negative         Brief Urine Lab Results  (Last result in the past 365 days)        Color   Clarity   Blood   Leuk Est   Nitrite   Protein   CREAT   Urine HCG        06/24/25 1634             41.5         06/24/25 1634 Yellow   Clear   Negative   Large (3+)   Negative   Negative                   Microbiology Results Abnormal       Procedure Component Value - Date/Time    Eosinophil Smear - Urine, Urine, Clean Catch [[id number]]  (Abnormal) Collected: 06/24/25 1634    Lab Status: Final result Specimen: Urine, Clean Catch Updated: 06/25/25 0537     Eosinophil Smear 2 % EOS/100 Cells             XR Chest 1 View  Result Date: 6/30/2025  XR CHEST 1 VW Date of Exam: 6/30/2025 1:59 AM EDT Indication: peripheral edema, hyponatremia. assess volume status & evaluate for pulm edema Comparison: 6/24/2025 Findings:  Cardiomegaly is stable. Pulmonary vessels are normal. Lungs are clear. No pleural effusion. No pneumothorax.     Impression: Impression: 1. No acute cardiopulmonary disease. 2. Stable cardiomegaly. Electronically Signed: Garth Crow MD  6/30/2025 7:22 AM EDT  Workstation ID: [id number]        Results for orders placed during the hospital encounter of 06/24/25    Adult Transthoracic Echo Complete W/ Cont if Necessary Per Protocol    Interpretation Summary    Left ventricular systolic function is normal. Estimated left ventricular EF = 55%    Left ventricular wall thickness is consistent with mild concentric hypertrophy.    The left atrial cavity is mildly dilated.    There is calcification of the aortic valve.  No aortic valve regurgitation or stenosis present.    Insufficient TR jet to assess RVSP.  No echo findings of significant pulmonary hypertension identified      Current medications:  Scheduled Meds:acetaminophen, 650 mg, Oral, Q8H  amiodarone, 200 mg, Oral, Daily  apixaban, 5 mg, Oral, Q12H  ARIPiprazole, 5 mg, Oral, Daily  atorvastatin, 40 mg, Oral, Daily  empagliflozin, 10 mg, Oral, Daily  gabapentin, 300 mg, Oral, Q12H  hydrocortisone, 25 mg, Rectal, BID  ipratropium, 2 spray, Each Nare, Q PM  melatonin, 5 mg, Oral, Nightly  [Held by provider] metoprolol succinate XL, 12.5 mg, Oral, Daily  pantoprazole, 40 mg, Oral, BID AC  Pharmacy Meds to Bed Consult, , Not Applicable, Daily  Psyllium, 1 packet, Oral, Daily  sodium chloride, 10 mL, Intravenous, Q12H  sodium chloride, 1 g, Oral, BID With Meals      Continuous Infusions:sodium chloride, 75 mL/hr, Last Rate: 75 mL/hr (06/30/25 0822)        PRN Meds:.  ALPRAZolam    senna-docusate sodium **AND** polyethylene glycol **AND** bisacodyl **AND** bisacodyl    nitroglycerin    oxyCODONE-acetaminophen    sodium chloride    sodium chloride    sodium chloride    temazepam    Assessment & Plan   Assessment & Plan     Active Hospital Problems    Diagnosis  POA    **GÓMEZ  (acute kidney injury) [N17.9]  Yes    Dehydration [E86.0]  Yes    Acute hyponatremia [E87.1]  Yes    Hypotension [I95.9]  Yes    Elevated troponin level not due to acute coronary syndrome [R79.89]  Yes    Coronary artery disease involving native coronary artery of native heart without angina pectoris [I25.10]  Yes    Heart failure with improved ejection fraction (HFimpEF) [I50.32]  Yes    Hyperlipidemia LDL goal <50 [E78.5]  Yes    Persistent atrial fibrillation [I48.19]  Yes      Resolved Hospital Problems   No resolved problems to display.        Brief Hospital Course to date:  Bo Perez is a 79 y.o. male admitted with hypotension, gómez, hyponatremia likely due to cardiac meds/diuretics. These were held and diuretics held with improvement in renal function and sodium w/ significant improvement in bp (no 110's) with no dyspnea, no dizziness. . Gi saw for some scant brbpr on 6/27/25 andatient declined colonoscopy, patient was extremely adamant about going home. GI recommended anusol suppositories and made outpatient ADT referral for GI clinic for future outpatient colonoscopy & cardiology recommended holding diuretics/entresto w/ plans to restart Toprol at lower dose of 12.5mg daily. Patient was in process of being discharged home on 6/27/25 (discharge summary completed & med rec completed) but then wife showed up and patient changed his mind wanting to stay for further observation so discharge was canceled. Developed some asymptomatic bradycardia for which toprol was d/c'd. Also had recurrence of hyponatremia    Hyponatremia, asymptomatic  GÓMEZ on ckd 3 (baseline cr ~1.2-1.3), improved  -initial sodium 124, creatinine 2.52  -sodium levels initially improved while holding diuretics/entresto/jardiance & gentle hydration initially  -on 6/28/25 sodium dropped back down to 127, cr 1.33; added salt tabs 1 bid; 1.5L fluid striction  -tsh WNL  -6/30/25: sodium down to 123 today (from 125 yesterday); urine sodium <20.  Echo EF 55%, no right sided failure noted  -initiate NS 75cc/hr. Q6h sodium levels; Consulting Nephrology today to assist w/ volume status management/hyponatremia (I spoke w/ dr. Berger, he will see)    Hypotension (iatrogenic), resolved  Hx HFpEF  CAD  Persistent Afib  Bradycardia, asymptomatic  Chronic BLE edema  -BP & renal function improved, overall sodium improved from admission as well ~129  -patient very pleasantly adamant about discharge home  -initially held eliquis due to mild bleeding; now restarted eliquis  -held bumex/diuretics/jardiance;entresto (due to hyponatremia and armani) until see stability/improvement in renal function as outpatient  -Cards follows (salvador Alcantar has seen this admission): initiated toprol at decreased dose of 12.5mg daily (down from 25mg) & continued amiodarone.   -6/28/25: heart rate consistently 40's after toprol 12.5mg; Toprol stopped  -6/29/25: heart rate improved 50's today, asymptomatic; continue amiodarone;; continue holding diuretics/jardiance/entresto for now (renal insufficiency, borderline hypotension). Back on eliquis (monitoring for recurrence of mild rectal bleeding as below)  -6/30/25:  heart rate improved today (holding toprol); has BLE edema and I doubt has DVT (since on anticoagulation) but ordering BLE duplex ordered (pending) since patient has BLE edema but appears intravascularly dry (urine sodium <20)  . Echo EF 55%, no right sided failure noted  -Cards follows:  continue holding b-blocker (bradycardia), agrees w/ gentle hydration trial (holding entresto & diuretics); continue amidarone; added jardiance    Rectal bleeding, mild. resolved  -hgb stable  -initially held eliquis   -GI consulted; patient declined colonoscopy. GI ordered anusol suppositories for likely hemorrhoids, psyillium, and outpatient GI referral placed in ADT for oupatient colonoscopy  -patient has refused the anusol suppositories (states will use the suppositories if has recurrence of  bleeding)  -no further bleeding last couple of days  -restarted eliquis on 6/28/25; no further rectal bleeding  -hgb stable    Obesity  -on wegovy as outpatient; hold for now, restart as outpatient      Labs: q6h sodiums  Am labs: cbc,bmp (follow up BLE duplex)      Expected Discharge Location and Transportation: Home  Expected Discharge   Expected Discharge Date: 6/30/2025; Expected Discharge Time:      VTE Prophylaxis:  Pharmacologic & mechanical VTE prophylaxis orders are present.         AM-PAC 6 Clicks Score (PT): 19 (06/30/25 0800)    CODE STATUS:   Code Status and Medical Interventions: CPR (Attempt to Resuscitate); Full Support   Ordered at: 06/24/25 2003     Code Status (Patient has no pulse and is not breathing):    CPR (Attempt to Resuscitate)     Medical Interventions (Patient has pulse or is breathing):    Full Support     Level Of Support Discussed With:    Patient       Teofilo Albright MD  06/30/25

## 2025-07-01 PROBLEM — I89.0 LYMPHEDEMA: Status: ACTIVE | Noted: 2025-07-01

## 2025-07-01 LAB
ANION GAP SERPL CALCULATED.3IONS-SCNC: 10 MMOL/L (ref 5–15)
BUN SERPL-MCNC: 15.2 MG/DL (ref 8–23)
BUN/CREAT SERPL: 12.3 (ref 7–25)
CALCIUM SPEC-SCNC: 8.2 MG/DL (ref 8.6–10.5)
CHLORIDE SERPL-SCNC: 93 MMOL/L (ref 98–107)
CO2 SERPL-SCNC: 23 MMOL/L (ref 22–29)
CREAT SERPL-MCNC: 1.24 MG/DL (ref 0.76–1.27)
EGFRCR SERPLBLD CKD-EPI 2021: 59.5 ML/MIN/1.73
GLUCOSE SERPL-MCNC: 88 MG/DL (ref 65–99)
HCT VFR BLD AUTO: 27.6 % (ref 37.5–51)
HGB BLD-MCNC: 9.8 G/DL (ref 13–17.7)
POTASSIUM SERPL-SCNC: 3.5 MMOL/L (ref 3.5–5.2)
QT INTERVAL: 150 MS
QT INTERVAL: 456 MS
QT INTERVAL: 460 MS
QTC INTERVAL: 244 MS
QTC INTERVAL: 478 MS
QTC INTERVAL: 481 MS
SODIUM SERPL-SCNC: 126 MMOL/L (ref 136–145)
SODIUM SERPL-SCNC: 126 MMOL/L (ref 136–145)
SODIUM SERPL-SCNC: 129 MMOL/L (ref 136–145)

## 2025-07-01 PROCEDURE — 80048 BASIC METABOLIC PNL TOTAL CA: CPT | Performed by: INTERNAL MEDICINE

## 2025-07-01 PROCEDURE — 97530 THERAPEUTIC ACTIVITIES: CPT

## 2025-07-01 PROCEDURE — 25010000002 BUMETANIDE PER 0.5 MG: Performed by: INTERNAL MEDICINE

## 2025-07-01 PROCEDURE — 93005 ELECTROCARDIOGRAM TRACING: CPT | Performed by: STUDENT IN AN ORGANIZED HEALTH CARE EDUCATION/TRAINING PROGRAM

## 2025-07-01 PROCEDURE — 99232 SBSQ HOSP IP/OBS MODERATE 35: CPT | Performed by: NURSE PRACTITIONER

## 2025-07-01 PROCEDURE — 85014 HEMATOCRIT: CPT | Performed by: INTERNAL MEDICINE

## 2025-07-01 PROCEDURE — 85018 HEMOGLOBIN: CPT | Performed by: INTERNAL MEDICINE

## 2025-07-01 PROCEDURE — 84295 ASSAY OF SERUM SODIUM: CPT | Performed by: INTERNAL MEDICINE

## 2025-07-01 PROCEDURE — 97116 GAIT TRAINING THERAPY: CPT

## 2025-07-01 PROCEDURE — 99232 SBSQ HOSP IP/OBS MODERATE 35: CPT | Performed by: STUDENT IN AN ORGANIZED HEALTH CARE EDUCATION/TRAINING PROGRAM

## 2025-07-01 PROCEDURE — 93010 ELECTROCARDIOGRAM REPORT: CPT | Performed by: INTERNAL MEDICINE

## 2025-07-01 RX ORDER — BUMETANIDE 0.25 MG/ML
2 INJECTION, SOLUTION INTRAMUSCULAR; INTRAVENOUS DAILY
Status: DISCONTINUED | OUTPATIENT
Start: 2025-07-01 | End: 2025-07-02 | Stop reason: HOSPADM

## 2025-07-01 RX ADMIN — SODIUM CHLORIDE 2 G: 1 TABLET ORAL at 17:13

## 2025-07-01 RX ADMIN — ACETAMINOPHEN 650 MG: 325 TABLET ORAL at 17:13

## 2025-07-01 RX ADMIN — AVOBENZONE, HOMOSALATE, OCTISALATE, OCTOCRYLENE, AND OXYBENZONE 1 PACKET: 29.4; 147; 49; 25.4; 58.8 LOTION TOPICAL at 08:20

## 2025-07-01 RX ADMIN — SODIUM CHLORIDE 2 G: 1 TABLET ORAL at 08:20

## 2025-07-01 RX ADMIN — ATORVASTATIN CALCIUM 40 MG: 40 TABLET, FILM COATED ORAL at 08:19

## 2025-07-01 RX ADMIN — APIXABAN 5 MG: 5 TABLET, FILM COATED ORAL at 08:20

## 2025-07-01 RX ADMIN — SACUBITRIL AND VALSARTAN 1 TABLET: 24; 26 TABLET, FILM COATED ORAL at 21:17

## 2025-07-01 RX ADMIN — Medication 5 MG: at 21:17

## 2025-07-01 RX ADMIN — GABAPENTIN 300 MG: 300 CAPSULE ORAL at 21:18

## 2025-07-01 RX ADMIN — PANTOPRAZOLE SODIUM 40 MG: 40 TABLET, DELAYED RELEASE ORAL at 08:20

## 2025-07-01 RX ADMIN — BUMETANIDE 2 MG: 0.25 INJECTION INTRAMUSCULAR; INTRAVENOUS at 17:13

## 2025-07-01 RX ADMIN — IPRATROPIUM BROMIDE 2 SPRAY: 42 SPRAY, METERED NASAL at 17:16

## 2025-07-01 RX ADMIN — AMIODARONE HYDROCHLORIDE 200 MG: 200 TABLET ORAL at 08:19

## 2025-07-01 RX ADMIN — ACETAMINOPHEN 650 MG: 325 TABLET ORAL at 09:45

## 2025-07-01 RX ADMIN — ARIPIPRAZOLE 5 MG: 10 TABLET ORAL at 08:20

## 2025-07-01 RX ADMIN — Medication 10 ML: at 09:45

## 2025-07-01 RX ADMIN — EMPAGLIFLOZIN 10 MG: 10 TABLET, FILM COATED ORAL at 08:20

## 2025-07-01 RX ADMIN — Medication 10 ML: at 21:18

## 2025-07-01 RX ADMIN — APIXABAN 5 MG: 5 TABLET, FILM COATED ORAL at 21:18

## 2025-07-01 RX ADMIN — GABAPENTIN 300 MG: 300 CAPSULE ORAL at 08:20

## 2025-07-01 NOTE — PROGRESS NOTES
Cardiology Progress Note      Reason for visit:    Chronic heart failure with improved EF  Hypotension  Coronary artery disease  Persistent atrial fibrillation    IDENTIFICATION: Patient is 79-year-old gentleman who resides in [facility] Hospital Problems    Diagnosis  POA    **GÓMEZ (acute kidney injury) [N17.9]  Yes     Priority: High    Dehydration [E86.0]  Yes     Priority: High    Acute hyponatremia [E87.1]  Yes     Priority: High    Hypotension [I95.9]  Yes     Priority: High    Elevated troponin level not due to acute coronary syndrome [R79.89]  Yes     Priority: High    Heart failure with improved ejection fraction (HFimpEF) [I50.32]  Yes     Priority: High     Echo at [facility] (9/2020): LVEF 55-60%  Echo at [facility] (8/16/2024): LVEF 55-60%  Echo (8/31/2024): LVEF 30%  Echo (9/6/2024): LVEF 35%  Cardiac catheterization (12/30/2024): Mild to moderate 1-vessel CAD (RCA).  This lesion was not hemodynamically significant (RFR 0.95).  Minimal left coronary disease.  LVEF 35%.  Mildly elevated LV filling pressure (LVEDP 16 mmHg)  Echo (2/12/2025): LVEF 53%.  Aortic valve calcification with no significant stenosis or regurgitation      Coronary artery disease involving native coronary artery of native heart without angina pectoris [I25.10]  Yes     Priority: Medium     Cardiac catheterization (12/30/2024): Mild to moderate 1-vessel CAD (RCA).  This lesion was not hemodynamically significant (RFR 0.95).  Minimal left coronary disease.  LVEF 35%.  Mildly elevated LV filling pressure (LVEDP 16 mmHg)      Persistent atrial fibrillation [I48.19]  Yes     Priority: Medium     Chads Vasc 4 (age >75, CHF, CAD)  Cardiac catheterization (12/30/2024): Mild to moderate 1-vessel CAD (RCA).  This lesion was not hemodynamically significant (RFR 0.95).  Minimal left coronary disease.  LVEF 35%.  Mildly elevated LV filling pressure (LVEDP 16 mmHg)      Hyperlipidemia LDL goal <50 [E78.5]  Yes      Priority: Low     High intensity statin recommended due to the presence of CAD  Normal LP(a),             Patient is sitting up in the chair without complaints.  He has converted to normal sinus rhythm.  He denies any chest pain or shortness of breath.  His blood pressure is improved.  His creatinine is back to normal.  Nephrology is following for his hyponatremia.           Vital Sign Min/Max for last 24 hours  Temp  Min: 97.3 °F (36.3 °C)  Max: 98.8 °F (37.1 °C)   BP  Min: 131/68  Max: 147/68   Pulse  Min: 55  Max: 67   Resp  Min: 18  Max: 18   SpO2  Min: 85 %  Max: 99 %   No data recorded      Intake/Output Summary (Last 24 hours) at 2025 0804  Last data filed at 2025 0741  Gross per 24 hour   Intake 120 ml   Output 2070 ml   Net -1950 ml           Physical Exam  Constitutional:       General: He is awake.   Cardiovascular:      Rate and Rhythm: Normal rate. Rhythm irregular.      Heart sounds: Murmur heard.   Pulmonary:      Effort: Pulmonary effort is normal.      Breath sounds: Normal breath sounds.   Musculoskeletal:      Right lower le+ Pitting Edema present.      Left lower le+ Pitting Edema present.   Skin:     General: Skin is warm and dry.   Neurological:      Mental Status: He is alert and oriented to person, place, and time.   Psychiatric:         Behavior: Behavior is cooperative.         Tele: Normal sinus rhythm    Results Review (reviewed the patient's recent labs in the electronic medical record):     EKG (2025): Atrial fibrillation with left bundle branch block    CXR (2025): No acute cardiopulmonary abnormality    Echo (2025): LVEF 52%.  Moderate calcification aortic valve without significant stenosis or regurgitation.  Grade 1 diastolic dysfunction      Venous duplex (2025): No DVT    Results from last 7 days   Lab Units 25  0638 25  2317 25  1733 25  0519 25  1009 25  0654 25  0651 25  1646 25  1404    SODIUM mmol/L 126* 126* 127* 123* 125* 127* 129* 129* 130*   POTASSIUM mmol/L 3.5  --   --  3.5 3.7 3.8 4.2 4.1 3.9   CHLORIDE mmol/L 93*  --   --  89* 91* 92* 95* 92* 94*   BUN mg/dL 15.2  --   --  21.8 21.8 27.7* 33.0* 35.1* 38.1*   CREATININE mg/dL 1.24  --   --  1.28* 1.34* 1.33* 1.36* 1.30* 1.23   MAGNESIUM mg/dL  --   --   --  1.7 1.7 1.9 2.0  --   --        Results from last 7 days   Lab Units 06/25/25  0359 06/24/25  1959 06/24/25  1557   HSTROP T ng/L 48* 39* 46*       Results from last 7 days   Lab Units 07/01/25  0638 06/30/25  0519 06/29/25  1009 06/28/25  0654   WBC 10*3/mm3  --  8.84 7.36 8.91   HEMOGLOBIN g/dL 9.8* 10.0* 10.1* 10.1*   HEMATOCRIT % 27.6* 28.7* 28.8* 28.3*   PLATELETS 10*3/mm3  --  132* 118* 134*       Lab Results   Component Value Date    HGBA1C 5.00 12/30/2024       Lab Results   Component Value Date    CHOL 125 12/30/2024    TRIG 110 12/30/2024    HDL 38 (L) 12/30/2024    LDL 43 02/18/2025                  Chronic heart failure with improved EF  Takes metoprolol succinate, Entresto and spironolactone at home but currently on hold due to hypotension  Currently euvolemic and compensated and actually dehydrated  Last echo 2/2025 LVEF 52% with grade 1 diastolic dysfunction.  Jardiance 10 mg daily  Intolerant to metoprolol due to causing bradycardia heart rates in the 40s  Patient does have lower extremity edema but has chronic lymphedema  Echo this admission shows LVEF 55%.  No significant valvular abnormality and no significant findings of pulmonary hypertension        Hypotension from overdiuresis/dehydration  Sent from cardiology office to ED for severe hypotension  All heart failure medicines and diuretics on hold  Received IV fluids with improvement in blood pressures.  Received an additional 1000 mL normal saline 6/26 due to hypotension  Current /67     Coronary artery disease  Cath 12/2024 mild to moderate one-vessel CAD not significant by RFR.  Currently no angina  symptoms  Aspirin deferred due to concomitant use of Eliquis     Elevated troponin not due to ACS  Troponins elevated and flat, EKG without acute ischemia.  Scenario not consistent with ACS but consistent with demand ischemia from with acute renal failure and dehydration     Persistent atrial fibrillation  Previously on Tikosyn but discontinued in the past due to prolonged QT  Amiodarone 200 mg daily  Eliquis 5 mg twice daily was placed on hold due to bloody stools but now restarted at 5 mg twice daily  Has had intermittent rate controlled atrial fibrillation this admission  Currently rate controlled atrial fibrillation on 6/26/2025  Was started on metoprolol but discontinued due to bradycardia heart rates in the 40s     Frequent PVCs  Follows EP  Controlled on amiodarone  No PVCs noted on telemetry    Acute kidney injury  Creatinine was elevated 2.52 on admission  Hold nephrotoxic medications and diuretics on hold  Creatinine within normal limits at 1.24        Acute hyponatremia  Current sodium 126  Diuretics on hold  Has received IV fluids since admission  Nephrology following and placed on fluid restriction 1200 mL a day  No sodium restriction to diet.  Add sodium tablets if sodium remains low       Possible GI bleed  Patient reported episode of bloody stools last evening.  H&H 8.8 27.6  Eliquis restarted on 6/28  IV Protonix ordered  Patient declined colonoscopy.  Felt to be due to hemorrhoids.  Started on Anusol and H&H has remained stable.         Add Entresto 24/26 mg 1 tablet twice daily  Will defer beta-blocker due to causing extreme bradycardia  Continue Eliquis for stroke prophylaxis and amiodarone for rhythm management  Appreciate nephrology assistance      RAKEL Vaughn

## 2025-07-01 NOTE — THERAPY TREATMENT NOTE
Patient Name: Bo Perez  : 1945    MRN: [id number]                              Today's Date: 2025       Admit Date: 2025    Visit Dx:     ICD-10-CM ICD-9-CM   1. Acute renal failure, unspecified acute renal failure type  N17.9 584.9   2. Acute dehydration  E86.0 276.51   3. Acute hyponatremia  E87.1 276.1   4. Hypotension, unspecified hypotension type  I95.9 458.9   5. Elevated troponin  R79.89 790.6   6. Rectal bleeding  K62.5 569.3     Patient Active Problem List   Diagnosis    Chronic anticoagulation    Morbid obesity    Persistent atrial fibrillation    Frequent PVCs    Hyperlipidemia LDL goal <50    Wide-complex tachycardia    Heart failure with improved ejection fraction (HFimpEF)    Coronary artery disease involving native coronary artery of native heart without angina pectoris    Hypotension    Acute hyponatremia    GÓMEZ (acute kidney injury)    Dehydration    Elevated troponin level not due to acute coronary syndrome    Lymphedema     Past Medical History:   Diagnosis Date    A-fib     Arrhythmia     Arthritis     Coronary artery disease     Hard to intubate     HTN (hypertension) 2023    Target blood pressure <130/80 mmHg      Hyperlipidemia     Hypertension     Sleep apnea     NOT USING CPAP     Past Surgical History:   Procedure Laterality Date    BLADDER SURGERY  2016    STIMULATOR IMPLANTED, NEW ELECTRODE 2022    CARDIAC ABLATION      AFIB    CARDIAC CATHETERIZATION N/A 2024    Procedure: CASE ABORT;  Surgeon: Yasir Canales IV, MD;  Location:  [facility];  Service: Cardiovascular;  Laterality: N/A;    CARDIAC CATHETERIZATION N/A 2024    Procedure: Left Heart Cath;  Surgeon: Yasir Canales IV, MD;  Location:  [facility];  Service: Cardiovascular;  Laterality: N/A;    CARDIAC CATHETERIZATION  2024    Procedure: Functional Flow Kerrick;  Surgeon: Yasir Canales IV, MD;  Location:   [facility];  Service: Cardiovascular;;    CARPAL TUNNEL RELEASE Right 2002    CHOLECYSTECTOMY  2011    COLONOSCOPY      DENTAL PROCEDURE  1962    FINGER SURGERY Left 2000    FOREFINGER BONE TRIMMED    HEMORRHOIDECTOMY  1973    KNEE ARTHROSCOPY Left 2008    MOUTH SURGERY  2008    FOR SLEEP APNEA    SPINAL FUSION  2007    L3 LUMBAR SPINAL STENOSIS    TONSILLECTOMY  1954    TOTAL KNEE ARTHROPLASTY Left 2009    TOTAL KNEE ARTHROPLASTY Right 6/22/2023    Procedure: TOTAL KNEE ARTHROPLASTY - RIGHT;  Surgeon: Leland Elizalde MD;  Location: [facility] OR;  Service: Orthopedics;  Laterality: Right;      General Information       Row Name 07/01/25 1600          OT Time and Intention    Document Type therapy note (daily note)  -     Mode of Treatment occupational therapy  -       Row Name 07/01/25 1600          General Information    Patient Profile Reviewed yes  -EN     Existing Precautions/Restrictions fall;other (see comments)  B LE edema; baseline tremors  -       Row Name 07/01/25 1600          Cognition    Orientation Status (Cognition) oriented x 3  -       Row Name 07/01/25 1600          Safety Issues/Impairments Affecting Functional Mobility    Safety Issues Affecting Function (Mobility) awareness of need for assistance;insight into deficits/self-awareness;judgment;problem-solving  -     Impairments Affecting Function (Mobility) balance;endurance/activity tolerance;pain;postural/trunk control;shortness of breath;strength  -               User Key  (r) = Recorded By, (t) = Taken By, (c) = Cosigned By      Initials Name Provider Type    Irma Andrade OT Occupational Therapist                     Mobility/ADL's       Row Name 07/01/25 1601          Bed Mobility    Comment, (Bed Mobility) Received UI and returned to chair  -EN       Row Name 07/01/25 1601          Transfers    Transfers sit-stand transfer  -EN       Row Name 07/01/25 1601          Sit-Stand Transfer    Sit-Stand [place]  (Transfers) contact guard  -EN     Assistive Device (Sit-Stand Transfers) walker, front-wheeled  -     Comment, (Sit-Stand Transfer) increased time needed to stabilize in standing  -       Row Name 07/01/25 1601          Functional Mobility    Functional Mobility- Ind. Level contact guard assist  -EN     Functional Mobility-Distance (Feet) 22  -EN     Functional Mobility- Comment Pt ambulated in room using FWW; forward flexed posture  -       Row Name 07/01/25 1601          Activities of Daily Living    BADL Assessment/Intervention lower body dressing  -       Row Name 07/01/25 1601          Lower Body Dressing Assessment/Training    [place] Level (Lower Body Dressing) don;socks;dependent (less than 25% patient effort)  -EN     Position (Lower Body Dressing) supported sitting  -EN               User Key  (r) = Recorded By, (t) = Taken By, (c) = Cosigned By      Initials Name Provider Type    Irma Andrade OT Occupational Therapist                   Obj/Interventions       Row Name 07/01/25 1604          Motor Skills    Therapeutic Exercise other (see comments)  -[facility] Name 07/01/25 1604          Balance    Balance Assessment sitting static balance;sitting dynamic balance;standing static balance;standing dynamic balance  -EN     Static Sitting Balance independent  -EN     Dynamic Sitting Balance independent  -EN     Position, Sitting Balance unsupported;sitting in chair  -EN     Static Standing Balance contact guard  -EN     Dynamic Standing Balance contact guard  -EN     Position/Device Used, Standing Balance supported;walker, front-wheeled  -       Row Name 07/01/25 1604          Knee (Therapeutic Exercise)    Knee (Therapeutic Exercise) AROM (active range of motion)  -     Knee AROM (Therapeutic Exercise) left;right;LAQ (long arc quad);sitting;10 repetitions  -[facility] Name 07/01/25 1604          Ankle (Therapeutic Exercise)    Ankle (Therapeutic Exercise) AROM (active range of  motion)  -EN     Ankle AROM (Therapeutic Exercise) left;right;dorsiflexion;plantarflexion;sitting;10 repetitions  -EN               User Key  (r) = Recorded By, (t) = Taken By, (c) = Cosigned By      Initials Name Provider Type    Irma Andrade, ANNA Occupational Therapist                   Goals/Plan    No documentation.                  Clinical Impression       Row Name 07/01/25 1605          Pain Assessment    Pain Location extremity  -EN     Pain Side/Orientation bilateral;lower  -EN     Pain Management Interventions exercise or physical activity utilized  -EN     Response to Pain Interventions activity participation with tolerable pain  -EN     Pre/Posttreatment Pain Comment RN aware and managing  -EN     Additional Documentation Pain Scale: FACES Pre/Post-Treatment (Group)  -       Row Name 07/01/25 1605          Pain Scale: FACES Pre/Post-Treatment    Pain: FACES Scale, Pretreatment 2-->hurts little bit  -EN     Posttreatment Pain Rating 4-->hurts little more  -EN       Row Name 07/01/25 1605          Plan of Care Review    Plan of Care Reviewed With patient  -EN     Progress no change  -EN     Outcome Evaluation Pt's participation in ADL's continues to be impacted by generalized weakness, B LE pain and edema, and decreased activity tolerance. Pt ambulated in room using FWW with CGA, distance limited by increased pain and fatigue. Continue to recommend IRF at discharge for optimal outcomes.  -       Row Name 07/01/25 1605          Therapy Plan Review/Discharge Plan (OT)    Anticipated Discharge Disposition (OT) inpatient rehabilitation facility  -       Row Name 07/01/25 1605          Vital Signs    O2 Delivery Pre Treatment room air  -EN     O2 Delivery Intra Treatment room air  -EN     O2 Delivery Post Treatment room air  -EN     Pre Patient Position Sitting  -EN     Intra Patient Position Standing  -EN     Post Patient Position Sitting  -EN       Row Name 07/01/25 1608          Positioning and  Restraints    Pre-Treatment Position sitting in chair/recliner  -EN     Post Treatment Position chair  -EN     In Chair notified nsg;sitting;call light within reach;encouraged to call for assist;waffle cushion  chair alarm unchanged  -EN               User Key  (r) = Recorded By, (t) = Taken By, (c) = Cosigned By      Initials Name Provider Type    Irma Andrade OT Occupational Therapist                   Outcome Measures       Row Name 07/01/25 1612          How much help from another is currently needed...    Putting on and taking off regular lower body clothing? 1  -EN     Bathing (including washing, rinsing, and drying) 2  -EN     Toileting (which includes using toilet bed pan or urinal) 2  -EN     Putting on and taking off regular upper body clothing 3  -EN     Taking care of personal grooming (such as brushing teeth) 3  -EN     Eating meals 4  -EN     AM-PAC 6 Clicks Score (OT) 15  -EN       Row Name 07/01/25 0944 07/01/25 0800       How much help from another person do you currently need...    Turning from your back to your side while in flat bed without using bedrails? 3  -AS 3  -EG    Moving from lying on back to sitting on the side of a flat bed without bedrails? 3  -AS 3  -EG    Moving to and from a bed to a chair (including a wheelchair)? 3  -AS 3  -EG    Standing up from a chair using your arms (e.g., wheelchair, bedside chair)? 3  -AS 3  -EG    Climbing 3-5 steps with a railing? 2  -AS 2  -EG    To walk in hospital room? 2  -AS 2  -EG    AM-PAC 6 Clicks Score (PT) 16  -AS 16  -EG    Highest Level of Mobility Goal Stand (1 or More Minutes)-5  -AS Stand (1 or More Minutes)-5  -EG      Row Name 07/01/25 1612 07/01/25 0944       Functional Assessment    Outcome Measure Options AM-PAC 6 Clicks Daily Activity (OT)  -EN AM-PAC 6 Clicks Basic Mobility (PT)  -AS              User Key  (r) = Recorded By, (t) = Taken By, (c) = Cosigned By      Initials Name Provider Type    AS Johana Forbes PTA  Physical Therapist Assistant    Irma Andrade OT Occupational Therapist    Edith Acevedo, RN Registered Nurse                    Occupational Therapy Education       Title: PT OT SLP Therapies (In Progress)       Topic: Occupational Therapy (Done)       Point: ADL training (Done)       Learning Progress Summary            Patient Acceptance, E, VU by EN at 7/1/2025 1612    Comment: OT POC; discharge planning; LE compression options                      Point: Home exercise program (Done)       Learning Progress Summary            Patient Acceptance, E, VU by EN at 7/1/2025 1612    Comment: OT POC; discharge planning; LE compression options                                      User Key       Initials Effective Dates Name Provider Type Discipline    EN 06/16/21 -  Irma Cardenas OT Occupational Therapist OT                  OT Recommendation and Plan     Plan of Care Review  Plan of Care Reviewed With: patient  Progress: no change  Outcome Evaluation: Pt's participation in ADL's continues to be impacted by generalized weakness, B LE pain and edema, and decreased activity tolerance. Pt ambulated in room using FWW with CGA, distance limited by increased pain and fatigue. Continue to recommend IRF at discharge for optimal outcomes.     Time Calculation:         Time Calculation- OT       Row Name 07/01/25 1530 07/01/25 0945          Time Calculation- OT    OT Start Time 1530  -EN --     OT Received On 07/01/25  -EN --        Timed Charges    88901 - Gait Training Minutes  -- 23  -AS     66919 - OT Therapeutic Activity Minutes 25  -EN --        Total Minutes    Timed Charges Total Minutes 25  -EN 23  -AS      Total Minutes 25  -EN 23  -AS               User Key  (r) = Recorded By, (t) = Taken By, (c) = Cosigned By      Initials Name Provider Type    AS Johana Forbes PTA Physical Therapist Assistant    Irma Andrade OT Occupational Therapist                  Therapy Charges for Today       Code  Description Service Date Service Provider Modifiers Qty    77938131099  OT THERAPEUTIC ACT EA 15 MIN 7/1/2025 Irma Cardenas OT GO 2                 Irma Cardenas OT  7/1/2025

## 2025-07-01 NOTE — PLAN OF CARE
Goal Outcome Evaluation:  Plan of Care Reviewed With: patient        Progress: no change  Outcome Evaluation: Pt's participation in ADL's continues to be impacted by generalized weakness, B LE pain and edema, and decreased activity tolerance. Pt ambulated in room using FWW with CGA, distance limited by increased pain and fatigue. Continue to recommend IRF at discharge for optimal outcomes.    Anticipated Discharge Disposition (OT): inpatient rehabilitation facility

## 2025-07-01 NOTE — THERAPY TREATMENT NOTE
Patient Name: Bo Perez  : 1945    MRN: [id number]                              Today's Date: 2025       Admit Date: 2025    Visit Dx:     ICD-10-CM ICD-9-CM   1. Acute renal failure, unspecified acute renal failure type  N17.9 584.9   2. Acute dehydration  E86.0 276.51   3. Acute hyponatremia  E87.1 276.1   4. Hypotension, unspecified hypotension type  I95.9 458.9   5. Elevated troponin  R79.89 790.6   6. Rectal bleeding  K62.5 569.3     Patient Active Problem List   Diagnosis    Chronic anticoagulation    Morbid obesity    Persistent atrial fibrillation    Frequent PVCs    Hyperlipidemia LDL goal <50    Wide-complex tachycardia    Heart failure with improved ejection fraction (HFimpEF)    Coronary artery disease involving native coronary artery of native heart without angina pectoris    Hypotension    Acute hyponatremia    GÓMEZ (acute kidney injury)    Dehydration    Elevated troponin level not due to acute coronary syndrome    Lymphedema     Past Medical History:   Diagnosis Date    A-fib     Arrhythmia     Arthritis     Coronary artery disease     Hard to intubate     HTN (hypertension) 2023    Target blood pressure <130/80 mmHg      Hyperlipidemia     Hypertension     Sleep apnea     NOT USING CPAP     Past Surgical History:   Procedure Laterality Date    BLADDER SURGERY  2016    STIMULATOR IMPLANTED, NEW ELECTRODE 2022    CARDIAC ABLATION      AFIB    CARDIAC CATHETERIZATION N/A 2024    Procedure: CASE ABORT;  Surgeon: Yasir Canales IV, MD;  Location:  [facility];  Service: Cardiovascular;  Laterality: N/A;    CARDIAC CATHETERIZATION N/A 2024    Procedure: Left Heart Cath;  Surgeon: Yasir Canales IV, MD;  Location:  [facility];  Service: Cardiovascular;  Laterality: N/A;    CARDIAC CATHETERIZATION  2024    Procedure: Functional Flow Reading;  Surgeon: Yasir Canales IV, MD;  Location:   [facility];  Service: Cardiovascular;;    CARPAL TUNNEL RELEASE Right 2002    CHOLECYSTECTOMY  2011    COLONOSCOPY      DENTAL PROCEDURE  1962    FINGER SURGERY Left 2000    FOREFINGER BONE TRIMMED    HEMORRHOIDECTOMY  1973    KNEE ARTHROSCOPY Left 2008    MOUTH SURGERY  2008    FOR SLEEP APNEA    SPINAL FUSION  2007    L3 LUMBAR SPINAL STENOSIS    TONSILLECTOMY  1954    TOTAL KNEE ARTHROPLASTY Left 2009    TOTAL KNEE ARTHROPLASTY Right 6/22/2023    Procedure: TOTAL KNEE ARTHROPLASTY - RIGHT;  Surgeon: Leland Elizalde MD;  Location: [facility] OR;  Service: Orthopedics;  Laterality: Right;      General Information       Row Name 07/01/25 0937          Physical Therapy Time and Intention    Document Type therapy note (daily note)  -AS     Mode of Treatment physical therapy  -AS       Row Name 07/01/25 0937          General Information    Patient Profile Reviewed yes  -AS     Existing Precautions/Restrictions fall;other (see comments)  tremors-baseline per patient, B LE edema  -AS     Barriers to Rehab medically complex;previous functional deficit  -AS       Row Name 07/01/25 0937          Cognition    Orientation Status (Cognition) oriented x 3  -AS       Row Name 07/01/25 0937          Safety Issues/Impairments Affecting Functional Mobility    Safety Issues Affecting Function (Mobility) awareness of need for assistance;insight into deficits/self-awareness;sequencing abilities;safety precautions follow-through/compliance;positioning of assistive device  -AS     Impairments Affecting Function (Mobility) balance;endurance/activity tolerance;pain;sensation/sensory awareness;shortness of breath;strength;motor control  -AS     Comment, Safety Issues/Impairments (Mobility) alert and following commands  -AS               User Key  (r) = Recorded By, (t) = Taken By, (c) = Cosigned By      Initials Name Provider Type    AS Johana Forbes PTA Physical Therapist Assistant                   Mobility       Row  Name 07/01/25 0938          Bed Mobility    Comment, (Bed Mobility) [facility] pre/post tx  -AS       Row Name 07/01/25 0938          Transfers    Comment, (Transfers) cues for hand placement, stood ~30 seconds prior to taking step d/t tremors  -AS       Row Name 07/01/25 0938          Sit-Stand Transfer    Sit-Stand [place] (Transfers) verbal cues;minimum assist (75% patient effort);1 person assist  -AS     Assistive Device (Sit-Stand Transfers) walker, front-wheeled  -AS     Comment, (Sit-Stand Transfer) ues for hand placement  -AS       Row Name 07/01/25 0938          Gait/Stairs (Locomotion)    [place] Level (Gait) verbal cues;minimum assist (75% patient effort);1 person assist;1 person to manage equipment  -AS     Assistive Device (Gait) walker, front-wheeled  -AS     Distance in Feet (Gait) 4  + 10  -AS     Deviations/Abnormal Patterns (Gait) bilateral deviations;base of support, wide;trace decreased;gait speed decreased  -AS     Bilateral Gait Deviations heel strike decreased;forward flexed posture;weight shift ability decreased  -AS     Comment, (Gait/Stairs) patient ambulated 4' then took seated rest break d/t reports of pain B LE, then ambualted 10' with min assist x1, rolling walker and close follow with chair for safety. Verbal cues for upright posture and proper walker placement,  -AS               User Key  (r) = Recorded By, (t) = Taken By, (c) = Cosigned By      Initials Name Provider Type    AS Johana Forbes PTA Physical Therapist Assistant                   Obj/Interventions       Row Name 07/01/25 0942          Motor Skills    Therapeutic Exercise knee;ankle  -AS       Row Name 07/01/25 0942          Knee (Therapeutic Exercise)    Knee (Therapeutic Exercise) strengthening exercise  -AS     Knee Strengthening (Therapeutic Exercise) bilateral;marching while seated;LAQ (long arc quad);sitting;10 repetitions  -AS       Row Name 07/01/25 0942          Ankle (Therapeutic Exercise)    Ankle  (Therapeutic Exercise) AROM (active range of motion)  -AS     Ankle AROM (Therapeutic Exercise) bilateral;dorsiflexion;plantarflexion;sitting;10 repetitions  -AS       Row Name 07/01/25 0942          Balance    Dynamic Standing Balance verbal cues;minimal assist;1-person assist;1 person to manage equipment  -AS     Position/Device Used, Standing Balance supported;walker, front-wheeled  -AS     Comment, Balance min assist x1, close follow with chair for safety, tremors and B EL weakness  -AS               User Key  (r) = Recorded By, (t) = Taken By, (c) = Cosigned By      Initials Name Provider Type    AS Johana Forbes, SUSHMA Physical Therapist Assistant                   Goals/Plan    No documentation.                  Clinical Impression       Row Name 07/01/25 0942          Pain    Pretreatment Pain Rating 5/10  -AS     Posttreatment Pain Rating 5/10  -AS     Pain Location extremity  -AS     Pain Side/Orientation bilateral;lower  -AS     Pain Management Interventions activity modification encouraged;exercise or physical activity utilized  -AS     Response to Pain Interventions activity level improved  -AS       Row Name 07/01/25 0942          Plan of Care Review    Plan of Care Reviewed With patient  -AS     Progress no change  -AS     Outcome Evaluation patient ambulated 4' then took seated rest break d/t reports of pain B LE, then ambualted 10' with min assist x1, rolling walker and close follow with chair for safety. Verbal cues for upright posture and proper walker placement, Completed B LE exercises with cues for technique. Recommend IRF at D/C for best functional outcome when medically appropriate, patient is refusing at this time, would need 24/7 assist if discharged home.  -AS       Row Name 07/01/25 0942          Positioning and Restraints    Pre-Treatment Position sitting in chair/recliner  -AS     Post Treatment Position chair  -AS     In Chair reclined;call light within reach;encouraged to call for  assist;exit alarm on;waffle cushion;with family/caregiver;legs elevated  -AS               User Key  (r) = Recorded By, (t) = Taken By, (c) = Cosigned By      Initials Name Provider Type    AS Johana Forbes PTA Physical Therapist Assistant                   Outcome Measures       Row Name 07/01/25 0944          How much help from another person do you currently need...    Turning from your back to your side while in flat bed without using bedrails? 3  -AS     Moving from lying on back to sitting on the side of a flat bed without bedrails? 3  -AS     Moving to and from a bed to a chair (including a wheelchair)? 3  -AS     Standing up from a chair using your arms (e.g., wheelchair, bedside chair)? 3  -AS     Climbing 3-5 steps with a railing? 2  -AS     To walk in hospital room? 2  -AS     AM-[facility] 6 Clicks Score (PT) 16  -AS     Highest Level of Mobility Goal Stand (1 or More Minutes)-5  -AS       Row Name 07/01/25 0944          Functional Assessment    Outcome Measure Options AM-PAC 6 Clicks Basic Mobility (PT)  -AS               User Key  (r) = Recorded By, (t) = Taken By, (c) = Cosigned By      Initials Name Provider Type    AS Johana Forbes PTA Physical Therapist Assistant                                 Physical Therapy Education       Title: PT OT SLP Therapies (In Progress)       Topic: Physical Therapy (In Progress)       Point: Mobility training (In Progress)       Learning Progress Summary            Patient Acceptance, E, NR by AS at 7/1/2025 0944    Acceptance, E, VU by MB at 6/25/2025 0026                      Point: Home exercise program (In Progress)       Learning Progress Summary            Patient Acceptance, E, NR by AS at 7/1/2025 0944                      Point: Body mechanics (In Progress)       Learning Progress Summary            Patient Acceptance, E, NR by AS at 7/1/2025 0944    Acceptance, E, VU by MB at 6/25/2025 5766                      Point: Precautions (In Progress)        Learning Progress Summary            Patient Acceptance, E, NR by AS at 7/1/2025 0944    Acceptance, E, VU by MB at 6/25/2025 1546                                      User Key       Initials Effective Dates Name Provider Type Discipline    AS 12/13/24 -  Johana Forbes PTA Physical Therapist Assistant PT    MB 05/30/25 -  Bhavya Taylor PT Physical Therapist PT                  PT Recommendation and Plan     Progress: no change  Outcome Evaluation: patient ambulated 4' then took seated rest break d/t reports of pain B LE, then ambualted 10' with min assist x1, rolling walker and close follow with chair for safety. Verbal cues for upright posture and proper walker placement, Completed B LE exercises with cues for technique. Recommend IRF at D/C for best functional outcome when medically appropriate, patient is refusing at this time, would need 24/7 assist if discharged home.     Time Calculation:         PT Charges       Row Name 07/01/25 0945             Time Calculation    Start Time 0900  -AS      PT Received On 07/01/25  -AS      PT Goal Re-Cert Due Date 07/05/25  -AS         Timed Charges    63262 - Gait Training Minutes  23  -AS         Total Minutes    Timed Charges Total Minutes 23  -AS       Total Minutes 23  -AS                User Key  (r) = Recorded By, (t) = Taken By, (c) = Cosigned By      Initials Name Provider Type    AS Johana Forbes PTA Physical Therapist Assistant                  Therapy Charges for Today       Code Description Service Date Service Provider Modifiers Qty    61233148804 HC GAIT TRAINING EA 15 MIN 7/1/2025 Johana Forbes PTA GP 2    89890274185 HC PT THER SUPP EA 15 MIN 7/1/2025 Johana Forbes PTA GP 2            PT G-Codes  Outcome Measure Options: AM-PAC 6 Clicks Basic Mobility (PT)  AM-PAC 6 Clicks Score (PT): 16  AM-PAC 6 Clicks Score (OT): 16       Johana Forbes PTA  7/1/2025

## 2025-07-01 NOTE — PLAN OF CARE
Problem: Adult Inpatient Plan of Care  Goal: Absence of Hospital-Acquired Illness or Injury  Intervention: Identify and Manage Fall Risk  Recent Flowsheet Documentation  Taken 7/1/2025 0410 by eRmigio Gardner RN  Safety Promotion/Fall Prevention: safety round/check completed  Taken 7/1/2025 0210 by Remigio Gardner RN  Safety Promotion/Fall Prevention: safety round/check completed  Taken 7/1/2025 0010 by Remigio Gardner RN  Safety Promotion/Fall Prevention: safety round/check completed  Taken 6/30/2025 2210 by Remigio Gardner RN  Safety Promotion/Fall Prevention: safety round/check completed  Taken 6/30/2025 2010 by Remigio Gardner RN  Safety Promotion/Fall Prevention:   safety round/check completed   nonskid shoes/slippers when out of bed  Intervention: Prevent Skin Injury  Recent Flowsheet Documentation  Taken 7/1/2025 0410 by Remigio Gardner RN  Body Position: sitting up in bed  Skin Protection: incontinence pads utilized  Taken 7/1/2025 0210 by Remigio Gardner RN  Body Position: sitting up in bed  Skin Protection: incontinence pads utilized  Taken 7/1/2025 0010 by Remigio Gardner RN  Body Position:   position changed independently   supine  Skin Protection: incontinence pads utilized  Taken 6/30/2025 2210 by Remigio Gardner RN  Body Position: heels elevated  Skin Protection: incontinence pads utilized  Taken 6/30/2025 2010 by Remigio Gardner RN  Body Position: legs elevated  Skin Protection: incontinence pads utilized  Goal: Optimal Comfort and Wellbeing  Intervention: Provide Person-Centered Care  Recent Flowsheet Documentation  Taken 6/30/2025 2010 by Remigio Gardner RN  Trust Relationship/Rapport:   care explained   reassurance provided   thoughts/feelings acknowledged   Goal Outcome Evaluation:

## 2025-07-01 NOTE — PLAN OF CARE
Goal Outcome Evaluation:  Plan of Care Reviewed With: patient        Progress: no change  Outcome Evaluation: patient ambulated 4' then took seated rest break d/t reports of pain B LE, then ambualted 10' with min assist x1, rolling walker and close follow with chair for safety. Verbal cues for upright posture and proper walker placement, Completed B LE exercises with cues for technique. Recommend IRF at D/C for best functional outcome when medically appropriate, patient is refusing at this time, would need 24/7 assist if discharged home.

## 2025-07-01 NOTE — PROGRESS NOTES
"          Clinical Nutrition Assessment   Patient Name: Bo Perez  YOB: 1945  MRN: [id number]  Date of Encounter: 07/01/25 09:11 EDT  Admission date: 6/24/2025  Reason for Visit: Identified at risk by screening criteria, LOS    Patient screened for LOS. Chart reviewed. Patient endorsed eating at baseline since starting Wegovy. Has been regularly consuming 3 smaller meals with snacks throughout the day. Denied any significant weight loss (maybe a \"few\" pounds). Declined food preferences. Denied any nutrition needs currently. No nutrition monitoring warranted at this time. Patient does not meet malnutrition criteria currently. RDN following peripherally. Available via consult.    Emilee Zuleta, MS,RD,LD  Time Spent: 25min  "

## 2025-07-01 NOTE — CASE MANAGEMENT/SOCIAL WORK
Continued Stay Note  [facility]     Patient Name: Bo Perez  MRN: [id number]  Today's Date: 7/1/2025    Admit Date: 6/24/2025    Plan: Home   Discharge Plan       Row Name 07/01/25 1246       Plan    Plan Home    Patient/Family in Agreement with Plan yes    Plan Comments Spoke with Mr. Perez and his wife at bedside. Mr. Perez DECLINED rehab. He and his wife were both agreeable to go home with [facility] at discharge. Wife will transport him home. CM will continue to follow for medical readiness and will assist with any discharge needs as indicated.    Final Discharge Disposition Code 06 - home with home health care                   Discharge Codes    No documentation.                 Expected Discharge Date and Time       Expected Discharge Date Expected Discharge Time    Jun 30, 2025               Terri Schreiber RN

## 2025-07-01 NOTE — PROGRESS NOTES
" LOS: 6 days   Patient Care Team:  Mario Rowland MD as PCP - General (Internal Medicine)  Shekhar Galvez MD as Consulting Physician (Cardiology)  Rafaela Jones APRN as Nurse Practitioner (Interventional Cardiology)  Yasir Canales IV, MD as Consulting Physician (Interventional Cardiology)  Antwan Chamorro MD as Consulting Physician (Cardiology)    Chief Complaint: Hyponatremia   GÓMEZ    Subjective     Subjective:  Symptoms:  Stable.  No shortness of breath or chest pain.    Diet:  Poor intake.        History taken from: patient    Objective     Vital Sign Min/Max for last 24 hours  Temp  Min: 97.3 °F (36.3 °C)  Max: 98.8 °F (37.1 °C)   BP  Min: 109/61  Max: 150/74   Pulse  Min: 49  Max: 67   Resp  Min: 18  Max: 18   SpO2  Min: 91 %  Max: 99 %   No data recorded   No data recorded     Flowsheet Rows      Flowsheet Row First Filed Value   Admission Height 185.4 cm (73\") Documented at 06/24/2025 1433   Admission Weight 163 kg (360 lb) Documented at 06/24/2025 1433            I/O this shift:  In: 480 [P.O.:480]  Out: 2125 [Urine:2125]  I/O last 3 completed shifts:  In: 1560 [P.O.:1560]  Out: 2120 [Urine:2120]    Objective:  General Appearance:  Comfortable.    Vital signs: (most recent): Blood pressure 109/61, pulse (!) 49, temperature 98.3 °F (36.8 °C), temperature source Oral, resp. rate 18, height 185.4 cm (72.99\"), weight (!) 164 kg (361 lb 8.9 oz), SpO2 98%.    Output: Producing urine.    Lungs:  Normal effort.  There are wheezes.    Heart: Normal rate.  Regular rhythm.  S1 normal and S2 normal.  No murmur or gallop.   Abdomen: Abdomen is soft.    Extremities: Normal range of motion.  There is dependent edema.    Pulses: Distal pulses are intact.    Neurological: Patient is alert and oriented to person, place and time.  Normal strength.    Skin:  Warm.                Results Review:     I reviewed the patient's new clinical results.    WBC WBC   Date Value Ref Range Status   06/30/2025 " "8.84 3.40 - 10.80 10*3/mm3 Final   06/29/2025 7.36 3.40 - 10.80 10*3/mm3 Final      HGB Hemoglobin   Date Value Ref Range Status   07/01/2025 9.8 (L) 13.0 - 17.7 g/dL Final   06/30/2025 10.0 (L) 13.0 - 17.7 g/dL Final   06/29/2025 10.1 (L) 13.0 - 17.7 g/dL Final      HCT Hematocrit   Date Value Ref Range Status   07/01/2025 27.6 (L) 37.5 - 51.0 % Final   06/30/2025 28.7 (L) 37.5 - 51.0 % Final   06/29/2025 28.8 (L) 37.5 - 51.0 % Final      Platlets No results found for: \"LABPLAT\"   MCV MCV   Date Value Ref Range Status   06/30/2025 95.3 79.0 - 97.0 fL Final   06/29/2025 96.0 79.0 - 97.0 fL Final          Sodium Sodium   Date Value Ref Range Status   07/01/2025 129 (L) 136 - 145 mmol/L Final   07/01/2025 126 (L) 136 - 145 mmol/L Final   06/30/2025 126 (L) 136 - 145 mmol/L Final   06/30/2025 127 (L) 136 - 145 mmol/L Final   06/30/2025 123 (L) 136 - 145 mmol/L Final   06/29/2025 125 (L) 136 - 145 mmol/L Final      Potassium Potassium   Date Value Ref Range Status   07/01/2025 3.5 3.5 - 5.2 mmol/L Final   06/30/2025 3.5 3.5 - 5.2 mmol/L Final   06/29/2025 3.7 3.5 - 5.2 mmol/L Final      Chloride Chloride   Date Value Ref Range Status   07/01/2025 93 (L) 98 - 107 mmol/L Final   06/30/2025 89 (L) 98 - 107 mmol/L Final   06/29/2025 91 (L) 98 - 107 mmol/L Final      CO2 CO2   Date Value Ref Range Status   07/01/2025 23.0 22.0 - 29.0 mmol/L Final   06/30/2025 25.0 22.0 - 29.0 mmol/L Final   06/29/2025 22.9 22.0 - 29.0 mmol/L Final      BUN BUN   Date Value Ref Range Status   07/01/2025 15.2 8.0 - 23.0 mg/dL Final   06/30/2025 21.8 8.0 - 23.0 mg/dL Final   06/29/2025 21.8 8.0 - 23.0 mg/dL Final      Creatinine Creatinine   Date Value Ref Range Status   07/01/2025 1.24 0.76 - 1.27 mg/dL Final   06/30/2025 1.28 (H) 0.76 - 1.27 mg/dL Final   06/29/2025 1.34 (H) 0.76 - 1.27 mg/dL Final      Calcium Calcium   Date Value Ref Range Status   07/01/2025 8.2 (L) 8.6 - 10.5 mg/dL Final   06/30/2025 8.3 (L) 8.6 - 10.5 mg/dL Final " "  06/29/2025 8.5 (L) 8.6 - 10.5 mg/dL Final      PO4 No results found for: \"CAPO4\"   Albumin No results found for: \"ALBUMIN\"   Magnesium Magnesium   Date Value Ref Range Status   06/30/2025 1.7 1.6 - 2.4 mg/dL Final   06/29/2025 1.7 1.6 - 2.4 mg/dL Final      Uric Acid No results found for: \"URICACID\"     Medication Review: Yes    Assessment & Plan       GÓMEZ (acute kidney injury)    Persistent atrial fibrillation    Hyperlipidemia LDL goal <50    Heart failure with improved ejection fraction (HFimpEF)    Coronary artery disease involving native coronary artery of native heart without angina pectoris    Hypotension    Acute hyponatremia    Dehydration    Elevated troponin level not due to acute coronary syndrome    Lymphedema      Assessment & Plan:    ARF:  Creatinine initially elevated at 2.5 on admission with hypotension on multiple diuretics along with RAAS suppression and SGLT2 inhibitor. Patient is currently on SGLT-2inh and entersto.      CKD: Creatinine in the past 1.1-1.3 range since 10/24.  May have coincided with addition of GDMT..     HTN: Blood pressure remains stable.  Entresto on hold due to ARF.  Monitor for now.     Hyponatremia: Patient denies history of low sodium in the past.  On initial workup serum osmolality normal range at 287 with urine osmolality of 241 and urine sodium of 28 despite serum sodium of 124.  Patient does report fluid intake with strict sodium avoidance.  Urine sodium now <20.  Suspect hypervolemic hyponatremia based on physical exam.     - For now restrict fluids to 1200 mL a day  -No sodium restriction to diet  - Bumex 2 mg daily.   -Monitor serum sodium levels    Anemia: Hemoglobin stable at goal >10     Volume: Patient with chronic lymphedema.  Cannot use lower extremity edema as gauge of volume overload.  Nonoliguric urine output.    - Bumex 2 mg IV daily   - Continue strict I's and O's to assist with volume management     Thank you consulting us on Bo Perez who is of " high risk and complexity.  We will follow along closely       Chuck Doan MD  07/01/25  16:17 EDT

## 2025-07-01 NOTE — PROGRESS NOTES
[facility] Medicine Services  PROGRESS NOTE    Patient Name: Bo Perez  : 1945  MRN: [id number]    Date of Admission: 2025  Primary Care Physician: Mario Rowlnad MD    Subjective   Subjective     CC:  Hypertension    HPI:  Patient has no complaints. Wife feels he is more edematous today      Objective   Objective     Vital Signs:   Temp:  [97.3 °F (36.3 °C)-98.8 °F (37.1 °C)] 98.4 °F (36.9 °C)  Heart Rate:  [53-67] 53  Resp:  [18] 18  BP: (131-150)/(67-76) 134/76     Physical Exam:  Alert, nontoxic, ox3, sitting in chair, normal work of breathing; normal mentation  Psych: calm/appropriate  Ncat, oroph clear  Irr irr, regular rate  Ctab, normal work of breathing   Abd obese, nontender to palpation  BLE chronic edema 2+ w/ chronic changes/edema/ mild erythema (no warmth)    Results Reviewed:  LAB RESULTS:      Lab 25  0638 25  0519 25  1009 25  0654 25  0832 25  1404 25  0602 25  2105 25  0359 25  1959 25  1557 25  1449   WBC  --  8.84 7.36 8.91  --   --  8.64  --  8.84  --   --  9.01   HEMOGLOBIN 9.8* 10.0* 10.1* 10.1* 10.4*   < > 10.2*   < > 10.9*  --   --  11.8*   HEMATOCRIT 27.6* 28.7* 28.8* 28.3* 30.1*   < > 28.3*   < > 31.5*  --   --  33.3*   PLATELETS  --  132* 118* 134*  --   --  151  --  162  --   --  175   NEUTROS ABS  --   --   --   --   --   --  6.05  --  6.59  --   --  6.59   IMMATURE GRANS (ABS)  --   --   --   --   --   --  0.06*  --  0.04  --   --  0.05   LYMPHS ABS  --   --   --   --   --   --  1.63  --  1.57  --   --  1.52   MONOS ABS  --   --   --   --   --   --  0.66  --  0.40  --   --  0.61   EOS ABS  --   --   --   --   --   --  0.21  --  0.21  --   --  0.21   MCV  --  95.3 96.0 94.6  --   --  93.1  --  96.6  --   --  94.9   HSTROP T  --   --   --   --   --   --   --   --  48* 39* 46* 63*    < > = values in this interval not displayed.         Lab 25  0638  06/30/25  2317 06/30/25  1733 06/30/25  0519 06/29/25  1009 06/28/25  0654 06/27/25  0651 06/25/25  0745 06/25/25  0359   SODIUM 126* 126* 127* 123* 125* 127* 129*   < > 127*  126*   POTASSIUM 3.5  --   --  3.5 3.7 3.8 4.2   < > 3.6  3.5   CHLORIDE 93*  --   --  89* 91* 92* 95*   < > 85*  86*   CO2 23.0  --   --  25.0 22.9 25.0 24.0   < > 29.0  27.0   ANION GAP 10.0  --   --  9.0 11.1 10.0 10.0   < > 13.0  13.0   BUN 15.2  --   --  21.8 21.8 27.7* 33.0*   < > 60.0*  62.3*   CREATININE 1.24  --   --  1.28* 1.34* 1.33* 1.36*   < > 1.97*  1.89*   EGFR 59.5*  --   --  57.3* 54.2* 54.7* 53.3*   < > 34.1*  35.9*   GLUCOSE 88  --   --  96 91 90 86   < > 99  99   CALCIUM 8.2*  --   --  8.3* 8.5* 8.7 8.3*   < > 8.8  8.9   MAGNESIUM  --   --   --  1.7 1.7 1.9 2.0  --  2.2   PHOSPHORUS  --   --   --   --   --   --  2.5  --   --    TSH  --   --   --   --  0.814  --   --   --   --     < > = values in this interval not displayed.         Lab 06/24/25  1449   TOTAL PROTEIN 7.1   ALBUMIN 4.1   GLOBULIN 3.0   ALT (SGPT) 21   AST (SGOT) 22   BILIRUBIN 1.0   ALK PHOS 54         Lab 06/25/25  0359 06/24/25  1959 06/24/25  1557 06/24/25  1449   HSTROP T 48* 39* 46* 63*             Lab 06/27/25  0832 06/26/25  0602   ABO TYPING O O   RH TYPING Negative Negative   ANTIBODY SCREEN Negative Negative         Brief Urine Lab Results  (Last result in the past 365 days)        Color   Clarity   Blood   Leuk Est   Nitrite   Protein   CREAT   Urine HCG        06/24/25 1634             41.5         06/24/25 1634 Yellow   Clear   Negative   Large (3+)   Negative   Negative                   Microbiology Results Abnormal       Procedure Component Value - Date/Time    Eosinophil Smear - Urine, Urine, Clean Catch [[id number]]  (Abnormal) Collected: 06/24/25 1634    Lab Status: Final result Specimen: Urine, Clean Catch Updated: 06/25/25 0537     Eosinophil Smear 2 % EOS/100 Cells             Duplex Venous Lower Extremity - Bilateral  CAR  Result Date: 6/30/2025    Normal bilateral lower extremity venous duplex scan.     XR Chest 1 View  Result Date: 6/30/2025  XR CHEST 1 VW Date of Exam: 6/30/2025 1:59 AM EDT Indication: peripheral edema, hyponatremia. assess volume status & evaluate for pulm edema Comparison: 6/24/2025 Findings: Cardiomegaly is stable. Pulmonary vessels are normal. Lungs are clear. No pleural effusion. No pneumothorax.     Impression: Impression: 1. No acute cardiopulmonary disease. 2. Stable cardiomegaly. Electronically Signed: Garth Crow MD  6/30/2025 7:22 AM EDT  Workstation ID: [id number]        Results for orders placed during the hospital encounter of 06/24/25    Adult Transthoracic Echo Complete W/ Cont if Necessary Per Protocol 06/30/2025  1:46 PM    Interpretation Summary    Left ventricular systolic function is normal. Estimated left ventricular EF = 55%    Left ventricular wall thickness is consistent with mild concentric hypertrophy.    The left atrial cavity is mildly dilated.    There is calcification of the aortic valve.  No aortic valve regurgitation or stenosis present.    Insufficient TR jet to assess RVSP.  No echo findings of significant pulmonary hypertension identified      Current medications:  Scheduled Meds:acetaminophen, 650 mg, Oral, Q8H  amiodarone, 200 mg, Oral, Daily  apixaban, 5 mg, Oral, Q12H  ARIPiprazole, 5 mg, Oral, Daily  atorvastatin, 40 mg, Oral, Daily  empagliflozin, 10 mg, Oral, Daily  gabapentin, 300 mg, Oral, Q12H  hydrocortisone, 25 mg, Rectal, BID  ipratropium, 2 spray, Each Nare, Q PM  melatonin, 5 mg, Oral, Nightly  [Held by provider] metoprolol succinate XL, 12.5 mg, Oral, Daily  Pharmacy Meds to Bed Consult, , Not Applicable, Daily  Psyllium, 1 packet, Oral, Daily  sodium chloride, 10 mL, Intravenous, Q12H  sodium chloride, 2 g, Oral, BID With Meals      Continuous Infusions:       PRN Meds:.  ALPRAZolam    senna-docusate sodium **AND** polyethylene glycol **AND** bisacodyl  **AND** bisacodyl    nitroglycerin    oxyCODONE-acetaminophen    sodium chloride    sodium chloride    sodium chloride    temazepam    Assessment & Plan   Assessment & Plan     Active Hospital Problems    Diagnosis  POA    **GÓMEZ (acute kidney injury) [N17.9]  Yes    Lymphedema [I89.0]  Yes    Dehydration [E86.0]  Yes    Acute hyponatremia [E87.1]  Yes    Hypotension [I95.9]  Yes    Elevated troponin level not due to acute coronary syndrome [R79.89]  Yes    Coronary artery disease involving native coronary artery of native heart without angina pectoris [I25.10]  Yes    Heart failure with improved ejection fraction (HFimpEF) [I50.32]  Yes    Hyperlipidemia LDL goal <50 [E78.5]  Yes    Persistent atrial fibrillation [I48.19]  Yes      Resolved Hospital Problems   No resolved problems to display.        Brief Hospital Course to date:  Bo Perez is a 79 y.o. male admitted with hypotension, gómez, hyponatremia likely due to cardiac meds/diuretics. These were held and diuretics held with improvement in renal function and sodium w/ significant improvement in bp (no 110's) with no dyspnea, no dizziness. . Gi saw for some scant brbpr on 6/27/25 and patient declined colonoscopy, patient was extremely adamant about going home. GI recommended anusol suppositories and made outpatient ADT referral for GI clinic for future outpatient colonoscopy & cardiology recommended holding diuretics/entresto w/ plans to restart Toprol at lower dose of 12.5mg daily. Patient was in process of being discharged home on 6/27/25 (discharge summary completed & med rec completed) but then wife showed up and patient changed his mind wanting to stay for further observation so discharge was canceled. Developed some asymptomatic bradycardia for which toprol was d/c'd. Also had recurrence of hyponatremia. Cardiology and nephrology follow    Hyponatremia, asymptomatic  GÓMEZ on ckd 3 (baseline cr ~1.2-1.3), improved  -initial sodium 124, creatinine  2.52  -sodium levels initially improved while holding diuretics/entresto/jardiance & gentle hydration initially  -on 6/28/25 sodium dropped back down to 127, cr 1.33; added salt tabs, fluid restriction  -tsh WNL  -nephrology consulted for worsening hyponatremia despite above measures, salt tabs increased, cont fluid restriction. Na now improving    Hypotension (iatrogenic), resolved  Hx HFpEF  CAD  Persistent Afib  Bradycardia, asymptomatic  Chronic BLE edema  -initially held eliquis due to mild bleeding; now restarted eliquis  -held bumex/diuretics/jardiance;entresto (due to hyponatremia and armani) until see stability/improvement in renal function as outpatient  --Echo EF 55%, no right sided failure noted  --LE duplex neg for DVT  -Cards follows:  continue holding b-blocker (bradycardia), agrees w/ gentle hydration trial (holding entresto & diuretics); continue amidarone; added jardiance. Add back BP meds as BP/renal function tolerates    Rectal bleeding, mild. resolved  -hgb stable  -initially held eliquis   -GI consulted; patient declined colonoscopy. GI ordered anusol suppositories for likely hemorrhoids, psyillium, and outpatient GI referral placed in ADT for oupatient colonoscopy  -patient has refused the anusol suppositories (states will use the suppositories if has recurrence of bleeding)  -no further bleeding last couple of days  -restarted eliquis on 6/28/25; no further rectal bleeding  -hgb stable    Obesity  -on wegovy as outpatient; hold for now, restart as outpatient          Expected Discharge Location and Transportation: Home  Expected Discharge   Expected Discharge Date: 7/3/2025; Expected Discharge Time:      VTE Prophylaxis:  Pharmacologic & mechanical VTE prophylaxis orders are present.         AM-PAC 6 Clicks Score (PT): 16 (07/01/25 1263)    CODE STATUS:   Code Status and Medical Interventions: CPR (Attempt to Resuscitate); Full Support   Ordered at: 06/24/25 2003     Code Status (Patient has no  pulse and is not breathing):    CPR (Attempt to Resuscitate)     Medical Interventions (Patient has pulse or is breathing):    Full Support     Level Of Support Discussed With:    Patient       Lulu Santo MD  07/01/25

## 2025-07-02 ENCOUNTER — READMISSION MANAGEMENT (OUTPATIENT)
Dept: CALL CENTER | Facility: HOSPITAL | Age: 80
End: 2025-07-02
Payer: MEDICARE

## 2025-07-02 VITALS
OXYGEN SATURATION: 95 % | DIASTOLIC BLOOD PRESSURE: 59 MMHG | WEIGHT: 274.69 LBS | BODY MASS INDEX: 36.41 KG/M2 | HEIGHT: 73 IN | HEART RATE: 42 BPM | SYSTOLIC BLOOD PRESSURE: 111 MMHG | RESPIRATION RATE: 20 BRPM | TEMPERATURE: 97.7 F

## 2025-07-02 PROBLEM — N17.9 AKI (ACUTE KIDNEY INJURY): Status: RESOLVED | Noted: 2025-06-24 | Resolved: 2025-07-02

## 2025-07-02 PROBLEM — E86.0 DEHYDRATION: Status: RESOLVED | Noted: 2025-06-24 | Resolved: 2025-07-02

## 2025-07-02 PROBLEM — E87.1 ACUTE HYPONATREMIA: Status: RESOLVED | Noted: 2025-06-24 | Resolved: 2025-07-02

## 2025-07-02 PROBLEM — I95.9 HYPOTENSION: Status: RESOLVED | Noted: 2025-06-24 | Resolved: 2025-07-02

## 2025-07-02 LAB
ANION GAP SERPL CALCULATED.3IONS-SCNC: 15.4 MMOL/L (ref 5–15)
BUN SERPL-MCNC: 15.9 MG/DL (ref 8–23)
BUN/CREAT SERPL: 12.3 (ref 7–25)
CALCIUM SPEC-SCNC: 8.7 MG/DL (ref 8.6–10.5)
CHLORIDE SERPL-SCNC: 96 MMOL/L (ref 98–107)
CO2 SERPL-SCNC: 21.6 MMOL/L (ref 22–29)
CREAT SERPL-MCNC: 1.29 MG/DL (ref 0.76–1.27)
EGFRCR SERPLBLD CKD-EPI 2021: 56.8 ML/MIN/1.73
GLUCOSE SERPL-MCNC: 88 MG/DL (ref 65–99)
POTASSIUM SERPL-SCNC: 3.3 MMOL/L (ref 3.5–5.2)
QT INTERVAL: 386 MS
QTC INTERVAL: 358 MS
SODIUM SERPL-SCNC: 133 MMOL/L (ref 136–145)

## 2025-07-02 PROCEDURE — 80048 BASIC METABOLIC PNL TOTAL CA: CPT | Performed by: INTERNAL MEDICINE

## 2025-07-02 PROCEDURE — 25010000002 BUMETANIDE PER 0.5 MG: Performed by: INTERNAL MEDICINE

## 2025-07-02 PROCEDURE — 99239 HOSP IP/OBS DSCHRG MGMT >30: CPT | Performed by: STUDENT IN AN ORGANIZED HEALTH CARE EDUCATION/TRAINING PROGRAM

## 2025-07-02 PROCEDURE — 99232 SBSQ HOSP IP/OBS MODERATE 35: CPT | Performed by: NURSE PRACTITIONER

## 2025-07-02 RX ORDER — POTASSIUM CHLORIDE 1500 MG/1
40 TABLET, EXTENDED RELEASE ORAL ONCE
Status: COMPLETED | OUTPATIENT
Start: 2025-07-02 | End: 2025-07-02

## 2025-07-02 RX ADMIN — TEMAZEPAM 15 MG: 15 CAPSULE ORAL at 02:49

## 2025-07-02 RX ADMIN — ARIPIPRAZOLE 5 MG: 10 TABLET ORAL at 09:38

## 2025-07-02 RX ADMIN — GABAPENTIN 300 MG: 300 CAPSULE ORAL at 09:38

## 2025-07-02 RX ADMIN — SACUBITRIL AND VALSARTAN 1 TABLET: 24; 26 TABLET, FILM COATED ORAL at 09:38

## 2025-07-02 RX ADMIN — APIXABAN 5 MG: 5 TABLET, FILM COATED ORAL at 09:38

## 2025-07-02 RX ADMIN — ACETAMINOPHEN 650 MG: 325 TABLET ORAL at 02:37

## 2025-07-02 RX ADMIN — AMIODARONE HYDROCHLORIDE 200 MG: 200 TABLET ORAL at 09:38

## 2025-07-02 RX ADMIN — POTASSIUM CHLORIDE 40 MEQ: 1500 TABLET, EXTENDED RELEASE ORAL at 13:12

## 2025-07-02 RX ADMIN — BUMETANIDE 2 MG: 0.25 INJECTION INTRAMUSCULAR; INTRAVENOUS at 09:38

## 2025-07-02 RX ADMIN — ACETAMINOPHEN 650 MG: 325 TABLET ORAL at 09:38

## 2025-07-02 RX ADMIN — SODIUM CHLORIDE 2 G: 1 TABLET ORAL at 09:38

## 2025-07-02 RX ADMIN — Medication 10 ML: at 09:39

## 2025-07-02 RX ADMIN — EMPAGLIFLOZIN 10 MG: 10 TABLET, FILM COATED ORAL at 09:38

## 2025-07-02 RX ADMIN — ATORVASTATIN CALCIUM 40 MG: 40 TABLET, FILM COATED ORAL at 09:38

## 2025-07-02 NOTE — CASE MANAGEMENT/SOCIAL WORK
Continued Stay Note   [facility]     Patient Name: Bo Perez  MRN: [id number]  Today's Date: 7/2/2025    Admit Date: 6/24/2025    Plan: Home   Discharge Plan       Row Name 07/02/25 1235       Plan    Plan Home    Patient/Family in Agreement with Plan yes    Plan Comments Discussed in MDR. Mr. Perez is not being discharged today. His plan remains going home with [facility] for PT/OT. Family will transport. CM will continue to follow for medical readiness and assist with any discharge needs as indicated.    Final Discharge Disposition Code 06 - home with home health care                   Discharge Codes    No documentation.                 Expected Discharge Date and Time       Expected Discharge Date Expected Discharge Time    Jul 3, 2025               Terri Schreiber RN

## 2025-07-02 NOTE — DISCHARGE SUMMARY
[facility] Medicine Services  DISCHARGE SUMMARY    Patient Name: Bo Perez  : 1945  MRN: [id number]    Date of Admission: 2025  2:39 PM  Date of Discharge:  2025  Primary Care Physician: Mario Rowland MD    Consults       Date and Time Order Name Status Description    2025  7:39 AM Inpatient Nephrology Consult Completed     2025 11:03 AM Inpatient Gastroenterology Consult Completed     2025 10:37 PM Inpatient Cardiology Consult Completed             Hospital Course     Presenting Problem: GÓMEZ    Active Hospital Problems    Diagnosis  POA    Lymphedema [I89.0]  Yes    Elevated troponin level not due to acute coronary syndrome [R79.89]  Yes    Coronary artery disease involving native coronary artery of native heart without angina pectoris [I25.10]  Yes    Heart failure with improved ejection fraction (HFimpEF) [I50.32]  Yes    Hyperlipidemia LDL goal <50 [E78.5]  Yes    Persistent atrial fibrillation [I48.19]  Yes      Resolved Hospital Problems    Diagnosis Date Resolved POA    **GÓMEZ (acute kidney injury) [N17.9] 2025 Yes    Dehydration [E86.0] 2025 Yes    Acute hyponatremia [E87.1] 2025 Yes    Hypotension [I95.9] 2025 Yes          Hospital Course:  Bo Perez is a 79 y.o. male Bo Perez is a 79 y.o. male admitted with hypotension, gómez, hyponatremia likely due to cardiac meds/diuretics. These were held and diuretics held with improvement in renal function and sodium w/ significant improvement in bp with no dyspnea, no dizziness. . Gi saw for some scant brbpr on 25 and patient declined colonoscopy, patient was extremely adamant about going home. GI recommended anusol suppositories and made outpatient ADT referral for GI clinic for future outpatient colonoscopy & cardiology recommended holding diuretics/entresto w/ plans to restart Toprol at lower dose of 12.5mg daily. Patient was in process of being discharged  home on 6/27/25 (discharge summary completed & med rec completed) but then wife showed up and patient changed his mind wanting to stay for further observation so discharge was canceled. Developed some asymptomatic bradycardia for which toprol was d/c'd. Also had recurrence of hyponatremia. Cardiology and nephrology follow     Hyponatremia, asymptomatic  GÓMEZ on ckd 3 (baseline cr ~1.2-1.3), improved  -initial sodium 124, creatinine 2.52  -sodium levels initially improved while holding diuretics/entresto/jardiance & gentle hydration initially  -on 6/28/25 sodium dropped back down to 127, cr 1.33; added salt tabs, fluid restriction  -tsh WNL  -likely secondary to hypervolemic hyponatremia  -nephrology consulted for worsening hyponatremia despite above measures, salt tabs increased, cont fluid restriction. No sodium restriction to diet. Na now improving. Nephrology recommends no salt tabs on discharge, continue bumex. Needs 2 week f/u     Hypotension (iatrogenic), resolved  Hx HFpEF  CAD  Persistent Afib  Bradycardia, asymptomatic  Chronic BLE edema  -initially held eliquis due to mild bleeding; now restarted eliquis  -held bumex/diuretics/jardiance;entresto (due to hyponatremia and gómez) until stability/improvement in renal function as outpatient  --Echo EF 55%, no right sided failure noted  --LE duplex neg for DVT  -Cards follows:  continue holding b-blocker (bradycardia), agrees w/ gentle hydration trial which is now complete(holding entresto & diuretics); continue amidarone; added jardiance. Add back BP meds as BP/renal function tolerates. Entresto added back 7/1. Needs 2 week monitor on discharge     Rectal bleeding, mild. resolved  -hgb stable  -initially held eliquis   -GI consulted; patient declined colonoscopy. GI ordered anusol suppositories for likely hemorrhoids, psyillium, and outpatient GI referral placed in ADT for oupatient colonoscopy  -patient has refused the anusol suppositories (states will use the  suppositories if has recurrence of bleeding)  -no further bleeding last couple of days  -restarted eliquis on 6/28/25; no further rectal bleeding  -hgb stable     Obesity  -on wegovy as outpatient; hold for now, restart as outpatient         Discharge Follow Up Recommendations for outpatient labs/diagnostics:   PCP, nephrology, cardiology, GI    Day of Discharge     HPI:   No new issues overnight, no specific complaints this AM    Review of Systems  Gen- No fevers, chills  CV- No chest pain, palpitations  Resp- No cough, dyspnea  GI- No N/V/D, abd pain      Vital Signs:   Temp:  [97.7 °F (36.5 °C)-98.3 °F (36.8 °C)] 97.7 °F (36.5 °C)  Heart Rate:  [36-64] 55  Resp:  [18-20] 20  BP: (109-137)/(61-84) 126/69      Physical Exam:  Alert, nontoxic, ox3, sitting in chair, normal work of breathing; normal mentation  Psych: calm/appropriate  Ncat, oroph clear  Irr irr, regular rate  Ctab, normal work of breathing   Abd obese, nontender to palpation  BLE chronic edema 2+ w/ chronic changes/edema/ mild erythema (no warmth)    Pertinent  and/or Most Recent Results     LAB RESULTS:      Lab 07/01/25  0638 06/30/25  0519 06/29/25  1009 06/28/25  0654 06/27/25  0832 06/26/25  1404 06/26/25  0602   WBC  --  8.84 7.36 8.91  --   --  8.64   HEMOGLOBIN 9.8* 10.0* 10.1* 10.1* 10.4*   < > 10.2*   HEMATOCRIT 27.6* 28.7* 28.8* 28.3* 30.1*   < > 28.3*   PLATELETS  --  132* 118* 134*  --   --  151   NEUTROS ABS  --   --   --   --   --   --  6.05   IMMATURE GRANS (ABS)  --   --   --   --   --   --  0.06*   LYMPHS ABS  --   --   --   --   --   --  1.63   MONOS ABS  --   --   --   --   --   --  0.66   EOS ABS  --   --   --   --   --   --  0.21   MCV  --  95.3 96.0 94.6  --   --  93.1    < > = values in this interval not displayed.         Lab 07/02/25  0503 07/01/25  1352 07/01/25  0638 06/30/25  2317 06/30/25  1733 06/30/25  0519 06/29/25  1009 06/28/25  0654 06/27/25  0651   SODIUM 133* 129* 126* 126* 127* 123* 125* 127* 129*   POTASSIUM  3.3*  --  3.5  --   --  3.5 3.7 3.8 4.2   CHLORIDE 96*  --  93*  --   --  89* 91* 92* 95*   CO2 21.6*  --  23.0  --   --  25.0 22.9 25.0 24.0   ANION GAP 15.4*  --  10.0  --   --  9.0 11.1 10.0 10.0   BUN 15.9  --  15.2  --   --  21.8 21.8 27.7* 33.0*   CREATININE 1.29*  --  1.24  --   --  1.28* 1.34* 1.33* 1.36*   EGFR 56.8*  --  59.5*  --   --  57.3* 54.2* 54.7* 53.3*   GLUCOSE 88  --  88  --   --  96 91 90 86   CALCIUM 8.7  --  8.2*  --   --  8.3* 8.5* 8.7 8.3*   MAGNESIUM  --   --   --   --   --  1.7 1.7 1.9 2.0   PHOSPHORUS  --   --   --   --   --   --   --   --  2.5   TSH  --   --   --   --   --   --  0.814  --   --                      Lab 06/27/25  0832 06/26/25  0602   ABO TYPING O O   RH TYPING Negative Negative   ANTIBODY SCREEN Negative Negative         Brief Urine Lab Results  (Last result in the past 365 days)        Color   Clarity   Blood   Leuk Est   Nitrite   Protein   CREAT   Urine HCG        06/24/25 1634             41.5         06/24/25 1634 Yellow   Clear   Negative   Large (3+)   Negative   Negative                 Microbiology Results (last 10 days)       Procedure Component Value - Date/Time    Eosinophil Smear - Urine, Urine, Clean Catch [[id number]]  (Abnormal) Collected: 06/24/25 1634    Lab Status: Final result Specimen: Urine, Clean Catch Updated: 06/25/25 0537     Eosinophil Smear 2 % EOS/100 Cells     COVID PRE-OP / PRE-PROCEDURE SCREENING ORDER (NO ISOLATION) - Swab, Nasopharynx [[id number]]  (Normal) Collected: 06/24/25 1510    Lab Status: Final result Specimen: Swab from Nasopharynx Updated: 06/24/25 1621    Narrative:      The following orders were created for panel order COVID PRE-OP / PRE-PROCEDURE SCREENING ORDER (NO ISOLATION) - Swab, Nasopharynx.  Procedure                               Abnormality         Status                     ---------                               -----------         ------                     Respiratory Panel PCR w/...[[id number]]  Normal               Final result                 Please view results for these tests on the individual orders.    Respiratory Panel PCR w/COVID-19(SARS-CoV-2) [facility]TI/[facility]/JAY/[facility] In-House, NP Swab in UTM/VTM, 2 HR TAT - Swab, Nasopharynx [[id number]]  (Normal) Collected: 06/24/25 1510    Lab Status: Final result Specimen: Swab from Nasopharynx Updated: 06/24/25 1621     ADENOVIRUS, PCR Not Detected     Coronavirus 229E Not Detected     Coronavirus HKU1 Not Detected     Coronavirus NL63 Not Detected     Coronavirus OC43 Not Detected     COVID19 Not Detected     Human Metapneumovirus Not Detected     Human Rhinovirus/Enterovirus Not Detected     Influenza A PCR Not Detected     Influenza B PCR Not Detected     Parainfluenza Virus 1 Not Detected     Parainfluenza Virus 2 Not Detected     Parainfluenza Virus 3 Not Detected     Parainfluenza Virus 4 Not Detected     RSV, PCR Not Detected     Bordetella pertussis pcr Not Detected     Bordetella parapertussis PCR Not Detected     Chlamydophila pneumoniae PCR Not Detected     Mycoplasma pneumo by PCR Not Detected    Narrative:      In the setting of a positive respiratory panel with a viral infection PLUS a negative procalcitonin without other underlying concern for bacterial infection, consider observing off antibiotics or discontinuation of antibiotics and continue supportive care. If the respiratory panel is positive for atypical bacterial infection (Bordetella pertussis, Chlamydophila pneumoniae, or Mycoplasma pneumoniae), consider antibiotic de-escalation to target atypical bacterial infection.            Duplex Venous Lower Extremity - Bilateral CAR  Result Date: 6/30/2025    Normal bilateral lower extremity venous duplex scan.     XR Chest 1 View  Result Date: 6/30/2025  XR CHEST 1 VW Date of Exam: 6/30/2025 1:59 AM EDT Indication: peripheral edema, hyponatremia. assess volume status & evaluate for pulm edema Comparison: 6/24/2025 Findings: Cardiomegaly is stable. Pulmonary vessels  are normal. Lungs are clear. No pleural effusion. No pneumothorax.     Impression: 1. No acute cardiopulmonary disease. 2. Stable cardiomegaly. Electronically Signed: Garth Crow MD  6/30/2025 7:22 AM EDT  Workstation ID: [id number]    XR Chest 1 View  Result Date: 6/24/2025  XR CHEST 1 VW Date of Exam: 6/24/2025 3:04 PM EDT Indication: Weak/Dizzy/AMS triage protocol Comparison: [facility] chest x-ray 11/15/2024 Findings: The patient's chin partially obscures evaluation of the upper chest. Lungs are adequately expanded and appear clear. Cardiomediastinal contours are stable.     Impression: No acute cardiopulmonary abnormality is identified. Electronically Signed: Radha Lawrence MD  6/24/2025 3:44 PM EDT  Workstation ID: [id number]      Results for orders placed during the hospital encounter of 06/24/25    Duplex Venous Lower Extremity - Bilateral CAR 06/30/2025  6:48 PM    Interpretation Summary    Normal bilateral lower extremity venous duplex scan.      Results for orders placed during the hospital encounter of 06/24/25    Duplex Venous Lower Extremity - Bilateral CAR 06/30/2025  6:48 PM    Interpretation Summary    Normal bilateral lower extremity venous duplex scan.      Results for orders placed during the hospital encounter of 06/24/25    Adult Transthoracic Echo Complete W/ Cont if Necessary Per Protocol 06/30/2025  1:46 PM    Interpretation Summary    Left ventricular systolic function is normal. Estimated left ventricular EF = 55%    Left ventricular wall thickness is consistent with mild concentric hypertrophy.    The left atrial cavity is mildly dilated.    There is calcification of the aortic valve.  No aortic valve regurgitation or stenosis present.    Insufficient TR jet to assess RVSP.  No echo findings of significant pulmonary hypertension identified      Plan for Follow-up of Pending Labs/Results: no pending results    Discharge Details        Discharge Medications        New Medications        Instructions  Start Date   hydrocortisone 25 MG suppository  Commonly known as: ANUSOL-HC   25 mg, Rectal, 2 Times Daily      PHARMACY MEDS TO BED CONSULT   Not Applicable, Daily      Psyllium 51.7 % packet  Commonly known as: METAMUCIL MULTIHEALTH FIBER   Mix 1 packet in at least 8 ounces of cool liquid and take by mouth Daily.             Changes to Medications        Instructions Start Date   Wegovy 1.7 MG/0.75ML solution auto-injector  Generic drug: Semaglutide-Weight Management  What changed: Another medication with the same name was removed. Continue taking this medication, and follow the directions you see here.   1.7 mg, Subcutaneous, Weekly             Continue These Medications        Instructions Start Date   acetaminophen 650 MG 8 hr tablet  Commonly known as: TYLENOL   650 mg, 3 times daily      amiodarone 200 MG tablet  Commonly known as: PACERONE   200 mg, Oral, Daily      apixaban 5 MG tablet tablet  Commonly known as: Eliquis   5 mg, Oral, Every 12 Hours Scheduled      ARIPiprazole 5 MG tablet  Commonly known as: ABILIFY   5 mg, Daily      atorvastatin 40 MG tablet  Commonly known as: LIPITOR   40 mg, Oral, Daily      bumetanide 1 MG tablet  Commonly known as: BUMEX   2 mg, Oral, Daily      docusate sodium 50 MG capsule  Commonly known as: COLACE   50 mg, Daily      empagliflozin 10 MG tablet tablet  Commonly known as: JARDIANCE   10 mg, Oral, Daily      ipratropium 0.06 % nasal spray  Commonly known as: ATROVENT   USE 2 SPRAYS IN EACH NOSTRIL EVERY DAY 30 MINUTES BEFORE DINNER      nitroglycerin 0.4 MG SL tablet  Commonly known as: NITROSTAT   0.4 mg, Every 5 Minutes PRN      OSTEO BI-FLEX TRIPLE STRENGTH PO   1 tablet, 2 Times Daily      oxyCODONE-acetaminophen  MG per tablet  Commonly known as: PERCOCET   1 tablet, Oral, Every 4 Hours PRN      potassium chloride 20 MEQ CR tablet  Commonly known as: KLOR-CON M20   20 mEq, Oral, Daily      PRESERVISION AREDS 2 PO   1 tablet, 2 Times Daily     "  sacubitril-valsartan 24-26 MG tablet  Commonly known as: ENTRESTO   1 tablet, Oral, Every 12 Hours Scheduled      trospium 60 MG capsule sustained-release 24 hr capsule   60 mg, Daily             Stop These Medications      Allegra Allergy 60 MG tablet  Generic drug: fexofenadine     clobetasol 0.05 % ointment  Commonly known as: TEMOVATE     escitalopram 10 MG tablet  Commonly known as: LEXAPRO     ketoconazole 2 % shampoo  Commonly known as: NIZORAL     metoprolol succinate XL 25 MG 24 hr tablet  Commonly known as: TOPROL-XL              Allergies   Allergen Reactions    Amoxicillin Other (See Comments)     Pt states \"Made me very sick\"         Discharge Disposition:  Home or Self Care    Diet:  Hospital:  Diet Order   Procedures    Diet: Fluid Restriction (240 mL/tray), Regular/House; Other (Specify mL/day) (1200); Fluid Consistency: Thin (IDDSI 0)            Activity:  as tolerated    Restrictions or Other Recommendations:  none       CODE STATUS:    Code Status and Medical Interventions: CPR (Attempt to Resuscitate); Full Support   Ordered at: 06/24/25 2003     Code Status (Patient has no pulse and is not breathing):    CPR (Attempt to Resuscitate)     Medical Interventions (Patient has pulse or is breathing):    Full Support     Level Of Support Discussed With:    Patient       Future Appointments   Date Time Provider Department Center   9/23/2025  2:00 PM Antwan Chamorro MD [facility]   12/23/2025  1:45 PM Yasir Canales IV, MD [facility]   4/14/2026  9:45 AM Yasir Canales IV, MD [facility]       Additional Instructions for the Follow-ups that You Need to Schedule       Ambulatory Referral For Screening Colonoscopy   As directed      DIAGNOSTIC COLONOSCOPY for rectal bleeding    Order Comments: DIAGNOSTIC COLONOSCOPY for rectal bleeding         Ambulatory Referral to Home Health   As directed      Face to Face Visit Date: 6/27/2025   Follow-up provider for Plan of Care?: " I treated the patient in an acute care facility and will not continue treatment after discharge.   Follow-up provider: SHANT GROSS [[id number]]   Reason/Clinical Findings: Acute kidney injury   Describe mobility limitations that make leaving home difficult: Impaired functional mobility, balance, gait and endurance   Nursing/Therapeutic Services Requested: Physical Therapy Occupational Therapy   PT orders: Therapeutic exercise Gait Training Strengthening Transfer training Home safety assessment   Weight Bearing Status: Full Weight Bearing   Occupational orders: Activities of daily living Energy conservation Strengthening Home safety assessment   Frequency: 1 Week 1        Discharge Follow-up with PCP   As directed       Currently Documented PCP:    Shant Gross MD    PCP Phone Number:    [phone]     Follow Up Details: 1 week        Discharge Follow-up with Specialty: RAKEL Alcantar ([employer] cards) 3-4 days   As directed      Specialty: RAKEL Alcantar ([employer] cards) 3-4 days                      Lulu Santo MD  07/02/25      Time Spent on Discharge:  I spent  45  minutes on this discharge activity which included: face-to-face encounter with the patient, reviewing the data in the system, coordination of the care with the nursing staff as well as consultants, documentation, and entering orders.

## 2025-07-02 NOTE — PROGRESS NOTES
" LOS: 7 days   Patient Care Team:  Mario Rowland MD as PCP - General (Internal Medicine)  Shekhar Galvez MD as Consulting Physician (Cardiology)  Rafaela Jones APRN as Nurse Practitioner (Interventional Cardiology)  Yasir Canales IV, MD as Consulting Physician (Interventional Cardiology)  Antwan Chamorro MD as Consulting Physician (Cardiology)    Chief Complaint: Hyponatremia   GÓMEZ    Subjective     Improving hyponatremia and renal function. SOB improved. LE edema persistent but better.     Subjective:  Symptoms:  Stable.  No shortness of breath or chest pain.    Diet:  Poor intake.        History taken from: patient    Objective     Vital Sign Min/Max for last 24 hours  Temp  Min: 97.7 °F (36.5 °C)  Max: 98.3 °F (36.8 °C)   BP  Min: 109/61  Max: 137/64   Pulse  Min: 36  Max: 64   Resp  Min: 18  Max: 20   SpO2  Min: 93 %  Max: 100 %   No data recorded   Weight  Min: 125 kg (274 lb 11.1 oz)  Max: 125 kg (274 lb 11.1 oz)     Flowsheet Rows      Flowsheet Row First Filed Value   Admission Height 185.4 cm (73\") Documented at 06/24/2025 1433   Admission Weight 163 kg (360 lb) Documented at 06/24/2025 1433            I/O this shift:  In: 1296 [P.O.:1296]  Out: 1200 [Urine:1200]  I/O last 3 completed shifts:  In: 1560 [P.O.:1560]  Out: 6750 [Urine:6750]    Objective:  General Appearance:  Comfortable.    Vital signs: (most recent): Blood pressure 111/59, pulse (!) 42, temperature 97.7 °F (36.5 °C), temperature source Oral, resp. rate 20, height 185.4 cm (72.99\"), weight 125 kg (274 lb 11.1 oz), SpO2 95%.    Output: Producing urine.    Lungs:  Normal effort.  There are wheezes.    Heart: Normal rate.  Regular rhythm.  S1 normal and S2 normal.  No murmur or gallop.   Abdomen: Abdomen is soft.    Extremities: Normal range of motion.  There is dependent edema.    Pulses: Distal pulses are intact.    Neurological: Patient is alert and oriented to person, place and time.  Normal strength.    Skin:  " "Warm.                Results Review:     I reviewed the patient's new clinical results.    WBC WBC   Date Value Ref Range Status   06/30/2025 8.84 3.40 - 10.80 10*3/mm3 Final      HGB Hemoglobin   Date Value Ref Range Status   07/01/2025 9.8 (L) 13.0 - 17.7 g/dL Final   06/30/2025 10.0 (L) 13.0 - 17.7 g/dL Final      HCT Hematocrit   Date Value Ref Range Status   07/01/2025 27.6 (L) 37.5 - 51.0 % Final   06/30/2025 28.7 (L) 37.5 - 51.0 % Final      Platlets No results found for: \"LABPLAT\"   MCV MCV   Date Value Ref Range Status   06/30/2025 95.3 79.0 - 97.0 fL Final          Sodium Sodium   Date Value Ref Range Status   07/02/2025 133 (L) 136 - 145 mmol/L Final   07/01/2025 129 (L) 136 - 145 mmol/L Final   07/01/2025 126 (L) 136 - 145 mmol/L Final   06/30/2025 126 (L) 136 - 145 mmol/L Final   06/30/2025 127 (L) 136 - 145 mmol/L Final   06/30/2025 123 (L) 136 - 145 mmol/L Final      Potassium Potassium   Date Value Ref Range Status   07/02/2025 3.3 (L) 3.5 - 5.2 mmol/L Final   07/01/2025 3.5 3.5 - 5.2 mmol/L Final   06/30/2025 3.5 3.5 - 5.2 mmol/L Final      Chloride Chloride   Date Value Ref Range Status   07/02/2025 96 (L) 98 - 107 mmol/L Final   07/01/2025 93 (L) 98 - 107 mmol/L Final   06/30/2025 89 (L) 98 - 107 mmol/L Final      CO2 CO2   Date Value Ref Range Status   07/02/2025 21.6 (L) 22.0 - 29.0 mmol/L Final   07/01/2025 23.0 22.0 - 29.0 mmol/L Final   06/30/2025 25.0 22.0 - 29.0 mmol/L Final      BUN BUN   Date Value Ref Range Status   07/02/2025 15.9 8.0 - 23.0 mg/dL Final   07/01/2025 15.2 8.0 - 23.0 mg/dL Final   06/30/2025 21.8 8.0 - 23.0 mg/dL Final      Creatinine Creatinine   Date Value Ref Range Status   07/02/2025 1.29 (H) 0.76 - 1.27 mg/dL Final   07/01/2025 1.24 0.76 - 1.27 mg/dL Final   06/30/2025 1.28 (H) 0.76 - 1.27 mg/dL Final      Calcium Calcium   Date Value Ref Range Status   07/02/2025 8.7 8.6 - 10.5 mg/dL Final   07/01/2025 8.2 (L) 8.6 - 10.5 mg/dL Final   06/30/2025 8.3 (L) 8.6 - 10.5 " "mg/dL Final      PO4 No results found for: \"CAPO4\"   Albumin No results found for: \"ALBUMIN\"   Magnesium Magnesium   Date Value Ref Range Status   06/30/2025 1.7 1.6 - 2.4 mg/dL Final      Uric Acid No results found for: \"URICACID\"     Medication Review: Yes    Assessment & Plan       Persistent atrial fibrillation    Hyperlipidemia LDL goal <50    Heart failure with improved ejection fraction (HFimpEF)    Coronary artery disease involving native coronary artery of native heart without angina pectoris    Elevated troponin level not due to acute coronary syndrome    Lymphedema      Assessment & Plan:    ARF:  Creatinine initially elevated at 2.5 on admission with hypotension on multiple diuretics along with RAAS suppression and SGLT2 inhibitor. Ok to resume enetersto on discharge. SGLT-2inh can be restarted as outpatient.     CKD: Creatinine in the past 1.1-1.3 range since 10/24.  May have coincided with addition of GDMT..     HTN: Blood pressure remains stable.  Entresto on hold due to ARF.  Monitor for now.     Hyponatremia: Patient denies history of low sodium in the past.  On initial workup serum osmolality normal range at 287 with urine osmolality of 241 and urine sodium of 28 despite serum sodium of 124.  Patient does report fluid intake with strict sodium avoidance.  Urine sodium now <20.  Suspect hypervolemic hyponatremia based on physical exam.     - Continue bumex 2 mg daily on discharge  - D/c Salt tabs on discharge  - Limit Fluid intake 1.2 liter/day. Na intake to 2 gm/day      Anemia: Hemoglobin stable at goal >10     Volume: Patient with chronic lymphedema.  Cannot use lower extremity edema as gauge of volume overload.  Nonoliguric urine output.    - Bumex 2 mg on discharge.        Thank you consulting us on Bo JACOBSEN Perez who is of high risk and complexity.  We will follow along closely       Chuck Doan MD  07/02/25  15:29 EDT            "

## 2025-07-02 NOTE — PROGRESS NOTES
Cardiology Progress Note      Reason for visit:    Chronic heart failure with improved EF  Hypotension  Bradycardia  Coronary artery disease  Persistent atrial fibrillation    IDENTIFICATION: Patient is 79-year-old gentleman who resides in [facility] Problems    Diagnosis  POA    **GÓMEZ (acute kidney injury) [N17.9]  Yes     Priority: High    Dehydration [E86.0]  Yes     Priority: High    Acute hyponatremia [E87.1]  Yes     Priority: High    Hypotension [I95.9]  Yes     Priority: High    Elevated troponin level not due to acute coronary syndrome [R79.89]  Yes     Priority: High    Heart failure with improved ejection fraction (HFimpEF) [I50.32]  Yes     Priority: High     Echo at [facility] (9/2020): LVEF 55-60%  Echo at [facility] (8/16/2024): LVEF 55-60%  Echo (8/31/2024): LVEF 30%  Echo (9/6/2024): LVEF 35%  Cardiac catheterization (12/30/2024): Mild to moderate 1-vessel CAD (RCA).  This lesion was not hemodynamically significant (RFR 0.95).  Minimal left coronary disease.  LVEF 35%.  Mildly elevated LV filling pressure (LVEDP 16 mmHg)  Echo (2/12/2025): LVEF 53%.  Aortic valve calcification with no significant stenosis or regurgitation      Coronary artery disease involving native coronary artery of native heart without angina pectoris [I25.10]  Yes     Priority: Medium     Cardiac catheterization (12/30/2024): Mild to moderate 1-vessel CAD (RCA).  This lesion was not hemodynamically significant (RFR 0.95).  Minimal left coronary disease.  LVEF 35%.  Mildly elevated LV filling pressure (LVEDP 16 mmHg)      Persistent atrial fibrillation [I48.19]  Yes     Priority: Medium     Chads Vasc 4 (age >75, CHF, CAD)  Cardiac catheterization (12/30/2024): Mild to moderate 1-vessel CAD (RCA).  This lesion was not hemodynamically significant (RFR 0.95).  Minimal left coronary disease.  LVEF 35%.  Mildly elevated LV filling pressure (LVEDP 16 mmHg)      Hyperlipidemia LDL goal <50  [E78.5]  Yes     Priority: Low     High intensity statin recommended due to the presence of CAD  Normal LP(a),       Lymphedema [I89.0]  Yes          I was contacted by the nurse yesterday that the patient had bradycardia heart rates 40s and 50s and back in atrial fibrillation.  He was relatively asymptomatic.  He has not been on any AV juliane blocking agents.  He is sitting up in the chair.  He is more drowsy today.  Nephrology started him on sodium tablets with improvement in sodium level to 133.  He was also started back on IV Bumex.  Pressures have been stable.  He is currently in atrial fibrillation/flutter with rates in the 50s to 60s.           Vital Sign Min/Max for last 24 hours  Temp  Min: 98 °F (36.7 °C)  Max: 98.5 °F (36.9 °C)   BP  Min: 109/61  Max: 150/74   Pulse  Min: 36  Max: 65   Resp  Min: 18  Max: 18   SpO2  Min: 91 %  Max: 100 %   No data recorded      Intake/Output Summary (Last 24 hours) at 2025 0803  Last data filed at 2025 0300  Gross per 24 hour   Intake 960 ml   Output 5650 ml   Net -4690 ml           Physical Exam  Constitutional:       General: He is sleeping.   Cardiovascular:      Rate and Rhythm: Normal rate. Rhythm irregular.      Heart sounds: Murmur heard.   Pulmonary:      Effort: Pulmonary effort is normal.      Breath sounds: Normal breath sounds.   Musculoskeletal:      Right lower le+ Pitting Edema present.      Left lower le+ Pitting Edema present.   Skin:     General: Skin is warm and dry.   Neurological:      Mental Status: He is oriented to person, place, and time and easily aroused.   Psychiatric:         Behavior: Behavior is cooperative.         Tele: Atrial fibrillation/flutter rates 50s to 60    Results Review (reviewed the patient's recent labs in the electronic medical record):     EKG (2025): Atrial flutter/bradycardia    CXR (2025): No acute cardiopulmonary abnormality.  Stable cardiomegaly    Echo (2025: LVEF 55%.  Calcification  aortic valve without regurgitation or stenosis.  No findings suggest pulmonary hypertension      Venous duplex (6/30/2025): No DVT    Results from last 7 days   Lab Units 07/02/25  0503 07/01/25  1352 07/01/25  0638 06/30/25  1733 06/30/25  0519 06/29/25  1009 06/28/25  0654 06/27/25  0651 06/26/25  1646 06/26/25  1646   SODIUM mmol/L 133* 129* 126*   < > 123* 125* 127* 129*  --  129*   POTASSIUM mmol/L 3.3*  --  3.5  --  3.5 3.7 3.8 4.2  --  4.1   CHLORIDE mmol/L 96*  --  93*  --  89* 91* 92* 95*  --  92*   BUN mg/dL 15.9  --  15.2  --  21.8 21.8 27.7* 33.0*  --  35.1*   CREATININE mg/dL 1.29*  --  1.24  --  1.28* 1.34* 1.33* 1.36*  --  1.30*   MAGNESIUM mg/dL  --   --   --   --  1.7 1.7 1.9 2.0   < >  --     < > = values in this interval not displayed.               Results from last 7 days   Lab Units 07/01/25  0638 06/30/25  0519 06/29/25  1009 06/28/25  0654   WBC 10*3/mm3  --  8.84 7.36 8.91   HEMOGLOBIN g/dL 9.8* 10.0* 10.1* 10.1*   HEMATOCRIT % 27.6* 28.7* 28.8* 28.3*   PLATELETS 10*3/mm3  --  132* 118* 134*       Lab Results   Component Value Date    HGBA1C 5.00 12/30/2024       Lab Results   Component Value Date    CHOL 125 12/30/2024    TRIG 110 12/30/2024    HDL 38 (L) 12/30/2024    LDL 43 02/18/2025                  Chronic heart failure with improved EF  Takes metoprolol succinate, Entresto and spironolactone at home but currently on hold due to hypotension  Currently euvolemic and compensated and actually dehydrated  Last echo 2/2025 LVEF 52% with grade 1 diastolic dysfunction.  Jardiance 10 mg daily  Intolerant to metoprolol due to causing bradycardia heart rates in the 40s  Patient does have lower extremity edema but has chronic lymphedema  Echo this admission shows LVEF 55%.  No significant valvular abnormality and no significant findings of pulmonary hypertension  Nephrology following due to hyponatremia.  They started Bumex 2 mg IV daily on 7/1/2025  Entresto restarted at 24/26 mg 1 tablet twice  daily        Hypotension from overdiuresis/dehydration  Sent from cardiology office to ED for severe hypotension  Received IV fluids with improvement in blood pressures.  Current /64     Coronary artery disease  Cath 12/2024 mild to moderate one-vessel CAD not significant by RFR.  Currently no angina symptoms  Aspirin deferred due to concomitant use of Eliquis     Elevated troponin not due to ACS  Troponins elevated and flat, EKG without acute ischemia.  Scenario not consistent with ACS but consistent with demand ischemia from with acute renal failure and dehydration     Persistent atrial fibrillation/flutter/bradycardia  Previously on Tikosyn but discontinued in the past due to prolonged QT  Amiodarone 200 mg daily  Eliquis 5 mg twice daily was placed on hold due to bloody stools but now restarted at 5 mg twice daily  Has had intermittent rate controlled atrial fibrillation this admission  Started back on metoprolol this admission but discontinued due to severe bradycardia with heart rates in the 40s  Currently in atrial flutter heart rate 40s     Frequent PVCs  Follows EP  Controlled on amiodarone  No PVCs noted on telemetry    Acute kidney injury  Creatinine was elevated 2.52 on admission  Hold nephrotoxic medications and diuretics on hold  Creatinine within normal limits at 1.24        Acute hyponatremia  Diuretics initially placed on hold.  Received IV fluids  Nephrology following and placed on fluid restriction 1200 mL a day  No sodium restriction to diet.  Nephrology started sodium tablets 2 g twice daily  Nephrology started IV Bumex         Possible GI bleed  Patient reported episode of bloody stools last evening.  H&H 9.8 and 27.6  Eliquis restarted on 6/28  Patient declined colonoscopy.  Felt to be due to hemorrhoids.  Started on Anusol and H&H has remained stable.         Avoid AV juliane blocking agents due to bradycardia  Will need 2-week monitor at discharge  We will continue amiodarone for  suppression of PVCs has history of high burden  Continue Entresto  Defer diuretic management to nephrology  When nephrology satisfied with care would be okay for discharge from our standpoint      Rafaela Jones, APRN

## 2025-07-02 NOTE — OUTREACH NOTE
Prep Survey      Flowsheet Row Responses   [facility] facility patient discharged from? [place]   Is LACE score < 7 ? No   Eligibility Readm Mgmt   Discharge diagnosis GÓMEZ (acute kidney injury)   Does the patient have one of the following disease processes/diagnoses(primary or secondary)? Other   Does the patient have Home health ordered? Yes   What is the Home health agency?  [facility]   Prep survey completed? Yes            Yina MAGANA - Registered Nurse

## 2025-07-03 LAB
QT INTERVAL: 498 MS
QTC INTERVAL: 509 MS

## 2025-07-07 ENCOUNTER — READMISSION MANAGEMENT (OUTPATIENT)
Dept: CALL CENTER | Facility: HOSPITAL | Age: 80
End: 2025-07-07
Payer: MEDICARE

## 2025-07-07 NOTE — OUTREACH NOTE
Medical Week 1 Survey      Flowsheet Row Responses   [facility] patient discharged from? [place]   Does the patient have one of the following disease processes/diagnoses(primary or secondary)? Other   Week 1 attempt successful? No   Unsuccessful attempts Attempt 1            Xuan SOSA - Registered Nurse

## 2025-07-10 ENCOUNTER — READMISSION MANAGEMENT (OUTPATIENT)
Dept: CALL CENTER | Facility: HOSPITAL | Age: 80
End: 2025-07-10
Payer: MEDICARE

## 2025-07-10 NOTE — OUTREACH NOTE
Medical Week 1 Survey      Flowsheet Row Responses   [facility] patient discharged from? [place]   Does the patient have one of the following disease processes/diagnoses(primary or secondary)? Other   Week 1 attempt successful? Yes   Call start time 1753   Call end time 1758   Discharge diagnosis GÓMEZ (acute kidney injury)   Meds reviewed with patient/caregiver? Yes   Is the patient having any side effects they believe may be caused by any medication additions or changes? No   Does the patient have all medications ordered at discharge? Yes   Is the patient taking all medications as directed (includes completed medication regime)? Yes   Does the patient have a primary care provider?  Yes   Does the patient have an appointment with their PCP within 7 days of discharge? Yes   Has the patient kept scheduled appointments due by today? Yes   Comments Saw PCP today and [facility] tomorrow.   What is the Home health agency?  [facility]   Has home health visited the patient within 72 hours of discharge? Yes   Psychosocial issues? No   Did the patient receive a copy of their discharge instructions? Yes   Nursing interventions Reviewed instructions with patient   What is the patient's perception of their health status since discharge? Improving   Is the patient/caregiver able to teach back signs and symptoms related to disease process for when to call PCP? Yes   Is the patient/caregiver able to teach back signs and symptoms related to disease process for when to call 911? Yes   Is the patient/caregiver able to teach back the hierarchy of who to call/visit for symptoms/problems? PCP, Specialist, Home health nurse, Urgent Care, ED, 911 Yes   Week 1 call completed? Yes   Call end time 1758            Gauri JIMÉNEZ - Licensed Nurse

## 2025-07-11 ENCOUNTER — OFFICE VISIT (OUTPATIENT)
Dept: CARDIOLOGY | Facility: HOSPITAL | Age: 80
End: 2025-07-11
Payer: MEDICARE

## 2025-07-11 VITALS
WEIGHT: 315 LBS | HEIGHT: 72 IN | BODY MASS INDEX: 42.66 KG/M2 | OXYGEN SATURATION: 98 % | DIASTOLIC BLOOD PRESSURE: 51 MMHG | SYSTOLIC BLOOD PRESSURE: 103 MMHG | HEART RATE: 82 BPM

## 2025-07-11 DIAGNOSIS — E87.1 ACUTE HYPONATREMIA: ICD-10-CM

## 2025-07-11 DIAGNOSIS — I48.19 PERSISTENT ATRIAL FIBRILLATION: ICD-10-CM

## 2025-07-11 DIAGNOSIS — I50.32 HEART FAILURE WITH IMPROVED EJECTION FRACTION (HFIMPEF): Primary | ICD-10-CM

## 2025-07-11 RX ORDER — GABAPENTIN 600 MG/1
600 TABLET ORAL 3 TIMES DAILY
COMMUNITY

## 2025-07-11 RX ORDER — ESCITALOPRAM OXALATE 10 MG/1
10 TABLET ORAL DAILY
COMMUNITY

## 2025-07-11 RX ORDER — MAGNESIUM GLUCONATE 27 MG(500)
250 TABLET ORAL DAILY
COMMUNITY

## 2025-07-14 NOTE — PROGRESS NOTES
"[place] Chief Complaint  Congestive Heart Failure    Subjective    History of Present Illness {  Problem List  Visit  Diagnosis   Encounters  Notes  Medications  Labs  Result Review Imaging  Media :23}       History of Present Illness   80-year-old male presents the office today for ongoing evaluation of his chronic HFrEF.  Patient was hospitalized at [facility] June 2025 with hypotension, acute kidney injury and hyponatremia.  GI evaluated patient for scant bright red blood per rectum and patient declined colonoscopy.  Will follow-up with GI outpatient.  Patient did develop asymptomatic bradycardia and Toprol was discontinued.  Initial creatinine upon admission was 2.52 with a baseline of 1.2-1.3.  At discharge creatinine back to 1.29.  Nephrology evaluated for patient's ongoing hyponatremia and he is to continue fluid restriction.  Patient reports he is feeling well other than chronic back pain and he currently denies chest pain, dyspnea, dizziness, palpitations, presyncope, syncope or pedal edema.  Objective     Vital Signs:   Vitals:    07/11/25 1438   BP: 103/51   BP Location: Left arm   Patient Position: Sitting   Pulse: 82   SpO2: 98%   Weight: (!) 165 kg (363 lb)   Height: 182.9 cm (72\")     Body mass index is 49.23 kg/m².  Physical Exam  Vitals and nursing note reviewed.   Constitutional:       Appearance: Normal appearance. He is obese.   HENT:      Head: Normocephalic.   Eyes:      Pupils: Pupils are equal, round, and reactive to light.   Cardiovascular:      Rate and Rhythm: Normal rate and regular rhythm.      Pulses: Normal pulses.      Heart sounds: Normal heart sounds. No murmur heard.  Pulmonary:      Effort: Pulmonary effort is normal.      Breath sounds: Normal breath sounds.   Abdominal:      General: Bowel sounds are normal.      Palpations: Abdomen is soft.   Musculoskeletal:         General: Normal range of motion.      Cervical back: Normal range of motion.      Right lower " leg: No edema.      Left lower leg: No edema.      Comments: Ambulates with rolling walker   Skin:     General: Skin is warm and dry.      Capillary Refill: Capillary refill takes less than 2 seconds.   Neurological:      Mental Status: He is alert and oriented to person, place, and time.   Psychiatric:         Mood and Affect: Mood normal.         Thought Content: Thought content normal.              Result Review  Data Reviewed:{ Labs  Result Review  Imaging  Med Tab  Media :23}   7/2/2025 with glucose 88, BUN 15.9, creatinine 1.29, sodium 139, potassium 3.3 ient reports that he had labs drawn with his PCP, chloride 109, CO2 21.6, calcium 8.7, estimated GFR 68           Assessment and Plan {CC Problem List  Visit Diagnosis  ROS  Review (Popup)  Health Maintenance  Quality  BestPractice  Medications  SmartSets  SnapShot Encounters  Media :23}   1. Heart failure with improved ejection fraction (HFimpEF)  Stable on Bumex , Entresto  Toprol recently DC'd due to asymptomatic bradycardia  Heart failure education today including signs and symptoms, the role of the heart failure center, daily weights, low sodium diet (less than 1500 mg per day), and daily physical activity. Reviewed HF Zones with patient and family.  Patient to continue current medications as previously ordered.   Nyha II  2. Persistent atrial fibrillation  CHADS-VASc Risk Assessment               3 Total Score    1 Hypertension    2 Age >/= 75    Criteria that do not apply:    CHF    DM    PRIOR STROKE/TIA/THROMBO    Vascular Disease    Age 65-74    Sex: Female        Stable on amiodarone, Eliquis      3. Acute hyponatremia  Resolved  Continue fluid restriction        Follow Up {Instructions Charge Capture  Follow-up Communications :23}   Return if symptoms worsen or fail to improve.    Patient was given instructions and counseling regarding his condition or for health maintenance advice. Please see specific information pulled into the  AVS if appropriate.  Patient was instructed to call the [facility] with any questions, concerns, or worsening symptoms.

## 2025-07-22 ENCOUNTER — SPECIALTY PHARMACY (OUTPATIENT)
Facility: HOSPITAL | Age: 80
End: 2025-07-22
Payer: MEDICARE

## 2025-07-22 ENCOUNTER — SPECIALTY PHARMACY (OUTPATIENT)
Dept: CARDIOLOGY | Facility: CLINIC | Age: 80
End: 2025-07-22
Payer: MEDICARE

## 2025-07-22 RX ORDER — SEMAGLUTIDE 1.7 MG/.75ML
1.7 INJECTION, SOLUTION SUBCUTANEOUS WEEKLY
Qty: 3 ML | Refills: 1 | Status: SHIPPED | OUTPATIENT
Start: 2025-07-22

## 2025-07-22 NOTE — PROGRESS NOTES
Specialty Pharmacy Patient Management Program  Cardiology Specialty Pharmacy Refill Outreach      Bo is a 80 y.o. male contacted today regarding refills of his medication(s).    Specialty medication(s) and dose(s) confirmed: Wegovy 1.7mg  Other medications being refilled: N/A    Refill Questions      Flowsheet Row Most Recent Value   Changes to allergies? No   Changes to medications? No   New conditions or infections since last clinic visit No   Unplanned office visit, urgent care, ED, or hospital admission in the last 4 weeks  Yes   How does patient/caregiver feel medication is working? Very good   Financial problems or insurance changes  No   Since the previous refill, were any specialty medication doses or scheduled injections missed or delayed?  No   Does this patient require a clinical escalation to a pharmacist? No            Delivery Questions      Flowsheet Row Most Recent Value   Delivery method UPS   Delivery address verified with patient/caregiver? Yes   Delivery address Home   Number of medications in delivery 1   Medication(s) being filled and delivered Semaglutide-Weight Management (Wegovy)   Doses left of specialty medications 1   Copay verified? Yes   Copay amount $499 with Wegovy copay card   Copay form of payment Credit/debit on file   Delivery Date Selection 07/23/25   Signature Required No   Do you consent to receive electronic handouts?  No                   Follow-up: 21 days     Madai Delgado CPhT  Pharmacy Care Coordinator  7/22/2025  10:30 EDT

## 2025-07-22 NOTE — PROGRESS NOTES
Specialty Pharmacy Patient Management Program  Per Protocol Prescription Order/Refill     Patient currently fills medications at [facility] and is enrolled in an Cardiology Patient Management Program.     Requested Prescriptions     Signed Prescriptions Disp Refills    Semaglutide-Weight Management (Wegovy) 1.7 MG/0.75ML solution auto-injector 3 mL 1     Sig: Inject 0.75 mL under the skin into the appropriate area as directed 1 (One) Time Per Week.     Prescription orders above were sent to the pharmacy per Collaborative Care Agreement Protocol.

## 2025-07-23 ENCOUNTER — READMISSION MANAGEMENT (OUTPATIENT)
Dept: CALL CENTER | Facility: HOSPITAL | Age: 80
End: 2025-07-23
Payer: MEDICARE

## 2025-07-23 NOTE — OUTREACH NOTE
Medical Week 2 Survey      Flowsheet Row Responses   [facility] patient discharged from? [place]   Does the patient have one of the following disease processes/diagnoses(primary or secondary)? Other   Week 2 attempt successful? No   Unsuccessful attempts Attempt 1   oke Darcy Blankenship Registered Nurse

## 2025-07-24 ENCOUNTER — DOCUMENTATION (OUTPATIENT)
Dept: CARDIOLOGY | Facility: CLINIC | Age: 80
End: 2025-07-24
Payer: MEDICARE

## 2025-07-24 NOTE — PROGRESS NOTES
I called Bo to discuss the results of his monitor.  It shows 100% rate controlled atrial fibrillation.  He did have 7 different pauses the longest of 3 seconds for which she was asymptomatic. lowest heart rate was 34 and max 109.  He is not on any beta-blocker.  We will continue to avoid AV juliane blocking agents.  No further recommendations.  Monitor is acceptable.  Patient and wife verbalized understanding      RAKEL Vaughn

## 2025-07-29 ENCOUNTER — PATIENT MESSAGE (OUTPATIENT)
Dept: CARDIOLOGY | Facility: CLINIC | Age: 80
End: 2025-07-29
Payer: MEDICARE

## 2025-07-31 ENCOUNTER — PATIENT MESSAGE (OUTPATIENT)
Dept: CARDIOLOGY | Facility: CLINIC | Age: 80
End: 2025-07-31
Payer: MEDICARE

## 2025-08-26 ENCOUNTER — OFFICE VISIT (OUTPATIENT)
Dept: CARDIOLOGY | Facility: CLINIC | Age: 80
End: 2025-08-26
Payer: MEDICARE

## 2025-08-26 VITALS
WEIGHT: 315 LBS | OXYGEN SATURATION: 94 % | HEART RATE: 75 BPM | DIASTOLIC BLOOD PRESSURE: 60 MMHG | SYSTOLIC BLOOD PRESSURE: 90 MMHG | BODY MASS INDEX: 42.66 KG/M2 | HEIGHT: 72 IN

## 2025-08-26 DIAGNOSIS — I50.32 HEART FAILURE WITH IMPROVED EJECTION FRACTION (HFIMPEF): Primary | ICD-10-CM

## 2025-08-26 DIAGNOSIS — I49.3 FREQUENT PVCS: ICD-10-CM

## 2025-08-26 DIAGNOSIS — I25.10 CORONARY ARTERY DISEASE INVOLVING NATIVE CORONARY ARTERY OF NATIVE HEART WITHOUT ANGINA PECTORIS: ICD-10-CM

## 2025-08-26 DIAGNOSIS — R00.0 WIDE-COMPLEX TACHYCARDIA: ICD-10-CM

## 2025-08-26 DIAGNOSIS — I48.19 PERSISTENT ATRIAL FIBRILLATION: ICD-10-CM

## 2025-08-26 DIAGNOSIS — E78.5 HYPERLIPIDEMIA LDL GOAL <50: ICD-10-CM

## 2025-08-28 ENCOUNTER — SPECIALTY PHARMACY (OUTPATIENT)
Dept: CARDIOLOGY | Facility: CLINIC | Age: 80
End: 2025-08-28
Payer: MEDICARE

## 2025-08-28 ENCOUNTER — TELEPHONE (OUTPATIENT)
Dept: CARDIOLOGY | Facility: CLINIC | Age: 80
End: 2025-08-28
Payer: MEDICARE

## 2025-08-28 ENCOUNTER — SPECIALTY PHARMACY (OUTPATIENT)
Dept: GENERAL RADIOLOGY | Facility: HOSPITAL | Age: 80
End: 2025-08-28
Payer: MEDICARE

## 2025-08-28 RX ORDER — SEMAGLUTIDE 2.4 MG/.75ML
2.4 INJECTION, SOLUTION SUBCUTANEOUS WEEKLY
Qty: 3 ML | Refills: 3 | Status: SHIPPED | OUTPATIENT
Start: 2025-08-28

## (undated) DEVICE — CATH DIAG EXPO .056 FL3.5 6F 100CM

## (undated) DEVICE — CATH DIAG EXPO M/ PK 6FR FL4/FR4 PIG 3PK

## (undated) DEVICE — PK CATH CARD 10

## (undated) DEVICE — GLIDESHEATH BASIC HYDROPHILIC COATED INTRODUCER SHEATH: Brand: GLIDESHEATH

## (undated) DEVICE — GW PRESSUREWIRE X WIRELESS FFR 175CM

## (undated) DEVICE — TR BAND RADIAL ARTERY COMPRESSION DEVICE: Brand: TR BAND

## (undated) DEVICE — NDL ANGIOGR ADV THN SMOTH SGLWALL 21G 1.5

## (undated) DEVICE — MODEL AT P65, P/N 701554-001KIT CONTENTS: HAND CONTROLLER, 3-WAY HIGH-PRESSURE STOPCOCK WITH ROTATING END AND PREMIUM HIGH-PRESSURE TUBING: Brand: ANGIOTOUCH® KIT

## (undated) DEVICE — GW PERIPH GUIDERIGHT STD/EXCHNG/J/TIP SS 0.035IN 5X260CM

## (undated) DEVICE — ST EXT IV SMRTSTE 2VLV FIX M LL 6ML 41

## (undated) DEVICE — MODEL BT2000 P/N 700287-012KIT CONTENTS: MANIFOLD WITH SALINE AND CONTRAST PORTS, SALINE TUBING WITH SPIKE AND HAND SYRINGE, TRANSDUCER: Brand: BT2000 AUTOMATED MANIFOLD KIT

## (undated) DEVICE — KT VLV HEMO MAP ACC PLS LG/BORE MTL/INTRO W/TORQ/DEV

## (undated) DEVICE — CVR PROB ULTRASND/TRANSD W/GEL 7X11IN STRL

## (undated) DEVICE — GUIDE CATHETER: Brand: MACH1™

## (undated) DEVICE — ADULT, W/LG. BACK PAD, RADIOTRANSPARENT ELEMENT AND LEAD WIRE COMPATIBLE W/: Brand: DEFIBRILLATION ELECTRODES